# Patient Record
Sex: MALE | Race: WHITE | NOT HISPANIC OR LATINO | Employment: FULL TIME | URBAN - METROPOLITAN AREA
[De-identification: names, ages, dates, MRNs, and addresses within clinical notes are randomized per-mention and may not be internally consistent; named-entity substitution may affect disease eponyms.]

---

## 2018-04-04 ENCOUNTER — TRANSFERRED RECORDS (OUTPATIENT)
Dept: HEALTH INFORMATION MANAGEMENT | Facility: CLINIC | Age: 53
End: 2018-04-04

## 2021-09-17 ENCOUNTER — OFFICE VISIT (OUTPATIENT)
Dept: PHYSICAL MEDICINE AND REHAB | Facility: CLINIC | Age: 56
End: 2021-09-17
Payer: COMMERCIAL

## 2021-09-17 VITALS
OXYGEN SATURATION: 96 % | DIASTOLIC BLOOD PRESSURE: 81 MMHG | HEART RATE: 87 BPM | BODY MASS INDEX: 32.58 KG/M2 | WEIGHT: 220 LBS | HEIGHT: 69 IN | SYSTOLIC BLOOD PRESSURE: 119 MMHG
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
## 2021-09-17 DIAGNOSIS — R41.3 MEMORY LOSS: ICD-10-CM

## 2021-09-17 DIAGNOSIS — G47.30 SLEEP APNEA, UNSPECIFIED TYPE: Primary | ICD-10-CM

## 2021-09-17 DIAGNOSIS — E03.9 HYPOTHYROIDISM, UNSPECIFIED TYPE: ICD-10-CM

## 2021-09-17 DIAGNOSIS — F43.10 PTSD (POST-TRAUMATIC STRESS DISORDER): ICD-10-CM

## 2021-09-17 DIAGNOSIS — S13.4XXS WHIPLASH INJURIES, SEQUELA: ICD-10-CM

## 2021-09-17 PROCEDURE — 99204 OFFICE O/P NEW MOD 45 MIN: CPT | Performed by: PHYSICAL MEDICINE & REHABILITATION

## 2021-09-17 RX ORDER — MIRTAZAPINE 30 MG/1
30 TABLET, FILM COATED ORAL AT BEDTIME
COMMUNITY
Start: 2021-08-16

## 2021-09-17 RX ORDER — ALBUTEROL SULFATE 90 UG/1
AEROSOL, METERED RESPIRATORY (INHALATION)
COMMUNITY
Start: 2021-07-14

## 2021-09-17 RX ORDER — DIVALPROEX SODIUM 500 MG/1
1000 TABLET, DELAYED RELEASE ORAL
COMMUNITY
End: 2022-12-26

## 2021-09-17 RX ORDER — INSULIN LISPRO 100 [IU]/ML
INJECTION, SOLUTION INTRAVENOUS; SUBCUTANEOUS
Status: ON HOLD | COMMUNITY
Start: 2021-08-24 | End: 2024-01-01

## 2021-09-17 RX ORDER — LEVOTHYROXINE SODIUM 150 UG/1
1 TABLET ORAL DAILY
COMMUNITY
Start: 2021-07-22

## 2021-09-17 RX ORDER — LEVETIRACETAM 1000 MG/1
TABLET ORAL
COMMUNITY
Start: 2021-08-18 | End: 2022-10-24

## 2021-09-17 RX ORDER — BLOOD SUGAR DIAGNOSTIC
STRIP MISCELLANEOUS
COMMUNITY
Start: 2020-04-22

## 2021-09-17 RX ORDER — PEN NEEDLE, DIABETIC 30 GX5/16"
NEEDLE, DISPOSABLE MISCELLANEOUS
COMMUNITY
Start: 2020-12-10

## 2021-09-17 RX ORDER — ONDANSETRON 4 MG/1
TABLET, ORALLY DISINTEGRATING ORAL
Status: ON HOLD | COMMUNITY
Start: 2021-02-12 | End: 2024-01-01

## 2021-09-17 RX ORDER — GABAPENTIN 300 MG/1
900 CAPSULE ORAL 3 TIMES DAILY
COMMUNITY
Start: 2021-08-31

## 2021-09-17 RX ORDER — HYDROXYZINE PAMOATE 25 MG/1
CAPSULE ORAL
COMMUNITY
Start: 2021-08-19

## 2021-09-17 RX ORDER — CYCLOBENZAPRINE HCL 10 MG
TABLET ORAL
COMMUNITY
Start: 2020-12-14 | End: 2022-12-26

## 2021-09-17 RX ORDER — OXYMETAZOLINE HCL 0.05% 0.05 MG/ML
SPRAY NASAL
COMMUNITY
Start: 2021-04-24

## 2021-09-17 ASSESSMENT — MIFFLIN-ST. JEOR: SCORE: 1828.29

## 2021-09-17 ASSESSMENT — PAIN SCALES - GENERAL: PAINLEVEL: NO PAIN (0)
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
## 2021-09-24 ENCOUNTER — HOSPITAL ENCOUNTER (OUTPATIENT)
Dept: SPEECH THERAPY | Facility: CLINIC | Age: 56
Setting detail: THERAPIES SERIES
End: 2021-09-24
Attending: PHYSICAL MEDICINE & REHABILITATION
Payer: COMMERCIAL

## 2021-09-24 PROCEDURE — 96125 COGNITIVE TEST BY HC PRO: CPT | Mod: GN | Performed by: SPEECH-LANGUAGE PATHOLOGIST
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
## 2021-09-27 NOTE — PROGRESS NOTES
Speech Language Pathology     Cognitive Linguistic Quick Test (CLQT)    SUMMARY OF TEST:    The CLQT assesses visual attention and perception, working memory and language output skills, as well as auditory memory and comprehension.  Non-linguistic tasks can help assess planning, and self-monitoring, visual discrimination and analysis, as well as creativity and mental flexibility.   Together, these subtests assess the cognitive domains of attention, memory, executive function, language, and visuospatial skills using a severity rating of either WNL (within normal limits), Mild, Moderate or Severe.    RESULTS OF TESTING:   Attention    Score: 121    Severity Rating: Moderate   Memory    Score: 121    Severity Rating: Moderate   Executive Functions    Score: 26    Severity Rating: WNL    Language    Score: 28    Severity Rating: Mild   Visuospatial Skills    Score: 64    Severity Rating: Mild    Composite Severity Rating    Score: 2.8    Severity Rating: Mild    Clock Drawing     Score: 11    Severity Rating: Mild     INTERPRETATION OF TEST RESULTS: Overall pt scored in the mild range for overage cognitive domain score with moderate impairments in memory and attention, mild impairments in language and visuospatial skills and WNL in executive functions. Individual subtests in which pt scored below normal limits included symbol cancellation, clock drawing, story retelling, symbol trails, and design memory. Pt was WFL for the following individual subtests personal facts, confrontation naming, generative naming, mazes, and design generation. Patterns of impaired attention and memory with relatively intact executive function were noted.  TIME ADMINISTERING TEST: 45 min  TIME FOR INTERPRETATION AND PREPARATION OF REPORT: 45 min  TOTAL TIME: 90 min  Reference:  Kimmie Salas, [place], CCC-SLP, (2001) [employer]    Thank you for referring this patient.     Lynn Amado MS, CCC-SLP  Speech-Language  [facility]  [phone]

## 2021-10-01 ENCOUNTER — HOSPITAL ENCOUNTER (OUTPATIENT)
Dept: SPEECH THERAPY | Facility: CLINIC | Age: 56
Setting detail: THERAPIES SERIES
End: 2021-10-01
Payer: COMMERCIAL

## 2021-10-01 PROCEDURE — 97130 THER IVNTJ EA ADDL 15 MIN: CPT | Mod: GN | Performed by: SPEECH-LANGUAGE PATHOLOGIST

## 2021-10-01 PROCEDURE — 97129 THER IVNTJ 1ST 15 MIN: CPT | Mod: GN | Performed by: SPEECH-LANGUAGE PATHOLOGIST

## 2021-10-06 ENCOUNTER — HOSPITAL ENCOUNTER (OUTPATIENT)
Dept: SPEECH THERAPY | Facility: CLINIC | Age: 56
Setting detail: THERAPIES SERIES
End: 2021-10-06
Attending: PHYSICAL MEDICINE & REHABILITATION
Payer: COMMERCIAL

## 2021-10-06 PROCEDURE — 97130 THER IVNTJ EA ADDL 15 MIN: CPT | Mod: GN | Performed by: SPEECH-LANGUAGE PATHOLOGIST

## 2021-10-06 PROCEDURE — 97129 THER IVNTJ 1ST 15 MIN: CPT | Mod: GN | Performed by: SPEECH-LANGUAGE PATHOLOGIST

## 2021-10-14 ENCOUNTER — VIRTUAL VISIT (OUTPATIENT)
Dept: BEHAVIORAL HEALTH | Facility: CLINIC | Age: 56
End: 2021-10-14
Attending: PHYSICAL MEDICINE & REHABILITATION

## 2021-10-14 DIAGNOSIS — Z91.199 NO-SHOW FOR APPOINTMENT: Primary | ICD-10-CM

## 2021-10-14 NOTE — PROGRESS NOTES
No Show:    This patient was a no show for this scheduled appointment. Pt did not connect via text invitation to [employer]. Made two call attempts, both went to . Left a message with instructions of how to reschedule.     Connor Swenson, JENI    October 14, 2021

## 2021-10-15 ENCOUNTER — HOSPITAL ENCOUNTER (OUTPATIENT)
Dept: SPEECH THERAPY | Facility: CLINIC | Age: 56
Setting detail: THERAPIES SERIES
End: 2021-10-15
Attending: PHYSICAL MEDICINE & REHABILITATION
Payer: COMMERCIAL

## 2021-10-15 PROCEDURE — 97129 THER IVNTJ 1ST 15 MIN: CPT | Mod: GN | Performed by: SPEECH-LANGUAGE PATHOLOGIST

## 2021-10-15 PROCEDURE — 97130 THER IVNTJ EA ADDL 15 MIN: CPT | Mod: GN | Performed by: SPEECH-LANGUAGE PATHOLOGIST

## 2021-10-26 ENCOUNTER — HOSPITAL ENCOUNTER (OUTPATIENT)
Dept: SPEECH THERAPY | Facility: CLINIC | Age: 56
Setting detail: THERAPIES SERIES
End: 2021-10-26
Attending: PHYSICAL MEDICINE & REHABILITATION
Payer: COMMERCIAL

## 2021-10-26 PROCEDURE — 97130 THER IVNTJ EA ADDL 15 MIN: CPT | Mod: GN | Performed by: SPEECH-LANGUAGE PATHOLOGIST

## 2021-10-26 PROCEDURE — 97129 THER IVNTJ 1ST 15 MIN: CPT | Mod: GN | Performed by: SPEECH-LANGUAGE PATHOLOGIST

## 2021-11-10 ENCOUNTER — HOSPITAL ENCOUNTER (OUTPATIENT)
Dept: SPEECH THERAPY | Facility: CLINIC | Age: 56
Setting detail: THERAPIES SERIES
End: 2021-11-10
Attending: PHYSICAL MEDICINE & REHABILITATION
Payer: COMMERCIAL

## 2021-11-10 PROCEDURE — 97130 THER IVNTJ EA ADDL 15 MIN: CPT | Mod: GN | Performed by: SPEECH-LANGUAGE PATHOLOGIST

## 2021-11-10 PROCEDURE — 97129 THER IVNTJ 1ST 15 MIN: CPT | Mod: GN | Performed by: SPEECH-LANGUAGE PATHOLOGIST

## 2021-11-17 ENCOUNTER — HOSPITAL ENCOUNTER (OUTPATIENT)
Dept: SPEECH THERAPY | Facility: CLINIC | Age: 56
Setting detail: THERAPIES SERIES
End: 2021-11-17
Attending: PHYSICAL MEDICINE & REHABILITATION

## 2021-11-17 PROCEDURE — 97130 THER IVNTJ EA ADDL 15 MIN: CPT | Mod: GN | Performed by: SPEECH-LANGUAGE PATHOLOGIST

## 2021-11-17 PROCEDURE — 97129 THER IVNTJ 1ST 15 MIN: CPT | Mod: GN | Performed by: SPEECH-LANGUAGE PATHOLOGIST

## 2021-11-18 ENCOUNTER — VIRTUAL VISIT (OUTPATIENT)
Dept: BEHAVIORAL HEALTH | Facility: CLINIC | Age: 56
End: 2021-11-18

## 2021-11-18 DIAGNOSIS — Z91.199 NO-SHOW FOR APPOINTMENT: Primary | ICD-10-CM

## 2021-11-18 NOTE — PROGRESS NOTES
No Show:    This patient was a no show for this scheduled appointment. Sent two invitations to join video visit, by email and text. Called mobile 2x, went to . Left a message with instructions of how to reschedule.     Connor Swenson, JENI    November 18, 2021

## 2021-11-24 ENCOUNTER — HOSPITAL ENCOUNTER (OUTPATIENT)
Dept: SPEECH THERAPY | Facility: CLINIC | Age: 56
Setting detail: THERAPIES SERIES
End: 2021-11-24
Attending: PHYSICAL MEDICINE & REHABILITATION

## 2021-11-24 PROCEDURE — 97130 THER IVNTJ EA ADDL 15 MIN: CPT | Mod: GN | Performed by: SPEECH-LANGUAGE PATHOLOGIST

## 2021-11-24 PROCEDURE — 97129 THER IVNTJ 1ST 15 MIN: CPT | Mod: GN | Performed by: SPEECH-LANGUAGE PATHOLOGIST

## 2021-12-07 ENCOUNTER — THERAPY VISIT (OUTPATIENT)
Dept: PHYSICAL THERAPY | Facility: CLINIC | Age: 56
End: 2021-12-07
Payer: COMMERCIAL

## 2021-12-07 DIAGNOSIS — M54.2 CERVICALGIA: ICD-10-CM

## 2021-12-07 DIAGNOSIS — M54.50 LUMBAGO: ICD-10-CM

## 2021-12-07 DIAGNOSIS — M54.6 MIDLINE THORACIC BACK PAIN: ICD-10-CM

## 2021-12-07 PROCEDURE — 97110 THERAPEUTIC EXERCISES: CPT | Mod: GP
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
## 2022-01-27 ENCOUNTER — MEDICAL CORRESPONDENCE (OUTPATIENT)
Dept: HEALTH INFORMATION MANAGEMENT | Facility: CLINIC | Age: 57
End: 2022-01-27

## 2022-08-23 ENCOUNTER — TRANSFERRED RECORDS (OUTPATIENT)
Dept: HEALTH INFORMATION MANAGEMENT | Facility: CLINIC | Age: 57
End: 2022-08-23

## 2022-08-23 ENCOUNTER — MEDICAL CORRESPONDENCE (OUTPATIENT)
Dept: HEALTH INFORMATION MANAGEMENT | Facility: CLINIC | Age: 57
End: 2022-08-23

## 2022-08-27 ENCOUNTER — TRANSFERRED RECORDS (OUTPATIENT)
Dept: HEALTH INFORMATION MANAGEMENT | Facility: CLINIC | Age: 57
End: 2022-08-27

## 2022-08-30 ENCOUNTER — MEDICAL CORRESPONDENCE (OUTPATIENT)
Dept: HEALTH INFORMATION MANAGEMENT | Facility: CLINIC | Age: 57
End: 2022-08-30

## 2022-09-07 ENCOUNTER — MEDICAL CORRESPONDENCE (OUTPATIENT)
Dept: HEALTH INFORMATION MANAGEMENT | Facility: CLINIC | Age: 57
End: 2022-09-07

## 2022-10-19 ENCOUNTER — TRANSCRIBE ORDERS (OUTPATIENT)
Dept: OTHER | Age: 57
End: 2022-10-19

## 2022-10-19 ENCOUNTER — TELEPHONE (OUTPATIENT)
Dept: NEUROLOGY | Facility: CLINIC | Age: 57
End: 2022-10-19

## 2022-10-19 DIAGNOSIS — R41.89 COGNITIVE IMPAIRMENT: ICD-10-CM

## 2022-10-19 DIAGNOSIS — R73.09 LABILE BLOOD GLUCOSE: ICD-10-CM

## 2022-10-19 DIAGNOSIS — G40.919 INTRACTABLE SEIZURE DISORDER (H): Primary | ICD-10-CM

## 2022-10-19 DIAGNOSIS — G43.719 INTRACTABLE CHRONIC MIGRAINE WITHOUT AURA AND WITHOUT STATUS MIGRAINOSUS: ICD-10-CM

## 2022-10-19 DIAGNOSIS — R29.6 REPEATED FALLS: ICD-10-CM

## 2022-10-19 DIAGNOSIS — R00.0 TACHYCARDIA: ICD-10-CM

## 2022-10-19 DIAGNOSIS — R68.89 FLU-LIKE SYMPTOMS: ICD-10-CM

## 2022-10-24 ENCOUNTER — OFFICE VISIT (OUTPATIENT)
Dept: NEUROLOGY | Facility: CLINIC | Age: 57
End: 2022-10-24
Attending: PSYCHIATRY & NEUROLOGY
Payer: COMMERCIAL

## 2022-10-24 VITALS
BODY MASS INDEX: 31.25 KG/M2 | SYSTOLIC BLOOD PRESSURE: 126 MMHG | HEIGHT: 69 IN | HEART RATE: 76 BPM | TEMPERATURE: 97.8 F | WEIGHT: 211 LBS | DIASTOLIC BLOOD PRESSURE: 82 MMHG

## 2022-10-24 DIAGNOSIS — G40.319: Primary | ICD-10-CM

## 2022-10-24 RX ORDER — LEVETIRACETAM 750 MG/1
1500 TABLET ORAL 2 TIMES DAILY
COMMUNITY
Start: 2022-08-02

## 2022-10-24 RX ORDER — LACOSAMIDE 100 MG/1
100 TABLET ORAL 2 TIMES DAILY
COMMUNITY
Start: 2022-09-06

## 2022-10-24 RX ORDER — POLYETHYLENE GLYCOL 3350 17 G/17G
17 POWDER, FOR SOLUTION ORAL
Status: ON HOLD | COMMUNITY
End: 2024-01-01

## 2022-10-24 RX ORDER — DIHYDROERGOTAMINE MESYLATE 4 MG/ML
1 SPRAY NASAL
Status: ON HOLD | COMMUNITY
Start: 2022-08-11 | End: 2024-01-01

## 2022-10-24 RX ORDER — AMOXICILLIN 250 MG
1 CAPSULE ORAL DAILY
Status: ON HOLD | COMMUNITY
End: 2024-01-01

## 2022-10-24 RX ORDER — CETIRIZINE HYDROCHLORIDE 10 MG/1
10 TABLET ORAL DAILY
COMMUNITY
Start: 2022-07-22 | End: 2022-12-26

## 2022-10-24 RX ORDER — ASPIRIN 81 MG/1
81 TABLET, CHEWABLE ORAL EVERY EVENING
COMMUNITY
Start: 2022-08-03

## 2022-10-24 RX ORDER — LEVOTHYROXINE SODIUM 150 UG/1
150 TABLET ORAL DAILY
Status: ON HOLD | COMMUNITY
Start: 2022-08-02 | End: 2024-01-01

## 2022-10-24 RX ORDER — CALCIUM CARBONATE 500(1250)
500 TABLET,CHEWABLE ORAL
COMMUNITY
Start: 2022-08-02
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
## 2022-10-26 ENCOUNTER — TELEPHONE (OUTPATIENT)
Dept: NEUROLOGY | Facility: CLINIC | Age: 57
End: 2022-10-26

## 2022-10-26 NOTE — TELEPHONE ENCOUNTER
Silvano Motta is calling to inform Dr. Torres that the medication he has been taking is as follows :    Vimpat prescribed by Dr. Maddy Belle at [facility].    Patient states he was advised by Dr. Torres to provide this information.

## 2022-10-28 ENCOUNTER — TRANSFERRED RECORDS (OUTPATIENT)
Dept: HEALTH INFORMATION MANAGEMENT | Facility: CLINIC | Age: 57
End: 2022-10-28

## 2022-10-29 ENCOUNTER — TELEPHONE (OUTPATIENT)
Dept: NEUROLOGY | Facility: CLINIC | Age: 57
End: 2022-10-29

## 2022-10-29 NOTE — TELEPHONE ENCOUNTER
"Patient of Dr. Torres, needs \"full neuropsychological testing\" to qualify for assisted living, wondered how that would be arranged. Answer:  Call (or [facility], but he's been having trouble with MyChart) Dr. Torres's office on Monday.  "

## 2022-10-31 NOTE — TELEPHONE ENCOUNTER
Patient's mother is requesting a call back to discuss neuropsych testing. Caller would like to know if Dr. Torres would be willing to order this.    Please follow up at [phone].

## 2022-10-31 NOTE — TELEPHONE ENCOUNTER
Patient's mother states patient is ready to be admitted to assisted living, but needs to be deemed disabled by neuropsych testing. It looks like the schedulers have placed an appointment on hold for the patient for next week. Will check with Lacy Vizcarra and Brian to see if they both agree to this request.

## 2022-10-31 NOTE — TELEPHONE ENCOUNTER
Per Dr. Vizcarra, a clinical np test is not appropriate when the stated goal is in regards to disability claims. Patient was provided with the following providers to contact as alternatives.  Claudia Boo ([place])   Hafsa Adhikari ([place])   Lj Jin ([place])   Dawood Kuhn ([place])

## 2022-10-31 NOTE — TELEPHONE ENCOUNTER
Spoke with patient and recommended the following providers, per Dr. Vizcarra's recommendation, to do an evaluation for the patient:  Claudia Boo ([place])   Hafsa Adhikari ([place])   Lj Jin ([place])   Dawood Kuhn ([place])     Patient took down the information and will contact alternate providers.

## 2022-11-01 NOTE — TELEPHONE ENCOUNTER
What is the concern that needs to be addressed by a nurse? Patient  calling to let  know that Patient does not need the np testing for disability claim , he needs it to transition out from  and to be out in the community and if he will be able to do this testing for patient ?    May a detailed message be left on voicemail? yes    Date of last office visit: 10/24/2022    Message routed to: [facility] RN POOL

## 2022-11-02 NOTE — TELEPHONE ENCOUNTER
Contacted [facility], informed we can not do the type of neuropsychiatry testing they are looking for. Gave her the Providers we had recommended previously

## 2022-11-02 NOTE — TELEPHONE ENCOUNTER
Patient called back to clinic and is really wanting to have Neuropsych testing done, said he just needs regular Neuropsych testing. Would like to get referral for it. Informed Patient that Dr. Torres does not think it's necessary at this time. He would still abbi to have it done

## 2022-11-03 ENCOUNTER — MEDICAL CORRESPONDENCE (OUTPATIENT)
Dept: HEALTH INFORMATION MANAGEMENT | Facility: CLINIC | Age: 57
End: 2022-11-03

## 2022-11-03 ENCOUNTER — ANCILLARY PROCEDURE (OUTPATIENT)
Dept: NEUROLOGY | Facility: CLINIC | Age: 57
End: 2022-11-03
Attending: PSYCHIATRY & NEUROLOGY
Payer: COMMERCIAL

## 2022-11-03 DIAGNOSIS — G40.319: ICD-10-CM

## 2022-11-03 NOTE — TELEPHONE ENCOUNTER
Call placed to patient.  We discussed 's recommendations that he should discuss neuropsychometric testing with his primary care provider, or his regular neurologist (both part of [facility] system)    notes that he would only order epilepsy specific testing if indicated, and at this time he does not feel the testing is indicated.    Patient expressed understanding of this, and said he would call his  as his doctors at [facility] would not be available

## 2022-11-08 ENCOUNTER — TELEPHONE (OUTPATIENT)
Dept: NEUROLOGY | Facility: CLINIC | Age: 57
End: 2022-11-08

## 2022-11-08 NOTE — TELEPHONE ENCOUNTER
What is the concern that needs to be addressed by a nurse? Patient mom is hoping to get EEG results done on 11/3/22. Offered appointment on 11/23 but they will be out of town and hoping to get results before then.    May a detailed message be left on voicemail? Yes    Date of last office visit: 10/24/22    Message routed to: [facility] RN pool

## 2022-12-16 ENCOUNTER — TELEPHONE (OUTPATIENT)
Dept: NEUROLOGY | Facility: CLINIC | Age: 57
End: 2022-12-16

## 2022-12-16 NOTE — TELEPHONE ENCOUNTER
What is the concern that needs to be addressed by a nurse?Patient sister would like to discuss few thing with nurse. Recent results and next steps. Consent of file    May a detailed message be left on voicemail? Yes    Date of last office visit: 10/24/22    Message routed to: [facility] RN Pool

## 2022-12-19 NOTE — TELEPHONE ENCOUNTER
Wants to make sure Dr. Torres has been able to review records from [facility], available thru [facility] where

## 2022-12-20 NOTE — TELEPHONE ENCOUNTER
Chart reviewed.  Scanned consent to communicate reviewed.   The document signature has not been dated.   Signature does not match signature of photo ID and previous consent to communicate that has not names listed in the person to communicate with section (date 10/5/2021)

## 2022-12-26 ENCOUNTER — OFFICE VISIT (OUTPATIENT)
Dept: NEUROLOGY | Facility: CLINIC | Age: 57
End: 2022-12-26
Payer: COMMERCIAL

## 2022-12-26 VITALS
BODY MASS INDEX: 31.25 KG/M2 | DIASTOLIC BLOOD PRESSURE: 82 MMHG | HEART RATE: 78 BPM | HEIGHT: 69 IN | SYSTOLIC BLOOD PRESSURE: 116 MMHG | WEIGHT: 211 LBS | TEMPERATURE: 97.9 F

## 2022-12-26 DIAGNOSIS — G40.319: Primary | ICD-10-CM

## 2022-12-26 DIAGNOSIS — G35 MULTIPLE SCLEROSIS (H): ICD-10-CM
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
## 2022-12-26 NOTE — LETTER
Date:December 27, 2022      Patient was self referred, no letter generated. Do not send.        [facility] Health Information
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
## 2022-12-31 NOTE — TELEPHONE ENCOUNTER
RECORDS RECEIVED FROM: Internal   REASON FOR VISIT: MS   Date of Appt: 1.25.23   NOTES (FOR ALL VISITS) STATUS DETAILS   OFFICE NOTE from referring provider Internal 12.26.22 Dr. Torres, [facility] Neurology   NEUROPSYCH Care Everywhere [facility]:  7/20/21   MEDICATION LIST Internal    IMAGING  (FOR ALL VISITS)     LUMBAR PUNCTURE Care Everywhere [facility]:  8/26/22   MRI (HEAD, NECK, SPINE) Received   8.26.22 MR Brain  8.26.22 MR Angio Head  12.10.21 MRI Brain   12.9.21 MR Brain  11.26.21 MR cervical spine  11.26.21 MR brain      Action 12.31.22 MJ   Action Taken Requested images from     * states images are in PACS. No * images were requested    1/18/23 MV 2.26pm  2nd request faxed to [facility] for Dec 2021 MRI Brains    1/19/23 MV 7.03am  Images resolved in PACS

## 2023-01-01 ENCOUNTER — TELEPHONE (OUTPATIENT)
Dept: NEUROLOGY | Facility: CLINIC | Age: 58
End: 2023-01-01
Payer: COMMERCIAL

## 2023-01-01 ENCOUNTER — PRE VISIT (OUTPATIENT)
Dept: NEUROLOGY | Facility: CLINIC | Age: 58
End: 2023-01-01

## 2023-01-01 ENCOUNTER — ANCILLARY PROCEDURE (OUTPATIENT)
Dept: MRI IMAGING | Facility: CLINIC | Age: 58
End: 2023-01-01
Attending: COUNSELOR
Payer: COMMERCIAL

## 2023-01-01 ENCOUNTER — TELEPHONE (OUTPATIENT)
Dept: NEUROLOGY | Facility: CLINIC | Age: 58
End: 2023-01-01

## 2023-01-01 DIAGNOSIS — R25.1 TREMOR: ICD-10-CM

## 2023-01-01 DIAGNOSIS — R41.3 MEMORY LOSS: ICD-10-CM

## 2023-01-01 DIAGNOSIS — R90.89 LEPTOMENINGEAL ENHANCEMENT ON MRI OF BRAIN: ICD-10-CM

## 2023-01-01 DIAGNOSIS — R29.2 REFLEX ASYMMETRY: ICD-10-CM

## 2023-01-01 PROCEDURE — 70553 MRI BRAIN STEM W/O & W/DYE: CPT | Mod: GC | Performed by: STUDENT IN AN ORGANIZED HEALTH CARE EDUCATION/TRAINING PROGRAM

## 2023-01-01 PROCEDURE — A9585 GADOBUTROL INJECTION: HCPCS | Performed by: STUDENT IN AN ORGANIZED HEALTH CARE EDUCATION/TRAINING PROGRAM

## 2023-01-01 PROCEDURE — 72156 MRI NECK SPINE W/O & W/DYE: CPT | Mod: GC | Performed by: RADIOLOGY

## 2023-01-01 RX ORDER — GADOBUTROL 604.72 MG/ML
10 INJECTION INTRAVENOUS ONCE
Status: COMPLETED | OUTPATIENT
Start: 2023-01-01 | End: 2023-01-01

## 2023-01-01 RX ADMIN — GADOBUTROL 10 ML: 604.72 INJECTION INTRAVENOUS at 08:51

## 2023-01-25 ENCOUNTER — LAB (OUTPATIENT)
Dept: LAB | Facility: CLINIC | Age: 58
End: 2023-01-25
Payer: COMMERCIAL

## 2023-01-25 ENCOUNTER — OFFICE VISIT (OUTPATIENT)
Dept: NEUROLOGY | Facility: CLINIC | Age: 58
End: 2023-01-25
Attending: PSYCHIATRY & NEUROLOGY
Payer: COMMERCIAL

## 2023-01-25 ENCOUNTER — PRE VISIT (OUTPATIENT)
Dept: NEUROLOGY | Facility: CLINIC | Age: 58
End: 2023-01-25
Payer: COMMERCIAL

## 2023-01-25 VITALS
HEART RATE: 87 BPM | DIASTOLIC BLOOD PRESSURE: 75 MMHG | OXYGEN SATURATION: 96 % | WEIGHT: 211.3 LBS | SYSTOLIC BLOOD PRESSURE: 115 MMHG | BODY MASS INDEX: 31.2 KG/M2

## 2023-01-25 DIAGNOSIS — R25.1 TREMOR: ICD-10-CM

## 2023-01-25 DIAGNOSIS — R90.89 LEPTOMENINGEAL ENHANCEMENT ON MRI OF BRAIN: Primary | ICD-10-CM

## 2023-01-25 DIAGNOSIS — R29.2 REFLEX ASYMMETRY: ICD-10-CM

## 2023-01-25 DIAGNOSIS — G35 MULTIPLE SCLEROSIS (H): ICD-10-CM

## 2023-01-25 DIAGNOSIS — R41.3 MEMORY LOSS: ICD-10-CM

## 2023-01-25 DIAGNOSIS — R90.89 LEPTOMENINGEAL ENHANCEMENT ON MRI OF BRAIN: ICD-10-CM

## 2023-01-25 LAB
Lab: NORMAL
PERFORMING LABORATORY: NORMAL
RHEUMATOID FACT SER NEPH-ACNC: <7 IU/ML
SPECIMEN STATUS: NORMAL
T PALLIDUM AB SER QL: NONREACTIVE
TEST NAME: NORMAL

## 2023-01-25 PROCEDURE — 99205 OFFICE O/P NEW HI 60 MIN: CPT | Mod: GC | Performed by: PSYCHIATRY & NEUROLOGY

## 2023-01-25 PROCEDURE — 86038 ANTINUCLEAR ANTIBODIES: CPT | Mod: 90 | Performed by: PATHOLOGY

## 2023-01-25 PROCEDURE — 36415 COLL VENOUS BLD VENIPUNCTURE: CPT | Performed by: PATHOLOGY

## 2023-01-25 PROCEDURE — 86255 FLUORESCENT ANTIBODY SCREEN: CPT | Performed by: PATHOLOGY

## 2023-01-25 PROCEDURE — 86225 DNA ANTIBODY NATIVE: CPT | Mod: 90 | Performed by: PATHOLOGY

## 2023-01-25 PROCEDURE — 86431 RHEUMATOID FACTOR QUANT: CPT | Mod: 90 | Performed by: PATHOLOGY

## 2023-01-25 PROCEDURE — 83519 RIA NONANTIBODY: CPT | Performed by: PATHOLOGY

## 2023-01-25 PROCEDURE — 86780 TREPONEMA PALLIDUM: CPT | Mod: 90 | Performed by: PATHOLOGY

## 2023-01-25 PROCEDURE — G0463 HOSPITAL OUTPT CLINIC VISIT: HCPCS | Performed by: PSYCHIATRY & NEUROLOGY

## 2023-01-25 PROCEDURE — 86036 ANCA SCREEN EACH ANTIBODY: CPT | Mod: 90 | Performed by: PATHOLOGY

## 2023-01-25 PROCEDURE — G0463 HOSPITAL OUTPT CLINIC VISIT: HCPCS

## 2023-01-25 PROCEDURE — 86235 NUCLEAR ANTIGEN ANTIBODY: CPT | Mod: 90 | Performed by: PATHOLOGY

## 2023-01-25 PROCEDURE — 86596 VOLTAGE-GTD CA CHNL ANTB EA: CPT | Performed by: PATHOLOGY

## 2023-01-25 PROCEDURE — 86341 ISLET CELL ANTIBODY: CPT | Mod: 90 | Performed by: PATHOLOGY

## 2023-01-25 PROCEDURE — 86039 ANTINUCLEAR ANTIBODIES (ANA): CPT | Mod: 90 | Performed by: PATHOLOGY

## 2023-01-25 PROCEDURE — 99000 SPECIMEN HANDLING OFFICE-LAB: CPT | Performed by: PATHOLOGY

## 2023-01-25 PROCEDURE — 86256 FLUORESCENT ANTIBODY TITER: CPT | Mod: 90 | Performed by: PATHOLOGY

## 2023-01-25 RX ORDER — ACETAMINOPHEN 500 MG
1000 TABLET ORAL
Status: ON HOLD | COMMUNITY
Start: 2022-08-02 | End: 2024-01-01

## 2023-01-25 ASSESSMENT — PAIN SCALES - GENERAL: PAINLEVEL: NO PAIN (0)

## 2023-01-25 NOTE — NURSING NOTE
Chief Complaint   Patient presents with     MS     New Patient     Multiple sclerosis/Demeylination      Vitals were taken and medications were reconciled.   Marcio Baptiste, EMT  8:14 AM
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
## 2023-01-25 NOTE — PATIENT INSTRUCTIONS
We don't see findings consistent with MS on your MRI. I don't think this is MS. You did have oligoclonal bands in your spinal fluid which can fit with other immune diseases.     We would like to repeat your MRI to check how things have changed since you were in the hospital. We would also like to check for changes in your neck.     We will send some lab work for autoimmune diseases which could explain your symptoms including FLORENTINO ab which can sometimes cause seizures and be associated with DMI.     It is possible that many of the changes you are experiencing are related to your TBI and seizures. Seizures can cause injury to the brain over time.     Follow up in 2 weeks to discuss results (with Dr. Ibrahim)

## 2023-01-26 LAB
ANA PAT SER IF-IMP: ABNORMAL
ANA SER QL IF: POSITIVE
ANA TITR SER IF: ABNORMAL {TITER}
ANCA AB PATTERN SER IF-IMP: NORMAL
C-ANCA TITR SER IF: NORMAL {TITER}
DSDNA AB SER-ACNC: 2 IU/ML
ENA SS-A AB SER IA-ACNC: <0.5 U/ML
ENA SS-A AB SER IA-ACNC: NEGATIVE
ENA SS-B IGG SER IA-ACNC: <0.6 U/ML
ENA SS-B IGG SER IA-ACNC: NEGATIVE

## 2023-01-28 LAB — GAD65 AB SER IA-ACNC: 42.7 IU/ML

## 2023-02-02 LAB — MAYO MISC RESULT: NORMAL

## 2023-02-13 ENCOUNTER — LAB (OUTPATIENT)
Dept: LAB | Facility: CLINIC | Age: 58
End: 2023-02-13
Payer: COMMERCIAL

## 2023-02-13 ENCOUNTER — OFFICE VISIT (OUTPATIENT)
Dept: NEUROLOGY | Facility: CLINIC | Age: 58
End: 2023-02-13
Attending: COUNSELOR
Payer: COMMERCIAL

## 2023-02-13 VITALS
OXYGEN SATURATION: 94 % | SYSTOLIC BLOOD PRESSURE: 122 MMHG | DIASTOLIC BLOOD PRESSURE: 83 MMHG | BODY MASS INDEX: 31.5 KG/M2 | HEART RATE: 87 BPM | WEIGHT: 213.3 LBS

## 2023-02-13 DIAGNOSIS — R41.3 MEMORY LOSS: ICD-10-CM

## 2023-02-13 DIAGNOSIS — G40.909 SEIZURE DISORDER (H): Primary | ICD-10-CM

## 2023-02-13 DIAGNOSIS — G40.909 SEIZURE DISORDER (H): ICD-10-CM

## 2023-02-13 DIAGNOSIS — G44.219 EPISODIC TENSION-TYPE HEADACHE, NOT INTRACTABLE: ICD-10-CM

## 2023-02-13 LAB
Lab: NORMAL
PERFORMING LABORATORY: NORMAL
TEST NAME: NORMAL

## 2023-02-13 PROCEDURE — 86341 ISLET CELL ANTIBODY: CPT | Performed by: PATHOLOGY

## 2023-02-13 PROCEDURE — G0463 HOSPITAL OUTPT CLINIC VISIT: HCPCS | Performed by: COUNSELOR

## 2023-02-13 PROCEDURE — 99215 OFFICE O/P EST HI 40 MIN: CPT | Performed by: COUNSELOR

## 2023-02-13 PROCEDURE — 36415 COLL VENOUS BLD VENIPUNCTURE: CPT | Performed by: PATHOLOGY

## 2023-02-13 PROCEDURE — G0463 HOSPITAL OUTPT CLINIC VISIT: HCPCS

## 2023-02-13 PROCEDURE — 86255 FLUORESCENT ANTIBODY SCREEN: CPT | Performed by: PATHOLOGY

## 2023-02-13 PROCEDURE — 99417 PROLNG OP E/M EACH 15 MIN: CPT | Performed by: COUNSELOR

## 2023-02-13 ASSESSMENT — PAIN SCALES - GENERAL: PAINLEVEL: NO PAIN (0)

## 2023-02-13 NOTE — NURSING NOTE
Chief Complaint   Patient presents with     MS     New Patient     2 week follow up lab work done prior      Vitals were taken and medications were reconciled.   Marcio Baptiste, EMT  12:35 PM
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
## 2023-02-13 NOTE — PATIENT INSTRUCTIONS
MRI brain (repeat) and cervical spine.  Blood work today: autoimmune epilepsy panel.    Your FLORENTINO antibody was very mildly elevated, this can sometimes be associated with autoimmune epilepsy. Yours is not high enough for this to be of big concern but it should be rechecked if your. seizures were to become refractory to treatment.   We will refer you to a headache specialist.     If we do not see anything on the MRI of concern you can follow up with Dr. Torres as previously planned.
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
## 2023-02-23 LAB — MAYO MISC RESULT: ABNORMAL

## 2023-03-22 NOTE — TELEPHONE ENCOUNTER
What is the concern that needs to be addressed by a nurse?  called in stating that he needs an call back regarding patient medical disability case with jose luis calvo from 11/09/2020 until now .  would like a call back at [phone]        May a detailed message be left on voicemail?yes        Message routed to: enoc lopez

## 2023-03-23 NOTE — TELEPHONE ENCOUNTER
"Per   \"He had a history of seizures that were controlled with medications that were started by other physicians before I first saw him in October 2022.  I am not prescribing the medications.     I referred him to the MS Clinic for evaluation of brain white matter lesions on MRI, which do not appear to represent MS, but rather some other white matter condition (unrelated to epilepsy) that might cause his encephalopathy and focal motor deficits.  The MS specialists have ordered cervical and brain MRI, which have not been completed yet.       I do not have an opinion on any possible cause of disability.  If someone else completed a disability form, Dr. Peña may want to speak with that person.  Thanks.   \"    Call placed to , message left with a call back, and a comment that  does not have an opinion on any possible cause of disability  "

## 2023-03-28 NOTE — DISCHARGE INSTRUCTIONS
MRI Contrast Discharge Instructions    The IV contrast you received today will pass out of your body in your  urine. This will happen in the next 24 hours. You will not feel this process.  Your urine will not change color.    Drink at least 4 extra glasses of water or juice today (unless your doctor  has restricted your fluids). This reduces the stress on your kidneys.  You may take your regular medicines.    If you are on dialysis: It is best to have dialysis today.    If you have a reaction: Most reactions happen right away. If you have  any new symptoms after leaving the hospital (such as hives or swelling),  call your hospital at the correct number below. Or call your family doctor.  If you have breathing distress or wheezing, call 911.    Special instructions: ***    I have read and understand the above information.    Signature:______________________________________ Date:___________    Staff:__________________________________________ Date:___________     Time:__________    [place] [facility] Departments:    ___[place]: [phone]  ___[facility]: [phone]  ___[place]: [phone] ___[facility]: [phone]  ___[facility]: [phone]  ___[facility]: [phone]  ___[phone]  ___[facility]: [phone]  ___[phone]

## 2023-03-28 NOTE — DISCHARGE INSTRUCTIONS
MRI Contrast Discharge Instructions    The IV contrast you received today will pass out of your body in your  urine. This will happen in the next 24 hours. You will not feel this process.  Your urine will not change color.    Drink at least 4 extra glasses of water or juice today (unless your doctor  has restricted your fluids). This reduces the stress on your kidneys.  You may take your regular medicines.    If you are on dialysis: It is best to have dialysis today.    If you have a reaction: Most reactions happen right away. If you have  any new symptoms after leaving the hospital (such as hives or swelling),  call your hospital at the correct number below. Or call your family doctor.  If you have breathing distress or wheezing, call 911.    Special instructions: ***    I have read and understand the above information.    Signature:______________________________________ Date:___________    Staff:__________________________________________ Date:___________     Time:__________    [place] [facility] Departments:    ___[place]: [phone]  ___[facility]: [phone]  ___[place]: [phone] ___[facility]: [phone]  ___[facility]: [phone]  ___[facility]: [phone]  ___[phone]  ___[facility]: [phone]  ___[phone]
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
## 2023-07-07 NOTE — TELEPHONE ENCOUNTER
LVM for pt, Please verify pt's insurance. If he has only [employer] with medicare coverage, both appts will need to be canceled. Dr. Rosenthal and Dr. Torres.    7/7/23 BD

## 2024-01-01 ENCOUNTER — APPOINTMENT (OUTPATIENT)
Dept: ULTRASOUND IMAGING | Facility: CLINIC | Age: 59
DRG: 003 | End: 2024-01-01
Attending: STUDENT IN AN ORGANIZED HEALTH CARE EDUCATION/TRAINING PROGRAM
Payer: COMMERCIAL

## 2024-01-01 ENCOUNTER — APPOINTMENT (OUTPATIENT)
Dept: PHYSICAL THERAPY | Facility: CLINIC | Age: 59
DRG: 003 | End: 2024-01-01
Attending: INTERNAL MEDICINE
Payer: COMMERCIAL

## 2024-01-01 ENCOUNTER — APPOINTMENT (OUTPATIENT)
Dept: CT IMAGING | Facility: CLINIC | Age: 59
DRG: 003 | End: 2024-01-01
Attending: STUDENT IN AN ORGANIZED HEALTH CARE EDUCATION/TRAINING PROGRAM
Payer: COMMERCIAL

## 2024-01-01 ENCOUNTER — APPOINTMENT (OUTPATIENT)
Dept: CT IMAGING | Facility: CLINIC | Age: 59
DRG: 003 | End: 2024-01-01
Payer: COMMERCIAL

## 2024-01-01 ENCOUNTER — APPOINTMENT (OUTPATIENT)
Dept: GENERAL RADIOLOGY | Facility: CLINIC | Age: 59
DRG: 003 | End: 2024-01-01
Attending: STUDENT IN AN ORGANIZED HEALTH CARE EDUCATION/TRAINING PROGRAM
Payer: COMMERCIAL

## 2024-01-01 ENCOUNTER — APPOINTMENT (OUTPATIENT)
Dept: NEUROLOGY | Facility: CLINIC | Age: 59
DRG: 003 | End: 2024-01-01
Attending: STUDENT IN AN ORGANIZED HEALTH CARE EDUCATION/TRAINING PROGRAM
Payer: COMMERCIAL

## 2024-01-01 ENCOUNTER — APPOINTMENT (OUTPATIENT)
Dept: GENERAL RADIOLOGY | Facility: CLINIC | Age: 59
DRG: 003 | End: 2024-01-01
Attending: INTERNAL MEDICINE
Payer: COMMERCIAL

## 2024-01-01 ENCOUNTER — APPOINTMENT (OUTPATIENT)
Dept: ULTRASOUND IMAGING | Facility: CLINIC | Age: 59
DRG: 003 | End: 2024-01-01
Attending: INTERNAL MEDICINE
Payer: COMMERCIAL

## 2024-01-01 ENCOUNTER — REFERRAL (OUTPATIENT)
Dept: TRANSPLANT | Facility: CLINIC | Age: 59
End: 2024-01-01

## 2024-01-01 ENCOUNTER — APPOINTMENT (OUTPATIENT)
Dept: PHYSICAL THERAPY | Facility: CLINIC | Age: 59
DRG: 003 | End: 2024-01-01
Payer: COMMERCIAL

## 2024-01-01 ENCOUNTER — TELEPHONE (OUTPATIENT)
Dept: CARDIOLOGY | Facility: CLINIC | Age: 59
End: 2024-01-01
Payer: COMMERCIAL

## 2024-01-01 ENCOUNTER — TRANSCRIBE ORDERS (OUTPATIENT)
Dept: OTHER | Age: 59
End: 2024-01-01

## 2024-01-01 ENCOUNTER — ANESTHESIA EVENT (OUTPATIENT)
Dept: MEDSURG UNIT | Facility: CLINIC | Age: 59
DRG: 003 | End: 2024-01-01
Payer: COMMERCIAL

## 2024-01-01 ENCOUNTER — APPOINTMENT (OUTPATIENT)
Dept: CARDIOLOGY | Facility: CLINIC | Age: 59
DRG: 003 | End: 2024-01-01
Attending: NURSE PRACTITIONER
Payer: COMMERCIAL

## 2024-01-01 ENCOUNTER — MEDICAL CORRESPONDENCE (OUTPATIENT)
Dept: HEALTH INFORMATION MANAGEMENT | Facility: CLINIC | Age: 59
End: 2024-01-01
Payer: COMMERCIAL

## 2024-01-01 ENCOUNTER — APPOINTMENT (OUTPATIENT)
Dept: CARDIOLOGY | Facility: CLINIC | Age: 59
DRG: 003 | End: 2024-01-01
Attending: STUDENT IN AN ORGANIZED HEALTH CARE EDUCATION/TRAINING PROGRAM
Payer: COMMERCIAL

## 2024-01-01 ENCOUNTER — APPOINTMENT (OUTPATIENT)
Dept: CARDIOLOGY | Facility: CLINIC | Age: 59
DRG: 003 | End: 2024-01-01
Payer: COMMERCIAL

## 2024-01-01 ENCOUNTER — APPOINTMENT (OUTPATIENT)
Dept: CARDIOLOGY | Facility: CLINIC | Age: 59
DRG: 003 | End: 2024-01-01
Attending: INTERNAL MEDICINE
Payer: COMMERCIAL

## 2024-01-01 ENCOUNTER — PRE VISIT (OUTPATIENT)
Dept: CARDIOLOGY | Facility: CLINIC | Age: 59
End: 2024-01-01
Payer: COMMERCIAL

## 2024-01-01 ENCOUNTER — HOSPITAL ENCOUNTER (INPATIENT)
Facility: CLINIC | Age: 59
LOS: 36 days | DRG: 003 | End: 2024-03-11
Attending: INTERNAL MEDICINE | Admitting: INTERNAL MEDICINE
Payer: COMMERCIAL

## 2024-01-01 ENCOUNTER — APPOINTMENT (OUTPATIENT)
Dept: GENERAL RADIOLOGY | Facility: CLINIC | Age: 59
DRG: 003 | End: 2024-01-01
Payer: COMMERCIAL

## 2024-01-01 ENCOUNTER — TELEPHONE (OUTPATIENT)
Dept: NEUROLOGY | Facility: CLINIC | Age: 59
End: 2024-01-01

## 2024-01-01 ENCOUNTER — APPOINTMENT (OUTPATIENT)
Dept: NUCLEAR MEDICINE | Facility: CLINIC | Age: 59
DRG: 003 | End: 2024-01-01
Attending: STUDENT IN AN ORGANIZED HEALTH CARE EDUCATION/TRAINING PROGRAM
Payer: COMMERCIAL

## 2024-01-01 ENCOUNTER — ANESTHESIA (OUTPATIENT)
Dept: MEDSURG UNIT | Facility: CLINIC | Age: 59
DRG: 003 | End: 2024-01-01
Payer: COMMERCIAL

## 2024-01-01 ENCOUNTER — TRANSFERRED RECORDS (OUTPATIENT)
Dept: HEALTH INFORMATION MANAGEMENT | Facility: CLINIC | Age: 59
End: 2024-01-01
Payer: COMMERCIAL

## 2024-01-01 ENCOUNTER — APPOINTMENT (OUTPATIENT)
Dept: CT IMAGING | Facility: CLINIC | Age: 59
DRG: 003 | End: 2024-01-01
Attending: INTERNAL MEDICINE
Payer: COMMERCIAL

## 2024-01-01 ENCOUNTER — APPOINTMENT (OUTPATIENT)
Dept: GENERAL RADIOLOGY | Facility: CLINIC | Age: 59
DRG: 003 | End: 2024-01-01
Attending: PHYSICIAN ASSISTANT
Payer: COMMERCIAL

## 2024-01-01 ENCOUNTER — TELEPHONE (OUTPATIENT)
Dept: INTERNAL MEDICINE | Facility: CLINIC | Age: 59
End: 2024-01-01

## 2024-01-01 VITALS
WEIGHT: 247.8 LBS | TEMPERATURE: 98.4 F | HEIGHT: 70 IN | HEART RATE: 95 BPM | SYSTOLIC BLOOD PRESSURE: 122 MMHG | DIASTOLIC BLOOD PRESSURE: 60 MMHG | OXYGEN SATURATION: 97 % | RESPIRATION RATE: 15 BRPM | BODY MASS INDEX: 35.48 KG/M2

## 2024-01-01 DIAGNOSIS — E10.65 TYPE 1 DIABETES MELLITUS WITH HYPERGLYCEMIA (H): ICD-10-CM

## 2024-01-01 DIAGNOSIS — I27.20 PULMONARY HYPERTENSION (H): ICD-10-CM

## 2024-01-01 DIAGNOSIS — R06.02 SOB (SHORTNESS OF BREATH): Primary | ICD-10-CM

## 2024-01-01 DIAGNOSIS — Z09 ENCOUNTER FOR FOLLOW-UP EXAMINATION AFTER COMPLETED TREATMENT FOR CONDITIONS OTHER THAN MALIGNANT NEOPLASM: ICD-10-CM

## 2024-01-01 DIAGNOSIS — Z11.59 ENCOUNTER FOR SCREENING FOR OTHER VIRAL DISEASES: ICD-10-CM

## 2024-01-01 DIAGNOSIS — I46.9 CARDIAC ARREST (H): ICD-10-CM

## 2024-01-01 DIAGNOSIS — I27.20 PULMONARY HYPERTENSION (H): Primary | ICD-10-CM

## 2024-01-01 DIAGNOSIS — Z91.89 AT HIGH RISK FOR PRESSURE INJURY OF SKIN: ICD-10-CM

## 2024-01-01 DIAGNOSIS — R79.9 ABNORMAL FINDING OF BLOOD CHEMISTRY, UNSPECIFIED: ICD-10-CM

## 2024-01-01 DIAGNOSIS — J96.01 ACUTE HYPOXEMIC RESPIRATORY FAILURE (H): ICD-10-CM

## 2024-01-01 LAB
7AMINOCLONAZEPAM SERPL-MCNC: NEGATIVE NG/ML
A FLAVUS AB SER QL ID: NORMAL
A FUMIGATUS1 AB SER QL ID: NORMAL
A FUMIGATUS2 AB SER QL ID: NORMAL
A FUMIGATUS3 AB SER QL ID: NORMAL
A FUMIGATUS6 AB SER QL ID: NORMAL
A PULLULANS AB SER QL ID: NORMAL
ABO/RH(D): NORMAL
ACANTHOCYTES BLD QL SMEAR: NORMAL
ACT BLD: 108 SECONDS (ref 74–150)
ACT BLD: 112 SECONDS (ref 74–150)
ACT BLD: 116 SECONDS (ref 74–150)
ACT BLD: 120 SECONDS (ref 74–150)
ACT BLD: 124 SECONDS (ref 74–150)
ACT BLD: 135 SECONDS (ref 74–150)
ACT BLD: 139 SECONDS (ref 74–150)
ACT BLD: 143 SECONDS (ref 74–150)
ACT BLD: 143 SECONDS (ref 74–150)
ACT BLD: 147 SECONDS (ref 74–150)
ACT BLD: 151 SECONDS (ref 74–150)
ACT BLD: 154 SECONDS (ref 74–150)
ACT BLD: 155 SECONDS (ref 74–150)
ACT BLD: 155 SECONDS (ref 74–150)
ACT BLD: 158 SECONDS (ref 74–150)
ACT BLD: 162 SECONDS (ref 74–150)
ACT BLD: 166 SECONDS (ref 74–150)
ACT BLD: 170 SECONDS (ref 74–150)
ACT BLD: 174 SECONDS (ref 74–150)
ACT BLD: 178 SECONDS (ref 74–150)
ACT BLD: 182 SECONDS (ref 74–150)
ACT BLD: 182 SECONDS (ref 74–150)
ACT BLD: 185 SECONDS (ref 74–150)
ACT BLD: 185 SECONDS (ref 74–150)
ACT BLD: 186 SECONDS (ref 74–150)
ACT BLD: 186 SECONDS (ref 74–150)
ACT BLD: 189 SECONDS (ref 74–150)
ACT BLD: 193 SECONDS (ref 74–150)
ACT BLD: 197 SECONDS (ref 74–150)
ACT BLD: 201 SECONDS (ref 74–150)
ACT BLD: 205 SECONDS (ref 74–150)
ACT BLD: 205 SECONDS (ref 74–150)
ACT BLD: 213 SECONDS (ref 74–150)
ACT BLD: 228 SECONDS (ref 74–150)
ACT BLD: 302 SECONDS (ref 74–150)
ALBUMIN SERPL BCG-MCNC: 3 G/DL (ref 3.5–5.2)
ALBUMIN SERPL BCG-MCNC: 3.1 G/DL (ref 3.5–5.2)
ALBUMIN SERPL BCG-MCNC: 3.2 G/DL (ref 3.5–5.2)
ALBUMIN SERPL BCG-MCNC: 3.3 G/DL (ref 3.5–5.2)
ALBUMIN SERPL BCG-MCNC: 3.4 G/DL (ref 3.5–5.2)
ALBUMIN SERPL BCG-MCNC: 3.5 G/DL (ref 3.5–5.2)
ALBUMIN SERPL BCG-MCNC: 3.6 G/DL (ref 3.5–5.2)
ALBUMIN SERPL BCG-MCNC: 3.7 G/DL (ref 3.5–5.2)
ALBUMIN SERPL BCG-MCNC: 3.8 G/DL (ref 3.5–5.2)
ALBUMIN SERPL BCG-MCNC: 3.9 G/DL (ref 3.5–5.2)
ALBUMIN SERPL BCG-MCNC: 3.9 G/DL (ref 3.5–5.2)
ALBUMIN SERPL BCG-MCNC: 4 G/DL (ref 3.5–5.2)
ALBUMIN SERPL BCG-MCNC: 4.1 G/DL (ref 3.5–5.2)
ALBUMIN SERPL BCG-MCNC: 4.2 G/DL (ref 3.5–5.2)
ALBUMIN SERPL BCG-MCNC: 4.2 G/DL (ref 3.5–5.2)
ALBUMIN UR-MCNC: 50 MG/DL
ALBUMIN UR-MCNC: NEGATIVE MG/DL
ALBUMIN UR-MCNC: NEGATIVE MG/DL
ALDOLASE SERPL-CCNC: 5.7 U/L
ALLEN'S TEST: ABNORMAL
ALLEN'S TEST: NO
ALLEN'S TEST: NORMAL
ALLEN'S TEST: YES
ALP SERPL-CCNC: 102 U/L (ref 40–150)
ALP SERPL-CCNC: 108 U/L (ref 40–150)
ALP SERPL-CCNC: 109 U/L (ref 40–150)
ALP SERPL-CCNC: 109 U/L (ref 40–150)
ALP SERPL-CCNC: 110 U/L (ref 40–150)
ALP SERPL-CCNC: 110 U/L (ref 40–150)
ALP SERPL-CCNC: 111 U/L (ref 40–150)
ALP SERPL-CCNC: 112 U/L (ref 40–150)
ALP SERPL-CCNC: 115 U/L (ref 40–150)
ALP SERPL-CCNC: 116 U/L (ref 40–150)
ALP SERPL-CCNC: 117 U/L (ref 40–150)
ALP SERPL-CCNC: 118 U/L (ref 40–150)
ALP SERPL-CCNC: 121 U/L (ref 40–150)
ALP SERPL-CCNC: 124 U/L (ref 40–150)
ALP SERPL-CCNC: 127 U/L (ref 40–150)
ALP SERPL-CCNC: 133 U/L (ref 40–150)
ALP SERPL-CCNC: 133 U/L (ref 40–150)
ALP SERPL-CCNC: 137 U/L (ref 40–150)
ALP SERPL-CCNC: 139 U/L (ref 40–150)
ALP SERPL-CCNC: 139 U/L (ref 40–150)
ALP SERPL-CCNC: 142 U/L (ref 40–150)
ALP SERPL-CCNC: 143 U/L (ref 40–150)
ALP SERPL-CCNC: 144 U/L (ref 40–150)
ALP SERPL-CCNC: 146 U/L (ref 40–150)
ALP SERPL-CCNC: 147 U/L (ref 40–150)
ALP SERPL-CCNC: 159 U/L (ref 40–150)
ALP SERPL-CCNC: 166 U/L (ref 40–150)
ALP SERPL-CCNC: 35 U/L (ref 40–150)
ALP SERPL-CCNC: 35 U/L (ref 40–150)
ALP SERPL-CCNC: 36 U/L (ref 40–150)
ALP SERPL-CCNC: 37 U/L (ref 40–150)
ALP SERPL-CCNC: 38 U/L (ref 40–150)
ALP SERPL-CCNC: 38 U/L (ref 40–150)
ALP SERPL-CCNC: 39 U/L (ref 40–150)
ALP SERPL-CCNC: 40 U/L (ref 40–150)
ALP SERPL-CCNC: 41 U/L (ref 40–150)
ALP SERPL-CCNC: 41 U/L (ref 40–150)
ALP SERPL-CCNC: 43 U/L (ref 40–150)
ALP SERPL-CCNC: 43 U/L (ref 40–150)
ALP SERPL-CCNC: 44 U/L (ref 40–150)
ALP SERPL-CCNC: 44 U/L (ref 40–150)
ALP SERPL-CCNC: 45 U/L (ref 40–150)
ALP SERPL-CCNC: 46 U/L (ref 40–150)
ALP SERPL-CCNC: 47 U/L (ref 40–150)
ALP SERPL-CCNC: 51 U/L (ref 40–150)
ALP SERPL-CCNC: 56 U/L (ref 40–150)
ALP SERPL-CCNC: 59 U/L (ref 40–150)
ALP SERPL-CCNC: 59 U/L (ref 40–150)
ALP SERPL-CCNC: 67 U/L (ref 40–150)
ALP SERPL-CCNC: 68 U/L (ref 40–150)
ALP SERPL-CCNC: 69 U/L (ref 40–150)
ALP SERPL-CCNC: 74 U/L (ref 40–150)
ALP SERPL-CCNC: 76 U/L (ref 40–150)
ALP SERPL-CCNC: 79 U/L (ref 40–150)
ALP SERPL-CCNC: 79 U/L (ref 40–150)
ALP SERPL-CCNC: 81 U/L (ref 40–150)
ALP SERPL-CCNC: 82 U/L (ref 40–150)
ALP SERPL-CCNC: 82 U/L (ref 40–150)
ALP SERPL-CCNC: 83 U/L (ref 40–150)
ALP SERPL-CCNC: 84 U/L (ref 40–150)
ALP SERPL-CCNC: 86 U/L (ref 40–150)
ALP SERPL-CCNC: 87 U/L (ref 40–150)
ALP SERPL-CCNC: 90 U/L (ref 40–150)
ALP SERPL-CCNC: 93 U/L (ref 40–150)
ALPRAZ SERPL-MCNC: NEGATIVE NG/ML
ALT SERPL W P-5'-P-CCNC: 108 U/L (ref 0–70)
ALT SERPL W P-5'-P-CCNC: 108 U/L (ref 0–70)
ALT SERPL W P-5'-P-CCNC: 111 U/L (ref 0–70)
ALT SERPL W P-5'-P-CCNC: 112 U/L (ref 0–70)
ALT SERPL W P-5'-P-CCNC: 113 U/L (ref 0–70)
ALT SERPL W P-5'-P-CCNC: 114 U/L (ref 0–70)
ALT SERPL W P-5'-P-CCNC: 124 U/L (ref 0–70)
ALT SERPL W P-5'-P-CCNC: 124 U/L (ref 0–70)
ALT SERPL W P-5'-P-CCNC: 125 U/L (ref 0–70)
ALT SERPL W P-5'-P-CCNC: 125 U/L (ref 0–70)
ALT SERPL W P-5'-P-CCNC: 129 U/L (ref 0–70)
ALT SERPL W P-5'-P-CCNC: 134 U/L (ref 0–70)
ALT SERPL W P-5'-P-CCNC: 141 U/L (ref 0–70)
ALT SERPL W P-5'-P-CCNC: 149 U/L (ref 0–70)
ALT SERPL W P-5'-P-CCNC: 149 U/L (ref 0–70)
ALT SERPL W P-5'-P-CCNC: 154 U/L (ref 0–70)
ALT SERPL W P-5'-P-CCNC: 157 U/L (ref 0–70)
ALT SERPL W P-5'-P-CCNC: 160 U/L (ref 0–70)
ALT SERPL W P-5'-P-CCNC: 164 U/L (ref 0–70)
ALT SERPL W P-5'-P-CCNC: 171 U/L (ref 0–70)
ALT SERPL W P-5'-P-CCNC: 26 U/L (ref 0–70)
ALT SERPL W P-5'-P-CCNC: 27 U/L (ref 0–70)
ALT SERPL W P-5'-P-CCNC: 29 U/L (ref 0–70)
ALT SERPL W P-5'-P-CCNC: 29 U/L (ref 0–70)
ALT SERPL W P-5'-P-CCNC: 30 U/L (ref 0–70)
ALT SERPL W P-5'-P-CCNC: 30 U/L (ref 0–70)
ALT SERPL W P-5'-P-CCNC: 31 U/L (ref 0–70)
ALT SERPL W P-5'-P-CCNC: 33 U/L (ref 0–70)
ALT SERPL W P-5'-P-CCNC: 33 U/L (ref 0–70)
ALT SERPL W P-5'-P-CCNC: 34 U/L (ref 0–70)
ALT SERPL W P-5'-P-CCNC: 34 U/L (ref 0–70)
ALT SERPL W P-5'-P-CCNC: 37 U/L (ref 0–70)
ALT SERPL W P-5'-P-CCNC: 39 U/L (ref 0–70)
ALT SERPL W P-5'-P-CCNC: 40 U/L (ref 0–70)
ALT SERPL W P-5'-P-CCNC: 41 U/L (ref 0–70)
ALT SERPL W P-5'-P-CCNC: 43 U/L (ref 0–70)
ALT SERPL W P-5'-P-CCNC: 43 U/L (ref 0–70)
ALT SERPL W P-5'-P-CCNC: 45 U/L (ref 0–70)
ALT SERPL W P-5'-P-CCNC: 45 U/L (ref 0–70)
ALT SERPL W P-5'-P-CCNC: 46 U/L (ref 0–70)
ALT SERPL W P-5'-P-CCNC: 48 U/L (ref 0–70)
ALT SERPL W P-5'-P-CCNC: 50 U/L (ref 0–70)
ALT SERPL W P-5'-P-CCNC: 50 U/L (ref 0–70)
ALT SERPL W P-5'-P-CCNC: 54 U/L (ref 0–70)
ALT SERPL W P-5'-P-CCNC: 55 U/L (ref 0–70)
ALT SERPL W P-5'-P-CCNC: 56 U/L (ref 0–70)
ALT SERPL W P-5'-P-CCNC: 60 U/L (ref 0–70)
ALT SERPL W P-5'-P-CCNC: 61 U/L (ref 0–70)
ALT SERPL W P-5'-P-CCNC: 62 U/L (ref 0–70)
ALT SERPL W P-5'-P-CCNC: 64 U/L (ref 0–70)
ALT SERPL W P-5'-P-CCNC: 66 U/L (ref 0–70)
ALT SERPL W P-5'-P-CCNC: 68 U/L (ref 0–70)
ALT SERPL W P-5'-P-CCNC: 69 U/L (ref 0–70)
ALT SERPL W P-5'-P-CCNC: 73 U/L (ref 0–70)
ALT SERPL W P-5'-P-CCNC: 81 U/L (ref 0–70)
ALT SERPL W P-5'-P-CCNC: 81 U/L (ref 0–70)
ALT SERPL W P-5'-P-CCNC: 83 U/L (ref 0–70)
ALT SERPL W P-5'-P-CCNC: 86 U/L (ref 0–70)
ALT SERPL W P-5'-P-CCNC: 87 U/L (ref 0–70)
ALT SERPL W P-5'-P-CCNC: 87 U/L (ref 0–70)
ALT SERPL W P-5'-P-CCNC: 88 U/L (ref 0–70)
ALT SERPL W P-5'-P-CCNC: 88 U/L (ref 0–70)
ALT SERPL W P-5'-P-CCNC: 90 U/L (ref 0–70)
ALT SERPL W P-5'-P-CCNC: 91 U/L (ref 0–70)
ALT SERPL W P-5'-P-CCNC: 94 U/L (ref 0–70)
ALT SERPL W P-5'-P-CCNC: 96 U/L (ref 0–70)
ALT SERPL W P-5'-P-CCNC: 96 U/L (ref 0–70)
ALT SERPL W P-5'-P-CCNC: 98 U/L (ref 0–70)
AMMONIA PLAS-SCNC: 34 UMOL/L (ref 16–60)
AMPHET BLD CFM-MCNC: NEGATIVE NG/ML
AMPHETAMINES UR QL SCN: ABNORMAL
ANA PAT SER IF-IMP: ABNORMAL
ANA SER QL IF: POSITIVE
ANA TITR SER IF: ABNORMAL {TITER}
ANCA AB PATTERN SER IF-IMP: NORMAL
ANION GAP SERPL CALCULATED.3IONS-SCNC: 10 MMOL/L (ref 7–15)
ANION GAP SERPL CALCULATED.3IONS-SCNC: 11 MMOL/L (ref 7–15)
ANION GAP SERPL CALCULATED.3IONS-SCNC: 12 MMOL/L (ref 7–15)
ANION GAP SERPL CALCULATED.3IONS-SCNC: 13 MMOL/L (ref 7–15)
ANION GAP SERPL CALCULATED.3IONS-SCNC: 14 MMOL/L (ref 7–15)
ANION GAP SERPL CALCULATED.3IONS-SCNC: 15 MMOL/L (ref 7–15)
ANION GAP SERPL CALCULATED.3IONS-SCNC: 16 MMOL/L (ref 7–15)
ANION GAP SERPL CALCULATED.3IONS-SCNC: 16 MMOL/L (ref 7–15)
ANION GAP SERPL CALCULATED.3IONS-SCNC: 18 MMOL/L (ref 7–15)
ANION GAP SERPL CALCULATED.3IONS-SCNC: 4 MMOL/L (ref 7–15)
ANION GAP SERPL CALCULATED.3IONS-SCNC: 6 MMOL/L (ref 7–15)
ANION GAP SERPL CALCULATED.3IONS-SCNC: 7 MMOL/L (ref 7–15)
ANION GAP SERPL CALCULATED.3IONS-SCNC: 8 MMOL/L (ref 7–15)
ANION GAP SERPL CALCULATED.3IONS-SCNC: 9 MMOL/L (ref 7–15)
ANTIBODY SCREEN: NEGATIVE
APAP BLD-MCNC: NEGATIVE UG/ML
APPEARANCE UR: ABNORMAL
APPEARANCE UR: CLEAR
APPEARANCE UR: CLEAR
APTT PPP: 104 SECONDS (ref 22–38)
APTT PPP: 106 SECONDS (ref 22–38)
APTT PPP: 108 SECONDS (ref 22–38)
APTT PPP: 116 SECONDS (ref 22–38)
APTT PPP: 117 SECONDS (ref 22–38)
APTT PPP: 121 SECONDS (ref 22–38)
APTT PPP: 124 SECONDS (ref 22–38)
APTT PPP: 127 SECONDS (ref 22–38)
APTT PPP: 129 SECONDS (ref 22–38)
APTT PPP: 137 SECONDS (ref 22–38)
APTT PPP: 140 SECONDS (ref 22–38)
APTT PPP: 141 SECONDS (ref 22–38)
APTT PPP: 216 SECONDS (ref 22–38)
APTT PPP: 30 SECONDS (ref 22–38)
APTT PPP: 31 SECONDS (ref 22–38)
APTT PPP: 33 SECONDS (ref 22–38)
APTT PPP: 34 SECONDS (ref 22–38)
APTT PPP: 34 SECONDS (ref 22–38)
APTT PPP: 35 SECONDS (ref 22–38)
APTT PPP: 35 SECONDS (ref 22–38)
APTT PPP: 36 SECONDS (ref 22–38)
APTT PPP: 36 SECONDS (ref 22–38)
APTT PPP: 40 SECONDS (ref 22–38)
APTT PPP: 41 SECONDS (ref 22–38)
APTT PPP: 42 SECONDS (ref 22–38)
APTT PPP: 47 SECONDS (ref 22–38)
APTT PPP: 48 SECONDS (ref 22–38)
APTT PPP: 49 SECONDS (ref 22–38)
APTT PPP: 50 SECONDS (ref 22–38)
APTT PPP: 51 SECONDS (ref 22–38)
APTT PPP: 51 SECONDS (ref 22–38)
APTT PPP: 52 SECONDS (ref 22–38)
APTT PPP: 53 SECONDS (ref 22–38)
APTT PPP: 54 SECONDS (ref 22–38)
APTT PPP: 56 SECONDS (ref 22–38)
APTT PPP: 57 SECONDS (ref 22–38)
APTT PPP: 59 SECONDS (ref 22–38)
APTT PPP: 61 SECONDS (ref 22–38)
APTT PPP: 64 SECONDS (ref 22–38)
APTT PPP: 66 SECONDS (ref 22–38)
APTT PPP: 67 SECONDS (ref 22–38)
APTT PPP: 67 SECONDS (ref 22–38)
APTT PPP: 68 SECONDS (ref 22–38)
APTT PPP: 69 SECONDS (ref 22–38)
APTT PPP: 70 SECONDS (ref 22–38)
APTT PPP: 71 SECONDS (ref 22–38)
APTT PPP: 74 SECONDS (ref 22–38)
APTT PPP: 76 SECONDS (ref 22–38)
APTT PPP: 77 SECONDS (ref 22–38)
APTT PPP: 79 SECONDS (ref 22–38)
APTT PPP: 81 SECONDS (ref 22–38)
APTT PPP: 83 SECONDS (ref 22–38)
APTT PPP: 96 SECONDS (ref 22–38)
APTT PPP: 98 SECONDS (ref 22–38)
APTT PPP: 99 SECONDS (ref 22–38)
APTT PPP: 99 SECONDS (ref 22–38)
APTT PPP: >240 SECONDS (ref 22–38)
AST SERPL W P-5'-P-CCNC: 101 U/L (ref 0–45)
AST SERPL W P-5'-P-CCNC: 103 U/L (ref 0–45)
AST SERPL W P-5'-P-CCNC: 106 U/L (ref 0–45)
AST SERPL W P-5'-P-CCNC: 110 U/L (ref 0–45)
AST SERPL W P-5'-P-CCNC: 118 U/L (ref 0–45)
AST SERPL W P-5'-P-CCNC: 121 U/L (ref 0–45)
AST SERPL W P-5'-P-CCNC: 126 U/L (ref 0–45)
AST SERPL W P-5'-P-CCNC: 129 U/L (ref 0–45)
AST SERPL W P-5'-P-CCNC: 134 U/L (ref 0–45)
AST SERPL W P-5'-P-CCNC: 135 U/L (ref 0–45)
AST SERPL W P-5'-P-CCNC: 142 U/L (ref 0–45)
AST SERPL W P-5'-P-CCNC: 142 U/L (ref 0–45)
AST SERPL W P-5'-P-CCNC: 145 U/L (ref 0–45)
AST SERPL W P-5'-P-CCNC: 145 U/L (ref 0–45)
AST SERPL W P-5'-P-CCNC: 160 U/L (ref 0–45)
AST SERPL W P-5'-P-CCNC: 166 U/L (ref 0–45)
AST SERPL W P-5'-P-CCNC: 36 U/L (ref 0–45)
AST SERPL W P-5'-P-CCNC: 40 U/L (ref 0–45)
AST SERPL W P-5'-P-CCNC: 42 U/L (ref 0–45)
AST SERPL W P-5'-P-CCNC: 44 U/L (ref 0–45)
AST SERPL W P-5'-P-CCNC: 44 U/L (ref 0–45)
AST SERPL W P-5'-P-CCNC: 45 U/L (ref 0–45)
AST SERPL W P-5'-P-CCNC: 46 U/L (ref 0–45)
AST SERPL W P-5'-P-CCNC: 47 U/L (ref 0–45)
AST SERPL W P-5'-P-CCNC: 48 U/L (ref 0–45)
AST SERPL W P-5'-P-CCNC: 51 U/L (ref 0–45)
AST SERPL W P-5'-P-CCNC: 51 U/L (ref 0–45)
AST SERPL W P-5'-P-CCNC: 53 U/L (ref 0–45)
AST SERPL W P-5'-P-CCNC: 57 U/L (ref 0–45)
AST SERPL W P-5'-P-CCNC: 58 U/L (ref 0–45)
AST SERPL W P-5'-P-CCNC: 61 U/L (ref 0–45)
AST SERPL W P-5'-P-CCNC: 61 U/L (ref 0–45)
AST SERPL W P-5'-P-CCNC: 62 U/L (ref 0–45)
AST SERPL W P-5'-P-CCNC: 62 U/L (ref 0–45)
AST SERPL W P-5'-P-CCNC: 63 U/L (ref 0–45)
AST SERPL W P-5'-P-CCNC: 67 U/L (ref 0–45)
AST SERPL W P-5'-P-CCNC: 69 U/L (ref 0–45)
AST SERPL W P-5'-P-CCNC: 71 U/L (ref 0–45)
AST SERPL W P-5'-P-CCNC: 72 U/L (ref 0–45)
AST SERPL W P-5'-P-CCNC: 74 U/L (ref 0–45)
AST SERPL W P-5'-P-CCNC: 75 U/L (ref 0–45)
AST SERPL W P-5'-P-CCNC: 76 U/L (ref 0–45)
AST SERPL W P-5'-P-CCNC: 77 U/L (ref 0–45)
AST SERPL W P-5'-P-CCNC: 78 U/L (ref 0–45)
AST SERPL W P-5'-P-CCNC: 79 U/L (ref 0–45)
AST SERPL W P-5'-P-CCNC: 81 U/L (ref 0–45)
AST SERPL W P-5'-P-CCNC: 82 U/L (ref 0–45)
AST SERPL W P-5'-P-CCNC: 84 U/L (ref 0–45)
AST SERPL W P-5'-P-CCNC: 85 U/L (ref 0–45)
AST SERPL W P-5'-P-CCNC: 86 U/L (ref 0–45)
AST SERPL W P-5'-P-CCNC: 89 U/L (ref 0–45)
AST SERPL W P-5'-P-CCNC: 89 U/L (ref 0–45)
AST SERPL W P-5'-P-CCNC: 91 U/L (ref 0–45)
AST SERPL W P-5'-P-CCNC: 97 U/L (ref 0–45)
AST SERPL W P-5'-P-CCNC: 98 U/L (ref 0–45)
AST SERPL W P-5'-P-CCNC: ABNORMAL U/L
AT III ACT/NOR PPP CHRO: 100 % (ref 85–135)
AT III ACT/NOR PPP CHRO: 102 % (ref 85–135)
AT III ACT/NOR PPP CHRO: 103 % (ref 85–135)
AT III ACT/NOR PPP CHRO: 106 % (ref 85–135)
AT III ACT/NOR PPP CHRO: 108 % (ref 85–135)
AT III ACT/NOR PPP CHRO: 111 % (ref 85–135)
AT III ACT/NOR PPP CHRO: 114 % (ref 85–135)
AT III ACT/NOR PPP CHRO: 45 % (ref 85–135)
AT III ACT/NOR PPP CHRO: 46 % (ref 85–135)
AT III ACT/NOR PPP CHRO: 51 % (ref 85–135)
AT III ACT/NOR PPP CHRO: 82 % (ref 85–135)
AT III ACT/NOR PPP CHRO: 83 % (ref 85–135)
AT III ACT/NOR PPP CHRO: 89 % (ref 85–135)
AT III ACT/NOR PPP CHRO: 89 % (ref 85–135)
AT III ACT/NOR PPP CHRO: 92 % (ref 85–135)
AT III ACT/NOR PPP CHRO: 95 % (ref 85–135)
AT III ACT/NOR PPP CHRO: 99 % (ref 85–135)
AT III ACT/NOR PPP CHRO: 99 % (ref 85–135)
ATRIAL RATE - MUSE: 105 BPM
ATRIAL RATE - MUSE: 108 BPM
ATRIAL RATE - MUSE: 115 BPM
ATRIAL RATE - MUSE: 72 BPM
ATRIAL RATE - MUSE: 76 BPM
ATRIAL RATE - MUSE: 77 BPM
ATRIAL RATE - MUSE: 79 BPM
ATRIAL RATE - MUSE: 85 BPM
ATRIAL RATE - MUSE: 87 BPM
ATRIAL RATE - MUSE: 95 BPM
AUER BODIES BLD QL SMEAR: NORMAL
BACTERIA #/AREA URNS HPF: ABNORMAL /HPF
BACTERIA BLD CULT: ABNORMAL
BACTERIA BLD CULT: ABNORMAL
BACTERIA BLD CULT: NO GROWTH
BACTERIA SPEC CULT: ABNORMAL
BACTERIA SPT CULT: ABNORMAL
BACTERIA UR CULT: NO GROWTH
BARBITURATES SPEC-MCNC: NEGATIVE UG/ML
BARBITURATES UR QL SCN: ABNORMAL
BASE EXCESS BLDA CALC-SCNC: -1.1 MMOL/L (ref -3–3)
BASE EXCESS BLDA CALC-SCNC: -10.5 MMOL/L (ref -3–3)
BASE EXCESS BLDA CALC-SCNC: -11.7 MMOL/L (ref -3–3)
BASE EXCESS BLDA CALC-SCNC: -3.5 MMOL/L (ref -3–3)
BASE EXCESS BLDA CALC-SCNC: -6.1 MMOL/L (ref -3–3)
BASE EXCESS BLDA CALC-SCNC: -7.6 MMOL/L (ref -3–3)
BASE EXCESS BLDA CALC-SCNC: -9 MMOL/L (ref -3–3)
BASE EXCESS BLDA CALC-SCNC: 0.2 MMOL/L (ref -3–3)
BASE EXCESS BLDA CALC-SCNC: 0.4 MMOL/L (ref -3–3)
BASE EXCESS BLDA CALC-SCNC: 0.6 MMOL/L (ref -3–3)
BASE EXCESS BLDA CALC-SCNC: 0.7 MMOL/L (ref -3–3)
BASE EXCESS BLDA CALC-SCNC: 0.9 MMOL/L (ref -3–3)
BASE EXCESS BLDA CALC-SCNC: 1.2 MMOL/L (ref -3–3)
BASE EXCESS BLDA CALC-SCNC: 1.3 MMOL/L (ref -3–3)
BASE EXCESS BLDA CALC-SCNC: 1.4 MMOL/L (ref -3–3)
BASE EXCESS BLDA CALC-SCNC: 1.5 MMOL/L (ref -3–3)
BASE EXCESS BLDA CALC-SCNC: 1.7 MMOL/L (ref -3–3)
BASE EXCESS BLDA CALC-SCNC: 1.7 MMOL/L (ref -3–3)
BASE EXCESS BLDA CALC-SCNC: 1.8 MMOL/L (ref -3–3)
BASE EXCESS BLDA CALC-SCNC: 1.8 MMOL/L (ref -3–3)
BASE EXCESS BLDA CALC-SCNC: 1.9 MMOL/L (ref -3–3)
BASE EXCESS BLDA CALC-SCNC: 1.9 MMOL/L (ref -3–3)
BASE EXCESS BLDA CALC-SCNC: 10 MMOL/L (ref -3–3)
BASE EXCESS BLDA CALC-SCNC: 2 MMOL/L (ref -3–3)
BASE EXCESS BLDA CALC-SCNC: 2.1 MMOL/L (ref -3–3)
BASE EXCESS BLDA CALC-SCNC: 2.2 MMOL/L (ref -3–3)
BASE EXCESS BLDA CALC-SCNC: 2.3 MMOL/L (ref -3–3)
BASE EXCESS BLDA CALC-SCNC: 2.4 MMOL/L (ref -3–3)
BASE EXCESS BLDA CALC-SCNC: 2.5 MMOL/L (ref -3–3)
BASE EXCESS BLDA CALC-SCNC: 2.5 MMOL/L (ref -3–3)
BASE EXCESS BLDA CALC-SCNC: 2.7 MMOL/L (ref -3–3)
BASE EXCESS BLDA CALC-SCNC: 2.8 MMOL/L (ref -3–3)
BASE EXCESS BLDA CALC-SCNC: 2.9 MMOL/L (ref -3–3)
BASE EXCESS BLDA CALC-SCNC: 3.1 MMOL/L (ref -3–3)
BASE EXCESS BLDA CALC-SCNC: 3.2 MMOL/L (ref -3–3)
BASE EXCESS BLDA CALC-SCNC: 3.3 MMOL/L (ref -3–3)
BASE EXCESS BLDA CALC-SCNC: 3.4 MMOL/L (ref -3–3)
BASE EXCESS BLDA CALC-SCNC: 3.5 MMOL/L (ref -3–3)
BASE EXCESS BLDA CALC-SCNC: 3.6 MMOL/L (ref -3–3)
BASE EXCESS BLDA CALC-SCNC: 3.6 MMOL/L (ref -3–3)
BASE EXCESS BLDA CALC-SCNC: 3.7 MMOL/L (ref -3–3)
BASE EXCESS BLDA CALC-SCNC: 3.8 MMOL/L (ref -3–3)
BASE EXCESS BLDA CALC-SCNC: 3.9 MMOL/L (ref -3–3)
BASE EXCESS BLDA CALC-SCNC: 4 MMOL/L (ref -3–3)
BASE EXCESS BLDA CALC-SCNC: 4 MMOL/L (ref -3–3)
BASE EXCESS BLDA CALC-SCNC: 4.1 MMOL/L (ref -3–3)
BASE EXCESS BLDA CALC-SCNC: 4.2 MMOL/L (ref -3–3)
BASE EXCESS BLDA CALC-SCNC: 4.3 MMOL/L (ref -3–3)
BASE EXCESS BLDA CALC-SCNC: 4.4 MMOL/L (ref -3–3)
BASE EXCESS BLDA CALC-SCNC: 4.5 MMOL/L (ref -3–3)
BASE EXCESS BLDA CALC-SCNC: 4.6 MMOL/L (ref -3–3)
BASE EXCESS BLDA CALC-SCNC: 4.7 MMOL/L (ref -3–3)
BASE EXCESS BLDA CALC-SCNC: 4.7 MMOL/L (ref -3–3)
BASE EXCESS BLDA CALC-SCNC: 4.8 MMOL/L (ref -3–3)
BASE EXCESS BLDA CALC-SCNC: 4.9 MMOL/L (ref -3–3)
BASE EXCESS BLDA CALC-SCNC: 5 MMOL/L (ref -3–3)
BASE EXCESS BLDA CALC-SCNC: 5 MMOL/L (ref -3–3)
BASE EXCESS BLDA CALC-SCNC: 5.1 MMOL/L (ref -3–3)
BASE EXCESS BLDA CALC-SCNC: 5.2 MMOL/L (ref -3–3)
BASE EXCESS BLDA CALC-SCNC: 5.3 MMOL/L (ref -3–3)
BASE EXCESS BLDA CALC-SCNC: 5.4 MMOL/L (ref -3–3)
BASE EXCESS BLDA CALC-SCNC: 5.5 MMOL/L (ref -3–3)
BASE EXCESS BLDA CALC-SCNC: 5.6 MMOL/L (ref -3–3)
BASE EXCESS BLDA CALC-SCNC: 5.7 MMOL/L (ref -3–3)
BASE EXCESS BLDA CALC-SCNC: 5.8 MMOL/L (ref -3–3)
BASE EXCESS BLDA CALC-SCNC: 5.9 MMOL/L (ref -3–3)
BASE EXCESS BLDA CALC-SCNC: 5.9 MMOL/L (ref -3–3)
BASE EXCESS BLDA CALC-SCNC: 6 MMOL/L (ref -3–3)
BASE EXCESS BLDA CALC-SCNC: 6.1 MMOL/L (ref -3–3)
BASE EXCESS BLDA CALC-SCNC: 6.2 MMOL/L (ref -3–3)
BASE EXCESS BLDA CALC-SCNC: 6.3 MMOL/L (ref -3–3)
BASE EXCESS BLDA CALC-SCNC: 6.3 MMOL/L (ref -3–3)
BASE EXCESS BLDA CALC-SCNC: 6.4 MMOL/L (ref -3–3)
BASE EXCESS BLDA CALC-SCNC: 6.5 MMOL/L (ref -3–3)
BASE EXCESS BLDA CALC-SCNC: 6.5 MMOL/L (ref -3–3)
BASE EXCESS BLDA CALC-SCNC: 6.6 MMOL/L (ref -3–3)
BASE EXCESS BLDA CALC-SCNC: 6.7 MMOL/L (ref -3–3)
BASE EXCESS BLDA CALC-SCNC: 6.8 MMOL/L (ref -3–3)
BASE EXCESS BLDA CALC-SCNC: 6.8 MMOL/L (ref -3–3)
BASE EXCESS BLDA CALC-SCNC: 6.9 MMOL/L (ref -3–3)
BASE EXCESS BLDA CALC-SCNC: 7 MMOL/L (ref -3–3)
BASE EXCESS BLDA CALC-SCNC: 7.1 MMOL/L (ref -3–3)
BASE EXCESS BLDA CALC-SCNC: 7.2 MMOL/L (ref -3–3)
BASE EXCESS BLDA CALC-SCNC: 7.3 MMOL/L (ref -3–3)
BASE EXCESS BLDA CALC-SCNC: 7.4 MMOL/L (ref -3–3)
BASE EXCESS BLDA CALC-SCNC: 7.5 MMOL/L (ref -3–3)
BASE EXCESS BLDA CALC-SCNC: 7.6 MMOL/L (ref -3–3)
BASE EXCESS BLDA CALC-SCNC: 7.7 MMOL/L (ref -3–3)
BASE EXCESS BLDA CALC-SCNC: 7.8 MMOL/L (ref -3–3)
BASE EXCESS BLDA CALC-SCNC: 7.9 MMOL/L (ref -3–3)
BASE EXCESS BLDA CALC-SCNC: 8 MMOL/L (ref -3–3)
BASE EXCESS BLDA CALC-SCNC: 8 MMOL/L (ref -3–3)
BASE EXCESS BLDA CALC-SCNC: 8.1 MMOL/L (ref -3–3)
BASE EXCESS BLDA CALC-SCNC: 8.2 MMOL/L (ref -3–3)
BASE EXCESS BLDA CALC-SCNC: 8.3 MMOL/L (ref -3–3)
BASE EXCESS BLDA CALC-SCNC: 8.3 MMOL/L (ref -3–3)
BASE EXCESS BLDA CALC-SCNC: 8.4 MMOL/L (ref -3–3)
BASE EXCESS BLDA CALC-SCNC: 8.5 MMOL/L (ref -3–3)
BASE EXCESS BLDA CALC-SCNC: 8.6 MMOL/L (ref -3–3)
BASE EXCESS BLDA CALC-SCNC: 8.8 MMOL/L (ref -3–3)
BASE EXCESS BLDA CALC-SCNC: 8.9 MMOL/L (ref -3–3)
BASE EXCESS BLDA CALC-SCNC: 9.1 MMOL/L (ref -3–3)
BASE EXCESS BLDA CALC-SCNC: 9.2 MMOL/L (ref -3–3)
BASE EXCESS BLDA CALC-SCNC: 9.4 MMOL/L (ref -3–3)
BASE EXCESS BLDA CALC-SCNC: 9.6 MMOL/L (ref -3–3)
BASE EXCESS BLDA CALC-SCNC: 9.8 MMOL/L (ref -3–3)
BASE EXCESS BLDV CALC-SCNC: 0.7 MMOL/L (ref -3–3)
BASE EXCESS BLDV CALC-SCNC: 1.3 MMOL/L (ref -3–3)
BASE EXCESS BLDV CALC-SCNC: 2.6 MMOL/L (ref -3–3)
BASE EXCESS BLDV CALC-SCNC: 2.7 MMOL/L (ref -3–3)
BASE EXCESS BLDV CALC-SCNC: 2.8 MMOL/L (ref -3–3)
BASE EXCESS BLDV CALC-SCNC: 2.8 MMOL/L (ref -3–3)
BASE EXCESS BLDV CALC-SCNC: 3 MMOL/L (ref -3–3)
BASE EXCESS BLDV CALC-SCNC: 3 MMOL/L (ref -3–3)
BASE EXCESS BLDV CALC-SCNC: 3.1 MMOL/L (ref -3–3)
BASE EXCESS BLDV CALC-SCNC: 3.1 MMOL/L (ref -3–3)
BASE EXCESS BLDV CALC-SCNC: 3.4 MMOL/L (ref -3–3)
BASE EXCESS BLDV CALC-SCNC: 3.5 MMOL/L (ref -3–3)
BASE EXCESS BLDV CALC-SCNC: 3.6 MMOL/L (ref -3–3)
BASE EXCESS BLDV CALC-SCNC: 3.6 MMOL/L (ref -3–3)
BASE EXCESS BLDV CALC-SCNC: 3.9 MMOL/L (ref -3–3)
BASE EXCESS BLDV CALC-SCNC: 4.1 MMOL/L (ref -3–3)
BASE EXCESS BLDV CALC-SCNC: 4.1 MMOL/L (ref -3–3)
BASE EXCESS BLDV CALC-SCNC: 4.3 MMOL/L (ref -3–3)
BASE EXCESS BLDV CALC-SCNC: 4.4 MMOL/L (ref -3–3)
BASE EXCESS BLDV CALC-SCNC: 4.7 MMOL/L (ref -3–3)
BASE EXCESS BLDV CALC-SCNC: 4.7 MMOL/L (ref -3–3)
BASE EXCESS BLDV CALC-SCNC: 4.8 MMOL/L (ref -3–3)
BASE EXCESS BLDV CALC-SCNC: 4.8 MMOL/L (ref -3–3)
BASE EXCESS BLDV CALC-SCNC: 5.4 MMOL/L (ref -3–3)
BASE EXCESS BLDV CALC-SCNC: 5.5 MMOL/L (ref -3–3)
BASE EXCESS BLDV CALC-SCNC: 5.6 MMOL/L (ref -3–3)
BASE EXCESS BLDV CALC-SCNC: 5.8 MMOL/L (ref -3–3)
BASE EXCESS BLDV CALC-SCNC: 5.8 MMOL/L (ref -3–3)
BASE EXCESS BLDV CALC-SCNC: 6 MMOL/L (ref -3–3)
BASE EXCESS BLDV CALC-SCNC: 6.1 MMOL/L (ref -3–3)
BASE EXCESS BLDV CALC-SCNC: 6.2 MMOL/L (ref -3–3)
BASE EXCESS BLDV CALC-SCNC: 6.9 MMOL/L (ref -3–3)
BASE EXCESS BLDV CALC-SCNC: 7 MMOL/L (ref -3–3)
BASE EXCESS BLDV CALC-SCNC: 7.1 MMOL/L (ref -3–3)
BASE EXCESS BLDV CALC-SCNC: 7.1 MMOL/L (ref -3–3)
BASE EXCESS BLDV CALC-SCNC: 7.3 MMOL/L (ref -3–3)
BASE EXCESS BLDV CALC-SCNC: 7.4 MMOL/L (ref -3–3)
BASE EXCESS BLDV CALC-SCNC: 7.4 MMOL/L (ref -3–3)
BASE EXCESS BLDV CALC-SCNC: 7.6 MMOL/L (ref -3–3)
BASE EXCESS BLDV CALC-SCNC: 7.9 MMOL/L (ref -3–3)
BASE EXCESS BLDV CALC-SCNC: 8.2 MMOL/L (ref -3–3)
BASE EXCESS BLDV CALC-SCNC: 8.2 MMOL/L (ref -3–3)
BASE EXCESS BLDV CALC-SCNC: 8.6 MMOL/L (ref -3–3)
BASE EXCESS BLDV CALC-SCNC: 8.8 MMOL/L (ref -3–3)
BASE EXCESS BLDV CALC-SCNC: 9.1 MMOL/L (ref -3–3)
BASE EXCESS BLDV CALC-SCNC: 9.1 MMOL/L (ref -3–3)
BASE EXCESS BLDV CALC-SCNC: 9.6 MMOL/L (ref -3–3)
BASO STIPL BLD QL SMEAR: NORMAL
BASOPHILS # BLD AUTO: 0 10E3/UL (ref 0–0.2)
BASOPHILS # BLD AUTO: 0.1 10E3/UL (ref 0–0.2)
BASOPHILS # BLD AUTO: ABNORMAL 10*3/UL
BASOPHILS # BLD MANUAL: 0.2 10E3/UL (ref 0–0.2)
BASOPHILS NFR BLD AUTO: 0 %
BASOPHILS NFR BLD AUTO: 0 %
BASOPHILS NFR BLD AUTO: ABNORMAL %
BASOPHILS NFR BLD MANUAL: 1 %
BENZODIAZ SPEC QL: POSITIVE
BENZODIAZ SPEC-MCNC: ABNORMAL NG/ML
BENZODIAZ UR QL SCN: ABNORMAL
BILIRUB DIRECT SERPL-MCNC: <0.2 MG/DL (ref 0–0.3)
BILIRUB SERPL-MCNC: 0.2 MG/DL
BILIRUB SERPL-MCNC: 0.3 MG/DL
BILIRUB SERPL-MCNC: 0.4 MG/DL
BILIRUB SERPL-MCNC: 0.5 MG/DL
BILIRUB SERPL-MCNC: 0.7 MG/DL
BILIRUB SERPL-MCNC: 0.7 MG/DL
BILIRUB UR QL STRIP: NEGATIVE
BITE CELLS BLD QL SMEAR: NORMAL
BLD PROD TYP BPU: NORMAL
BLISTER CELLS BLD QL SMEAR: NORMAL
BLOOD COMPONENT TYPE: NORMAL
BUN SERPL-MCNC: 104 MG/DL (ref 6–20)
BUN SERPL-MCNC: 105 MG/DL (ref 6–20)
BUN SERPL-MCNC: 106 MG/DL (ref 6–20)
BUN SERPL-MCNC: 11.2 MG/DL (ref 6–20)
BUN SERPL-MCNC: 11.3 MG/DL (ref 6–20)
BUN SERPL-MCNC: 11.5 MG/DL (ref 6–20)
BUN SERPL-MCNC: 11.6 MG/DL (ref 6–20)
BUN SERPL-MCNC: 11.8 MG/DL (ref 6–20)
BUN SERPL-MCNC: 113 MG/DL (ref 6–20)
BUN SERPL-MCNC: 115 MG/DL (ref 6–20)
BUN SERPL-MCNC: 12.1 MG/DL (ref 6–20)
BUN SERPL-MCNC: 12.2 MG/DL (ref 6–20)
BUN SERPL-MCNC: 12.7 MG/DL (ref 6–20)
BUN SERPL-MCNC: 13.6 MG/DL (ref 6–20)
BUN SERPL-MCNC: 14 MG/DL (ref 6–20)
BUN SERPL-MCNC: 14.1 MG/DL (ref 6–20)
BUN SERPL-MCNC: 14.3 MG/DL (ref 6–20)
BUN SERPL-MCNC: 14.4 MG/DL (ref 6–20)
BUN SERPL-MCNC: 14.5 MG/DL (ref 6–20)
BUN SERPL-MCNC: 14.6 MG/DL (ref 6–20)
BUN SERPL-MCNC: 14.7 MG/DL (ref 6–20)
BUN SERPL-MCNC: 14.8 MG/DL (ref 6–20)
BUN SERPL-MCNC: 14.8 MG/DL (ref 6–20)
BUN SERPL-MCNC: 15 MG/DL (ref 6–20)
BUN SERPL-MCNC: 15.1 MG/DL (ref 6–20)
BUN SERPL-MCNC: 15.2 MG/DL (ref 6–20)
BUN SERPL-MCNC: 15.3 MG/DL (ref 6–20)
BUN SERPL-MCNC: 15.6 MG/DL (ref 6–20)
BUN SERPL-MCNC: 15.6 MG/DL (ref 6–20)
BUN SERPL-MCNC: 15.7 MG/DL (ref 6–20)
BUN SERPL-MCNC: 15.8 MG/DL (ref 6–20)
BUN SERPL-MCNC: 15.9 MG/DL (ref 6–20)
BUN SERPL-MCNC: 16 MG/DL (ref 6–20)
BUN SERPL-MCNC: 16.3 MG/DL (ref 6–20)
BUN SERPL-MCNC: 16.8 MG/DL (ref 6–20)
BUN SERPL-MCNC: 17.2 MG/DL (ref 6–20)
BUN SERPL-MCNC: 17.5 MG/DL (ref 6–20)
BUN SERPL-MCNC: 17.5 MG/DL (ref 6–20)
BUN SERPL-MCNC: 18 MG/DL (ref 6–20)
BUN SERPL-MCNC: 20.3 MG/DL (ref 6–20)
BUN SERPL-MCNC: 21 MG/DL (ref 6–20)
BUN SERPL-MCNC: 22.5 MG/DL (ref 6–20)
BUN SERPL-MCNC: 22.6 MG/DL (ref 6–20)
BUN SERPL-MCNC: 22.9 MG/DL (ref 6–20)
BUN SERPL-MCNC: 23.3 MG/DL (ref 6–20)
BUN SERPL-MCNC: 23.3 MG/DL (ref 6–20)
BUN SERPL-MCNC: 23.5 MG/DL (ref 6–20)
BUN SERPL-MCNC: 23.7 MG/DL (ref 6–20)
BUN SERPL-MCNC: 24.1 MG/DL (ref 6–20)
BUN SERPL-MCNC: 24.4 MG/DL (ref 6–20)
BUN SERPL-MCNC: 25.6 MG/DL (ref 6–20)
BUN SERPL-MCNC: 26.3 MG/DL (ref 6–20)
BUN SERPL-MCNC: 26.5 MG/DL (ref 6–20)
BUN SERPL-MCNC: 27.4 MG/DL (ref 6–20)
BUN SERPL-MCNC: 27.6 MG/DL (ref 6–20)
BUN SERPL-MCNC: 27.9 MG/DL (ref 6–20)
BUN SERPL-MCNC: 28.2 MG/DL (ref 6–20)
BUN SERPL-MCNC: 29.3 MG/DL (ref 6–20)
BUN SERPL-MCNC: 29.3 MG/DL (ref 6–20)
BUN SERPL-MCNC: 30.9 MG/DL (ref 6–20)
BUN SERPL-MCNC: 32.1 MG/DL (ref 6–20)
BUN SERPL-MCNC: 34.6 MG/DL (ref 6–20)
BUN SERPL-MCNC: 35.1 MG/DL (ref 6–20)
BUN SERPL-MCNC: 37.1 MG/DL (ref 6–20)
BUN SERPL-MCNC: 39.9 MG/DL (ref 6–20)
BUN SERPL-MCNC: 42.1 MG/DL (ref 6–20)
BUN SERPL-MCNC: 47.9 MG/DL (ref 6–20)
BUN SERPL-MCNC: 48.1 MG/DL (ref 6–20)
BUN SERPL-MCNC: 50.9 MG/DL (ref 6–20)
BUN SERPL-MCNC: 53.8 MG/DL (ref 6–20)
BUN SERPL-MCNC: 56.3 MG/DL (ref 6–20)
BUN SERPL-MCNC: 59.3 MG/DL (ref 6–20)
BUN SERPL-MCNC: 61.7 MG/DL (ref 6–20)
BUN SERPL-MCNC: 62 MG/DL (ref 6–20)
BUN SERPL-MCNC: 62.9 MG/DL (ref 6–20)
BUN SERPL-MCNC: 67 MG/DL (ref 6–20)
BUN SERPL-MCNC: 70.3 MG/DL (ref 6–20)
BUN SERPL-MCNC: 70.8 MG/DL (ref 6–20)
BUN SERPL-MCNC: 73.8 MG/DL (ref 6–20)
BUN SERPL-MCNC: 75 MG/DL (ref 6–20)
BUN SERPL-MCNC: 79.4 MG/DL (ref 6–20)
BUN SERPL-MCNC: 81.4 MG/DL (ref 6–20)
BUN SERPL-MCNC: 84 MG/DL (ref 6–20)
BUN SERPL-MCNC: 87 MG/DL (ref 6–20)
BUN SERPL-MCNC: 87 MG/DL (ref 6–20)
BUN SERPL-MCNC: 92.1 MG/DL (ref 6–20)
BUN SERPL-MCNC: 95.9 MG/DL (ref 6–20)
BUN SERPL-MCNC: 97.6 MG/DL (ref 6–20)
BUN SERPL-MCNC: 98.3 MG/DL (ref 6–20)
BUPRENORPHINE SERPL-MCNC: NEGATIVE NG/ML
BURR CELLS BLD QL SMEAR: NORMAL
BZE BLD CFM-MCNC: NEGATIVE NG/ML
BZE UR QL SCN: ABNORMAL
C PNEUM DNA SPEC QL NAA+PROBE: NOT DETECTED
C-ANCA TITR SER IF: NORMAL {TITER}
CA-I BLD-MCNC: 3.8 MG/DL (ref 4.4–5.2)
CA-I BLD-MCNC: 4.3 MG/DL (ref 4.4–5.2)
CA-I BLD-MCNC: 4.4 MG/DL (ref 4.4–5.2)
CA-I BLD-MCNC: 4.4 MG/DL (ref 4.4–5.2)
CA-I BLD-MCNC: 4.5 MG/DL (ref 4.4–5.2)
CA-I BLD-MCNC: 4.6 MG/DL (ref 4.4–5.2)
CA-I BLD-MCNC: 4.7 MG/DL (ref 4.4–5.2)
CA-I BLD-MCNC: 4.8 MG/DL (ref 4.4–5.2)
CA-I BLD-MCNC: 4.9 MG/DL (ref 4.4–5.2)
CA-I BLD-MCNC: 5 MG/DL (ref 4.4–5.2)
CA-I BLD-MCNC: 5.1 MG/DL (ref 4.4–5.2)
CA-I BLD-MCNC: 6.1 MG/DL (ref 4.4–5.2)
CA-I BLD-MCNC: 6.3 MG/DL (ref 4.4–5.2)
CALCIUM SERPL-MCNC: 11.1 MG/DL (ref 8.6–10)
CALCIUM SERPL-MCNC: 7.7 MG/DL (ref 8.6–10)
CALCIUM SERPL-MCNC: 7.8 MG/DL (ref 8.6–10)
CALCIUM SERPL-MCNC: 7.9 MG/DL (ref 8.6–10)
CALCIUM SERPL-MCNC: 8 MG/DL (ref 8.6–10)
CALCIUM SERPL-MCNC: 8 MG/DL (ref 8.6–10)
CALCIUM SERPL-MCNC: 8.1 MG/DL (ref 8.6–10)
CALCIUM SERPL-MCNC: 8.1 MG/DL (ref 8.6–10)
CALCIUM SERPL-MCNC: 8.2 MG/DL (ref 8.6–10)
CALCIUM SERPL-MCNC: 8.3 MG/DL (ref 8.6–10)
CALCIUM SERPL-MCNC: 8.4 MG/DL (ref 8.6–10)
CALCIUM SERPL-MCNC: 8.5 MG/DL (ref 8.6–10)
CALCIUM SERPL-MCNC: 8.6 MG/DL (ref 8.6–10)
CALCIUM SERPL-MCNC: 8.7 MG/DL (ref 8.6–10)
CALCIUM SERPL-MCNC: 8.8 MG/DL (ref 8.6–10)
CALCIUM SERPL-MCNC: 8.9 MG/DL (ref 8.6–10)
CALCIUM SERPL-MCNC: 9 MG/DL (ref 8.6–10)
CALCIUM SERPL-MCNC: 9.1 MG/DL (ref 8.6–10)
CALCIUM SERPL-MCNC: 9.2 MG/DL (ref 8.6–10)
CALCIUM SERPL-MCNC: 9.3 MG/DL (ref 8.6–10)
CALCIUM SERPL-MCNC: 9.3 MG/DL (ref 8.6–10)
CALCIUM SERPL-MCNC: 9.4 MG/DL (ref 8.6–10)
CALCIUM SERPL-MCNC: 9.5 MG/DL (ref 8.6–10)
CALCIUM SERPL-MCNC: 9.7 MG/DL (ref 8.6–10)
CANNABINOIDS UR QL SCN: ABNORMAL
CARBOXYTHC BLD-MCNC: NEGATIVE NG/ML
CARISOPRODOL IA: NEGATIVE UG/ML
CCP AB SER IA-ACNC: 1.5 U/ML
CF REDUC 30M P MA P HEP LENFR BLD TEG: 1 % (ref 0–8)
CF REDUC 30M P MA P HEP LENFR BLD TEG: 2.1 % (ref 0–8)
CF REDUC 30M P MA P HEP LENFR BLD TEG: 2.5 % (ref 0–8)
CF REDUC 60M P MA P HEPASE LENFR BLD TEG: 4.3 % (ref 0–15)
CF REDUC 60M P MA P HEPASE LENFR BLD TEG: 6.1 % (ref 0–15)
CF REDUC 60M P MA P HEPASE LENFR BLD TEG: 7.6 % (ref 0–15)
CFT P HPASE BLD TEG: 1.5 MINUTE (ref 1–3)
CFT P HPASE BLD TEG: 1.7 MINUTE (ref 1–3)
CFT P HPASE BLD TEG: 2.1 MINUTE (ref 1–3)
CHLORDIAZEP SERPL-MCNC: NEGATIVE NG/ML
CHLORIDE SERPL-SCNC: 100 MMOL/L (ref 98–107)
CHLORIDE SERPL-SCNC: 101 MMOL/L (ref 98–107)
CHLORIDE SERPL-SCNC: 102 MMOL/L (ref 98–107)
CHLORIDE SERPL-SCNC: 103 MMOL/L (ref 98–107)
CHLORIDE SERPL-SCNC: 104 MMOL/L (ref 98–107)
CHLORIDE SERPL-SCNC: 105 MMOL/L (ref 98–107)
CHLORIDE SERPL-SCNC: 106 MMOL/L (ref 98–107)
CHLORIDE SERPL-SCNC: 107 MMOL/L (ref 98–107)
CHLORIDE SERPL-SCNC: 108 MMOL/L (ref 98–107)
CHLORIDE SERPL-SCNC: 109 MMOL/L (ref 98–107)
CHLORIDE SERPL-SCNC: 110 MMOL/L (ref 98–107)
CHLORIDE SERPL-SCNC: 111 MMOL/L (ref 98–107)
CHLORIDE SERPL-SCNC: 112 MMOL/L (ref 98–107)
CHLORIDE SERPL-SCNC: 113 MMOL/L (ref 98–107)
CHLORIDE SERPL-SCNC: 94 MMOL/L (ref 98–107)
CHLORIDE SERPL-SCNC: 97 MMOL/L (ref 98–107)
CHLORIDE SERPL-SCNC: 99 MMOL/L (ref 98–107)
CI (COAGULATION INDEX)(Z): -0.7 (ref -3–3)
CI (COAGULATION INDEX)(Z): -1 (ref -3–3)
CI (COAGULATION INDEX)(Z): 1.2 (ref -3–3)
CK SERPL-CCNC: 108 U/L (ref 39–308)
CLONAZEPAM SERPL-MCNC: NEGATIVE NG/ML
CLOT ANGLE P HPASE BLD TEG: 62.5 DEGREES (ref 53–72)
CLOT ANGLE P HPASE BLD TEG: 65.6 DEGREES (ref 53–72)
CLOT ANGLE P HPASE BLD TEG: 70.9 DEGREES (ref 53–72)
CLOT INIT KAOL IND TO POST HEP NEUT TRTO: 1.5 {RATIO}
CLOT INIT KAOLIN IND BLD US: 205 SEC (ref 113–166)
CLOT INIT KAOLIN IND P HEP NEUT BLD US: 134 SEC (ref 103–153)
CLOT INIT P HPASE BLD TEG: 4.5 MINUTE (ref 5–10)
CLOT INIT P HPASE BLD TEG: 5.7 MINUTE (ref 5–10)
CLOT INIT P HPASE BLD TEG: 6.5 MINUTE (ref 5–10)
CLOT STIFF PLT CONT BLD CALC: ABNORMAL HPA
CLOT STIFF TF IND P HEP NEUT BLD US: ABNORMAL HPA
CLOT STIFF TF IND+IIB-IIIA INH P HEP NEU: ABNORMAL HPA
CLOT STRENGTH P HPASE BLD TEG: 6.1 KD/SC (ref 4.5–11)
CLOT STRENGTH P HPASE BLD TEG: 6.2 KD/SC (ref 4.5–11)
CLOT STRENGTH P HPASE BLD TEG: 6.9 KD/SC (ref 4.5–11)
CODING SYSTEM: NORMAL
COHGB MFR BLD: 100 % (ref 96–97)
COHGB MFR BLD: 100 % (ref 96–97)
COHGB MFR BLD: 23.8 % (ref 96–97)
COHGB MFR BLD: 68 % (ref 96–97)
COHGB MFR BLD: 84.1 % (ref 96–97)
COHGB MFR BLD: 88.1 % (ref 96–97)
COHGB MFR BLD: 89 % (ref 96–97)
COHGB MFR BLD: 89.6 % (ref 96–97)
COHGB MFR BLD: 90.6 % (ref 96–97)
COHGB MFR BLD: 91.1 % (ref 96–97)
COHGB MFR BLD: 91.2 % (ref 96–97)
COHGB MFR BLD: 91.7 % (ref 96–97)
COHGB MFR BLD: 91.9 % (ref 96–97)
COHGB MFR BLD: 92 % (ref 96–97)
COHGB MFR BLD: 92.3 % (ref 96–97)
COHGB MFR BLD: 92.4 % (ref 96–97)
COHGB MFR BLD: 92.9 % (ref 96–97)
COHGB MFR BLD: 92.9 % (ref 96–97)
COHGB MFR BLD: 93.1 % (ref 96–97)
COHGB MFR BLD: 93.1 % (ref 96–97)
COHGB MFR BLD: 93.2 % (ref 96–97)
COHGB MFR BLD: 93.2 % (ref 96–97)
COHGB MFR BLD: 93.3 % (ref 96–97)
COHGB MFR BLD: 93.4 % (ref 96–97)
COHGB MFR BLD: 93.6 % (ref 96–97)
COHGB MFR BLD: 93.6 % (ref 96–97)
COHGB MFR BLD: 93.7 % (ref 96–97)
COHGB MFR BLD: 93.8 % (ref 96–97)
COHGB MFR BLD: 94 % (ref 96–97)
COHGB MFR BLD: 94.1 % (ref 96–97)
COHGB MFR BLD: 94.1 % (ref 96–97)
COHGB MFR BLD: 94.2 % (ref 96–97)
COHGB MFR BLD: 94.2 % (ref 96–97)
COHGB MFR BLD: 94.5 % (ref 96–97)
COHGB MFR BLD: 94.6 % (ref 96–97)
COHGB MFR BLD: 94.7 % (ref 96–97)
COHGB MFR BLD: 94.8 % (ref 96–97)
COHGB MFR BLD: 94.9 % (ref 96–97)
COHGB MFR BLD: 95 % (ref 96–97)
COHGB MFR BLD: 95 % (ref 96–97)
COHGB MFR BLD: 95.1 % (ref 96–97)
COHGB MFR BLD: 95.1 % (ref 96–97)
COHGB MFR BLD: 95.3 % (ref 96–97)
COHGB MFR BLD: 95.3 % (ref 96–97)
COHGB MFR BLD: 95.4 % (ref 96–97)
COHGB MFR BLD: 95.4 % (ref 96–97)
COHGB MFR BLD: 95.5 % (ref 96–97)
COHGB MFR BLD: 95.6 % (ref 96–97)
COHGB MFR BLD: 95.8 % (ref 96–97)
COHGB MFR BLD: 95.9 % (ref 96–97)
COHGB MFR BLD: 96 % (ref 96–97)
COHGB MFR BLD: 96.1 % (ref 96–97)
COHGB MFR BLD: 96.2 % (ref 96–97)
COHGB MFR BLD: 96.3 % (ref 96–97)
COHGB MFR BLD: 96.3 % (ref 96–97)
COHGB MFR BLD: 96.4 % (ref 96–97)
COHGB MFR BLD: 96.5 % (ref 96–97)
COHGB MFR BLD: 96.6 % (ref 96–97)
COHGB MFR BLD: 96.7 % (ref 96–97)
COHGB MFR BLD: 96.8 % (ref 96–97)
COHGB MFR BLD: 96.9 % (ref 96–97)
COHGB MFR BLD: 97 % (ref 96–97)
COHGB MFR BLD: 97.1 % (ref 96–97)
COHGB MFR BLD: 97.2 % (ref 96–97)
COHGB MFR BLD: 97.3 % (ref 96–97)
COHGB MFR BLD: 97.4 % (ref 96–97)
COHGB MFR BLD: 97.5 % (ref 96–97)
COHGB MFR BLD: 97.6 % (ref 96–97)
COHGB MFR BLD: 97.6 % (ref 96–97)
COHGB MFR BLD: 97.7 % (ref 96–97)
COHGB MFR BLD: 97.8 % (ref 96–97)
COHGB MFR BLD: 97.8 % (ref 96–97)
COHGB MFR BLD: 97.9 % (ref 96–97)
COHGB MFR BLD: 97.9 % (ref 96–97)
COHGB MFR BLD: 98 % (ref 96–97)
COHGB MFR BLD: 98 % (ref 96–97)
COHGB MFR BLD: 98.1 % (ref 96–97)
COHGB MFR BLD: 98.2 % (ref 96–97)
COHGB MFR BLD: 98.3 % (ref 96–97)
COHGB MFR BLD: 98.4 % (ref 96–97)
COHGB MFR BLD: 98.5 % (ref 96–97)
COHGB MFR BLD: 98.6 % (ref 96–97)
COHGB MFR BLD: 98.7 % (ref 96–97)
COHGB MFR BLD: 98.8 % (ref 96–97)
COHGB MFR BLD: 98.8 % (ref 96–97)
COHGB MFR BLD: 98.9 % (ref 96–97)
COHGB MFR BLD: 98.9 % (ref 96–97)
COHGB MFR BLD: 99 % (ref 96–97)
COHGB MFR BLD: 99.1 % (ref 96–97)
COHGB MFR BLD: 99.2 % (ref 96–97)
COHGB MFR BLD: 99.3 % (ref 96–97)
COHGB MFR BLD: 99.4 % (ref 96–97)
COHGB MFR BLD: 99.5 % (ref 96–97)
COHGB MFR BLD: 99.6 % (ref 96–97)
COHGB MFR BLD: 99.7 % (ref 96–97)
COHGB MFR BLD: 99.8 % (ref 96–97)
COHGB MFR BLD: 99.9 % (ref 96–97)
COHGB MFR BLD: >100 % (ref 96–97)
COLOR UR AUTO: ABNORMAL
COLOR UR AUTO: ABNORMAL
COLOR UR AUTO: NORMAL
CORTIS SERPL-MCNC: 21.8 UG/DL
CPB POCT: NO
CPB POCT: NO
CREAT SERPL-MCNC: 0.83 MG/DL (ref 0.67–1.17)
CREAT SERPL-MCNC: 0.84 MG/DL (ref 0.67–1.17)
CREAT SERPL-MCNC: 0.85 MG/DL (ref 0.67–1.17)
CREAT SERPL-MCNC: 0.85 MG/DL (ref 0.67–1.17)
CREAT SERPL-MCNC: 0.86 MG/DL (ref 0.67–1.17)
CREAT SERPL-MCNC: 0.86 MG/DL (ref 0.67–1.17)
CREAT SERPL-MCNC: 0.87 MG/DL (ref 0.67–1.17)
CREAT SERPL-MCNC: 0.87 MG/DL (ref 0.67–1.17)
CREAT SERPL-MCNC: 0.88 MG/DL (ref 0.67–1.17)
CREAT SERPL-MCNC: 0.89 MG/DL (ref 0.67–1.17)
CREAT SERPL-MCNC: 0.9 MG/DL (ref 0.67–1.17)
CREAT SERPL-MCNC: 0.9 MG/DL (ref 0.67–1.17)
CREAT SERPL-MCNC: 0.91 MG/DL (ref 0.67–1.17)
CREAT SERPL-MCNC: 0.92 MG/DL (ref 0.67–1.17)
CREAT SERPL-MCNC: 0.93 MG/DL (ref 0.67–1.17)
CREAT SERPL-MCNC: 0.94 MG/DL (ref 0.67–1.17)
CREAT SERPL-MCNC: 0.95 MG/DL (ref 0.67–1.17)
CREAT SERPL-MCNC: 0.96 MG/DL (ref 0.67–1.17)
CREAT SERPL-MCNC: 0.97 MG/DL (ref 0.67–1.17)
CREAT SERPL-MCNC: 0.98 MG/DL (ref 0.67–1.17)
CREAT SERPL-MCNC: 0.98 MG/DL (ref 0.67–1.17)
CREAT SERPL-MCNC: 1 MG/DL (ref 0.67–1.17)
CREAT SERPL-MCNC: 1.01 MG/DL (ref 0.67–1.17)
CREAT SERPL-MCNC: 1.02 MG/DL (ref 0.67–1.17)
CREAT SERPL-MCNC: 1.03 MG/DL (ref 0.67–1.17)
CREAT SERPL-MCNC: 1.05 MG/DL (ref 0.67–1.17)
CREAT SERPL-MCNC: 1.07 MG/DL (ref 0.67–1.17)
CREAT SERPL-MCNC: 1.07 MG/DL (ref 0.67–1.17)
CREAT SERPL-MCNC: 1.08 MG/DL (ref 0.67–1.17)
CREAT SERPL-MCNC: 1.11 MG/DL (ref 0.67–1.17)
CREAT SERPL-MCNC: 1.11 MG/DL (ref 0.67–1.17)
CREAT SERPL-MCNC: 1.13 MG/DL (ref 0.67–1.17)
CREAT SERPL-MCNC: 1.13 MG/DL (ref 0.67–1.17)
CREAT SERPL-MCNC: 1.17 MG/DL (ref 0.67–1.17)
CREAT SERPL-MCNC: 1.21 MG/DL (ref 0.67–1.17)
CREAT SERPL-MCNC: 1.21 MG/DL (ref 0.67–1.17)
CREAT SERPL-MCNC: 1.22 MG/DL (ref 0.67–1.17)
CREAT SERPL-MCNC: 1.26 MG/DL (ref 0.67–1.17)
CREAT SERPL-MCNC: 1.27 MG/DL (ref 0.67–1.17)
CREAT SERPL-MCNC: 1.27 MG/DL (ref 0.67–1.17)
CREAT SERPL-MCNC: 1.29 MG/DL (ref 0.67–1.17)
CREAT SERPL-MCNC: 1.32 MG/DL (ref 0.67–1.17)
CREAT SERPL-MCNC: 1.34 MG/DL (ref 0.67–1.17)
CREAT SERPL-MCNC: 1.36 MG/DL (ref 0.67–1.17)
CREAT SERPL-MCNC: 1.41 MG/DL (ref 0.67–1.17)
CREAT SERPL-MCNC: 1.44 MG/DL (ref 0.67–1.17)
CREAT SERPL-MCNC: 1.46 MG/DL (ref 0.67–1.17)
CREAT SERPL-MCNC: 1.52 MG/DL (ref 0.67–1.17)
CREAT SERPL-MCNC: 1.6 MG/DL (ref 0.67–1.17)
CREAT SERPL-MCNC: 1.6 MG/DL (ref 0.67–1.17)
CREAT SERPL-MCNC: 1.62 MG/DL (ref 0.67–1.17)
CREAT SERPL-MCNC: 1.63 MG/DL (ref 0.67–1.17)
CREAT SERPL-MCNC: 1.73 MG/DL (ref 0.67–1.17)
CREAT SERPL-MCNC: 1.77 MG/DL (ref 0.67–1.17)
CREAT SERPL-MCNC: 1.82 MG/DL (ref 0.67–1.17)
CREAT SERPL-MCNC: 1.83 MG/DL (ref 0.67–1.17)
CREAT SERPL-MCNC: 1.85 MG/DL (ref 0.67–1.17)
CROSSMATCH: NORMAL
CRP SERPL-MCNC: 113 MG/L
CRP SERPL-MCNC: 131 MG/L
CRP SERPL-MCNC: 15.8 MG/L
CRP SERPL-MCNC: 15.8 MG/L
CRP SERPL-MCNC: 18 MG/L
CRP SERPL-MCNC: 25.4 MG/L
CRP SERPL-MCNC: 25.6 MG/L
CRP SERPL-MCNC: 34.8 MG/L
CRP SERPL-MCNC: 37.3 MG/L
CRP SERPL-MCNC: 43 MG/L
CRP SERPL-MCNC: 43.1 MG/L
CRP SERPL-MCNC: 45.9 MG/L
CRP SERPL-MCNC: 46 MG/L
CRP SERPL-MCNC: 65.8 MG/L
CRP SERPL-MCNC: 86 MG/L
CRP SERPL-MCNC: 89.9 MG/L
CRP SERPL-MCNC: 9.08 MG/L
CRP SERPL-MCNC: <3 MG/L
CRP SERPL-MCNC: <3 MG/L
D DIMER PPP FEU-MCNC: 0.79 UG/ML FEU (ref 0–0.5)
D DIMER PPP FEU-MCNC: 0.89 UG/ML FEU (ref 0–0.5)
D DIMER PPP FEU-MCNC: 1.12 UG/ML FEU (ref 0–0.5)
D DIMER PPP FEU-MCNC: 1.18 UG/ML FEU (ref 0–0.5)
D DIMER PPP FEU-MCNC: 1.21 UG/ML FEU (ref 0–0.5)
D DIMER PPP FEU-MCNC: 1.58 UG/ML FEU (ref 0–0.5)
D DIMER PPP FEU-MCNC: 1.61 UG/ML FEU (ref 0–0.5)
D DIMER PPP FEU-MCNC: 1.63 UG/ML FEU (ref 0–0.5)
D DIMER PPP FEU-MCNC: 1.75 UG/ML FEU (ref 0–0.5)
D DIMER PPP FEU-MCNC: 2.13 UG/ML FEU (ref 0–0.5)
D DIMER PPP FEU-MCNC: 2.22 UG/ML FEU (ref 0–0.5)
D DIMER PPP FEU-MCNC: 2.25 UG/ML FEU (ref 0–0.5)
D DIMER PPP FEU-MCNC: 2.38 UG/ML FEU (ref 0–0.5)
D DIMER PPP FEU-MCNC: 2.45 UG/ML FEU (ref 0–0.5)
D DIMER PPP FEU-MCNC: 2.71 UG/ML FEU (ref 0–0.5)
D DIMER PPP FEU-MCNC: 2.76 UG/ML FEU (ref 0–0.5)
D DIMER PPP FEU-MCNC: 5.85 UG/ML FEU (ref 0–0.5)
D DIMER PPP FEU-MCNC: 6.67 UG/ML FEU (ref 0–0.5)
D DIMER PPP FEU-MCNC: 6.89 UG/ML FEU (ref 0–0.5)
D DIMER PPP FEU-MCNC: 8.85 UG/ML FEU (ref 0–0.5)
D DIMER PPP FEU-MCNC: 8.97 UG/ML FEU (ref 0–0.5)
D DIMER PPP FEU-MCNC: 9.12 UG/ML FEU (ref 0–0.5)
DACRYOCYTES BLD QL SMEAR: NORMAL
DECLARED MEDICATIONS: ABNORMAL
DEPRECATED HCO3 PLAS-SCNC: 17 MMOL/L (ref 22–29)
DEPRECATED HCO3 PLAS-SCNC: 20 MMOL/L (ref 22–29)
DEPRECATED HCO3 PLAS-SCNC: 21 MMOL/L (ref 22–29)
DEPRECATED HCO3 PLAS-SCNC: 22 MMOL/L (ref 22–29)
DEPRECATED HCO3 PLAS-SCNC: 23 MMOL/L (ref 22–29)
DEPRECATED HCO3 PLAS-SCNC: 24 MMOL/L (ref 22–29)
DEPRECATED HCO3 PLAS-SCNC: 25 MMOL/L (ref 22–29)
DEPRECATED HCO3 PLAS-SCNC: 26 MMOL/L (ref 22–29)
DEPRECATED HCO3 PLAS-SCNC: 27 MMOL/L (ref 22–29)
DEPRECATED HCO3 PLAS-SCNC: 28 MMOL/L (ref 22–29)
DEPRECATED HCO3 PLAS-SCNC: 29 MMOL/L (ref 22–29)
DEPRECATED HCO3 PLAS-SCNC: 30 MMOL/L (ref 22–29)
DEPRECATED HCO3 PLAS-SCNC: 31 MMOL/L (ref 22–29)
DEPRECATED HCO3 PLAS-SCNC: 32 MMOL/L (ref 22–29)
DEPRECATED HCO3 PLAS-SCNC: 32 MMOL/L (ref 22–29)
DESALKYLFLURAZ SERPL CFM-MCNC: NEGATIVE NG/ML
DIASTOLIC BLOOD PRESSURE - MUSE: NORMAL MMHG
DIAZEPAM SERPL-MCNC: NEGATIVE NG/ML
DRUGS BLD SCN NOM: ABNORMAL
DRUGS FLD: ABNORMAL
EGFRCR SERPLBLD CKD-EPI 2021: 42 ML/MIN/1.73M2
EGFRCR SERPLBLD CKD-EPI 2021: 43 ML/MIN/1.73M2
EGFRCR SERPLBLD CKD-EPI 2021: 43 ML/MIN/1.73M2
EGFRCR SERPLBLD CKD-EPI 2021: 45 ML/MIN/1.73M2
EGFRCR SERPLBLD CKD-EPI 2021: 46 ML/MIN/1.73M2
EGFRCR SERPLBLD CKD-EPI 2021: 49 ML/MIN/1.73M2
EGFRCR SERPLBLD CKD-EPI 2021: 50 ML/MIN/1.73M2
EGFRCR SERPLBLD CKD-EPI 2021: 53 ML/MIN/1.73M2
EGFRCR SERPLBLD CKD-EPI 2021: 56 ML/MIN/1.73M2
EGFRCR SERPLBLD CKD-EPI 2021: 57 ML/MIN/1.73M2
EGFRCR SERPLBLD CKD-EPI 2021: 58 ML/MIN/1.73M2
EGFRCR SERPLBLD CKD-EPI 2021: 61 ML/MIN/1.73M2
EGFRCR SERPLBLD CKD-EPI 2021: 62 ML/MIN/1.73M2
EGFRCR SERPLBLD CKD-EPI 2021: 63 ML/MIN/1.73M2
EGFRCR SERPLBLD CKD-EPI 2021: 65 ML/MIN/1.73M2
EGFRCR SERPLBLD CKD-EPI 2021: 66 ML/MIN/1.73M2
EGFRCR SERPLBLD CKD-EPI 2021: 66 ML/MIN/1.73M2
EGFRCR SERPLBLD CKD-EPI 2021: 67 ML/MIN/1.73M2
EGFRCR SERPLBLD CKD-EPI 2021: 70 ML/MIN/1.73M2
EGFRCR SERPLBLD CKD-EPI 2021: 73 ML/MIN/1.73M2
EGFRCR SERPLBLD CKD-EPI 2021: 76 ML/MIN/1.73M2
EGFRCR SERPLBLD CKD-EPI 2021: 76 ML/MIN/1.73M2
EGFRCR SERPLBLD CKD-EPI 2021: 78 ML/MIN/1.73M2
EGFRCR SERPLBLD CKD-EPI 2021: 78 ML/MIN/1.73M2
EGFRCR SERPLBLD CKD-EPI 2021: 81 ML/MIN/1.73M2
EGFRCR SERPLBLD CKD-EPI 2021: 83 ML/MIN/1.73M2
EGFRCR SERPLBLD CKD-EPI 2021: 85 ML/MIN/1.73M2
EGFRCR SERPLBLD CKD-EPI 2021: 86 ML/MIN/1.73M2
EGFRCR SERPLBLD CKD-EPI 2021: 87 ML/MIN/1.73M2
EGFRCR SERPLBLD CKD-EPI 2021: 88 ML/MIN/1.73M2
EGFRCR SERPLBLD CKD-EPI 2021: >90 ML/MIN/1.73M2
ELLIPTOCYTES BLD QL SMEAR: NORMAL
ENA JO1 AB SER IA-ACNC: 0.5 U/ML
ENA JO1 IGG SER-ACNC: NEGATIVE
ENA SCL70 IGG SER IA-ACNC: 0.8 U/ML
ENA SCL70 IGG SER IA-ACNC: NEGATIVE
ENA SS-A AB SER IA-ACNC: 0.5 U/ML
ENA SS-A AB SER IA-ACNC: NEGATIVE
ENA SS-B IGG SER IA-ACNC: <0.6 U/ML
ENA SS-B IGG SER IA-ACNC: NEGATIVE
ENTEROCOCCUS FAECALIS: NOT DETECTED
ENTEROCOCCUS FAECIUM: NOT DETECTED
EOSINOPHIL # BLD AUTO: 0.1 10E3/UL (ref 0–0.7)
EOSINOPHIL # BLD AUTO: 0.8 10E3/UL (ref 0–0.7)
EOSINOPHIL # BLD AUTO: ABNORMAL 10*3/UL
EOSINOPHIL # BLD MANUAL: 0 10E3/UL (ref 0–0.7)
EOSINOPHIL NFR BLD AUTO: 1 %
EOSINOPHIL NFR BLD AUTO: 9 %
EOSINOPHIL NFR BLD AUTO: ABNORMAL %
EOSINOPHIL NFR BLD MANUAL: 0 %
ERYTHROCYTE [DISTWIDTH] IN BLOOD BY AUTOMATED COUNT: 11.9 % (ref 10–15)
ERYTHROCYTE [DISTWIDTH] IN BLOOD BY AUTOMATED COUNT: 11.9 % (ref 10–15)
ERYTHROCYTE [DISTWIDTH] IN BLOOD BY AUTOMATED COUNT: 12 % (ref 10–15)
ERYTHROCYTE [DISTWIDTH] IN BLOOD BY AUTOMATED COUNT: 12.1 % (ref 10–15)
ERYTHROCYTE [DISTWIDTH] IN BLOOD BY AUTOMATED COUNT: 12.2 % (ref 10–15)
ERYTHROCYTE [DISTWIDTH] IN BLOOD BY AUTOMATED COUNT: 12.3 % (ref 10–15)
ERYTHROCYTE [DISTWIDTH] IN BLOOD BY AUTOMATED COUNT: 12.4 % (ref 10–15)
ERYTHROCYTE [DISTWIDTH] IN BLOOD BY AUTOMATED COUNT: 12.5 % (ref 10–15)
ERYTHROCYTE [DISTWIDTH] IN BLOOD BY AUTOMATED COUNT: 12.6 % (ref 10–15)
ERYTHROCYTE [DISTWIDTH] IN BLOOD BY AUTOMATED COUNT: 12.7 % (ref 10–15)
ERYTHROCYTE [DISTWIDTH] IN BLOOD BY AUTOMATED COUNT: 12.9 % (ref 10–15)
ERYTHROCYTE [DISTWIDTH] IN BLOOD BY AUTOMATED COUNT: 13.4 % (ref 10–15)
ERYTHROCYTE [DISTWIDTH] IN BLOOD BY AUTOMATED COUNT: 13.4 % (ref 10–15)
ERYTHROCYTE [DISTWIDTH] IN BLOOD BY AUTOMATED COUNT: 13.5 % (ref 10–15)
ERYTHROCYTE [DISTWIDTH] IN BLOOD BY AUTOMATED COUNT: 13.5 % (ref 10–15)
ERYTHROCYTE [DISTWIDTH] IN BLOOD BY AUTOMATED COUNT: 13.8 % (ref 10–15)
ERYTHROCYTE [DISTWIDTH] IN BLOOD BY AUTOMATED COUNT: 14.2 % (ref 10–15)
ERYTHROCYTE [DISTWIDTH] IN BLOOD BY AUTOMATED COUNT: 14.6 % (ref 10–15)
ERYTHROCYTE [DISTWIDTH] IN BLOOD BY AUTOMATED COUNT: 14.7 % (ref 10–15)
ERYTHROCYTE [DISTWIDTH] IN BLOOD BY AUTOMATED COUNT: 14.8 % (ref 10–15)
ERYTHROCYTE [DISTWIDTH] IN BLOOD BY AUTOMATED COUNT: 14.8 % (ref 10–15)
ERYTHROCYTE [DISTWIDTH] IN BLOOD BY AUTOMATED COUNT: 14.9 % (ref 10–15)
ERYTHROCYTE [DISTWIDTH] IN BLOOD BY AUTOMATED COUNT: 15 % (ref 10–15)
ERYTHROCYTE [DISTWIDTH] IN BLOOD BY AUTOMATED COUNT: 15.1 % (ref 10–15)
ERYTHROCYTE [DISTWIDTH] IN BLOOD BY AUTOMATED COUNT: 15.1 % (ref 10–15)
ERYTHROCYTE [DISTWIDTH] IN BLOOD BY AUTOMATED COUNT: 15.2 % (ref 10–15)
ERYTHROCYTE [DISTWIDTH] IN BLOOD BY AUTOMATED COUNT: 15.2 % (ref 10–15)
ERYTHROCYTE [DISTWIDTH] IN BLOOD BY AUTOMATED COUNT: 15.5 % (ref 10–15)
ERYTHROCYTE [DISTWIDTH] IN BLOOD BY AUTOMATED COUNT: 15.6 % (ref 10–15)
ERYTHROCYTE [DISTWIDTH] IN BLOOD BY AUTOMATED COUNT: 15.6 % (ref 10–15)
ERYTHROCYTE [DISTWIDTH] IN BLOOD BY AUTOMATED COUNT: 15.8 % (ref 10–15)
ERYTHROCYTE [DISTWIDTH] IN BLOOD BY AUTOMATED COUNT: 15.8 % (ref 10–15)
ERYTHROCYTE [DISTWIDTH] IN BLOOD BY AUTOMATED COUNT: 15.9 % (ref 10–15)
ERYTHROCYTE [DISTWIDTH] IN BLOOD BY AUTOMATED COUNT: 16.6 % (ref 10–15)
ERYTHROCYTE [DISTWIDTH] IN BLOOD BY AUTOMATED COUNT: 16.6 % (ref 10–15)
ERYTHROCYTE [DISTWIDTH] IN BLOOD BY AUTOMATED COUNT: 16.8 % (ref 10–15)
ERYTHROCYTE [DISTWIDTH] IN BLOOD BY AUTOMATED COUNT: 16.9 % (ref 10–15)
ERYTHROCYTE [DISTWIDTH] IN BLOOD BY AUTOMATED COUNT: 17 % (ref 10–15)
ERYTHROCYTE [DISTWIDTH] IN BLOOD BY AUTOMATED COUNT: 17.2 % (ref 10–15)
ERYTHROCYTE [DISTWIDTH] IN BLOOD BY AUTOMATED COUNT: 17.3 % (ref 10–15)
ERYTHROCYTE [DISTWIDTH] IN BLOOD BY AUTOMATED COUNT: 17.4 % (ref 10–15)
ERYTHROCYTE [DISTWIDTH] IN BLOOD BY AUTOMATED COUNT: 17.5 % (ref 10–15)
ERYTHROCYTE [DISTWIDTH] IN BLOOD BY AUTOMATED COUNT: 17.5 % (ref 10–15)
ERYTHROCYTE [DISTWIDTH] IN BLOOD BY AUTOMATED COUNT: 17.7 % (ref 10–15)
ERYTHROCYTE [DISTWIDTH] IN BLOOD BY AUTOMATED COUNT: 17.8 % (ref 10–15)
ERYTHROCYTE [DISTWIDTH] IN BLOOD BY AUTOMATED COUNT: 17.9 % (ref 10–15)
ERYTHROCYTE [DISTWIDTH] IN BLOOD BY AUTOMATED COUNT: 18.4 % (ref 10–15)
ERYTHROCYTE [DISTWIDTH] IN BLOOD BY AUTOMATED COUNT: 18.5 % (ref 10–15)
ERYTHROCYTE [DISTWIDTH] IN BLOOD BY AUTOMATED COUNT: 18.6 % (ref 10–15)
ERYTHROCYTE [DISTWIDTH] IN BLOOD BY AUTOMATED COUNT: 19.1 % (ref 10–15)
ERYTHROCYTE [DISTWIDTH] IN BLOOD BY AUTOMATED COUNT: 19.2 % (ref 10–15)
ERYTHROCYTE [DISTWIDTH] IN BLOOD BY AUTOMATED COUNT: 19.3 % (ref 10–15)
ERYTHROCYTE [DISTWIDTH] IN BLOOD BY AUTOMATED COUNT: 19.3 % (ref 10–15)
ERYTHROCYTE [DISTWIDTH] IN BLOOD BY AUTOMATED COUNT: 19.4 % (ref 10–15)
ERYTHROCYTE [DISTWIDTH] IN BLOOD BY AUTOMATED COUNT: 19.5 % (ref 10–15)
ERYTHROCYTE [DISTWIDTH] IN BLOOD BY AUTOMATED COUNT: 19.9 % (ref 10–15)
ERYTHROCYTE [DISTWIDTH] IN BLOOD BY AUTOMATED COUNT: 19.9 % (ref 10–15)
ERYTHROCYTE [SEDIMENTATION RATE] IN BLOOD BY WESTERGREN METHOD: 103 MM/HR (ref 0–20)
ERYTHROCYTE [SEDIMENTATION RATE] IN BLOOD BY WESTERGREN METHOD: 14 MM/HR (ref 0–20)
ERYTHROCYTE [SEDIMENTATION RATE] IN BLOOD BY WESTERGREN METHOD: 140 MM/HR (ref 0–20)
ERYTHROCYTE [SEDIMENTATION RATE] IN BLOOD BY WESTERGREN METHOD: 15 MM/HR (ref 0–20)
ERYTHROCYTE [SEDIMENTATION RATE] IN BLOOD BY WESTERGREN METHOD: 19 MM/HR (ref 0–20)
ERYTHROCYTE [SEDIMENTATION RATE] IN BLOOD BY WESTERGREN METHOD: 20 MM/HR (ref 0–20)
ERYTHROCYTE [SEDIMENTATION RATE] IN BLOOD BY WESTERGREN METHOD: 3 MM/HR (ref 0–20)
ERYTHROCYTE [SEDIMENTATION RATE] IN BLOOD BY WESTERGREN METHOD: 33 MM/HR (ref 0–20)
ERYTHROCYTE [SEDIMENTATION RATE] IN BLOOD BY WESTERGREN METHOD: 36 MM/HR (ref 0–20)
ERYTHROCYTE [SEDIMENTATION RATE] IN BLOOD BY WESTERGREN METHOD: 37 MM/HR (ref 0–20)
ERYTHROCYTE [SEDIMENTATION RATE] IN BLOOD BY WESTERGREN METHOD: 4 MM/HR (ref 0–20)
ERYTHROCYTE [SEDIMENTATION RATE] IN BLOOD BY WESTERGREN METHOD: 40 MM/HR (ref 0–20)
ERYTHROCYTE [SEDIMENTATION RATE] IN BLOOD BY WESTERGREN METHOD: 42 MM/HR (ref 0–20)
ERYTHROCYTE [SEDIMENTATION RATE] IN BLOOD BY WESTERGREN METHOD: 52 MM/HR (ref 0–20)
ERYTHROCYTE [SEDIMENTATION RATE] IN BLOOD BY WESTERGREN METHOD: 56 MM/HR (ref 0–20)
ERYTHROCYTE [SEDIMENTATION RATE] IN BLOOD BY WESTERGREN METHOD: 57 MM/HR (ref 0–20)
ERYTHROCYTE [SEDIMENTATION RATE] IN BLOOD BY WESTERGREN METHOD: 6 MM/HR (ref 0–20)
ERYTHROCYTE [SEDIMENTATION RATE] IN BLOOD BY WESTERGREN METHOD: 68 MM/HR (ref 0–20)
ERYTHROCYTE [SEDIMENTATION RATE] IN BLOOD BY WESTERGREN METHOD: 7 MM/HR (ref 0–20)
ETHANOL BLD-MCNC: NEGATIVE GM/DL
FENTANYL BLD CFM-MCNC: 0.8 NG/ML
FENTANYL IA: ABNORMAL NG/ML
FENTANYL SPEC QL: POSITIVE
FENTANYL UR QL: ABNORMAL
FIBRINOGEN PPP-MCNC: 102 MG/DL (ref 170–490)
FIBRINOGEN PPP-MCNC: 128 MG/DL (ref 170–490)
FIBRINOGEN PPP-MCNC: 134 MG/DL (ref 170–490)
FIBRINOGEN PPP-MCNC: 140 MG/DL (ref 170–490)
FIBRINOGEN PPP-MCNC: 153 MG/DL (ref 170–490)
FIBRINOGEN PPP-MCNC: 170 MG/DL (ref 170–490)
FIBRINOGEN PPP-MCNC: 187 MG/DL (ref 170–490)
FIBRINOGEN PPP-MCNC: 220 MG/DL (ref 170–490)
FIBRINOGEN PPP-MCNC: 223 MG/DL (ref 170–490)
FIBRINOGEN PPP-MCNC: 227 MG/DL (ref 170–490)
FIBRINOGEN PPP-MCNC: 246 MG/DL (ref 170–490)
FIBRINOGEN PPP-MCNC: 264 MG/DL (ref 170–490)
FIBRINOGEN PPP-MCNC: 299 MG/DL (ref 170–490)
FIBRINOGEN PPP-MCNC: 327 MG/DL (ref 170–490)
FIBRINOGEN PPP-MCNC: 360 MG/DL (ref 170–490)
FIBRINOGEN PPP-MCNC: 369 MG/DL (ref 170–490)
FIBRINOGEN PPP-MCNC: 376 MG/DL (ref 170–490)
FIBRINOGEN PPP-MCNC: 384 MG/DL (ref 170–490)
FIBRINOGEN PPP-MCNC: 405 MG/DL (ref 170–490)
FIBRINOGEN PPP-MCNC: 406 MG/DL (ref 170–490)
FIBRINOGEN PPP-MCNC: 422 MG/DL (ref 170–490)
FIBRINOGEN PPP-MCNC: 432 MG/DL (ref 170–490)
FIBRINOGEN PPP-MCNC: 460 MG/DL (ref 170–490)
FLUAV H1 2009 PAND RNA SPEC QL NAA+PROBE: NOT DETECTED
FLUAV H1 RNA SPEC QL NAA+PROBE: NOT DETECTED
FLUAV H3 RNA SPEC QL NAA+PROBE: NOT DETECTED
FLUAV RNA SPEC QL NAA+PROBE: NEGATIVE
FLUAV RNA SPEC QL NAA+PROBE: NEGATIVE
FLUAV RNA SPEC QL NAA+PROBE: NOT DETECTED
FLUBV RNA RESP QL NAA+PROBE: NEGATIVE
FLUBV RNA RESP QL NAA+PROBE: NEGATIVE
FLUBV RNA SPEC QL NAA+PROBE: NOT DETECTED
FLURAZEPAM SPEC-MCNC: NEGATIVE NG/ML
FRAGMENTS BLD QL SMEAR: NORMAL
GABAPENTIN IA: ABNORMAL UG/ML
GABAPENTIN SERPLBLD QL SCN: POSITIVE
GABAPENTIN SERPLBLD-MCNC: 15.7 UG/ML
GGT SERPL-CCNC: 130 U/L (ref 8–61)
GGT SERPL-CCNC: 131 U/L (ref 8–61)
GGT SERPL-CCNC: 133 U/L (ref 8–61)
GGT SERPL-CCNC: 138 U/L (ref 8–61)
GLUCOSE BLD-MCNC: 106 MG/DL (ref 70–99)
GLUCOSE BLD-MCNC: 112 MG/DL (ref 70–99)
GLUCOSE BLD-MCNC: 123 MG/DL (ref 70–99)
GLUCOSE BLD-MCNC: 124 MG/DL (ref 70–99)
GLUCOSE BLD-MCNC: 130 MG/DL (ref 70–99)
GLUCOSE BLD-MCNC: 147 MG/DL (ref 70–99)
GLUCOSE BLD-MCNC: 148 MG/DL (ref 70–99)
GLUCOSE BLD-MCNC: 148 MG/DL (ref 70–99)
GLUCOSE BLD-MCNC: 152 MG/DL (ref 70–99)
GLUCOSE BLD-MCNC: 171 MG/DL (ref 70–99)
GLUCOSE BLD-MCNC: 178 MG/DL (ref 70–99)
GLUCOSE BLD-MCNC: 180 MG/DL (ref 70–99)
GLUCOSE BLD-MCNC: 219 MG/DL (ref 70–99)
GLUCOSE BLD-MCNC: 245 MG/DL (ref 70–99)
GLUCOSE BLD-MCNC: 72 MG/DL (ref 70–99)
GLUCOSE BLD-MCNC: 74 MG/DL (ref 70–99)
GLUCOSE BLD-MCNC: 79 MG/DL (ref 70–99)
GLUCOSE BLD-MCNC: 83 MG/DL (ref 70–99)
GLUCOSE BLD-MCNC: 89 MG/DL (ref 70–99)
GLUCOSE BLD-MCNC: 97 MG/DL (ref 70–99)
GLUCOSE BLD-MCNC: 99 MG/DL (ref 70–99)
GLUCOSE BLDC GLUCOMTR-MCNC: 100 MG/DL (ref 70–99)
GLUCOSE BLDC GLUCOMTR-MCNC: 102 MG/DL (ref 70–99)
GLUCOSE BLDC GLUCOMTR-MCNC: 103 MG/DL (ref 70–99)
GLUCOSE BLDC GLUCOMTR-MCNC: 104 MG/DL (ref 70–99)
GLUCOSE BLDC GLUCOMTR-MCNC: 105 MG/DL (ref 70–99)
GLUCOSE BLDC GLUCOMTR-MCNC: 108 MG/DL (ref 70–99)
GLUCOSE BLDC GLUCOMTR-MCNC: 109 MG/DL (ref 70–99)
GLUCOSE BLDC GLUCOMTR-MCNC: 109 MG/DL (ref 70–99)
GLUCOSE BLDC GLUCOMTR-MCNC: 110 MG/DL (ref 70–99)
GLUCOSE BLDC GLUCOMTR-MCNC: 110 MG/DL (ref 70–99)
GLUCOSE BLDC GLUCOMTR-MCNC: 111 MG/DL (ref 70–99)
GLUCOSE BLDC GLUCOMTR-MCNC: 111 MG/DL (ref 70–99)
GLUCOSE BLDC GLUCOMTR-MCNC: 113 MG/DL (ref 70–99)
GLUCOSE BLDC GLUCOMTR-MCNC: 113 MG/DL (ref 70–99)
GLUCOSE BLDC GLUCOMTR-MCNC: 114 MG/DL (ref 70–99)
GLUCOSE BLDC GLUCOMTR-MCNC: 114 MG/DL (ref 70–99)
GLUCOSE BLDC GLUCOMTR-MCNC: 115 MG/DL (ref 70–99)
GLUCOSE BLDC GLUCOMTR-MCNC: 116 MG/DL (ref 70–99)
GLUCOSE BLDC GLUCOMTR-MCNC: 116 MG/DL (ref 70–99)
GLUCOSE BLDC GLUCOMTR-MCNC: 117 MG/DL (ref 70–99)
GLUCOSE BLDC GLUCOMTR-MCNC: 117 MG/DL (ref 70–99)
GLUCOSE BLDC GLUCOMTR-MCNC: 118 MG/DL (ref 70–99)
GLUCOSE BLDC GLUCOMTR-MCNC: 119 MG/DL (ref 70–99)
GLUCOSE BLDC GLUCOMTR-MCNC: 120 MG/DL (ref 70–99)
GLUCOSE BLDC GLUCOMTR-MCNC: 121 MG/DL (ref 70–99)
GLUCOSE BLDC GLUCOMTR-MCNC: 122 MG/DL (ref 70–99)
GLUCOSE BLDC GLUCOMTR-MCNC: 123 MG/DL (ref 70–99)
GLUCOSE BLDC GLUCOMTR-MCNC: 124 MG/DL (ref 70–99)
GLUCOSE BLDC GLUCOMTR-MCNC: 126 MG/DL (ref 70–99)
GLUCOSE BLDC GLUCOMTR-MCNC: 127 MG/DL (ref 70–99)
GLUCOSE BLDC GLUCOMTR-MCNC: 128 MG/DL (ref 70–99)
GLUCOSE BLDC GLUCOMTR-MCNC: 128 MG/DL (ref 70–99)
GLUCOSE BLDC GLUCOMTR-MCNC: 129 MG/DL (ref 70–99)
GLUCOSE BLDC GLUCOMTR-MCNC: 130 MG/DL (ref 70–99)
GLUCOSE BLDC GLUCOMTR-MCNC: 131 MG/DL (ref 70–99)
GLUCOSE BLDC GLUCOMTR-MCNC: 132 MG/DL (ref 70–99)
GLUCOSE BLDC GLUCOMTR-MCNC: 132 MG/DL (ref 70–99)
GLUCOSE BLDC GLUCOMTR-MCNC: 133 MG/DL (ref 70–99)
GLUCOSE BLDC GLUCOMTR-MCNC: 133 MG/DL (ref 70–99)
GLUCOSE BLDC GLUCOMTR-MCNC: 134 MG/DL (ref 70–99)
GLUCOSE BLDC GLUCOMTR-MCNC: 134 MG/DL (ref 70–99)
GLUCOSE BLDC GLUCOMTR-MCNC: 135 MG/DL (ref 70–99)
GLUCOSE BLDC GLUCOMTR-MCNC: 136 MG/DL (ref 70–99)
GLUCOSE BLDC GLUCOMTR-MCNC: 137 MG/DL (ref 70–99)
GLUCOSE BLDC GLUCOMTR-MCNC: 138 MG/DL (ref 70–99)
GLUCOSE BLDC GLUCOMTR-MCNC: 139 MG/DL (ref 70–99)
GLUCOSE BLDC GLUCOMTR-MCNC: 140 MG/DL (ref 70–99)
GLUCOSE BLDC GLUCOMTR-MCNC: 141 MG/DL (ref 70–99)
GLUCOSE BLDC GLUCOMTR-MCNC: 142 MG/DL (ref 70–99)
GLUCOSE BLDC GLUCOMTR-MCNC: 143 MG/DL (ref 70–99)
GLUCOSE BLDC GLUCOMTR-MCNC: 144 MG/DL (ref 70–99)
GLUCOSE BLDC GLUCOMTR-MCNC: 145 MG/DL (ref 70–99)
GLUCOSE BLDC GLUCOMTR-MCNC: 146 MG/DL (ref 70–99)
GLUCOSE BLDC GLUCOMTR-MCNC: 147 MG/DL (ref 70–99)
GLUCOSE BLDC GLUCOMTR-MCNC: 148 MG/DL (ref 70–99)
GLUCOSE BLDC GLUCOMTR-MCNC: 149 MG/DL (ref 70–99)
GLUCOSE BLDC GLUCOMTR-MCNC: 149 MG/DL (ref 70–99)
GLUCOSE BLDC GLUCOMTR-MCNC: 150 MG/DL (ref 70–99)
GLUCOSE BLDC GLUCOMTR-MCNC: 151 MG/DL (ref 70–99)
GLUCOSE BLDC GLUCOMTR-MCNC: 152 MG/DL (ref 70–99)
GLUCOSE BLDC GLUCOMTR-MCNC: 153 MG/DL (ref 70–99)
GLUCOSE BLDC GLUCOMTR-MCNC: 154 MG/DL (ref 70–99)
GLUCOSE BLDC GLUCOMTR-MCNC: 155 MG/DL (ref 70–99)
GLUCOSE BLDC GLUCOMTR-MCNC: 156 MG/DL (ref 70–99)
GLUCOSE BLDC GLUCOMTR-MCNC: 157 MG/DL (ref 70–99)
GLUCOSE BLDC GLUCOMTR-MCNC: 157 MG/DL (ref 70–99)
GLUCOSE BLDC GLUCOMTR-MCNC: 158 MG/DL (ref 70–99)
GLUCOSE BLDC GLUCOMTR-MCNC: 159 MG/DL (ref 70–99)
GLUCOSE BLDC GLUCOMTR-MCNC: 159 MG/DL (ref 70–99)
GLUCOSE BLDC GLUCOMTR-MCNC: 160 MG/DL (ref 70–99)
GLUCOSE BLDC GLUCOMTR-MCNC: 160 MG/DL (ref 70–99)
GLUCOSE BLDC GLUCOMTR-MCNC: 161 MG/DL (ref 70–99)
GLUCOSE BLDC GLUCOMTR-MCNC: 162 MG/DL (ref 70–99)
GLUCOSE BLDC GLUCOMTR-MCNC: 162 MG/DL (ref 70–99)
GLUCOSE BLDC GLUCOMTR-MCNC: 163 MG/DL (ref 70–99)
GLUCOSE BLDC GLUCOMTR-MCNC: 164 MG/DL (ref 70–99)
GLUCOSE BLDC GLUCOMTR-MCNC: 165 MG/DL (ref 70–99)
GLUCOSE BLDC GLUCOMTR-MCNC: 166 MG/DL (ref 70–99)
GLUCOSE BLDC GLUCOMTR-MCNC: 167 MG/DL (ref 70–99)
GLUCOSE BLDC GLUCOMTR-MCNC: 167 MG/DL (ref 70–99)
GLUCOSE BLDC GLUCOMTR-MCNC: 168 MG/DL (ref 70–99)
GLUCOSE BLDC GLUCOMTR-MCNC: 169 MG/DL (ref 70–99)
GLUCOSE BLDC GLUCOMTR-MCNC: 170 MG/DL (ref 70–99)
GLUCOSE BLDC GLUCOMTR-MCNC: 171 MG/DL (ref 70–99)
GLUCOSE BLDC GLUCOMTR-MCNC: 172 MG/DL (ref 70–99)
GLUCOSE BLDC GLUCOMTR-MCNC: 173 MG/DL (ref 70–99)
GLUCOSE BLDC GLUCOMTR-MCNC: 174 MG/DL (ref 70–99)
GLUCOSE BLDC GLUCOMTR-MCNC: 174 MG/DL (ref 70–99)
GLUCOSE BLDC GLUCOMTR-MCNC: 175 MG/DL (ref 70–99)
GLUCOSE BLDC GLUCOMTR-MCNC: 176 MG/DL (ref 70–99)
GLUCOSE BLDC GLUCOMTR-MCNC: 177 MG/DL (ref 70–99)
GLUCOSE BLDC GLUCOMTR-MCNC: 177 MG/DL (ref 70–99)
GLUCOSE BLDC GLUCOMTR-MCNC: 178 MG/DL (ref 70–99)
GLUCOSE BLDC GLUCOMTR-MCNC: 179 MG/DL (ref 70–99)
GLUCOSE BLDC GLUCOMTR-MCNC: 180 MG/DL (ref 70–99)
GLUCOSE BLDC GLUCOMTR-MCNC: 180 MG/DL (ref 70–99)
GLUCOSE BLDC GLUCOMTR-MCNC: 181 MG/DL (ref 70–99)
GLUCOSE BLDC GLUCOMTR-MCNC: 182 MG/DL (ref 70–99)
GLUCOSE BLDC GLUCOMTR-MCNC: 183 MG/DL (ref 70–99)
GLUCOSE BLDC GLUCOMTR-MCNC: 184 MG/DL (ref 70–99)
GLUCOSE BLDC GLUCOMTR-MCNC: 185 MG/DL (ref 70–99)
GLUCOSE BLDC GLUCOMTR-MCNC: 186 MG/DL (ref 70–99)
GLUCOSE BLDC GLUCOMTR-MCNC: 186 MG/DL (ref 70–99)
GLUCOSE BLDC GLUCOMTR-MCNC: 187 MG/DL (ref 70–99)
GLUCOSE BLDC GLUCOMTR-MCNC: 188 MG/DL (ref 70–99)
GLUCOSE BLDC GLUCOMTR-MCNC: 189 MG/DL (ref 70–99)
GLUCOSE BLDC GLUCOMTR-MCNC: 190 MG/DL (ref 70–99)
GLUCOSE BLDC GLUCOMTR-MCNC: 191 MG/DL (ref 70–99)
GLUCOSE BLDC GLUCOMTR-MCNC: 192 MG/DL (ref 70–99)
GLUCOSE BLDC GLUCOMTR-MCNC: 194 MG/DL (ref 70–99)
GLUCOSE BLDC GLUCOMTR-MCNC: 194 MG/DL (ref 70–99)
GLUCOSE BLDC GLUCOMTR-MCNC: 195 MG/DL (ref 70–99)
GLUCOSE BLDC GLUCOMTR-MCNC: 195 MG/DL (ref 70–99)
GLUCOSE BLDC GLUCOMTR-MCNC: 196 MG/DL (ref 70–99)
GLUCOSE BLDC GLUCOMTR-MCNC: 197 MG/DL (ref 70–99)
GLUCOSE BLDC GLUCOMTR-MCNC: 198 MG/DL (ref 70–99)
GLUCOSE BLDC GLUCOMTR-MCNC: 199 MG/DL (ref 70–99)
GLUCOSE BLDC GLUCOMTR-MCNC: 200 MG/DL (ref 70–99)
GLUCOSE BLDC GLUCOMTR-MCNC: 201 MG/DL (ref 70–99)
GLUCOSE BLDC GLUCOMTR-MCNC: 208 MG/DL (ref 70–99)
GLUCOSE BLDC GLUCOMTR-MCNC: 209 MG/DL (ref 70–99)
GLUCOSE BLDC GLUCOMTR-MCNC: 209 MG/DL (ref 70–99)
GLUCOSE BLDC GLUCOMTR-MCNC: 210 MG/DL (ref 70–99)
GLUCOSE BLDC GLUCOMTR-MCNC: 212 MG/DL (ref 70–99)
GLUCOSE BLDC GLUCOMTR-MCNC: 213 MG/DL (ref 70–99)
GLUCOSE BLDC GLUCOMTR-MCNC: 214 MG/DL (ref 70–99)
GLUCOSE BLDC GLUCOMTR-MCNC: 214 MG/DL (ref 70–99)
GLUCOSE BLDC GLUCOMTR-MCNC: 216 MG/DL (ref 70–99)
GLUCOSE BLDC GLUCOMTR-MCNC: 218 MG/DL (ref 70–99)
GLUCOSE BLDC GLUCOMTR-MCNC: 219 MG/DL (ref 70–99)
GLUCOSE BLDC GLUCOMTR-MCNC: 219 MG/DL (ref 70–99)
GLUCOSE BLDC GLUCOMTR-MCNC: 221 MG/DL (ref 70–99)
GLUCOSE BLDC GLUCOMTR-MCNC: 224 MG/DL (ref 70–99)
GLUCOSE BLDC GLUCOMTR-MCNC: 227 MG/DL (ref 70–99)
GLUCOSE BLDC GLUCOMTR-MCNC: 231 MG/DL (ref 70–99)
GLUCOSE BLDC GLUCOMTR-MCNC: 232 MG/DL (ref 70–99)
GLUCOSE BLDC GLUCOMTR-MCNC: 235 MG/DL (ref 70–99)
GLUCOSE BLDC GLUCOMTR-MCNC: 236 MG/DL (ref 70–99)
GLUCOSE BLDC GLUCOMTR-MCNC: 237 MG/DL (ref 70–99)
GLUCOSE BLDC GLUCOMTR-MCNC: 241 MG/DL (ref 70–99)
GLUCOSE BLDC GLUCOMTR-MCNC: 242 MG/DL (ref 70–99)
GLUCOSE BLDC GLUCOMTR-MCNC: 243 MG/DL (ref 70–99)
GLUCOSE BLDC GLUCOMTR-MCNC: 244 MG/DL (ref 70–99)
GLUCOSE BLDC GLUCOMTR-MCNC: 245 MG/DL (ref 70–99)
GLUCOSE BLDC GLUCOMTR-MCNC: 246 MG/DL (ref 70–99)
GLUCOSE BLDC GLUCOMTR-MCNC: 247 MG/DL (ref 70–99)
GLUCOSE BLDC GLUCOMTR-MCNC: 248 MG/DL (ref 70–99)
GLUCOSE BLDC GLUCOMTR-MCNC: 264 MG/DL (ref 70–99)
GLUCOSE BLDC GLUCOMTR-MCNC: 266 MG/DL (ref 70–99)
GLUCOSE BLDC GLUCOMTR-MCNC: 268 MG/DL (ref 70–99)
GLUCOSE BLDC GLUCOMTR-MCNC: 269 MG/DL (ref 70–99)
GLUCOSE BLDC GLUCOMTR-MCNC: 269 MG/DL (ref 70–99)
GLUCOSE BLDC GLUCOMTR-MCNC: 271 MG/DL (ref 70–99)
GLUCOSE BLDC GLUCOMTR-MCNC: 272 MG/DL (ref 70–99)
GLUCOSE BLDC GLUCOMTR-MCNC: 277 MG/DL (ref 70–99)
GLUCOSE BLDC GLUCOMTR-MCNC: 279 MG/DL (ref 70–99)
GLUCOSE BLDC GLUCOMTR-MCNC: 282 MG/DL (ref 70–99)
GLUCOSE BLDC GLUCOMTR-MCNC: 284 MG/DL (ref 70–99)
GLUCOSE BLDC GLUCOMTR-MCNC: 285 MG/DL (ref 70–99)
GLUCOSE BLDC GLUCOMTR-MCNC: 286 MG/DL (ref 70–99)
GLUCOSE BLDC GLUCOMTR-MCNC: 286 MG/DL (ref 70–99)
GLUCOSE BLDC GLUCOMTR-MCNC: 287 MG/DL (ref 70–99)
GLUCOSE BLDC GLUCOMTR-MCNC: 288 MG/DL (ref 70–99)
GLUCOSE BLDC GLUCOMTR-MCNC: 289 MG/DL (ref 70–99)
GLUCOSE BLDC GLUCOMTR-MCNC: 291 MG/DL (ref 70–99)
GLUCOSE BLDC GLUCOMTR-MCNC: 292 MG/DL (ref 70–99)
GLUCOSE BLDC GLUCOMTR-MCNC: 293 MG/DL (ref 70–99)
GLUCOSE BLDC GLUCOMTR-MCNC: 297 MG/DL (ref 70–99)
GLUCOSE BLDC GLUCOMTR-MCNC: 298 MG/DL (ref 70–99)
GLUCOSE BLDC GLUCOMTR-MCNC: 298 MG/DL (ref 70–99)
GLUCOSE BLDC GLUCOMTR-MCNC: 299 MG/DL (ref 70–99)
GLUCOSE BLDC GLUCOMTR-MCNC: 300 MG/DL (ref 70–99)
GLUCOSE BLDC GLUCOMTR-MCNC: 302 MG/DL (ref 70–99)
GLUCOSE BLDC GLUCOMTR-MCNC: 303 MG/DL (ref 70–99)
GLUCOSE BLDC GLUCOMTR-MCNC: 304 MG/DL (ref 70–99)
GLUCOSE BLDC GLUCOMTR-MCNC: 305 MG/DL (ref 70–99)
GLUCOSE BLDC GLUCOMTR-MCNC: 305 MG/DL (ref 70–99)
GLUCOSE BLDC GLUCOMTR-MCNC: 315 MG/DL (ref 70–99)
GLUCOSE BLDC GLUCOMTR-MCNC: 320 MG/DL (ref 70–99)
GLUCOSE BLDC GLUCOMTR-MCNC: 321 MG/DL (ref 70–99)
GLUCOSE BLDC GLUCOMTR-MCNC: 321 MG/DL (ref 70–99)
GLUCOSE BLDC GLUCOMTR-MCNC: 322 MG/DL (ref 70–99)
GLUCOSE BLDC GLUCOMTR-MCNC: 328 MG/DL (ref 70–99)
GLUCOSE BLDC GLUCOMTR-MCNC: 329 MG/DL (ref 70–99)
GLUCOSE BLDC GLUCOMTR-MCNC: 329 MG/DL (ref 70–99)
GLUCOSE BLDC GLUCOMTR-MCNC: 335 MG/DL (ref 70–99)
GLUCOSE BLDC GLUCOMTR-MCNC: 338 MG/DL (ref 70–99)
GLUCOSE BLDC GLUCOMTR-MCNC: 338 MG/DL (ref 70–99)
GLUCOSE BLDC GLUCOMTR-MCNC: 340 MG/DL (ref 70–99)
GLUCOSE BLDC GLUCOMTR-MCNC: 344 MG/DL (ref 70–99)
GLUCOSE BLDC GLUCOMTR-MCNC: 349 MG/DL (ref 70–99)
GLUCOSE BLDC GLUCOMTR-MCNC: 37 MG/DL (ref 70–99)
GLUCOSE BLDC GLUCOMTR-MCNC: 370 MG/DL (ref 70–99)
GLUCOSE BLDC GLUCOMTR-MCNC: 380 MG/DL (ref 70–99)
GLUCOSE BLDC GLUCOMTR-MCNC: 382 MG/DL (ref 70–99)
GLUCOSE BLDC GLUCOMTR-MCNC: 386 MG/DL (ref 70–99)
GLUCOSE BLDC GLUCOMTR-MCNC: 387 MG/DL (ref 70–99)
GLUCOSE BLDC GLUCOMTR-MCNC: 388 MG/DL (ref 70–99)
GLUCOSE BLDC GLUCOMTR-MCNC: 401 MG/DL (ref 70–99)
GLUCOSE BLDC GLUCOMTR-MCNC: 412 MG/DL (ref 70–99)
GLUCOSE BLDC GLUCOMTR-MCNC: 419 MG/DL (ref 70–99)
GLUCOSE BLDC GLUCOMTR-MCNC: 57 MG/DL (ref 70–99)
GLUCOSE BLDC GLUCOMTR-MCNC: 61 MG/DL (ref 70–99)
GLUCOSE BLDC GLUCOMTR-MCNC: 67 MG/DL (ref 70–99)
GLUCOSE BLDC GLUCOMTR-MCNC: 68 MG/DL (ref 70–99)
GLUCOSE BLDC GLUCOMTR-MCNC: 70 MG/DL (ref 70–99)
GLUCOSE BLDC GLUCOMTR-MCNC: 70 MG/DL (ref 70–99)
GLUCOSE BLDC GLUCOMTR-MCNC: 75 MG/DL (ref 70–99)
GLUCOSE BLDC GLUCOMTR-MCNC: 76 MG/DL (ref 70–99)
GLUCOSE BLDC GLUCOMTR-MCNC: 78 MG/DL (ref 70–99)
GLUCOSE BLDC GLUCOMTR-MCNC: 79 MG/DL (ref 70–99)
GLUCOSE BLDC GLUCOMTR-MCNC: 79 MG/DL (ref 70–99)
GLUCOSE BLDC GLUCOMTR-MCNC: 82 MG/DL (ref 70–99)
GLUCOSE BLDC GLUCOMTR-MCNC: 86 MG/DL (ref 70–99)
GLUCOSE BLDC GLUCOMTR-MCNC: 87 MG/DL (ref 70–99)
GLUCOSE BLDC GLUCOMTR-MCNC: 89 MG/DL (ref 70–99)
GLUCOSE BLDC GLUCOMTR-MCNC: 90 MG/DL (ref 70–99)
GLUCOSE BLDC GLUCOMTR-MCNC: 90 MG/DL (ref 70–99)
GLUCOSE BLDC GLUCOMTR-MCNC: 91 MG/DL (ref 70–99)
GLUCOSE BLDC GLUCOMTR-MCNC: 93 MG/DL (ref 70–99)
GLUCOSE BLDC GLUCOMTR-MCNC: 94 MG/DL (ref 70–99)
GLUCOSE BLDC GLUCOMTR-MCNC: 94 MG/DL (ref 70–99)
GLUCOSE BLDC GLUCOMTR-MCNC: 95 MG/DL (ref 70–99)
GLUCOSE BLDC GLUCOMTR-MCNC: 96 MG/DL (ref 70–99)
GLUCOSE BLDC GLUCOMTR-MCNC: 96 MG/DL (ref 70–99)
GLUCOSE BLDC GLUCOMTR-MCNC: 97 MG/DL (ref 70–99)
GLUCOSE BLDC GLUCOMTR-MCNC: 97 MG/DL (ref 70–99)
GLUCOSE BLDC GLUCOMTR-MCNC: 98 MG/DL (ref 70–99)
GLUCOSE BLDC GLUCOMTR-MCNC: 99 MG/DL (ref 70–99)
GLUCOSE SERPL-MCNC: 100 MG/DL (ref 70–99)
GLUCOSE SERPL-MCNC: 103 MG/DL (ref 70–99)
GLUCOSE SERPL-MCNC: 109 MG/DL (ref 70–99)
GLUCOSE SERPL-MCNC: 110 MG/DL (ref 70–99)
GLUCOSE SERPL-MCNC: 117 MG/DL (ref 70–99)
GLUCOSE SERPL-MCNC: 120 MG/DL (ref 70–99)
GLUCOSE SERPL-MCNC: 122 MG/DL (ref 70–99)
GLUCOSE SERPL-MCNC: 126 MG/DL (ref 70–99)
GLUCOSE SERPL-MCNC: 126 MG/DL (ref 70–99)
GLUCOSE SERPL-MCNC: 129 MG/DL (ref 70–99)
GLUCOSE SERPL-MCNC: 129 MG/DL (ref 70–99)
GLUCOSE SERPL-MCNC: 130 MG/DL (ref 70–99)
GLUCOSE SERPL-MCNC: 133 MG/DL (ref 70–99)
GLUCOSE SERPL-MCNC: 144 MG/DL (ref 70–99)
GLUCOSE SERPL-MCNC: 147 MG/DL (ref 70–99)
GLUCOSE SERPL-MCNC: 150 MG/DL (ref 70–99)
GLUCOSE SERPL-MCNC: 150 MG/DL (ref 70–99)
GLUCOSE SERPL-MCNC: 151 MG/DL (ref 70–99)
GLUCOSE SERPL-MCNC: 152 MG/DL (ref 70–99)
GLUCOSE SERPL-MCNC: 153 MG/DL (ref 70–99)
GLUCOSE SERPL-MCNC: 154 MG/DL (ref 70–99)
GLUCOSE SERPL-MCNC: 154 MG/DL (ref 70–99)
GLUCOSE SERPL-MCNC: 156 MG/DL (ref 70–99)
GLUCOSE SERPL-MCNC: 159 MG/DL (ref 70–99)
GLUCOSE SERPL-MCNC: 159 MG/DL (ref 70–99)
GLUCOSE SERPL-MCNC: 160 MG/DL (ref 70–99)
GLUCOSE SERPL-MCNC: 162 MG/DL (ref 70–99)
GLUCOSE SERPL-MCNC: 162 MG/DL (ref 70–99)
GLUCOSE SERPL-MCNC: 164 MG/DL (ref 70–99)
GLUCOSE SERPL-MCNC: 166 MG/DL (ref 70–99)
GLUCOSE SERPL-MCNC: 167 MG/DL (ref 70–99)
GLUCOSE SERPL-MCNC: 168 MG/DL (ref 70–99)
GLUCOSE SERPL-MCNC: 168 MG/DL (ref 70–99)
GLUCOSE SERPL-MCNC: 169 MG/DL (ref 70–99)
GLUCOSE SERPL-MCNC: 172 MG/DL (ref 70–99)
GLUCOSE SERPL-MCNC: 174 MG/DL (ref 70–99)
GLUCOSE SERPL-MCNC: 174 MG/DL (ref 70–99)
GLUCOSE SERPL-MCNC: 176 MG/DL (ref 70–99)
GLUCOSE SERPL-MCNC: 178 MG/DL (ref 70–99)
GLUCOSE SERPL-MCNC: 180 MG/DL (ref 70–99)
GLUCOSE SERPL-MCNC: 181 MG/DL (ref 70–99)
GLUCOSE SERPL-MCNC: 182 MG/DL (ref 70–99)
GLUCOSE SERPL-MCNC: 184 MG/DL (ref 70–99)
GLUCOSE SERPL-MCNC: 185 MG/DL (ref 70–99)
GLUCOSE SERPL-MCNC: 187 MG/DL (ref 70–99)
GLUCOSE SERPL-MCNC: 188 MG/DL (ref 70–99)
GLUCOSE SERPL-MCNC: 188 MG/DL (ref 70–99)
GLUCOSE SERPL-MCNC: 191 MG/DL (ref 70–99)
GLUCOSE SERPL-MCNC: 192 MG/DL (ref 70–99)
GLUCOSE SERPL-MCNC: 194 MG/DL (ref 70–99)
GLUCOSE SERPL-MCNC: 198 MG/DL (ref 70–99)
GLUCOSE SERPL-MCNC: 199 MG/DL (ref 70–99)
GLUCOSE SERPL-MCNC: 200 MG/DL (ref 70–99)
GLUCOSE SERPL-MCNC: 201 MG/DL (ref 70–99)
GLUCOSE SERPL-MCNC: 204 MG/DL (ref 70–99)
GLUCOSE SERPL-MCNC: 208 MG/DL (ref 70–99)
GLUCOSE SERPL-MCNC: 209 MG/DL (ref 70–99)
GLUCOSE SERPL-MCNC: 222 MG/DL (ref 70–99)
GLUCOSE SERPL-MCNC: 223 MG/DL (ref 70–99)
GLUCOSE SERPL-MCNC: 227 MG/DL (ref 70–99)
GLUCOSE SERPL-MCNC: 230 MG/DL (ref 70–99)
GLUCOSE SERPL-MCNC: 230 MG/DL (ref 70–99)
GLUCOSE SERPL-MCNC: 235 MG/DL (ref 70–99)
GLUCOSE SERPL-MCNC: 235 MG/DL (ref 70–99)
GLUCOSE SERPL-MCNC: 247 MG/DL (ref 70–99)
GLUCOSE SERPL-MCNC: 247 MG/DL (ref 70–99)
GLUCOSE SERPL-MCNC: 248 MG/DL (ref 70–99)
GLUCOSE SERPL-MCNC: 255 MG/DL (ref 70–99)
GLUCOSE SERPL-MCNC: 271 MG/DL (ref 70–99)
GLUCOSE SERPL-MCNC: 286 MG/DL (ref 70–99)
GLUCOSE SERPL-MCNC: 306 MG/DL (ref 70–99)
GLUCOSE SERPL-MCNC: 317 MG/DL (ref 70–99)
GLUCOSE SERPL-MCNC: 330 MG/DL (ref 70–99)
GLUCOSE SERPL-MCNC: 62 MG/DL (ref 70–99)
GLUCOSE SERPL-MCNC: 75 MG/DL (ref 70–99)
GLUCOSE SERPL-MCNC: 91 MG/DL (ref 70–99)
GLUCOSE SERPL-MCNC: 97 MG/DL (ref 70–99)
GLUCOSE UR STRIP-MCNC: NEGATIVE MG/DL
GRAM STAIN RESULT: ABNORMAL
GRANULAR CAST: 2 /LPF
HADV DNA SPEC QL NAA+PROBE: NOT DETECTED
HBA1C MFR BLD: 10 %
HBA1C MFR BLD: 10 %
HCO3 BLD-SCNC: 17 MMOL/L (ref 21–28)
HCO3 BLD-SCNC: 19 MMOL/L (ref 21–28)
HCO3 BLD-SCNC: 22 MMOL/L (ref 21–28)
HCO3 BLD-SCNC: 24 MMOL/L (ref 21–28)
HCO3 BLD-SCNC: 25 MMOL/L (ref 21–28)
HCO3 BLD-SCNC: 26 MMOL/L (ref 21–28)
HCO3 BLD-SCNC: 27 MMOL/L (ref 21–28)
HCO3 BLD-SCNC: 28 MMOL/L (ref 21–28)
HCO3 BLD-SCNC: 29 MMOL/L (ref 21–28)
HCO3 BLD-SCNC: 30 MMOL/L (ref 21–28)
HCO3 BLD-SCNC: 31 MMOL/L (ref 21–28)
HCO3 BLD-SCNC: 32 MMOL/L (ref 21–28)
HCO3 BLD-SCNC: 33 MMOL/L (ref 21–28)
HCO3 BLD-SCNC: 34 MMOL/L (ref 21–28)
HCO3 BLDA-SCNC: 16 MMOL/L (ref 21–28)
HCO3 BLDA-SCNC: 20 MMOL/L (ref 21–28)
HCO3 BLDA-SCNC: 23 MMOL/L (ref 21–28)
HCO3 BLDA-SCNC: 25 MMOL/L (ref 21–28)
HCO3 BLDA-SCNC: 27 MMOL/L (ref 21–28)
HCO3 BLDA-SCNC: 27 MMOL/L (ref 21–28)
HCO3 BLDA-SCNC: 28 MMOL/L (ref 21–28)
HCO3 BLDA-SCNC: 29 MMOL/L (ref 21–28)
HCO3 BLDA-SCNC: 30 MMOL/L (ref 21–28)
HCO3 BLDA-SCNC: 31 MMOL/L (ref 21–28)
HCO3 BLDA-SCNC: 32 MMOL/L (ref 21–28)
HCO3 BLDA-SCNC: 33 MMOL/L (ref 21–28)
HCO3 BLDA-SCNC: 34 MMOL/L (ref 21–28)
HCO3 BLDA-SCNC: 35 MMOL/L (ref 21–28)
HCO3 BLDV-SCNC: 27 MMOL/L (ref 21–28)
HCO3 BLDV-SCNC: 27 MMOL/L (ref 21–28)
HCO3 BLDV-SCNC: 28 MMOL/L (ref 21–28)
HCO3 BLDV-SCNC: 29 MMOL/L (ref 21–28)
HCO3 BLDV-SCNC: 30 MMOL/L (ref 21–28)
HCO3 BLDV-SCNC: 31 MMOL/L (ref 21–28)
HCO3 BLDV-SCNC: 32 MMOL/L (ref 21–28)
HCO3 BLDV-SCNC: 33 MMOL/L (ref 21–28)
HCO3 BLDV-SCNC: 34 MMOL/L (ref 21–28)
HCO3 BLDV-SCNC: 35 MMOL/L (ref 21–28)
HCOV PNL SPEC NAA+PROBE: NOT DETECTED
HCT VFR BLD AUTO: 20.5 % (ref 40–53)
HCT VFR BLD AUTO: 20.5 % (ref 40–53)
HCT VFR BLD AUTO: 20.6 % (ref 40–53)
HCT VFR BLD AUTO: 20.9 % (ref 40–53)
HCT VFR BLD AUTO: 21 % (ref 40–53)
HCT VFR BLD AUTO: 21.1 % (ref 40–53)
HCT VFR BLD AUTO: 21.1 % (ref 40–53)
HCT VFR BLD AUTO: 21.2 % (ref 40–53)
HCT VFR BLD AUTO: 21.3 % (ref 40–53)
HCT VFR BLD AUTO: 21.4 % (ref 40–53)
HCT VFR BLD AUTO: 21.4 % (ref 40–53)
HCT VFR BLD AUTO: 21.5 % (ref 40–53)
HCT VFR BLD AUTO: 21.6 % (ref 40–53)
HCT VFR BLD AUTO: 21.7 % (ref 40–53)
HCT VFR BLD AUTO: 21.8 % (ref 40–53)
HCT VFR BLD AUTO: 21.8 % (ref 40–53)
HCT VFR BLD AUTO: 21.9 % (ref 40–53)
HCT VFR BLD AUTO: 21.9 % (ref 40–53)
HCT VFR BLD AUTO: 22 % (ref 40–53)
HCT VFR BLD AUTO: 22.1 % (ref 40–53)
HCT VFR BLD AUTO: 22.1 % (ref 40–53)
HCT VFR BLD AUTO: 22.2 % (ref 40–53)
HCT VFR BLD AUTO: 22.4 % (ref 40–53)
HCT VFR BLD AUTO: 22.5 % (ref 40–53)
HCT VFR BLD AUTO: 22.6 % (ref 40–53)
HCT VFR BLD AUTO: 22.6 % (ref 40–53)
HCT VFR BLD AUTO: 22.8 % (ref 40–53)
HCT VFR BLD AUTO: 22.8 % (ref 40–53)
HCT VFR BLD AUTO: 22.9 % (ref 40–53)
HCT VFR BLD AUTO: 23 % (ref 40–53)
HCT VFR BLD AUTO: 23.1 % (ref 40–53)
HCT VFR BLD AUTO: 23.2 % (ref 40–53)
HCT VFR BLD AUTO: 23.3 % (ref 40–53)
HCT VFR BLD AUTO: 23.4 % (ref 40–53)
HCT VFR BLD AUTO: 23.5 % (ref 40–53)
HCT VFR BLD AUTO: 23.8 % (ref 40–53)
HCT VFR BLD AUTO: 23.9 % (ref 40–53)
HCT VFR BLD AUTO: 23.9 % (ref 40–53)
HCT VFR BLD AUTO: 24 % (ref 40–53)
HCT VFR BLD AUTO: 24.2 % (ref 40–53)
HCT VFR BLD AUTO: 24.9 % (ref 40–53)
HCT VFR BLD AUTO: 25 % (ref 40–53)
HCT VFR BLD AUTO: 25.1 % (ref 40–53)
HCT VFR BLD AUTO: 25.2 % (ref 40–53)
HCT VFR BLD AUTO: 25.4 % (ref 40–53)
HCT VFR BLD AUTO: 26.4 % (ref 40–53)
HCT VFR BLD AUTO: 29.2 % (ref 40–53)
HCT VFR BLD AUTO: 30.7 % (ref 40–53)
HCT VFR BLD AUTO: 39.7 % (ref 40–53)
HCT VFR BLD AUTO: 40.9 % (ref 40–53)
HCT VFR BLD AUTO: 42.2 % (ref 40–53)
HCT VFR BLD AUTO: 42.8 % (ref 40–53)
HCT VFR BLD AUTO: 42.9 % (ref 40–53)
HCT VFR BLD AUTO: 43 % (ref 40–53)
HCT VFR BLD AUTO: 43.6 % (ref 40–53)
HCT VFR BLD AUTO: 43.7 % (ref 40–53)
HCT VFR BLD AUTO: 44 % (ref 40–53)
HCT VFR BLD AUTO: 44.1 % (ref 40–53)
HCT VFR BLD AUTO: 45.1 % (ref 40–53)
HCT VFR BLD AUTO: 45.4 % (ref 40–53)
HCT VFR BLD AUTO: 45.8 % (ref 40–53)
HCT VFR BLD AUTO: 55.9 % (ref 40–53)
HCT VFR BLD AUTO: 57.6 % (ref 40–53)
HCT VFR BLD AUTO: 59.6 % (ref 40–53)
HCT VFR BLD CALC: 55 % (ref 40–53)
HCT VFR BLD CALC: 60 % (ref 40–53)
HGB BLD-MCNC: 10.2 G/DL (ref 13.3–17.7)
HGB BLD-MCNC: 10.8 G/DL (ref 13.3–17.7)
HGB BLD-MCNC: 13.5 G/DL (ref 13.3–17.7)
HGB BLD-MCNC: 13.9 G/DL (ref 13.3–17.7)
HGB BLD-MCNC: 14.4 G/DL (ref 13.3–17.7)
HGB BLD-MCNC: 14.5 G/DL (ref 13.3–17.7)
HGB BLD-MCNC: 14.6 G/DL (ref 13.3–17.7)
HGB BLD-MCNC: 14.7 G/DL (ref 13.3–17.7)
HGB BLD-MCNC: 14.8 G/DL (ref 13.3–17.7)
HGB BLD-MCNC: 14.9 G/DL (ref 13.3–17.7)
HGB BLD-MCNC: 15.1 G/DL (ref 13.3–17.7)
HGB BLD-MCNC: 15.1 G/DL (ref 13.3–17.7)
HGB BLD-MCNC: 15.3 G/DL (ref 13.3–17.7)
HGB BLD-MCNC: 15.4 G/DL (ref 13.3–17.7)
HGB BLD-MCNC: 15.4 G/DL (ref 13.3–17.7)
HGB BLD-MCNC: 15.7 G/DL (ref 13.3–17.7)
HGB BLD-MCNC: 16 G/DL (ref 13.3–17.7)
HGB BLD-MCNC: 16.6 G/DL (ref 13.3–17.7)
HGB BLD-MCNC: 18.7 G/DL (ref 13.3–17.7)
HGB BLD-MCNC: 18.7 G/DL (ref 13.3–17.7)
HGB BLD-MCNC: 19.6 G/DL (ref 13.3–17.7)
HGB BLD-MCNC: 20.1 G/DL (ref 13.3–17.7)
HGB BLD-MCNC: 20.4 G/DL (ref 13.3–17.7)
HGB BLD-MCNC: 21.1 G/DL (ref 13.3–17.7)
HGB BLD-MCNC: 6.3 G/DL (ref 13.3–17.7)
HGB BLD-MCNC: 6.8 G/DL (ref 13.3–17.7)
HGB BLD-MCNC: 6.9 G/DL (ref 13.3–17.7)
HGB BLD-MCNC: 6.9 G/DL (ref 13.3–17.7)
HGB BLD-MCNC: 7 G/DL (ref 13.3–17.7)
HGB BLD-MCNC: 7 G/DL (ref 13.3–17.7)
HGB BLD-MCNC: 7.1 G/DL (ref 13.3–17.7)
HGB BLD-MCNC: 7.2 G/DL (ref 13.3–17.7)
HGB BLD-MCNC: 7.3 G/DL (ref 13.3–17.7)
HGB BLD-MCNC: 7.4 G/DL (ref 13.3–17.7)
HGB BLD-MCNC: 7.5 G/DL (ref 13.3–17.7)
HGB BLD-MCNC: 7.6 G/DL (ref 13.3–17.7)
HGB BLD-MCNC: 7.7 G/DL (ref 13.3–17.7)
HGB BLD-MCNC: 7.8 G/DL (ref 13.3–17.7)
HGB BLD-MCNC: 7.9 G/DL (ref 13.3–17.7)
HGB BLD-MCNC: 8 G/DL (ref 13.3–17.7)
HGB BLD-MCNC: 8.1 G/DL (ref 13.3–17.7)
HGB BLD-MCNC: 8.2 G/DL (ref 13.3–17.7)
HGB BLD-MCNC: 8.3 G/DL (ref 13.3–17.7)
HGB BLD-MCNC: 8.4 G/DL (ref 13.3–17.7)
HGB BLD-MCNC: 8.5 G/DL (ref 13.3–17.7)
HGB BLD-MCNC: 8.6 G/DL (ref 13.3–17.7)
HGB BLD-MCNC: 8.6 G/DL (ref 13.3–17.7)
HGB BLD-MCNC: 8.9 G/DL (ref 13.3–17.7)
HGB BLD-MCNC: 9.3 G/DL (ref 13.3–17.7)
HGB C CRYSTALS: NORMAL
HGB FREE PLAS-MCNC: 100 MG/DL
HGB FREE PLAS-MCNC: 110 MG/DL
HGB FREE PLAS-MCNC: 110 MG/DL
HGB FREE PLAS-MCNC: 30 MG/DL
HGB FREE PLAS-MCNC: 40 MG/DL
HGB FREE PLAS-MCNC: 50 MG/DL
HGB FREE PLAS-MCNC: 70 MG/DL
HGB FREE PLAS-MCNC: 80 MG/DL
HGB FREE PLAS-MCNC: 80 MG/DL
HGB FREE PLAS-MCNC: 90 MG/DL
HGB FREE PLAS-MCNC: 90 MG/DL
HGB UR QL STRIP: ABNORMAL
HGB UR QL STRIP: NEGATIVE
HGB UR QL STRIP: NEGATIVE
HIV 1+2 AB+HIV1 P24 AG SERPL QL IA: NONREACTIVE
HMPV RNA SPEC QL NAA+PROBE: NOT DETECTED
HOLD SPECIMEN HIT: NORMAL
HOLD SPECIMEN: NORMAL
HOWELL-JOLLY BOD BLD QL SMEAR: NORMAL
HPIV1 RNA SPEC QL NAA+PROBE: NOT DETECTED
HPIV2 RNA SPEC QL NAA+PROBE: NOT DETECTED
HPIV3 RNA SPEC QL NAA+PROBE: NOT DETECTED
HPIV4 RNA SPEC QL NAA+PROBE: NOT DETECTED
HYALINE CASTS: 4 /LPF
IGG SERPL-MCNC: 379 MG/DL (ref 610–1616)
IGG SERPL-MCNC: 791 MG/DL (ref 610–1616)
IGG SERPL-MCNC: 798 MG/DL (ref 610–1616)
IGG1 SER-MCNC: 240 MG/DL (ref 382–929)
IGG1 SER-MCNC: 510 MG/DL (ref 382–929)
IGG2 SER-MCNC: 228 MG/DL (ref 242–700)
IGG2 SER-MCNC: 98 MG/DL (ref 242–700)
IGG3 SER-MCNC: 18 MG/DL (ref 22–176)
IGG3 SER-MCNC: 39 MG/DL (ref 22–176)
IGG4 SER-MCNC: 20 MG/DL (ref 4–86)
IGG4 SER-MCNC: 34 MG/DL (ref 4–86)
IMM GRANULOCYTES # BLD: 0.2 10E3/UL
IMM GRANULOCYTES # BLD: 0.7 10E3/UL
IMM GRANULOCYTES # BLD: ABNORMAL 10*3/UL
IMM GRANULOCYTES NFR BLD: 2 %
IMM GRANULOCYTES NFR BLD: 3 %
IMM GRANULOCYTES NFR BLD: ABNORMAL %
INR PPP: 0.98 (ref 0.85–1.15)
INR PPP: 1.02 (ref 0.85–1.15)
INR PPP: 1.02 (ref 0.85–1.15)
INR PPP: 1.03 (ref 0.85–1.15)
INR PPP: 1.03 (ref 0.85–1.15)
INR PPP: 1.04 (ref 0.85–1.15)
INR PPP: 1.05 (ref 0.85–1.15)
INR PPP: 1.06 (ref 0.85–1.15)
INR PPP: 1.07 (ref 0.85–1.15)
INR PPP: 1.08 (ref 0.85–1.15)
INR PPP: 1.09 (ref 0.85–1.15)
INR PPP: 1.1 (ref 0.85–1.15)
INR PPP: 1.11 (ref 0.85–1.15)
INR PPP: 1.12 (ref 0.85–1.15)
INR PPP: 1.12 (ref 0.85–1.15)
INR PPP: 1.13 (ref 0.85–1.15)
INR PPP: 1.14 (ref 0.85–1.15)
INR PPP: 1.17 (ref 0.85–1.15)
INR PPP: 1.17 (ref 0.85–1.15)
INR PPP: 1.19 (ref 0.85–1.15)
INR PPP: 1.2 (ref 0.85–1.15)
INR PPP: 1.31 (ref 0.85–1.15)
INR PPP: 1.31 (ref 0.85–1.15)
INR PPP: 1.33 (ref 0.85–1.15)
INR PPP: 1.39 (ref 0.85–1.15)
INR PPP: 1.4 (ref 0.85–1.15)
INR PPP: 1.41 (ref 0.85–1.15)
INR PPP: 1.42 (ref 0.85–1.15)
INR PPP: 1.46 (ref 0.85–1.15)
INR PPP: 1.47 (ref 0.85–1.15)
INR PPP: 1.59 (ref 0.85–1.15)
INR PPP: 1.74 (ref 0.85–1.15)
INR PPP: 1.86 (ref 0.85–1.15)
INR PPP: 2.4 (ref 0.85–1.15)
INR PPP: 2.62 (ref 0.85–1.15)
INTERPRETATION ECG - MUSE: NORMAL
INTERPRETATION TEGPIA: NORMAL
INTERPRETATION TEGPIA: NORMAL
INTERPRETATION: NORMAL
ISSUE DATE AND TIME: NORMAL
KETONES UR STRIP-MCNC: NEGATIVE MG/DL
LAB PDF RESULT: NORMAL
LABORATORY REPORT: NORMAL
LACTATE BLD-SCNC: 0.6 MMOL/L (ref 0.7–2)
LACTATE BLD-SCNC: 0.8 MMOL/L (ref 0.7–2)
LACTATE BLD-SCNC: 0.8 MMOL/L (ref 0.7–2)
LACTATE BLD-SCNC: 0.9 MMOL/L (ref 0.7–2)
LACTATE BLD-SCNC: 1 MMOL/L (ref 0.7–2)
LACTATE BLD-SCNC: 1 MMOL/L (ref 0.7–2)
LACTATE BLD-SCNC: 1.1 MMOL/L (ref 0.7–2)
LACTATE BLD-SCNC: 1.2 MMOL/L (ref 0.7–2)
LACTATE BLD-SCNC: 1.4 MMOL/L (ref 0.7–2)
LACTATE BLD-SCNC: 7.3 MMOL/L (ref 0.7–2)
LACTATE BLD-SCNC: 7.7 MMOL/L (ref 0.7–2)
LACTATE SERPL-SCNC: 0.6 MMOL/L (ref 0.7–2)
LACTATE SERPL-SCNC: 0.7 MMOL/L (ref 0.7–2)
LACTATE SERPL-SCNC: 0.8 MMOL/L (ref 0.7–2)
LACTATE SERPL-SCNC: 0.9 MMOL/L (ref 0.7–2)
LACTATE SERPL-SCNC: 1 MMOL/L (ref 0.7–2)
LACTATE SERPL-SCNC: 1.1 MMOL/L (ref 0.7–2)
LACTATE SERPL-SCNC: 1.2 MMOL/L (ref 0.7–2)
LACTATE SERPL-SCNC: 1.3 MMOL/L (ref 0.7–2)
LACTATE SERPL-SCNC: 1.4 MMOL/L (ref 0.7–2)
LACTATE SERPL-SCNC: 1.5 MMOL/L (ref 0.7–2)
LACTATE SERPL-SCNC: 1.6 MMOL/L (ref 0.7–2)
LACTATE SERPL-SCNC: 1.7 MMOL/L (ref 0.7–2)
LACTATE SERPL-SCNC: 1.8 MMOL/L (ref 0.7–2)
LACTATE SERPL-SCNC: 1.9 MMOL/L (ref 0.7–2)
LACTATE SERPL-SCNC: 2 MMOL/L (ref 0.7–2)
LACTATE SERPL-SCNC: 2 MMOL/L (ref 0.7–2)
LACTATE SERPL-SCNC: 2.1 MMOL/L (ref 0.7–2)
LACTATE SERPL-SCNC: 2.2 MMOL/L (ref 0.7–2)
LACTATE SERPL-SCNC: 2.3 MMOL/L (ref 0.7–2)
LACTATE SERPL-SCNC: 2.5 MMOL/L (ref 0.7–2)
LACTATE SERPL-SCNC: 2.8 MMOL/L (ref 0.7–2)
LACTATE SERPL-SCNC: 3.2 MMOL/L (ref 0.7–2)
LACTATE SERPL-SCNC: 3.9 MMOL/L (ref 0.7–2)
LACTATE SERPL-SCNC: 4.7 MMOL/L (ref 0.7–2)
LACTATE SERPL-SCNC: 5.1 MMOL/L (ref 0.7–2)
LACTATE SERPL-SCNC: 5.2 MMOL/L (ref 0.7–2)
LACTATE SERPL-SCNC: 6.6 MMOL/L (ref 0.7–2)
LDH SERPL L TO P-CCNC: 372 U/L (ref 0–250)
LDH SERPL L TO P-CCNC: 387 U/L (ref 0–250)
LDH SERPL L TO P-CCNC: 393 U/L (ref 0–250)
LDH SERPL L TO P-CCNC: 398 U/L (ref 0–250)
LDH SERPL L TO P-CCNC: 404 U/L (ref 0–250)
LDH SERPL L TO P-CCNC: 422 U/L (ref 0–250)
LDH SERPL L TO P-CCNC: 441 U/L (ref 0–250)
LDH SERPL L TO P-CCNC: 461 U/L (ref 0–250)
LDH SERPL L TO P-CCNC: 462 U/L (ref 0–250)
LDH SERPL L TO P-CCNC: 502 U/L (ref 0–250)
LDH SERPL L TO P-CCNC: 528 U/L (ref 0–250)
LDH SERPL L TO P-CCNC: 549 U/L (ref 0–250)
LDH SERPL L TO P-CCNC: 598 U/L (ref 0–250)
LDH SERPL L TO P-CCNC: 639 U/L (ref 0–250)
LDH SERPL L TO P-CCNC: 662 U/L (ref 0–250)
LDH SERPL L TO P-CCNC: 665 U/L (ref 0–250)
LDH SERPL L TO P-CCNC: 672 U/L (ref 0–250)
LDH SERPL L TO P-CCNC: 696 U/L (ref 0–250)
LEUKOCYTE ESTERASE UR QL STRIP: NEGATIVE
LISTERIA SPECIES (DETECTED/NOT DETECTED): NOT DETECTED
LOCATION OF TASK: NORMAL
LORAZEPAM SERPL-MCNC: NEGATIVE NG/ML
LVEF ECHO: NORMAL
LYMPHOCYTES # BLD AUTO: 1 10E3/UL (ref 0.8–5.3)
LYMPHOCYTES # BLD AUTO: 3.7 10E3/UL (ref 0.8–5.3)
LYMPHOCYTES # BLD AUTO: ABNORMAL 10*3/UL
LYMPHOCYTES # BLD MANUAL: 2.6 10E3/UL (ref 0.8–5.3)
LYMPHOCYTES NFR BLD AUTO: 11 %
LYMPHOCYTES NFR BLD AUTO: 17 %
LYMPHOCYTES NFR BLD AUTO: ABNORMAL %
LYMPHOCYTES NFR BLD MANUAL: 13 %
M PNEUMO DNA SPEC QL NAA+PROBE: NOT DETECTED
MAGNESIUM SERPL-MCNC: 1.5 MG/DL (ref 1.7–2.3)
MAGNESIUM SERPL-MCNC: 1.9 MG/DL (ref 1.7–2.3)
MAGNESIUM SERPL-MCNC: 2 MG/DL (ref 1.7–2.3)
MAGNESIUM SERPL-MCNC: 2.1 MG/DL (ref 1.7–2.3)
MAGNESIUM SERPL-MCNC: 2.2 MG/DL (ref 1.7–2.3)
MAGNESIUM SERPL-MCNC: 2.3 MG/DL (ref 1.7–2.3)
MAGNESIUM SERPL-MCNC: 2.4 MG/DL (ref 1.7–2.3)
MAGNESIUM SERPL-MCNC: 2.5 MG/DL (ref 1.7–2.3)
MAGNESIUM SERPL-MCNC: 2.6 MG/DL (ref 1.7–2.3)
MAGNESIUM SERPL-MCNC: 2.7 MG/DL (ref 1.7–2.3)
MAGNESIUM SERPL-MCNC: 2.7 MG/DL (ref 1.7–2.3)
MAGNESIUM SERPL-MCNC: 2.8 MG/DL (ref 1.7–2.3)
MAGNESIUM SERPL-MCNC: 2.9 MG/DL (ref 1.7–2.3)
MAGNESIUM SERPL-MCNC: 3 MG/DL (ref 1.7–2.3)
MAGNESIUM SERPL-MCNC: 3.1 MG/DL (ref 1.7–2.3)
MAGNESIUM SERPL-MCNC: 3.2 MG/DL (ref 1.7–2.3)
MCF P HPASE BLD TEG: 54.9 MM (ref 50–70)
MCF P HPASE BLD TEG: 55.5 MM (ref 50–70)
MCF P HPASE BLD TEG: 58 MM (ref 50–70)
MCH RBC QN AUTO: 29.2 PG (ref 26.5–33)
MCH RBC QN AUTO: 29.3 PG (ref 26.5–33)
MCH RBC QN AUTO: 29.4 PG (ref 26.5–33)
MCH RBC QN AUTO: 29.5 PG (ref 26.5–33)
MCH RBC QN AUTO: 29.6 PG (ref 26.5–33)
MCH RBC QN AUTO: 29.6 PG (ref 26.5–33)
MCH RBC QN AUTO: 29.7 PG (ref 26.5–33)
MCH RBC QN AUTO: 29.8 PG (ref 26.5–33)
MCH RBC QN AUTO: 29.9 PG (ref 26.5–33)
MCH RBC QN AUTO: 30 PG (ref 26.5–33)
MCH RBC QN AUTO: 30 PG (ref 26.5–33)
MCH RBC QN AUTO: 30.1 PG (ref 26.5–33)
MCH RBC QN AUTO: 30.2 PG (ref 26.5–33)
MCH RBC QN AUTO: 30.3 PG (ref 26.5–33)
MCH RBC QN AUTO: 30.6 PG (ref 26.5–33)
MCH RBC QN AUTO: 30.7 PG (ref 26.5–33)
MCH RBC QN AUTO: 30.8 PG (ref 26.5–33)
MCH RBC QN AUTO: 30.9 PG (ref 26.5–33)
MCH RBC QN AUTO: 31 PG (ref 26.5–33)
MCH RBC QN AUTO: 31.1 PG (ref 26.5–33)
MCH RBC QN AUTO: 31.4 PG (ref 26.5–33)
MCH RBC QN AUTO: 31.5 PG (ref 26.5–33)
MCH RBC QN AUTO: 31.6 PG (ref 26.5–33)
MCH RBC QN AUTO: 31.7 PG (ref 26.5–33)
MCH RBC QN AUTO: 31.8 PG (ref 26.5–33)
MCH RBC QN AUTO: 32 PG (ref 26.5–33)
MCH RBC QN AUTO: 32.1 PG (ref 26.5–33)
MCH RBC QN AUTO: 32.3 PG (ref 26.5–33)
MCH RBC QN AUTO: 32.4 PG (ref 26.5–33)
MCH RBC QN AUTO: 32.5 PG (ref 26.5–33)
MCH RBC QN AUTO: 32.6 PG (ref 26.5–33)
MCH RBC QN AUTO: 32.7 PG (ref 26.5–33)
MCH RBC QN AUTO: 32.8 PG (ref 26.5–33)
MCH RBC QN AUTO: 32.9 PG (ref 26.5–33)
MCH RBC QN AUTO: 33 PG (ref 26.5–33)
MCH RBC QN AUTO: 33.1 PG (ref 26.5–33)
MCH RBC QN AUTO: 33.2 PG (ref 26.5–33)
MCH RBC QN AUTO: 33.3 PG (ref 26.5–33)
MCH RBC QN AUTO: 33.5 PG (ref 26.5–33)
MCH RBC QN AUTO: 33.5 PG (ref 26.5–33)
MCH RBC QN AUTO: 33.6 PG (ref 26.5–33)
MCH RBC QN AUTO: 33.6 PG (ref 26.5–33)
MCH RBC QN AUTO: 33.7 PG (ref 26.5–33)
MCH RBC QN AUTO: 33.8 PG (ref 26.5–33)
MCH RBC QN AUTO: 34.3 PG (ref 26.5–33)
MCHC RBC AUTO-ENTMCNC: 32.7 G/DL (ref 31.5–36.5)
MCHC RBC AUTO-ENTMCNC: 32.9 G/DL (ref 31.5–36.5)
MCHC RBC AUTO-ENTMCNC: 32.9 G/DL (ref 31.5–36.5)
MCHC RBC AUTO-ENTMCNC: 33 G/DL (ref 31.5–36.5)
MCHC RBC AUTO-ENTMCNC: 33.1 G/DL (ref 31.5–36.5)
MCHC RBC AUTO-ENTMCNC: 33.2 G/DL (ref 31.5–36.5)
MCHC RBC AUTO-ENTMCNC: 33.3 G/DL (ref 31.5–36.5)
MCHC RBC AUTO-ENTMCNC: 33.5 G/DL (ref 31.5–36.5)
MCHC RBC AUTO-ENTMCNC: 33.6 G/DL (ref 31.5–36.5)
MCHC RBC AUTO-ENTMCNC: 33.7 G/DL (ref 31.5–36.5)
MCHC RBC AUTO-ENTMCNC: 33.8 G/DL (ref 31.5–36.5)
MCHC RBC AUTO-ENTMCNC: 33.9 G/DL (ref 31.5–36.5)
MCHC RBC AUTO-ENTMCNC: 34 G/DL (ref 31.5–36.5)
MCHC RBC AUTO-ENTMCNC: 34.1 G/DL (ref 31.5–36.5)
MCHC RBC AUTO-ENTMCNC: 34.2 G/DL (ref 31.5–36.5)
MCHC RBC AUTO-ENTMCNC: 34.2 G/DL (ref 31.5–36.5)
MCHC RBC AUTO-ENTMCNC: 34.3 G/DL (ref 31.5–36.5)
MCHC RBC AUTO-ENTMCNC: 34.3 G/DL (ref 31.5–36.5)
MCHC RBC AUTO-ENTMCNC: 34.4 G/DL (ref 31.5–36.5)
MCHC RBC AUTO-ENTMCNC: 34.5 G/DL (ref 31.5–36.5)
MCHC RBC AUTO-ENTMCNC: 34.6 G/DL (ref 31.5–36.5)
MCHC RBC AUTO-ENTMCNC: 34.7 G/DL (ref 31.5–36.5)
MCHC RBC AUTO-ENTMCNC: 34.8 G/DL (ref 31.5–36.5)
MCHC RBC AUTO-ENTMCNC: 34.8 G/DL (ref 31.5–36.5)
MCHC RBC AUTO-ENTMCNC: 34.9 G/DL (ref 31.5–36.5)
MCHC RBC AUTO-ENTMCNC: 35 G/DL (ref 31.5–36.5)
MCHC RBC AUTO-ENTMCNC: 35 G/DL (ref 31.5–36.5)
MCHC RBC AUTO-ENTMCNC: 35.1 G/DL (ref 31.5–36.5)
MCHC RBC AUTO-ENTMCNC: 35.1 G/DL (ref 31.5–36.5)
MCHC RBC AUTO-ENTMCNC: 35.2 G/DL (ref 31.5–36.5)
MCHC RBC AUTO-ENTMCNC: 35.4 G/DL (ref 31.5–36.5)
MCHC RBC AUTO-ENTMCNC: 35.7 G/DL (ref 31.5–36.5)
MCHC RBC AUTO-ENTMCNC: 36.2 G/DL (ref 31.5–36.5)
MCV RBC AUTO: 100 FL (ref 78–100)
MCV RBC AUTO: 100 FL (ref 78–100)
MCV RBC AUTO: 87 FL (ref 78–100)
MCV RBC AUTO: 88 FL (ref 78–100)
MCV RBC AUTO: 88 FL (ref 78–100)
MCV RBC AUTO: 89 FL (ref 78–100)
MCV RBC AUTO: 90 FL (ref 78–100)
MCV RBC AUTO: 91 FL (ref 78–100)
MCV RBC AUTO: 92 FL (ref 78–100)
MCV RBC AUTO: 93 FL (ref 78–100)
MCV RBC AUTO: 94 FL (ref 78–100)
MCV RBC AUTO: 95 FL (ref 78–100)
MCV RBC AUTO: 96 FL (ref 78–100)
MCV RBC AUTO: 97 FL (ref 78–100)
MCV RBC AUTO: 98 FL (ref 78–100)
MCV RBC AUTO: 99 FL (ref 78–100)
MCV RBC AUTO: 99 FL (ref 78–100)
MEPERIDINE SERPLBLD-MCNC: NEGATIVE NG/ML
METHADONE SAL CFM-MCNC: NEGATIVE NG/ML
MIDAZOLAM SERPL-MCNC: 10.4 NG/ML
MONOCYTES # BLD AUTO: 0.6 10E3/UL (ref 0–1.3)
MONOCYTES # BLD AUTO: 0.7 10E3/UL (ref 0–1.3)
MONOCYTES # BLD AUTO: ABNORMAL 10*3/UL
MONOCYTES # BLD MANUAL: 1.6 10E3/UL (ref 0–1.3)
MONOCYTES NFR BLD AUTO: 3 %
MONOCYTES NFR BLD AUTO: 7 %
MONOCYTES NFR BLD AUTO: ABNORMAL %
MONOCYTES NFR BLD MANUAL: 8 %
MRSA DNA SPEC QL NAA+PROBE: POSITIVE
MUCOUS THREADS #/AREA URNS LPF: PRESENT /LPF
MUCOUS THREADS #/AREA URNS LPF: PRESENT /LPF
MYELOCYTES # BLD MANUAL: 0.8 10E3/UL
MYELOCYTES NFR BLD MANUAL: 4 %
NEUTROPHILS # BLD AUTO: 16.2 10E3/UL (ref 1.6–8.3)
NEUTROPHILS # BLD AUTO: 6.2 10E3/UL (ref 1.6–8.3)
NEUTROPHILS # BLD AUTO: ABNORMAL 10*3/UL
NEUTROPHILS # BLD MANUAL: 14.8 10E3/UL (ref 1.6–8.3)
NEUTROPHILS NFR BLD AUTO: 71 %
NEUTROPHILS NFR BLD AUTO: 76 %
NEUTROPHILS NFR BLD AUTO: ABNORMAL %
NEUTROPHILS NFR BLD MANUAL: 74 %
NEUTS HYPERSEG BLD QL SMEAR: NORMAL
NITRATE UR QL: NEGATIVE
NORCHLORDIAZEP SERPL-MCNC: NEGATIVE UG/ML
NORDIAZEPAM SPEC-MCNC: NEGATIVE NG/ML
NORFENTANYL BLD CFM-MCNC: NEGATIVE NG/ML
NRBC # BLD AUTO: 0 10E3/UL
NRBC # BLD AUTO: 0.1 10E3/UL
NRBC # BLD AUTO: 0.4 10E3/UL
NRBC # BLD AUTO: 0.8 10E3/UL
NRBC BLD AUTO-RTO: 0 /100
NRBC BLD AUTO-RTO: 0 /100
NRBC BLD AUTO-RTO: 2 /100
NRBC BLD MANUAL-RTO: 4 %
NSE SERPL IA-MCNC: 19.9 NG/ML
NSE SERPL IA-MCNC: 24.1 NG/ML
NSE SERPL IA-MCNC: 34.4 NG/ML
NT-PROBNP SERPL-MCNC: <36 PG/ML (ref 0–900)
O2/TOTAL GAS SETTING VFR VENT: 100 %
O2/TOTAL GAS SETTING VFR VENT: 40 %
O2/TOTAL GAS SETTING VFR VENT: 50 %
O2/TOTAL GAS SETTING VFR VENT: 55 %
O2/TOTAL GAS SETTING VFR VENT: 6 %
O2/TOTAL GAS SETTING VFR VENT: 60 %
O2/TOTAL GAS SETTING VFR VENT: 65 %
O2/TOTAL GAS SETTING VFR VENT: 70 %
O2/TOTAL GAS SETTING VFR VENT: 80 %
O2/TOTAL GAS SETTING VFR VENT: 85 %
O2/TOTAL GAS SETTING VFR VENT: 90 %
O2/TOTAL GAS SETTING VFR VENT: 91 %
OPIATES SPEC-MCNC: NEGATIVE NG/ML
OPIATES UR QL SCN: ABNORMAL
OXAZEPAM SERPL CFM-MCNC: NEGATIVE NG/ML
OXYCODONE SERPLBLD SCN-MCNC: NEGATIVE NG/ML
OXYHGB MFR BLDA: 100 % (ref 75–100)
OXYHGB MFR BLDA: 70 % (ref 75–100)
OXYHGB MFR BLDA: 70 % (ref 92–100)
OXYHGB MFR BLDA: 71 % (ref 92–100)
OXYHGB MFR BLDA: 72 % (ref 92–100)
OXYHGB MFR BLDA: 73 % (ref 92–100)
OXYHGB MFR BLDA: 82 % (ref 92–100)
OXYHGB MFR BLDA: 84 % (ref 75–100)
OXYHGB MFR BLDA: 84 % (ref 92–100)
OXYHGB MFR BLDA: 87 % (ref 92–100)
OXYHGB MFR BLDA: 87 % (ref 92–100)
OXYHGB MFR BLDA: 89 % (ref 92–100)
OXYHGB MFR BLDA: 90 % (ref 92–100)
OXYHGB MFR BLDA: 93 % (ref 92–100)
OXYHGB MFR BLDA: 95 % (ref 75–100)
OXYHGB MFR BLDA: 97 % (ref 75–100)
OXYHGB MFR BLDA: 98 % (ref 75–100)
OXYHGB MFR BLDA: 99 % (ref 75–100)
OXYHGB MFR BLDV: 24 % (ref 70–75)
OXYHGB MFR BLDV: 32 % (ref 70–75)
OXYHGB MFR BLDV: 34 % (ref 70–75)
OXYHGB MFR BLDV: 37 %
OXYHGB MFR BLDV: 41 %
OXYHGB MFR BLDV: 52 %
OXYHGB MFR BLDV: 56 %
OXYHGB MFR BLDV: 61 % (ref 70–75)
OXYHGB MFR BLDV: 62 %
OXYHGB MFR BLDV: 63 %
OXYHGB MFR BLDV: 63 % (ref 70–75)
OXYHGB MFR BLDV: 65 %
OXYHGB MFR BLDV: 65 %
OXYHGB MFR BLDV: 65 % (ref 70–75)
OXYHGB MFR BLDV: 68 %
OXYHGB MFR BLDV: 70 %
OXYHGB MFR BLDV: 71 %
OXYHGB MFR BLDV: 71 %
OXYHGB MFR BLDV: 72 %
OXYHGB MFR BLDV: 72 %
OXYHGB MFR BLDV: 73 %
OXYHGB MFR BLDV: 74 % (ref 70–75)
OXYHGB MFR BLDV: 75 %
OXYHGB MFR BLDV: 75 % (ref 70–75)
OXYHGB MFR BLDV: 76 %
OXYHGB MFR BLDV: 76 %
OXYHGB MFR BLDV: 78 %
OXYHGB MFR BLDV: 78 %
OXYHGB MFR BLDV: 79 %
OXYHGB MFR BLDV: 79 % (ref 70–75)
OXYHGB MFR BLDV: 80 %
OXYHGB MFR BLDV: 81 %
OXYHGB MFR BLDV: 81 %
OXYHGB MFR BLDV: 81 % (ref 70–75)
OXYHGB MFR BLDV: 82 %
OXYHGB MFR BLDV: 82 %
OXYHGB MFR BLDV: 82 % (ref 70–75)
OXYHGB MFR BLDV: 83 %
OXYHGB MFR BLDV: 84 %
OXYHGB MFR BLDV: 85 %
OXYHGB MFR BLDV: 85 %
OXYHGB MFR BLDV: 85 % (ref 70–75)
OXYHGB MFR BLDV: 87 %
OXYHGB MFR BLDV: 90 % (ref 70–75)
OXYHGB MFR BLDV: 97 % (ref 70–75)
P AXIS - MUSE: -4 DEGREES
P AXIS - MUSE: -6 DEGREES
P AXIS - MUSE: 17 DEGREES
P AXIS - MUSE: 29 DEGREES
P AXIS - MUSE: 32 DEGREES
P AXIS - MUSE: 50 DEGREES
P AXIS - MUSE: 59 DEGREES
P AXIS - MUSE: 64 DEGREES
P AXIS - MUSE: 64 DEGREES
P AXIS - MUSE: 78 DEGREES
PA AA BLD-ACNC: 100 %
PA AA BLD-ACNC: 99 %
PA ADP BLD-ACNC: 50 %
PA ADP BLD-ACNC: 84 %
PCO2 BLD: 30 MM HG (ref 35–45)
PCO2 BLD: 31 MM HG (ref 35–45)
PCO2 BLD: 31 MM HG (ref 35–45)
PCO2 BLD: 32 MM HG (ref 35–45)
PCO2 BLD: 33 MM HG (ref 35–45)
PCO2 BLD: 34 MM HG (ref 35–45)
PCO2 BLD: 35 MM HG (ref 35–45)
PCO2 BLD: 36 MM HG (ref 35–45)
PCO2 BLD: 37 MM HG (ref 35–45)
PCO2 BLD: 38 MM HG (ref 35–45)
PCO2 BLD: 39 MM HG (ref 35–45)
PCO2 BLD: 40 MM HG (ref 35–45)
PCO2 BLD: 41 MM HG (ref 35–45)
PCO2 BLD: 42 MM HG (ref 35–45)
PCO2 BLD: 43 MM HG (ref 35–45)
PCO2 BLD: 44 MM HG (ref 35–45)
PCO2 BLD: 45 MM HG (ref 35–45)
PCO2 BLD: 46 MM HG (ref 35–45)
PCO2 BLD: 47 MM HG (ref 35–45)
PCO2 BLD: 48 MM HG (ref 35–45)
PCO2 BLD: 49 MM HG (ref 35–45)
PCO2 BLD: 51 MM HG (ref 35–45)
PCO2 BLD: 90 MM HG (ref 35–45)
PCO2 BLDA: 36 MM HG (ref 35–45)
PCO2 BLDA: 37 MM HG (ref 35–45)
PCO2 BLDA: 40 MM HG (ref 35–45)
PCO2 BLDA: 41 MM HG (ref 35–45)
PCO2 BLDA: 42 MM HG (ref 35–45)
PCO2 BLDA: 45 MM HG (ref 35–45)
PCO2 BLDA: 45 MM HG (ref 35–45)
PCO2 BLDA: 46 MM HG (ref 35–45)
PCO2 BLDA: 47 MM HG (ref 35–45)
PCO2 BLDA: 47 MM HG (ref 35–45)
PCO2 BLDA: 48 MM HG (ref 35–45)
PCO2 BLDA: 49 MM HG (ref 35–45)
PCO2 BLDA: 50 MM HG (ref 35–45)
PCO2 BLDA: 51 MM HG (ref 35–45)
PCO2 BLDA: 51 MM HG (ref 35–45)
PCO2 BLDA: 52 MM HG (ref 35–45)
PCO2 BLDA: 53 MM HG (ref 35–45)
PCO2 BLDA: 54 MM HG (ref 35–45)
PCO2 BLDA: 56 MM HG (ref 35–45)
PCO2 BLDA: 56 MM HG (ref 35–45)
PCO2 BLDA: 60 MM HG (ref 35–45)
PCO2 BLDA: 60 MM HG (ref 35–45)
PCO2 BLDA: 61 MM HG (ref 35–45)
PCO2 BLDA: 64 MM HG (ref 35–45)
PCO2 BLDV: 40 MM HG (ref 40–50)
PCO2 BLDV: 43 MM HG (ref 40–50)
PCO2 BLDV: 45 MM HG (ref 40–50)
PCO2 BLDV: 46 MM HG (ref 40–50)
PCO2 BLDV: 46 MM HG (ref 40–50)
PCO2 BLDV: 47 MM HG (ref 40–50)
PCO2 BLDV: 48 MM HG (ref 40–50)
PCO2 BLDV: 49 MM HG (ref 40–50)
PCO2 BLDV: 50 MM HG (ref 40–50)
PCO2 BLDV: 51 MM HG (ref 40–50)
PCO2 BLDV: 52 MM HG (ref 40–50)
PCO2 BLDV: 53 MM HG (ref 40–50)
PCO2 BLDV: 53 MM HG (ref 40–50)
PCO2 BLDV: 54 MM HG (ref 40–50)
PCO2 BLDV: 55 MM HG (ref 40–50)
PCO2 BLDV: 56 MM HG (ref 40–50)
PCO2 BLDV: 57 MM HG (ref 40–50)
PCO2 BLDV: 58 MM HG (ref 40–50)
PCO2 BLDV: 59 MM HG (ref 40–50)
PCO2 BLDV: 59 MM HG (ref 40–50)
PCO2 BLDV: 60 MM HG (ref 40–50)
PCO2 BLDV: 60 MM HG (ref 40–50)
PCO2 BLDV: 61 MM HG (ref 40–50)
PCO2 BLDV: 61 MM HG (ref 40–50)
PCO2 BLDV: 64 MM HG (ref 40–50)
PCO2 BLDV: 64 MM HG (ref 40–50)
PCO2 BLDV: 65 MM HG (ref 40–50)
PCO2 BLDV: 66 MM HG (ref 40–50)
PCO2 BLDV: 68 MM HG (ref 40–50)
PCO2 BLDV: 91 MM HG (ref 40–50)
PCP QUAL URINE (ROCHE): ABNORMAL
PCP SPEC-MCNC: NEGATIVE NG/ML
PEEP: 10 CM H2O
PEEP: 12 CM H2O
PEEP: 14 CM H2O
PEEP: 5 CM H2O
PEEP: 5 CM H2O
PEEP: 8 CM H2O
PETH INTERPRETATION: NORMAL
PF4 HEPARIN CMPLX AB SER QL: NEGATIVE
PH BLD: 7.09 [PH] (ref 7.35–7.45)
PH BLD: 7.21 [PH] (ref 7.35–7.45)
PH BLD: 7.35 [PH] (ref 7.35–7.45)
PH BLD: 7.35 [PH] (ref 7.35–7.45)
PH BLD: 7.36 [PH] (ref 7.35–7.45)
PH BLD: 7.4 [PH] (ref 7.35–7.45)
PH BLD: 7.4 [PH] (ref 7.35–7.45)
PH BLD: 7.41 [PH] (ref 7.35–7.45)
PH BLD: 7.42 [PH] (ref 7.35–7.45)
PH BLD: 7.43 [PH] (ref 7.35–7.45)
PH BLD: 7.44 [PH] (ref 7.35–7.45)
PH BLD: 7.45 [PH] (ref 7.35–7.45)
PH BLD: 7.46 [PH] (ref 7.35–7.45)
PH BLD: 7.47 [PH] (ref 7.35–7.45)
PH BLD: 7.48 [PH] (ref 7.35–7.45)
PH BLD: 7.49 [PH] (ref 7.35–7.45)
PH BLD: 7.5 [PH] (ref 7.35–7.45)
PH BLD: 7.51 [PH] (ref 7.35–7.45)
PH BLD: 7.52 [PH] (ref 7.35–7.45)
PH BLD: 7.52 [PH] (ref 7.35–7.45)
PH BLD: 7.53 [PH] (ref 7.35–7.45)
PH BLD: 7.54 [PH] (ref 7.35–7.45)
PH BLD: 7.55 [PH] (ref 7.35–7.45)
PH BLD: 7.55 [PH] (ref 7.35–7.45)
PH BLD: 7.56 [PH] (ref 7.35–7.45)
PH BLDA: 7.09 [PH] (ref 7.35–7.45)
PH BLDA: 7.25 [PH] (ref 7.35–7.45)
PH BLDA: 7.32 [PH] (ref 7.35–7.45)
PH BLDA: 7.33 [PH] (ref 7.35–7.45)
PH BLDA: 7.34 [PH] (ref 7.35–7.45)
PH BLDA: 7.34 [PH] (ref 7.35–7.45)
PH BLDA: 7.36 [PH] (ref 7.35–7.45)
PH BLDA: 7.36 [PH] (ref 7.35–7.45)
PH BLDA: 7.37 [PH] (ref 7.35–7.45)
PH BLDA: 7.38 [PH] (ref 7.35–7.45)
PH BLDA: 7.39 [PH] (ref 7.35–7.45)
PH BLDA: 7.4 [PH] (ref 7.35–7.45)
PH BLDA: 7.41 [PH] (ref 7.35–7.45)
PH BLDA: 7.41 [PH] (ref 7.35–7.45)
PH BLDA: 7.42 [PH] (ref 7.35–7.45)
PH BLDA: 7.43 [PH] (ref 7.35–7.45)
PH BLDA: 7.44 [PH] (ref 7.35–7.45)
PH BLDA: 7.45 [PH] (ref 7.35–7.45)
PH BLDA: 7.45 [PH] (ref 7.35–7.45)
PH BLDA: 7.46 [PH] (ref 7.35–7.45)
PH BLDA: 7.52 [PH] (ref 7.35–7.45)
PH BLDV: 7.09 [PH] (ref 7.32–7.43)
PH BLDV: 7.3 [PH] (ref 7.32–7.43)
PH BLDV: 7.32 [PH] (ref 7.32–7.43)
PH BLDV: 7.32 [PH] (ref 7.32–7.43)
PH BLDV: 7.33 [PH] (ref 7.32–7.43)
PH BLDV: 7.33 [PH] (ref 7.32–7.43)
PH BLDV: 7.34 [PH] (ref 7.32–7.43)
PH BLDV: 7.35 [PH] (ref 7.32–7.43)
PH BLDV: 7.36 [PH] (ref 7.32–7.43)
PH BLDV: 7.37 [PH] (ref 7.32–7.43)
PH BLDV: 7.38 [PH] (ref 7.32–7.43)
PH BLDV: 7.39 [PH] (ref 7.32–7.43)
PH BLDV: 7.4 [PH] (ref 7.32–7.43)
PH BLDV: 7.41 [PH] (ref 7.32–7.43)
PH BLDV: 7.43 [PH] (ref 7.32–7.43)
PH BLDV: 7.44 [PH] (ref 7.32–7.43)
PH BLDV: 7.45 [PH] (ref 7.32–7.43)
PH BLDV: 7.47 [PH] (ref 7.32–7.43)
PH UR STRIP: 5 [PH] (ref 5–7)
PH UR STRIP: 5.5 [PH] (ref 5–7)
PH UR STRIP: 5.5 [PH] (ref 5–7)
PHOSPHATE SERPL-MCNC: 2 MG/DL (ref 2.5–4.5)
PHOSPHATE SERPL-MCNC: 2.4 MG/DL (ref 2.5–4.5)
PHOSPHATE SERPL-MCNC: 2.7 MG/DL (ref 2.5–4.5)
PHOSPHATE SERPL-MCNC: 2.7 MG/DL (ref 2.5–4.5)
PHOSPHATE SERPL-MCNC: 2.8 MG/DL (ref 2.5–4.5)
PHOSPHATE SERPL-MCNC: 2.8 MG/DL (ref 2.5–4.5)
PHOSPHATE SERPL-MCNC: 2.9 MG/DL (ref 2.5–4.5)
PHOSPHATE SERPL-MCNC: 2.9 MG/DL (ref 2.5–4.5)
PHOSPHATE SERPL-MCNC: 3 MG/DL (ref 2.5–4.5)
PHOSPHATE SERPL-MCNC: 3.1 MG/DL (ref 2.5–4.5)
PHOSPHATE SERPL-MCNC: 3.1 MG/DL (ref 2.5–4.5)
PHOSPHATE SERPL-MCNC: 3.6 MG/DL (ref 2.5–4.5)
PHOSPHATE SERPL-MCNC: 3.8 MG/DL (ref 2.5–4.5)
PHOSPHATE SERPL-MCNC: 3.8 MG/DL (ref 2.5–4.5)
PHOSPHATE SERPL-MCNC: 4 MG/DL (ref 2.5–4.5)
PHOSPHATE SERPL-MCNC: 4.3 MG/DL (ref 2.5–4.5)
PHOSPHATE SERPL-MCNC: 4.4 MG/DL (ref 2.5–4.5)
PHOSPHATE SERPL-MCNC: 4.6 MG/DL (ref 2.5–4.5)
PHOSPHATE SERPL-MCNC: 6.2 MG/DL (ref 2.5–4.5)
PHOSPHATE SERPL-MCNC: 6.6 MG/DL (ref 2.5–4.5)
PIGEON SERUM AB QL ID: NORMAL
PLAT MORPH BLD: ABNORMAL
PLAT MORPH BLD: NORMAL
PLATELET # BLD AUTO: 101 10E3/UL (ref 150–450)
PLATELET # BLD AUTO: 103 10E3/UL (ref 150–450)
PLATELET # BLD AUTO: 103 10E3/UL (ref 150–450)
PLATELET # BLD AUTO: 107 10E3/UL (ref 150–450)
PLATELET # BLD AUTO: 111 10E3/UL (ref 150–450)
PLATELET # BLD AUTO: 113 10E3/UL (ref 150–450)
PLATELET # BLD AUTO: 114 10E3/UL (ref 150–450)
PLATELET # BLD AUTO: 123 10E3/UL (ref 150–450)
PLATELET # BLD AUTO: 123 10E3/UL (ref 150–450)
PLATELET # BLD AUTO: 124 10E3/UL (ref 150–450)
PLATELET # BLD AUTO: 125 10E3/UL (ref 150–450)
PLATELET # BLD AUTO: 127 10E3/UL (ref 150–450)
PLATELET # BLD AUTO: 129 10E3/UL (ref 150–450)
PLATELET # BLD AUTO: 131 10E3/UL (ref 150–450)
PLATELET # BLD AUTO: 134 10E3/UL (ref 150–450)
PLATELET # BLD AUTO: 137 10E3/UL (ref 150–450)
PLATELET # BLD AUTO: 140 10E3/UL (ref 150–450)
PLATELET # BLD AUTO: 143 10E3/UL (ref 150–450)
PLATELET # BLD AUTO: 147 10E3/UL (ref 150–450)
PLATELET # BLD AUTO: 148 10E3/UL (ref 150–450)
PLATELET # BLD AUTO: 149 10E3/UL (ref 150–450)
PLATELET # BLD AUTO: 149 10E3/UL (ref 150–450)
PLATELET # BLD AUTO: 150 10E3/UL (ref 150–450)
PLATELET # BLD AUTO: 152 10E3/UL (ref 150–450)
PLATELET # BLD AUTO: 154 10E3/UL (ref 150–450)
PLATELET # BLD AUTO: 155 10E3/UL (ref 150–450)
PLATELET # BLD AUTO: 158 10E3/UL (ref 150–450)
PLATELET # BLD AUTO: 159 10E3/UL (ref 150–450)
PLATELET # BLD AUTO: 161 10E3/UL (ref 150–450)
PLATELET # BLD AUTO: 163 10E3/UL (ref 150–450)
PLATELET # BLD AUTO: 166 10E3/UL (ref 150–450)
PLATELET # BLD AUTO: 167 10E3/UL (ref 150–450)
PLATELET # BLD AUTO: 168 10E3/UL (ref 150–450)
PLATELET # BLD AUTO: 184 10E3/UL (ref 150–450)
PLATELET # BLD AUTO: 187 10E3/UL (ref 150–450)
PLATELET # BLD AUTO: 205 10E3/UL (ref 150–450)
PLATELET # BLD AUTO: 209 10E3/UL (ref 150–450)
PLATELET # BLD AUTO: 220 10E3/UL (ref 150–450)
PLATELET # BLD AUTO: 50 10E3/UL (ref 150–450)
PLATELET # BLD AUTO: 54 10E3/UL (ref 150–450)
PLATELET # BLD AUTO: 54 10E3/UL (ref 150–450)
PLATELET # BLD AUTO: 55 10E3/UL (ref 150–450)
PLATELET # BLD AUTO: 56 10E3/UL (ref 150–450)
PLATELET # BLD AUTO: 57 10E3/UL (ref 150–450)
PLATELET # BLD AUTO: 59 10E3/UL (ref 150–450)
PLATELET # BLD AUTO: 59 10E3/UL (ref 150–450)
PLATELET # BLD AUTO: 60 10E3/UL (ref 150–450)
PLATELET # BLD AUTO: 60 10E3/UL (ref 150–450)
PLATELET # BLD AUTO: 65 10E3/UL (ref 150–450)
PLATELET # BLD AUTO: 65 10E3/UL (ref 150–450)
PLATELET # BLD AUTO: 67 10E3/UL (ref 150–450)
PLATELET # BLD AUTO: 67 10E3/UL (ref 150–450)
PLATELET # BLD AUTO: 68 10E3/UL (ref 150–450)
PLATELET # BLD AUTO: 68 10E3/UL (ref 150–450)
PLATELET # BLD AUTO: 69 10E3/UL (ref 150–450)
PLATELET # BLD AUTO: 70 10E3/UL (ref 150–450)
PLATELET # BLD AUTO: 72 10E3/UL (ref 150–450)
PLATELET # BLD AUTO: 73 10E3/UL (ref 150–450)
PLATELET # BLD AUTO: 75 10E3/UL (ref 150–450)
PLATELET # BLD AUTO: 77 10E3/UL (ref 150–450)
PLATELET # BLD AUTO: 77 10E3/UL (ref 150–450)
PLATELET # BLD AUTO: 78 10E3/UL (ref 150–450)
PLATELET # BLD AUTO: 78 10E3/UL (ref 150–450)
PLATELET # BLD AUTO: 79 10E3/UL (ref 150–450)
PLATELET # BLD AUTO: 80 10E3/UL (ref 150–450)
PLATELET # BLD AUTO: 81 10E3/UL (ref 150–450)
PLATELET # BLD AUTO: 84 10E3/UL (ref 150–450)
PLATELET # BLD AUTO: 84 10E3/UL (ref 150–450)
PLATELET # BLD AUTO: 86 10E3/UL (ref 150–450)
PLATELET # BLD AUTO: 87 10E3/UL (ref 150–450)
PLATELET # BLD AUTO: 90 10E3/UL (ref 150–450)
PLATELET # BLD AUTO: 92 10E3/UL (ref 150–450)
PLATELET # BLD AUTO: 95 10E3/UL (ref 150–450)
PLATELET # BLD AUTO: 95 10E3/UL (ref 150–450)
PLATELET # BLD AUTO: 97 10E3/UL (ref 150–450)
PLPETH BLD-MCNC: 17 NG/ML
PO2 BLD: 100 MM HG (ref 80–105)
PO2 BLD: 100 MM HG (ref 80–105)
PO2 BLD: 102 MM HG (ref 80–105)
PO2 BLD: 103 MM HG (ref 80–105)
PO2 BLD: 104 MM HG (ref 80–105)
PO2 BLD: 105 MM HG (ref 80–105)
PO2 BLD: 107 MM HG (ref 80–105)
PO2 BLD: 107 MM HG (ref 80–105)
PO2 BLD: 109 MM HG (ref 80–105)
PO2 BLD: 109 MM HG (ref 80–105)
PO2 BLD: 110 MM HG (ref 80–105)
PO2 BLD: 112 MM HG (ref 80–105)
PO2 BLD: 113 MM HG (ref 80–105)
PO2 BLD: 114 MM HG (ref 80–105)
PO2 BLD: 116 MM HG (ref 80–105)
PO2 BLD: 117 MM HG (ref 80–105)
PO2 BLD: 119 MM HG (ref 80–105)
PO2 BLD: 121 MM HG (ref 80–105)
PO2 BLD: 121 MM HG (ref 80–105)
PO2 BLD: 122 MM HG (ref 80–105)
PO2 BLD: 123 MM HG (ref 80–105)
PO2 BLD: 127 MM HG (ref 80–105)
PO2 BLD: 129 MM HG (ref 80–105)
PO2 BLD: 130 MM HG (ref 80–105)
PO2 BLD: 132 MM HG (ref 80–105)
PO2 BLD: 134 MM HG (ref 80–105)
PO2 BLD: 137 MM HG (ref 80–105)
PO2 BLD: 140 MM HG (ref 80–105)
PO2 BLD: 144 MM HG (ref 80–105)
PO2 BLD: 160 MM HG (ref 80–105)
PO2 BLD: 190 MM HG (ref 80–105)
PO2 BLD: 204 MM HG (ref 80–105)
PO2 BLD: 237 MM HG (ref 80–105)
PO2 BLD: 25 MM HG (ref 80–105)
PO2 BLD: 272 MM HG (ref 80–105)
PO2 BLD: 38 MM HG (ref 80–105)
PO2 BLD: 47 MM HG (ref 80–105)
PO2 BLD: 48 MM HG (ref 80–105)
PO2 BLD: 52 MM HG (ref 80–105)
PO2 BLD: 54 MM HG (ref 80–105)
PO2 BLD: 54 MM HG (ref 80–105)
PO2 BLD: 55 MM HG (ref 80–105)
PO2 BLD: 55 MM HG (ref 80–105)
PO2 BLD: 57 MM HG (ref 80–105)
PO2 BLD: 58 MM HG (ref 80–105)
PO2 BLD: 59 MM HG (ref 80–105)
PO2 BLD: 60 MM HG (ref 80–105)
PO2 BLD: 61 MM HG (ref 80–105)
PO2 BLD: 61 MM HG (ref 80–105)
PO2 BLD: 63 MM HG (ref 80–105)
PO2 BLD: 64 MM HG (ref 80–105)
PO2 BLD: 65 MM HG (ref 80–105)
PO2 BLD: 66 MM HG (ref 80–105)
PO2 BLD: 67 MM HG (ref 80–105)
PO2 BLD: 68 MM HG (ref 80–105)
PO2 BLD: 69 MM HG (ref 80–105)
PO2 BLD: 70 MM HG (ref 80–105)
PO2 BLD: 71 MM HG (ref 80–105)
PO2 BLD: 72 MM HG (ref 80–105)
PO2 BLD: 73 MM HG (ref 80–105)
PO2 BLD: 74 MM HG (ref 80–105)
PO2 BLD: 75 MM HG (ref 80–105)
PO2 BLD: 76 MM HG (ref 80–105)
PO2 BLD: 77 MM HG (ref 80–105)
PO2 BLD: 78 MM HG (ref 80–105)
PO2 BLD: 79 MM HG (ref 80–105)
PO2 BLD: 80 MM HG (ref 80–105)
PO2 BLD: 81 MM HG (ref 80–105)
PO2 BLD: 82 MM HG (ref 80–105)
PO2 BLD: 83 MM HG (ref 80–105)
PO2 BLD: 83 MM HG (ref 80–105)
PO2 BLD: 84 MM HG (ref 80–105)
PO2 BLD: 85 MM HG (ref 80–105)
PO2 BLD: 86 MM HG (ref 80–105)
PO2 BLD: 88 MM HG (ref 80–105)
PO2 BLD: 89 MM HG (ref 80–105)
PO2 BLD: 90 MM HG (ref 80–105)
PO2 BLD: 91 MM HG (ref 80–105)
PO2 BLD: 91 MM HG (ref 80–105)
PO2 BLD: 92 MM HG (ref 80–105)
PO2 BLD: 93 MM HG (ref 80–105)
PO2 BLD: 93 MM HG (ref 80–105)
PO2 BLD: 94 MM HG (ref 80–105)
PO2 BLD: 94 MM HG (ref 80–105)
PO2 BLD: 95 MM HG (ref 80–105)
PO2 BLD: 95 MM HG (ref 80–105)
PO2 BLD: 96 MM HG (ref 80–105)
PO2 BLD: 97 MM HG (ref 80–105)
PO2 BLD: 97 MM HG (ref 80–105)
PO2 BLD: 98 MM HG (ref 80–105)
PO2 BLD: 99 MM HG (ref 80–105)
PO2 BLDA: 123 MM HG (ref 80–105)
PO2 BLDA: 127 MM HG (ref 80–105)
PO2 BLDA: 160 MM HG (ref 80–105)
PO2 BLDA: 207 MM HG (ref 80–105)
PO2 BLDA: 264 MM HG (ref 80–105)
PO2 BLDA: 266 MM HG (ref 80–105)
PO2 BLDA: 270 MM HG (ref 80–105)
PO2 BLDA: 271 MM HG (ref 80–105)
PO2 BLDA: 275 MM HG (ref 80–105)
PO2 BLDA: 286 MM HG (ref 80–105)
PO2 BLDA: 332 MM HG (ref 80–105)
PO2 BLDA: 334 MM HG (ref 80–105)
PO2 BLDA: 336 MM HG (ref 80–105)
PO2 BLDA: 342 MM HG (ref 80–105)
PO2 BLDA: 349 MM HG (ref 80–105)
PO2 BLDA: 351 MM HG (ref 80–105)
PO2 BLDA: 36 MM HG (ref 80–105)
PO2 BLDA: 361 MM HG (ref 80–105)
PO2 BLDA: 362 MM HG (ref 80–105)
PO2 BLDA: 366 MM HG (ref 80–105)
PO2 BLDA: 37 MM HG (ref 80–105)
PO2 BLDA: 375 MM HG (ref 80–105)
PO2 BLDA: 38 MM HG (ref 80–105)
PO2 BLDA: 38 MM HG (ref 80–105)
PO2 BLDA: 383 MM HG (ref 80–105)
PO2 BLDA: 384 MM HG (ref 80–105)
PO2 BLDA: 390 MM HG (ref 80–105)
PO2 BLDA: 396 MM HG (ref 80–105)
PO2 BLDA: 398 MM HG (ref 80–105)
PO2 BLDA: 399 MM HG (ref 80–105)
PO2 BLDA: 410 MM HG (ref 80–105)
PO2 BLDA: 42 MM HG (ref 80–105)
PO2 BLDA: 421 MM HG (ref 80–105)
PO2 BLDA: 439 MM HG (ref 80–105)
PO2 BLDA: 452 MM HG (ref 80–105)
PO2 BLDA: 456 MM HG (ref 80–105)
PO2 BLDA: 46 MM HG (ref 80–105)
PO2 BLDA: 461 MM HG (ref 80–105)
PO2 BLDA: 54 MM HG (ref 80–105)
PO2 BLDA: 54 MM HG (ref 80–105)
PO2 BLDA: 56 MM HG (ref 80–105)
PO2 BLDA: 56 MM HG (ref 80–105)
PO2 BLDA: 63 MM HG (ref 80–105)
PO2 BLDA: 65 MM HG (ref 80–105)
PO2 BLDA: 70 MM HG (ref 80–105)
PO2 BLDA: 72 MM HG (ref 80–105)
PO2 BLDA: 74 MM HG (ref 80–105)
PO2 BLDA: 91 MM HG (ref 80–105)
PO2 BLDV: 19 MM HG (ref 25–47)
PO2 BLDV: 23 MM HG (ref 25–47)
PO2 BLDV: 23 MM HG (ref 25–47)
PO2 BLDV: 24 MM HG (ref 25–47)
PO2 BLDV: 25 MM HG (ref 25–47)
PO2 BLDV: 26 MM HG (ref 25–47)
PO2 BLDV: 30 MM HG (ref 25–47)
PO2 BLDV: 33 MM HG (ref 25–47)
PO2 BLDV: 34 MM HG (ref 25–47)
PO2 BLDV: 34 MM HG (ref 25–47)
PO2 BLDV: 35 MM HG (ref 25–47)
PO2 BLDV: 35 MM HG (ref 25–47)
PO2 BLDV: 36 MM HG (ref 25–47)
PO2 BLDV: 36 MM HG (ref 25–47)
PO2 BLDV: 37 MM HG (ref 25–47)
PO2 BLDV: 37 MM HG (ref 25–47)
PO2 BLDV: 38 MM HG (ref 25–47)
PO2 BLDV: 40 MM HG (ref 25–47)
PO2 BLDV: 40 MM HG (ref 25–47)
PO2 BLDV: 41 MM HG (ref 25–47)
PO2 BLDV: 41 MM HG (ref 25–47)
PO2 BLDV: 42 MM HG (ref 25–47)
PO2 BLDV: 43 MM HG (ref 25–47)
PO2 BLDV: 44 MM HG (ref 25–47)
PO2 BLDV: 45 MM HG (ref 25–47)
PO2 BLDV: 46 MM HG (ref 25–47)
PO2 BLDV: 46 MM HG (ref 25–47)
PO2 BLDV: 47 MM HG (ref 25–47)
PO2 BLDV: 48 MM HG (ref 25–47)
PO2 BLDV: 48 MM HG (ref 25–47)
PO2 BLDV: 49 MM HG (ref 25–47)
PO2 BLDV: 50 MM HG (ref 25–47)
PO2 BLDV: 50 MM HG (ref 25–47)
PO2 BLDV: 51 MM HG (ref 25–47)
PO2 BLDV: 52 MM HG (ref 25–47)
PO2 BLDV: 53 MM HG (ref 25–47)
PO2 BLDV: 54 MM HG (ref 25–47)
PO2 BLDV: 55 MM HG (ref 25–47)
PO2 BLDV: 70 MM HG (ref 25–47)
PO2 BLDV: 85 MM HG (ref 25–47)
POLYCHROMASIA BLD QL SMEAR: NORMAL
POLYCHROMASIA BLD QL SMEAR: SLIGHT
POPETH BLD-MCNC: 32 NG/ML
POTASSIUM BLD-SCNC: 2.7 MMOL/L (ref 3.4–5.3)
POTASSIUM BLD-SCNC: 3.1 MMOL/L (ref 3.4–5.3)
POTASSIUM BLD-SCNC: 3.3 MMOL/L (ref 3.4–5.3)
POTASSIUM BLD-SCNC: 3.4 MMOL/L (ref 3.4–5.3)
POTASSIUM BLD-SCNC: 3.5 MMOL/L (ref 3.4–5.3)
POTASSIUM BLD-SCNC: 3.7 MMOL/L (ref 3.4–5.3)
POTASSIUM BLD-SCNC: 3.7 MMOL/L (ref 3.4–5.3)
POTASSIUM BLD-SCNC: 3.8 MMOL/L (ref 3.4–5.3)
POTASSIUM BLD-SCNC: 3.9 MMOL/L (ref 3.4–5.3)
POTASSIUM BLD-SCNC: 4 MMOL/L (ref 3.4–5.3)
POTASSIUM BLD-SCNC: 4.8 MMOL/L (ref 3.4–5.3)
POTASSIUM SERPL-SCNC: 3.1 MMOL/L (ref 3.4–5.3)
POTASSIUM SERPL-SCNC: 3.2 MMOL/L (ref 3.4–5.3)
POTASSIUM SERPL-SCNC: 3.3 MMOL/L (ref 3.4–5.3)
POTASSIUM SERPL-SCNC: 3.3 MMOL/L (ref 3.4–5.3)
POTASSIUM SERPL-SCNC: 3.4 MMOL/L (ref 3.4–5.3)
POTASSIUM SERPL-SCNC: 3.5 MMOL/L (ref 3.4–5.3)
POTASSIUM SERPL-SCNC: 3.6 MMOL/L (ref 3.4–5.3)
POTASSIUM SERPL-SCNC: 3.7 MMOL/L (ref 3.4–5.3)
POTASSIUM SERPL-SCNC: 3.8 MMOL/L (ref 3.4–5.3)
POTASSIUM SERPL-SCNC: 3.9 MMOL/L (ref 3.4–5.3)
POTASSIUM SERPL-SCNC: 4 MMOL/L (ref 3.4–5.3)
POTASSIUM SERPL-SCNC: 4.1 MMOL/L (ref 3.4–5.3)
POTASSIUM SERPL-SCNC: 4.2 MMOL/L (ref 3.4–5.3)
POTASSIUM SERPL-SCNC: 4.3 MMOL/L (ref 3.4–5.3)
POTASSIUM SERPL-SCNC: 4.4 MMOL/L (ref 3.4–5.3)
POTASSIUM SERPL-SCNC: 4.5 MMOL/L (ref 3.4–5.3)
POTASSIUM SERPL-SCNC: 4.6 MMOL/L (ref 3.4–5.3)
POTASSIUM SERPL-SCNC: 5 MMOL/L (ref 3.4–5.3)
PR INTERVAL - MUSE: 134 MS
PR INTERVAL - MUSE: 136 MS
PR INTERVAL - MUSE: 136 MS
PR INTERVAL - MUSE: 146 MS
PR INTERVAL - MUSE: 152 MS
PR INTERVAL - MUSE: 160 MS
PR INTERVAL - MUSE: 168 MS
PR INTERVAL - MUSE: 174 MS
PR INTERVAL - MUSE: 176 MS
PR INTERVAL - MUSE: 180 MS
PROCALCITONIN SERPL IA-MCNC: 0.18 NG/ML
PROCALCITONIN SERPL IA-MCNC: 0.81 NG/ML
PROCALCITONIN SERPL IA-MCNC: 1.37 NG/ML
PROPOXYPH SPEC-MCNC: NEGATIVE NG/ML
PROT SERPL-MCNC: 4.4 G/DL (ref 6.4–8.3)
PROT SERPL-MCNC: 4.6 G/DL (ref 6.4–8.3)
PROT SERPL-MCNC: 4.7 G/DL (ref 6.4–8.3)
PROT SERPL-MCNC: 4.8 G/DL (ref 6.4–8.3)
PROT SERPL-MCNC: 4.9 G/DL (ref 6.4–8.3)
PROT SERPL-MCNC: 5 G/DL (ref 6.4–8.3)
PROT SERPL-MCNC: 5.1 G/DL (ref 6.4–8.3)
PROT SERPL-MCNC: 5.2 G/DL (ref 6.4–8.3)
PROT SERPL-MCNC: 5.3 G/DL (ref 6.4–8.3)
PROT SERPL-MCNC: 5.4 G/DL (ref 6.4–8.3)
PROT SERPL-MCNC: 5.5 G/DL (ref 6.4–8.3)
PROT SERPL-MCNC: 5.6 G/DL (ref 6.4–8.3)
PROT SERPL-MCNC: 5.7 G/DL (ref 6.4–8.3)
PROT SERPL-MCNC: 6.1 G/DL (ref 6.4–8.3)
PROT SERPL-MCNC: 6.5 G/DL (ref 6.4–8.3)
PROT SERPL-MCNC: 6.6 G/DL (ref 6.4–8.3)
PROT SERPL-MCNC: 6.8 G/DL (ref 6.4–8.3)
QRS DURATION - MUSE: 70 MS
QRS DURATION - MUSE: 70 MS
QRS DURATION - MUSE: 72 MS
QRS DURATION - MUSE: 74 MS
QRS DURATION - MUSE: 76 MS
QRS DURATION - MUSE: 84 MS
QT - MUSE: 298 MS
QT - MUSE: 302 MS
QT - MUSE: 332 MS
QT - MUSE: 384 MS
QT - MUSE: 388 MS
QT - MUSE: 396 MS
QT - MUSE: 396 MS
QT - MUSE: 408 MS
QT - MUSE: 434 MS
QT - MUSE: 450 MS
QTC - MUSE: 399 MS
QTC - MUSE: 399 MS
QTC - MUSE: 444 MS
QTC - MUSE: 448 MS
QTC - MUSE: 459 MS
QTC - MUSE: 471 MS
QTC - MUSE: 483 MS
QTC - MUSE: 488 MS
QTC - MUSE: 490 MS
QTC - MUSE: 492 MS
R AXIS - MUSE: -31 DEGREES
R AXIS - MUSE: -44 DEGREES
R AXIS - MUSE: -48 DEGREES
R AXIS - MUSE: -49 DEGREES
R AXIS - MUSE: -55 DEGREES
R AXIS - MUSE: -55 DEGREES
R AXIS - MUSE: -58 DEGREES
R AXIS - MUSE: -66 DEGREES
R AXIS - MUSE: -72 DEGREES
R AXIS - MUSE: 265 DEGREES
RBC # BLD AUTO: 2.14 10E6/UL (ref 4.4–5.9)
RBC # BLD AUTO: 2.15 10E6/UL (ref 4.4–5.9)
RBC # BLD AUTO: 2.19 10E6/UL (ref 4.4–5.9)
RBC # BLD AUTO: 2.2 10E6/UL (ref 4.4–5.9)
RBC # BLD AUTO: 2.2 10E6/UL (ref 4.4–5.9)
RBC # BLD AUTO: 2.21 10E6/UL (ref 4.4–5.9)
RBC # BLD AUTO: 2.22 10E6/UL (ref 4.4–5.9)
RBC # BLD AUTO: 2.24 10E6/UL (ref 4.4–5.9)
RBC # BLD AUTO: 2.24 10E6/UL (ref 4.4–5.9)
RBC # BLD AUTO: 2.25 10E6/UL (ref 4.4–5.9)
RBC # BLD AUTO: 2.26 10E6/UL (ref 4.4–5.9)
RBC # BLD AUTO: 2.28 10E6/UL (ref 4.4–5.9)
RBC # BLD AUTO: 2.3 10E6/UL (ref 4.4–5.9)
RBC # BLD AUTO: 2.3 10E6/UL (ref 4.4–5.9)
RBC # BLD AUTO: 2.31 10E6/UL (ref 4.4–5.9)
RBC # BLD AUTO: 2.33 10E6/UL (ref 4.4–5.9)
RBC # BLD AUTO: 2.33 10E6/UL (ref 4.4–5.9)
RBC # BLD AUTO: 2.34 10E6/UL (ref 4.4–5.9)
RBC # BLD AUTO: 2.34 10E6/UL (ref 4.4–5.9)
RBC # BLD AUTO: 2.36 10E6/UL (ref 4.4–5.9)
RBC # BLD AUTO: 2.37 10E6/UL (ref 4.4–5.9)
RBC # BLD AUTO: 2.37 10E6/UL (ref 4.4–5.9)
RBC # BLD AUTO: 2.38 10E6/UL (ref 4.4–5.9)
RBC # BLD AUTO: 2.4 10E6/UL (ref 4.4–5.9)
RBC # BLD AUTO: 2.4 10E6/UL (ref 4.4–5.9)
RBC # BLD AUTO: 2.41 10E6/UL (ref 4.4–5.9)
RBC # BLD AUTO: 2.42 10E6/UL (ref 4.4–5.9)
RBC # BLD AUTO: 2.43 10E6/UL (ref 4.4–5.9)
RBC # BLD AUTO: 2.43 10E6/UL (ref 4.4–5.9)
RBC # BLD AUTO: 2.44 10E6/UL (ref 4.4–5.9)
RBC # BLD AUTO: 2.45 10E6/UL (ref 4.4–5.9)
RBC # BLD AUTO: 2.46 10E6/UL (ref 4.4–5.9)
RBC # BLD AUTO: 2.47 10E6/UL (ref 4.4–5.9)
RBC # BLD AUTO: 2.5 10E6/UL (ref 4.4–5.9)
RBC # BLD AUTO: 2.51 10E6/UL (ref 4.4–5.9)
RBC # BLD AUTO: 2.51 10E6/UL (ref 4.4–5.9)
RBC # BLD AUTO: 2.52 10E6/UL (ref 4.4–5.9)
RBC # BLD AUTO: 2.54 10E6/UL (ref 4.4–5.9)
RBC # BLD AUTO: 2.54 10E6/UL (ref 4.4–5.9)
RBC # BLD AUTO: 2.55 10E6/UL (ref 4.4–5.9)
RBC # BLD AUTO: 2.55 10E6/UL (ref 4.4–5.9)
RBC # BLD AUTO: 2.56 10E6/UL (ref 4.4–5.9)
RBC # BLD AUTO: 2.56 10E6/UL (ref 4.4–5.9)
RBC # BLD AUTO: 2.58 10E6/UL (ref 4.4–5.9)
RBC # BLD AUTO: 2.58 10E6/UL (ref 4.4–5.9)
RBC # BLD AUTO: 2.59 10E6/UL (ref 4.4–5.9)
RBC # BLD AUTO: 2.6 10E6/UL (ref 4.4–5.9)
RBC # BLD AUTO: 2.62 10E6/UL (ref 4.4–5.9)
RBC # BLD AUTO: 2.64 10E6/UL (ref 4.4–5.9)
RBC # BLD AUTO: 2.65 10E6/UL (ref 4.4–5.9)
RBC # BLD AUTO: 2.7 10E6/UL (ref 4.4–5.9)
RBC # BLD AUTO: 2.7 10E6/UL (ref 4.4–5.9)
RBC # BLD AUTO: 2.79 10E6/UL (ref 4.4–5.9)
RBC # BLD AUTO: 2.8 10E6/UL (ref 4.4–5.9)
RBC # BLD AUTO: 3.08 10E6/UL (ref 4.4–5.9)
RBC # BLD AUTO: 3.25 10E6/UL (ref 4.4–5.9)
RBC # BLD AUTO: 4.1 10E6/UL (ref 4.4–5.9)
RBC # BLD AUTO: 4.24 10E6/UL (ref 4.4–5.9)
RBC # BLD AUTO: 4.32 10E6/UL (ref 4.4–5.9)
RBC # BLD AUTO: 4.44 10E6/UL (ref 4.4–5.9)
RBC # BLD AUTO: 4.45 10E6/UL (ref 4.4–5.9)
RBC # BLD AUTO: 4.47 10E6/UL (ref 4.4–5.9)
RBC # BLD AUTO: 4.49 10E6/UL (ref 4.4–5.9)
RBC # BLD AUTO: 4.51 10E6/UL (ref 4.4–5.9)
RBC # BLD AUTO: 4.54 10E6/UL (ref 4.4–5.9)
RBC # BLD AUTO: 4.58 10E6/UL (ref 4.4–5.9)
RBC # BLD AUTO: 4.58 10E6/UL (ref 4.4–5.9)
RBC # BLD AUTO: 4.62 10E6/UL (ref 4.4–5.9)
RBC # BLD AUTO: 4.67 10E6/UL (ref 4.4–5.9)
RBC # BLD AUTO: 4.89 10E6/UL (ref 4.4–5.9)
RBC # BLD AUTO: 4.96 10E6/UL (ref 4.4–5.9)
RBC # BLD AUTO: 5.63 10E6/UL (ref 4.4–5.9)
RBC # BLD AUTO: 6.05 10E6/UL (ref 4.4–5.9)
RBC # BLD AUTO: 6.38 10E6/UL (ref 4.4–5.9)
RBC AGGLUT BLD QL: NORMAL
RBC MORPH BLD: ABNORMAL
RBC MORPH BLD: NORMAL
RBC URINE: 0 /HPF
RBC URINE: 4 /HPF
RBC URINE: 6 /HPF
RHEUMATOID FACT SERPL-ACNC: <10 IU/ML
ROULEAUX BLD QL SMEAR: NORMAL
RSV RNA SPEC NAA+PROBE: NEGATIVE
RSV RNA SPEC NAA+PROBE: NEGATIVE
RSV RNA SPEC QL NAA+PROBE: NOT DETECTED
RSV RNA SPEC QL NAA+PROBE: NOT DETECTED
RV+EV RNA SPEC QL NAA+PROBE: NOT DETECTED
S RECTIVIRGULA AB SER QL ID: NORMAL
S VIRIDIS AB SER QL ID: NORMAL
S100 CA BINDING PROTEIN B SER-MCNC: 119 NG/L
S100 CA BINDING PROTEIN B SER-MCNC: 246 NG/L
S100 CA BINDING PROTEIN B SER-MCNC: 860 NG/L
SA TARGET DNA: POSITIVE
SAO2 % BLDA: 100 % (ref 96–97)
SAO2 % BLDA: 24 % (ref 92–100)
SAO2 % BLDA: 67 % (ref 92–100)
SAO2 % BLDA: 71.9 % (ref 96–97)
SAO2 % BLDA: 72 % (ref 96–97)
SAO2 % BLDA: 73 % (ref 96–97)
SAO2 % BLDA: 74 % (ref 96–97)
SAO2 % BLDA: 75 % (ref 96–97)
SAO2 % BLDA: 83 % (ref 92–100)
SAO2 % BLDA: 84 % (ref 96–97)
SAO2 % BLDA: 86 % (ref 96–97)
SAO2 % BLDA: 86.6 % (ref 96–97)
SAO2 % BLDA: 87 % (ref 92–100)
SAO2 % BLDA: 88 % (ref 92–100)
SAO2 % BLDA: 89 % (ref 92–100)
SAO2 % BLDA: 89 % (ref 96–97)
SAO2 % BLDA: 90 % (ref 92–100)
SAO2 % BLDA: 91 % (ref 92–100)
SAO2 % BLDA: 91 % (ref 96–97)
SAO2 % BLDA: 92 % (ref 92–100)
SAO2 % BLDA: 93 % (ref 92–100)
SAO2 % BLDA: 93 % (ref 96–97)
SAO2 % BLDA: 93 % (ref 96–97)
SAO2 % BLDA: 94 % (ref 92–100)
SAO2 % BLDA: 95 % (ref 92–100)
SAO2 % BLDA: 95 % (ref 96–97)
SAO2 % BLDA: 96 % (ref 92–100)
SAO2 % BLDA: 97 % (ref 92–100)
SAO2 % BLDA: 98 % (ref 92–100)
SAO2 % BLDA: 98.2 % (ref 96–97)
SAO2 % BLDA: 99 % (ref 92–100)
SAO2 % BLDA: 99.9 % (ref 96–97)
SAO2 % BLDA: >100 % (ref 96–97)
SAO2 % BLDV: 22 % (ref 70–75)
SAO2 % BLDV: 24.5 % (ref 70–75)
SAO2 % BLDV: 32.1 % (ref 70–75)
SAO2 % BLDV: 34.5 % (ref 70–75)
SAO2 % BLDV: 38.5 % (ref 70–75)
SAO2 % BLDV: 41.7 % (ref 70–75)
SAO2 % BLDV: 53.9 % (ref 70–75)
SAO2 % BLDV: 57.7 % (ref 70–75)
SAO2 % BLDV: 61 % (ref 70–75)
SAO2 % BLDV: 62.9 % (ref 70–75)
SAO2 % BLDV: 63.9 % (ref 70–75)
SAO2 % BLDV: 63.9 % (ref 70–75)
SAO2 % BLDV: 64.8 % (ref 70–75)
SAO2 % BLDV: 66.2 % (ref 70–75)
SAO2 % BLDV: 66.3 % (ref 70–75)
SAO2 % BLDV: 66.9 % (ref 70–75)
SAO2 % BLDV: 69.3 % (ref 70–75)
SAO2 % BLDV: 72.1 % (ref 70–75)
SAO2 % BLDV: 72.8 % (ref 70–75)
SAO2 % BLDV: 73.2 % (ref 70–75)
SAO2 % BLDV: 74.1 % (ref 70–75)
SAO2 % BLDV: 74.5 % (ref 70–75)
SAO2 % BLDV: 75.3 % (ref 70–75)
SAO2 % BLDV: 75.7 % (ref 70–75)
SAO2 % BLDV: 76.3 % (ref 70–75)
SAO2 % BLDV: 77.5 % (ref 70–75)
SAO2 % BLDV: 78.6 % (ref 70–75)
SAO2 % BLDV: 78.8 % (ref 70–75)
SAO2 % BLDV: 79.7 % (ref 70–75)
SAO2 % BLDV: 80.1 % (ref 70–75)
SAO2 % BLDV: 80.2 % (ref 70–75)
SAO2 % BLDV: 80.9 % (ref 70–75)
SAO2 % BLDV: 81 % (ref 70–75)
SAO2 % BLDV: 81 % (ref 70–75)
SAO2 % BLDV: 81.1 % (ref 70–75)
SAO2 % BLDV: 82.1 % (ref 70–75)
SAO2 % BLDV: 82.7 % (ref 70–75)
SAO2 % BLDV: 82.8 % (ref 70–75)
SAO2 % BLDV: 83 % (ref 70–75)
SAO2 % BLDV: 84.3 % (ref 70–75)
SAO2 % BLDV: 84.5 % (ref 70–75)
SAO2 % BLDV: 84.9 % (ref 70–75)
SAO2 % BLDV: 86.4 % (ref 70–75)
SAO2 % BLDV: 86.6 % (ref 70–75)
SAO2 % BLDV: 87.1 % (ref 70–75)
SAO2 % BLDV: 87.1 % (ref 70–75)
SAO2 % BLDV: 88.7 % (ref 70–75)
SAO2 % BLDV: 92.2 % (ref 70–75)
SAO2 % BLDV: 98 % (ref 70–75)
SARS-COV-2 RNA RESP QL NAA+PROBE: NEGATIVE
SICKLE CELLS BLD QL SMEAR: NORMAL
SIGNIFICANT RESULTS: NORMAL
SMUDGE CELLS BLD QL SMEAR: NORMAL
SODIUM BLD-SCNC: 138 MMOL/L (ref 135–145)
SODIUM BLD-SCNC: 139 MMOL/L (ref 135–145)
SODIUM BLD-SCNC: 139 MMOL/L (ref 135–145)
SODIUM BLD-SCNC: 141 MMOL/L (ref 135–145)
SODIUM BLD-SCNC: 146 MMOL/L (ref 135–145)
SODIUM BLD-SCNC: 147 MMOL/L (ref 135–145)
SODIUM SERPL-SCNC: 134 MMOL/L (ref 135–145)
SODIUM SERPL-SCNC: 134 MMOL/L (ref 135–145)
SODIUM SERPL-SCNC: 135 MMOL/L (ref 135–145)
SODIUM SERPL-SCNC: 136 MMOL/L (ref 135–145)
SODIUM SERPL-SCNC: 136 MMOL/L (ref 135–145)
SODIUM SERPL-SCNC: 137 MMOL/L (ref 135–145)
SODIUM SERPL-SCNC: 138 MMOL/L (ref 135–145)
SODIUM SERPL-SCNC: 138 MMOL/L (ref 135–145)
SODIUM SERPL-SCNC: 139 MMOL/L (ref 135–145)
SODIUM SERPL-SCNC: 140 MMOL/L (ref 135–145)
SODIUM SERPL-SCNC: 141 MMOL/L (ref 135–145)
SODIUM SERPL-SCNC: 142 MMOL/L (ref 135–145)
SODIUM SERPL-SCNC: 143 MMOL/L (ref 135–145)
SODIUM SERPL-SCNC: 144 MMOL/L (ref 135–145)
SODIUM SERPL-SCNC: 145 MMOL/L (ref 135–145)
SODIUM SERPL-SCNC: 146 MMOL/L (ref 135–145)
SODIUM SERPL-SCNC: 147 MMOL/L (ref 135–145)
SODIUM SERPL-SCNC: 147 MMOL/L (ref 135–145)
SODIUM SERPL-SCNC: 148 MMOL/L (ref 135–145)
SODIUM SERPL-SCNC: 149 MMOL/L (ref 135–145)
SODIUM SERPL-SCNC: 150 MMOL/L (ref 135–145)
SODIUM SERPL-SCNC: 151 MMOL/L (ref 135–145)
SP GR UR STRIP: 1.01 (ref 1–1.03)
SP GR UR STRIP: 1.01 (ref 1–1.03)
SP GR UR STRIP: 1.02 (ref 1–1.03)
SPECIMEN DESCRIPTION: NORMAL
SPECIMEN EXPIRATION DATE: NORMAL
SPHEROCYTES BLD QL SMEAR: NORMAL
STAPHYLOCOCCUS AUREUS: NOT DETECTED
STAPHYLOCOCCUS EPIDERMIDIS: DETECTED
STAPHYLOCOCCUS LUGDUNENSIS: NOT DETECTED
STOMATOCYTES BLD QL SMEAR: NORMAL
STREPTOCOCCUS AGALACTIAE: NOT DETECTED
STREPTOCOCCUS ANGINOSUS GROUP: NOT DETECTED
STREPTOCOCCUS PNEUMONIAE: NOT DETECTED
STREPTOCOCCUS PYOGENES: NOT DETECTED
STREPTOCOCCUS SPECIES: NOT DETECTED
SUBCLASSES, PERCENT: 102 %
SUBCLASSES, PERCENT: 99 %
SYSTOLIC BLOOD PRESSURE - MUSE: NORMAL MMHG
T AXIS - MUSE: -19 DEGREES
T AXIS - MUSE: 100 DEGREES
T AXIS - MUSE: 19 DEGREES
T AXIS - MUSE: 44 DEGREES
T AXIS - MUSE: 57 DEGREES
T AXIS - MUSE: 64 DEGREES
T AXIS - MUSE: 69 DEGREES
T AXIS - MUSE: 71 DEGREES
T AXIS - MUSE: 77 DEGREES
T AXIS - MUSE: 86 DEGREES
T CANDIDUS AB SER QL: NORMAL
T4 FREE SERPL-MCNC: 1.21 NG/DL (ref 0.9–1.7)
TARGETS BLD QL SMEAR: NORMAL
TEMAZEPAM SERPL-MCNC: NEGATIVE NG/ML
TEST DETAILS, MDL: NORMAL
TOXIC GRANULES BLD QL SMEAR: NORMAL
TRAMADOL BLD-MCNC: NEGATIVE NG/ML
TRIAZOLAM SPEC-MCNC: NEGATIVE NG/ML
TRIGL SERPL-MCNC: 164 MG/DL
TRIGL SERPL-MCNC: 194 MG/DL
TRIGL SERPL-MCNC: 233 MG/DL
TRIGL SERPL-MCNC: 236 MG/DL
TRIGL SERPL-MCNC: 281 MG/DL
TRIGL SERPL-MCNC: 288 MG/DL
TROPONIN T SERPL HS-MCNC: 105 NG/L
TROPONIN T SERPL HS-MCNC: 128 NG/L
TROPONIN T SERPL HS-MCNC: 160 NG/L
TROPONIN T SERPL HS-MCNC: 174 NG/L
TROPONIN T SERPL HS-MCNC: 237 NG/L
TROPONIN T SERPL HS-MCNC: 27 NG/L
TROPONIN T SERPL HS-MCNC: 29 NG/L
TROPONIN T SERPL HS-MCNC: 30 NG/L
TROPONIN T SERPL HS-MCNC: 30 NG/L
TROPONIN T SERPL HS-MCNC: 32 NG/L
TROPONIN T SERPL HS-MCNC: 33 NG/L
TROPONIN T SERPL HS-MCNC: 34 NG/L
TROPONIN T SERPL HS-MCNC: 34 NG/L
TROPONIN T SERPL HS-MCNC: 35 NG/L
TROPONIN T SERPL HS-MCNC: 35 NG/L
TROPONIN T SERPL HS-MCNC: 36 NG/L
TROPONIN T SERPL HS-MCNC: 36 NG/L
TROPONIN T SERPL HS-MCNC: 39 NG/L
TROPONIN T SERPL HS-MCNC: 41 NG/L
TROPONIN T SERPL HS-MCNC: 41 NG/L
TROPONIN T SERPL HS-MCNC: 42 NG/L
TROPONIN T SERPL HS-MCNC: 42 NG/L
TROPONIN T SERPL HS-MCNC: 43 NG/L
TROPONIN T SERPL HS-MCNC: 44 NG/L
TROPONIN T SERPL HS-MCNC: 44 NG/L
TROPONIN T SERPL HS-MCNC: 45 NG/L
TROPONIN T SERPL HS-MCNC: 46 NG/L
TROPONIN T SERPL HS-MCNC: 47 NG/L
TROPONIN T SERPL HS-MCNC: 48 NG/L
TROPONIN T SERPL HS-MCNC: 50 NG/L
TROPONIN T SERPL HS-MCNC: 51 NG/L
TROPONIN T SERPL HS-MCNC: 52 NG/L
TROPONIN T SERPL HS-MCNC: 52 NG/L
TROPONIN T SERPL HS-MCNC: 53 NG/L
TROPONIN T SERPL HS-MCNC: 53 NG/L
TROPONIN T SERPL HS-MCNC: 55 NG/L
TROPONIN T SERPL HS-MCNC: 57 NG/L
TROPONIN T SERPL HS-MCNC: 57 NG/L
TROPONIN T SERPL HS-MCNC: 58 NG/L
TROPONIN T SERPL HS-MCNC: 59 NG/L
TROPONIN T SERPL HS-MCNC: 63 NG/L
TROPONIN T SERPL HS-MCNC: 64 NG/L
TROPONIN T SERPL HS-MCNC: 66 NG/L
TROPONIN T SERPL HS-MCNC: 67 NG/L
TROPONIN T SERPL HS-MCNC: 69 NG/L
TROPONIN T SERPL HS-MCNC: 70 NG/L
TROPONIN T SERPL HS-MCNC: 73 NG/L
TROPONIN T SERPL HS-MCNC: 75 NG/L
TROPONIN T SERPL HS-MCNC: 89 NG/L
TSH SERPL DL<=0.005 MIU/L-ACNC: 5.64 UIU/ML (ref 0.3–4.2)
UFH PPP CHRO-ACNC: 0.1 IU/ML
UFH PPP CHRO-ACNC: 0.11 IU/ML
UFH PPP CHRO-ACNC: 0.12 IU/ML
UFH PPP CHRO-ACNC: 0.14 IU/ML
UFH PPP CHRO-ACNC: 0.18 IU/ML
UFH PPP CHRO-ACNC: 0.19 IU/ML
UFH PPP CHRO-ACNC: 0.2 IU/ML
UFH PPP CHRO-ACNC: 0.22 IU/ML
UFH PPP CHRO-ACNC: 0.25 IU/ML
UFH PPP CHRO-ACNC: 0.28 IU/ML
UFH PPP CHRO-ACNC: 0.28 IU/ML
UFH PPP CHRO-ACNC: 0.32 IU/ML
UFH PPP CHRO-ACNC: 0.34 IU/ML
UFH PPP CHRO-ACNC: 0.35 IU/ML
UFH PPP CHRO-ACNC: 0.35 IU/ML
UFH PPP CHRO-ACNC: 0.39 IU/ML
UFH PPP CHRO-ACNC: 0.41 IU/ML
UFH PPP CHRO-ACNC: 0.41 IU/ML
UFH PPP CHRO-ACNC: 0.42 IU/ML
UFH PPP CHRO-ACNC: 0.42 IU/ML
UFH PPP CHRO-ACNC: 0.43 IU/ML
UFH PPP CHRO-ACNC: 0.43 IU/ML
UFH PPP CHRO-ACNC: 0.46 IU/ML
UFH PPP CHRO-ACNC: 0.49 IU/ML
UFH PPP CHRO-ACNC: 0.49 IU/ML
UFH PPP CHRO-ACNC: 0.51 IU/ML
UFH PPP CHRO-ACNC: 0.52 IU/ML
UFH PPP CHRO-ACNC: 0.53 IU/ML
UFH PPP CHRO-ACNC: 0.55 IU/ML
UFH PPP CHRO-ACNC: 0.56 IU/ML
UFH PPP CHRO-ACNC: 0.58 IU/ML
UFH PPP CHRO-ACNC: 0.59 IU/ML
UFH PPP CHRO-ACNC: 0.6 IU/ML
UFH PPP CHRO-ACNC: 0.62 IU/ML
UFH PPP CHRO-ACNC: 0.63 IU/ML
UFH PPP CHRO-ACNC: 0.64 IU/ML
UFH PPP CHRO-ACNC: 0.67 IU/ML
UFH PPP CHRO-ACNC: 0.68 IU/ML
UFH PPP CHRO-ACNC: 0.71 IU/ML
UFH PPP CHRO-ACNC: 0.72 IU/ML
UFH PPP CHRO-ACNC: 0.73 IU/ML
UFH PPP CHRO-ACNC: 0.74 IU/ML
UFH PPP CHRO-ACNC: 0.81 IU/ML
UFH PPP CHRO-ACNC: 0.82 IU/ML
UFH PPP CHRO-ACNC: 0.83 IU/ML
UFH PPP CHRO-ACNC: 0.99 IU/ML
UFH PPP CHRO-ACNC: 1 IU/ML
UFH PPP CHRO-ACNC: 1.02 IU/ML
UFH PPP CHRO-ACNC: <0.1 IU/ML
UFH PPP CHRO-ACNC: >1.1 IU/ML
UNIT ABO/RH: NORMAL
UNIT NUMBER: NORMAL
UNIT STATUS: NORMAL
UNIT TYPE ISBT: 5100
UROBILINOGEN UR STRIP-MCNC: NORMAL MG/DL
VANCOMYCIN SERPL-MCNC: 15.7 UG/ML
VANCOMYCIN SERPL-MCNC: 16.2 UG/ML
VANCOMYCIN SERPL-MCNC: 19.6 UG/ML
VANCOMYCIN SERPL-MCNC: 21.2 UG/ML
VANCOMYCIN SERPL-MCNC: 32.5 UG/ML
VARIANT LYMPHS BLD QL SMEAR: NORMAL
VENTRICULAR RATE- MUSE: 105 BPM
VENTRICULAR RATE- MUSE: 108 BPM
VENTRICULAR RATE- MUSE: 115 BPM
VENTRICULAR RATE- MUSE: 72 BPM
VENTRICULAR RATE- MUSE: 76 BPM
VENTRICULAR RATE- MUSE: 77 BPM
VENTRICULAR RATE- MUSE: 79 BPM
VENTRICULAR RATE- MUSE: 85 BPM
VENTRICULAR RATE- MUSE: 87 BPM
VENTRICULAR RATE- MUSE: 95 BPM
VIT D+METAB SERPL-MCNC: 13 NG/ML (ref 20–50)
WBC # BLD AUTO: 10.3 10E3/UL (ref 4–11)
WBC # BLD AUTO: 10.3 10E3/UL (ref 4–11)
WBC # BLD AUTO: 11.2 10E3/UL (ref 4–11)
WBC # BLD AUTO: 11.4 10E3/UL (ref 4–11)
WBC # BLD AUTO: 11.4 10E3/UL (ref 4–11)
WBC # BLD AUTO: 11.6 10E3/UL (ref 4–11)
WBC # BLD AUTO: 11.7 10E3/UL (ref 4–11)
WBC # BLD AUTO: 12.1 10E3/UL (ref 4–11)
WBC # BLD AUTO: 12.2 10E3/UL (ref 4–11)
WBC # BLD AUTO: 12.4 10E3/UL (ref 4–11)
WBC # BLD AUTO: 12.4 10E3/UL (ref 4–11)
WBC # BLD AUTO: 12.6 10E3/UL (ref 4–11)
WBC # BLD AUTO: 12.7 10E3/UL (ref 4–11)
WBC # BLD AUTO: 12.7 10E3/UL (ref 4–11)
WBC # BLD AUTO: 12.8 10E3/UL (ref 4–11)
WBC # BLD AUTO: 13 10E3/UL (ref 4–11)
WBC # BLD AUTO: 13.1 10E3/UL (ref 4–11)
WBC # BLD AUTO: 13.2 10E3/UL (ref 4–11)
WBC # BLD AUTO: 13.3 10E3/UL (ref 4–11)
WBC # BLD AUTO: 13.4 10E3/UL (ref 4–11)
WBC # BLD AUTO: 13.6 10E3/UL (ref 4–11)
WBC # BLD AUTO: 13.7 10E3/UL (ref 4–11)
WBC # BLD AUTO: 14 10E3/UL (ref 4–11)
WBC # BLD AUTO: 14.6 10E3/UL (ref 4–11)
WBC # BLD AUTO: 14.8 10E3/UL (ref 4–11)
WBC # BLD AUTO: 14.9 10E3/UL (ref 4–11)
WBC # BLD AUTO: 15 10E3/UL (ref 4–11)
WBC # BLD AUTO: 15.9 10E3/UL (ref 4–11)
WBC # BLD AUTO: 16 10E3/UL (ref 4–11)
WBC # BLD AUTO: 17.2 10E3/UL (ref 4–11)
WBC # BLD AUTO: 18.2 10E3/UL (ref 4–11)
WBC # BLD AUTO: 18.4 10E3/UL (ref 4–11)
WBC # BLD AUTO: 18.8 10E3/UL (ref 4–11)
WBC # BLD AUTO: 18.8 10E3/UL (ref 4–11)
WBC # BLD AUTO: 19.4 10E3/UL (ref 4–11)
WBC # BLD AUTO: 19.4 10E3/UL (ref 4–11)
WBC # BLD AUTO: 20 10E3/UL (ref 4–11)
WBC # BLD AUTO: 20.5 10E3/UL (ref 4–11)
WBC # BLD AUTO: 20.5 10E3/UL (ref 4–11)
WBC # BLD AUTO: 21.6 10E3/UL (ref 4–11)
WBC # BLD AUTO: 22.1 10E3/UL (ref 4–11)
WBC # BLD AUTO: 27.4 10E3/UL (ref 4–11)
WBC # BLD AUTO: 5.1 10E3/UL (ref 4–11)
WBC # BLD AUTO: 5.2 10E3/UL (ref 4–11)
WBC # BLD AUTO: 5.2 10E3/UL (ref 4–11)
WBC # BLD AUTO: 5.3 10E3/UL (ref 4–11)
WBC # BLD AUTO: 5.6 10E3/UL (ref 4–11)
WBC # BLD AUTO: 6.1 10E3/UL (ref 4–11)
WBC # BLD AUTO: 6.2 10E3/UL (ref 4–11)
WBC # BLD AUTO: 6.4 10E3/UL (ref 4–11)
WBC # BLD AUTO: 6.7 10E3/UL (ref 4–11)
WBC # BLD AUTO: 6.8 10E3/UL (ref 4–11)
WBC # BLD AUTO: 6.9 10E3/UL (ref 4–11)
WBC # BLD AUTO: 7 10E3/UL (ref 4–11)
WBC # BLD AUTO: 7.2 10E3/UL (ref 4–11)
WBC # BLD AUTO: 7.3 10E3/UL (ref 4–11)
WBC # BLD AUTO: 7.3 10E3/UL (ref 4–11)
WBC # BLD AUTO: 7.5 10E3/UL (ref 4–11)
WBC # BLD AUTO: 7.7 10E3/UL (ref 4–11)
WBC # BLD AUTO: 7.7 10E3/UL (ref 4–11)
WBC # BLD AUTO: 7.9 10E3/UL (ref 4–11)
WBC # BLD AUTO: 7.9 10E3/UL (ref 4–11)
WBC # BLD AUTO: 8.1 10E3/UL (ref 4–11)
WBC # BLD AUTO: 8.3 10E3/UL (ref 4–11)
WBC # BLD AUTO: 8.4 10E3/UL (ref 4–11)
WBC # BLD AUTO: 8.7 10E3/UL (ref 4–11)
WBC # BLD AUTO: 8.7 10E3/UL (ref 4–11)
WBC # BLD AUTO: 9 10E3/UL (ref 4–11)
WBC # BLD AUTO: 9.1 10E3/UL (ref 4–11)
WBC # BLD AUTO: 9.2 10E3/UL (ref 4–11)
WBC # BLD AUTO: 9.6 10E3/UL (ref 4–11)
WBC URINE: 1 /HPF
WBC URINE: 2 /HPF
WBC URINE: 6 /HPF

## 2024-01-01 PROCEDURE — 80048 BASIC METABOLIC PNL TOTAL CA: CPT

## 2024-01-01 PROCEDURE — G0452 MOLECULAR PATHOLOGY INTERPR: HCPCS | Mod: 26 | Performed by: PATHOLOGY

## 2024-01-01 PROCEDURE — 200N000002 HC R&B ICU UMMC

## 2024-01-01 PROCEDURE — 84100 ASSAY OF PHOSPHORUS: CPT | Performed by: STUDENT IN AN ORGANIZED HEALTH CARE EDUCATION/TRAINING PROGRAM

## 2024-01-01 PROCEDURE — 94660 CPAP INITIATION&MGMT: CPT

## 2024-01-01 PROCEDURE — 71045 X-RAY EXAM CHEST 1 VIEW: CPT

## 2024-01-01 PROCEDURE — 82805 BLOOD GASES W/O2 SATURATION: CPT | Performed by: STUDENT IN AN ORGANIZED HEALTH CARE EDUCATION/TRAINING PROGRAM

## 2024-01-01 PROCEDURE — 250N000011 HC RX IP 250 OP 636: Mod: JZ | Performed by: INTERNAL MEDICINE

## 2024-01-01 PROCEDURE — C9113 INJ PANTOPRAZOLE SODIUM, VIA: HCPCS | Performed by: NURSE PRACTITIONER

## 2024-01-01 PROCEDURE — 71045 X-RAY EXAM CHEST 1 VIEW: CPT | Mod: 26 | Performed by: RADIOLOGY

## 2024-01-01 PROCEDURE — 250N000013 HC RX MED GY IP 250 OP 250 PS 637: Performed by: STUDENT IN AN ORGANIZED HEALTH CARE EDUCATION/TRAINING PROGRAM

## 2024-01-01 PROCEDURE — 85379 FIBRIN DEGRADATION QUANT: CPT | Performed by: STUDENT IN AN ORGANIZED HEALTH CARE EDUCATION/TRAINING PROGRAM

## 2024-01-01 PROCEDURE — 85520 HEPARIN ASSAY: CPT | Performed by: STUDENT IN AN ORGANIZED HEALTH CARE EDUCATION/TRAINING PROGRAM

## 2024-01-01 PROCEDURE — 36415 COLL VENOUS BLD VENIPUNCTURE: CPT

## 2024-01-01 PROCEDURE — 85300 ANTITHROMBIN III ACTIVITY: CPT | Performed by: STUDENT IN AN ORGANIZED HEALTH CARE EDUCATION/TRAINING PROGRAM

## 2024-01-01 PROCEDURE — 999N000157 HC STATISTIC RCP TIME EA 10 MIN

## 2024-01-01 PROCEDURE — 250N000013 HC RX MED GY IP 250 OP 250 PS 637

## 2024-01-01 PROCEDURE — 80053 COMPREHEN METABOLIC PANEL: CPT

## 2024-01-01 PROCEDURE — 99232 SBSQ HOSP IP/OBS MODERATE 35: CPT | Mod: GC | Performed by: INTERNAL MEDICINE

## 2024-01-01 PROCEDURE — 83735 ASSAY OF MAGNESIUM: CPT | Performed by: STUDENT IN AN ORGANIZED HEALTH CARE EDUCATION/TRAINING PROGRAM

## 2024-01-01 PROCEDURE — 94640 AIRWAY INHALATION TREATMENT: CPT | Mod: 76

## 2024-01-01 PROCEDURE — 80347 BENZODIAZEPINES 13 OR MORE: CPT | Performed by: STUDENT IN AN ORGANIZED HEALTH CARE EDUCATION/TRAINING PROGRAM

## 2024-01-01 PROCEDURE — 85730 THROMBOPLASTIN TIME PARTIAL: CPT | Performed by: STUDENT IN AN ORGANIZED HEALTH CARE EDUCATION/TRAINING PROGRAM

## 2024-01-01 PROCEDURE — 250N000009 HC RX 250: Performed by: STUDENT IN AN ORGANIZED HEALTH CARE EDUCATION/TRAINING PROGRAM

## 2024-01-01 PROCEDURE — 4A09X5Z MEASUREMENT OF RESPIRATORY FLOW, EXTERNAL APPROACH: ICD-10-PCS | Performed by: INTERNAL MEDICINE

## 2024-01-01 PROCEDURE — 86316 IMMUNOASSAY TUMOR OTHER: CPT | Performed by: STUDENT IN AN ORGANIZED HEALTH CARE EDUCATION/TRAINING PROGRAM

## 2024-01-01 PROCEDURE — 258N000003 HC RX IP 258 OP 636: Performed by: INTERNAL MEDICINE

## 2024-01-01 PROCEDURE — 87637 SARSCOV2&INF A&B&RSV AMP PRB: CPT

## 2024-01-01 PROCEDURE — 94640 AIRWAY INHALATION TREATMENT: CPT

## 2024-01-01 PROCEDURE — 71275 CT ANGIOGRAPHY CHEST: CPT

## 2024-01-01 PROCEDURE — 82805 BLOOD GASES W/O2 SATURATION: CPT | Performed by: INTERNAL MEDICINE

## 2024-01-01 PROCEDURE — 370N000003 HC ANESTHESIA WARD SERVICE: Performed by: ANESTHESIOLOGY

## 2024-01-01 PROCEDURE — 99291 CRITICAL CARE FIRST HOUR: CPT | Performed by: PHYSICIAN ASSISTANT

## 2024-01-01 PROCEDURE — 250N000013 HC RX MED GY IP 250 OP 250 PS 637: Performed by: NURSE PRACTITIONER

## 2024-01-01 PROCEDURE — 80053 COMPREHEN METABOLIC PANEL: CPT | Performed by: STUDENT IN AN ORGANIZED HEALTH CARE EDUCATION/TRAINING PROGRAM

## 2024-01-01 PROCEDURE — 93325 DOPPLER ECHO COLOR FLOW MAPG: CPT

## 2024-01-01 PROCEDURE — 99233 SBSQ HOSP IP/OBS HIGH 50: CPT | Mod: 25 | Performed by: NURSE PRACTITIONER

## 2024-01-01 PROCEDURE — 250N000011 HC RX IP 250 OP 636: Performed by: STUDENT IN AN ORGANIZED HEALTH CARE EDUCATION/TRAINING PROGRAM

## 2024-01-01 PROCEDURE — 94645 CONT INHLJ TX EACH ADDL HOUR: CPT

## 2024-01-01 PROCEDURE — 82550 ASSAY OF CK (CPK): CPT

## 2024-01-01 PROCEDURE — 83051 HEMOGLOBIN PLASMA: CPT | Performed by: STUDENT IN AN ORGANIZED HEALTH CARE EDUCATION/TRAINING PROGRAM

## 2024-01-01 PROCEDURE — 82330 ASSAY OF CALCIUM: CPT | Performed by: STUDENT IN AN ORGANIZED HEALTH CARE EDUCATION/TRAINING PROGRAM

## 2024-01-01 PROCEDURE — 71250 CT THORAX DX C-: CPT | Mod: 26 | Performed by: RADIOLOGY

## 2024-01-01 PROCEDURE — 86235 NUCLEAR ANTIGEN ANTIBODY: CPT

## 2024-01-01 PROCEDURE — 82805 BLOOD GASES W/O2 SATURATION: CPT

## 2024-01-01 PROCEDURE — 85007 BL SMEAR W/DIFF WBC COUNT: CPT | Performed by: INTERNAL MEDICINE

## 2024-01-01 PROCEDURE — 99233 SBSQ HOSP IP/OBS HIGH 50: CPT | Mod: GC | Performed by: STUDENT IN AN ORGANIZED HEALTH CARE EDUCATION/TRAINING PROGRAM

## 2024-01-01 PROCEDURE — 999N000215 HC STATISTIC HFNC ADULT NON-CPAP

## 2024-01-01 PROCEDURE — 85610 PROTHROMBIN TIME: CPT | Performed by: STUDENT IN AN ORGANIZED HEALTH CARE EDUCATION/TRAINING PROGRAM

## 2024-01-01 PROCEDURE — 83605 ASSAY OF LACTIC ACID: CPT | Performed by: STUDENT IN AN ORGANIZED HEALTH CARE EDUCATION/TRAINING PROGRAM

## 2024-01-01 PROCEDURE — 85384 FIBRINOGEN ACTIVITY: CPT | Performed by: STUDENT IN AN ORGANIZED HEALTH CARE EDUCATION/TRAINING PROGRAM

## 2024-01-01 PROCEDURE — 85027 COMPLETE CBC AUTOMATED: CPT

## 2024-01-01 PROCEDURE — 86431 RHEUMATOID FACTOR QUANT: CPT

## 2024-01-01 PROCEDURE — 99223 1ST HOSP IP/OBS HIGH 75: CPT | Mod: GC | Performed by: STUDENT IN AN ORGANIZED HEALTH CARE EDUCATION/TRAINING PROGRAM

## 2024-01-01 PROCEDURE — 86923 COMPATIBILITY TEST ELECTRIC: CPT | Performed by: NURSE PRACTITIONER

## 2024-01-01 PROCEDURE — 87637 SARSCOV2&INF A&B&RSV AMP PRB: CPT | Performed by: STUDENT IN AN ORGANIZED HEALTH CARE EDUCATION/TRAINING PROGRAM

## 2024-01-01 PROCEDURE — 84478 ASSAY OF TRIGLYCERIDES: CPT | Performed by: INTERNAL MEDICINE

## 2024-01-01 PROCEDURE — 82040 ASSAY OF SERUM ALBUMIN: CPT | Performed by: STUDENT IN AN ORGANIZED HEALTH CARE EDUCATION/TRAINING PROGRAM

## 2024-01-01 PROCEDURE — C9113 INJ PANTOPRAZOLE SODIUM, VIA: HCPCS | Performed by: STUDENT IN AN ORGANIZED HEALTH CARE EDUCATION/TRAINING PROGRAM

## 2024-01-01 PROCEDURE — 93308 TTE F-UP OR LMTD: CPT | Mod: 26 | Performed by: INTERNAL MEDICINE

## 2024-01-01 PROCEDURE — 99207 EEG VIDEO 2-12 HRS UNMONITORED: CPT | Performed by: PSYCHIATRY & NEUROLOGY

## 2024-01-01 PROCEDURE — 99232 SBSQ HOSP IP/OBS MODERATE 35: CPT | Mod: GC | Performed by: STUDENT IN AN ORGANIZED HEALTH CARE EDUCATION/TRAINING PROGRAM

## 2024-01-01 PROCEDURE — 85018 HEMOGLOBIN: CPT

## 2024-01-01 PROCEDURE — P9016 RBC LEUKOCYTES REDUCED: HCPCS | Performed by: STUDENT IN AN ORGANIZED HEALTH CARE EDUCATION/TRAINING PROGRAM

## 2024-01-01 PROCEDURE — 36415 COLL VENOUS BLD VENIPUNCTURE: CPT | Performed by: STUDENT IN AN ORGANIZED HEALTH CARE EDUCATION/TRAINING PROGRAM

## 2024-01-01 PROCEDURE — 84155 ASSAY OF PROTEIN SERUM: CPT | Performed by: STUDENT IN AN ORGANIZED HEALTH CARE EDUCATION/TRAINING PROGRAM

## 2024-01-01 PROCEDURE — 33949 ECMO/ECLS DAILY MGMT ARTERY: CPT

## 2024-01-01 PROCEDURE — 272N000555 HC SENSOR NIRS OXIMETER, ADULT

## 2024-01-01 PROCEDURE — 83615 LACTATE (LD) (LDH) ENZYME: CPT | Performed by: STUDENT IN AN ORGANIZED HEALTH CARE EDUCATION/TRAINING PROGRAM

## 2024-01-01 PROCEDURE — 250N000011 HC RX IP 250 OP 636: Performed by: NURSE PRACTITIONER

## 2024-01-01 PROCEDURE — 250N000011 HC RX IP 250 OP 636

## 2024-01-01 PROCEDURE — 250N000009 HC RX 250: Performed by: INTERNAL MEDICINE

## 2024-01-01 PROCEDURE — 71045 X-RAY EXAM CHEST 1 VIEW: CPT | Mod: 26 | Performed by: STUDENT IN AN ORGANIZED HEALTH CARE EDUCATION/TRAINING PROGRAM

## 2024-01-01 PROCEDURE — P9016 RBC LEUKOCYTES REDUCED: HCPCS | Performed by: NURSE PRACTITIONER

## 2024-01-01 PROCEDURE — 258N000003 HC RX IP 258 OP 636: Performed by: STUDENT IN AN ORGANIZED HEALTH CARE EDUCATION/TRAINING PROGRAM

## 2024-01-01 PROCEDURE — 84484 ASSAY OF TROPONIN QUANT: CPT | Performed by: STUDENT IN AN ORGANIZED HEALTH CARE EDUCATION/TRAINING PROGRAM

## 2024-01-01 PROCEDURE — 87635 SARS-COV-2 COVID-19 AMP PRB: CPT

## 2024-01-01 PROCEDURE — 85347 COAGULATION TIME ACTIVATED: CPT

## 2024-01-01 PROCEDURE — 85027 COMPLETE CBC AUTOMATED: CPT | Performed by: STUDENT IN AN ORGANIZED HEALTH CARE EDUCATION/TRAINING PROGRAM

## 2024-01-01 PROCEDURE — 85652 RBC SED RATE AUTOMATED: CPT | Performed by: STUDENT IN AN ORGANIZED HEALTH CARE EDUCATION/TRAINING PROGRAM

## 2024-01-01 PROCEDURE — 85396 CLOTTING ASSAY WHOLE BLOOD: CPT | Performed by: STUDENT IN AN ORGANIZED HEALTH CARE EDUCATION/TRAINING PROGRAM

## 2024-01-01 PROCEDURE — 33949 ECMO/ECLS DAILY MGMT ARTERY: CPT | Performed by: STUDENT IN AN ORGANIZED HEALTH CARE EDUCATION/TRAINING PROGRAM

## 2024-01-01 PROCEDURE — 70450 CT HEAD/BRAIN W/O DYE: CPT

## 2024-01-01 PROCEDURE — 86923 COMPATIBILITY TEST ELECTRIC: CPT | Performed by: STUDENT IN AN ORGANIZED HEALTH CARE EDUCATION/TRAINING PROGRAM

## 2024-01-01 PROCEDURE — 93925 LOWER EXTREMITY STUDY: CPT | Mod: 26 | Performed by: RADIOLOGY

## 2024-01-01 PROCEDURE — C1751 CATH, INF, PER/CENT/MIDLINE: HCPCS | Performed by: INTERNAL MEDICINE

## 2024-01-01 PROCEDURE — 86140 C-REACTIVE PROTEIN: CPT | Performed by: STUDENT IN AN ORGANIZED HEALTH CARE EDUCATION/TRAINING PROGRAM

## 2024-01-01 PROCEDURE — 250N000011 HC RX IP 250 OP 636: Performed by: INTERNAL MEDICINE

## 2024-01-01 PROCEDURE — 82805 BLOOD GASES W/O2 SATURATION: CPT | Performed by: NURSE PRACTITIONER

## 2024-01-01 PROCEDURE — 120N000003 HC R&B IMCU UMMC

## 2024-01-01 PROCEDURE — 94003 VENT MGMT INPAT SUBQ DAY: CPT

## 2024-01-01 PROCEDURE — 258N000001 HC RX 258: Performed by: STUDENT IN AN ORGANIZED HEALTH CARE EDUCATION/TRAINING PROGRAM

## 2024-01-01 PROCEDURE — 84100 ASSAY OF PHOSPHORUS: CPT | Performed by: INTERNAL MEDICINE

## 2024-01-01 PROCEDURE — 86038 ANTINUCLEAR ANTIBODIES: CPT | Performed by: STUDENT IN AN ORGANIZED HEALTH CARE EDUCATION/TRAINING PROGRAM

## 2024-01-01 PROCEDURE — 78580 LUNG PERFUSION IMAGING: CPT

## 2024-01-01 PROCEDURE — 93325 DOPPLER ECHO COLOR FLOW MAPG: CPT | Mod: 26 | Performed by: INTERNAL MEDICINE

## 2024-01-01 PROCEDURE — 85027 COMPLETE CBC AUTOMATED: CPT | Performed by: INTERNAL MEDICINE

## 2024-01-01 PROCEDURE — 999N000185 HC STATISTIC TRANSPORT TIME EA 15 MIN

## 2024-01-01 PROCEDURE — 93325 DOPPLER ECHO COLOR FLOW MAPG: CPT | Mod: 26 | Performed by: STUDENT IN AN ORGANIZED HEALTH CARE EDUCATION/TRAINING PROGRAM

## 2024-01-01 PROCEDURE — 93010 ELECTROCARDIOGRAM REPORT: CPT | Performed by: INTERNAL MEDICINE

## 2024-01-01 PROCEDURE — 99291 CRITICAL CARE FIRST HOUR: CPT | Mod: 25 | Performed by: STUDENT IN AN ORGANIZED HEALTH CARE EDUCATION/TRAINING PROGRAM

## 2024-01-01 PROCEDURE — 33949 ECMO/ECLS DAILY MGMT ARTERY: CPT | Performed by: INTERNAL MEDICINE

## 2024-01-01 PROCEDURE — 81408 MOPATH PROCEDURE LEVEL 9: CPT | Performed by: INTERNAL MEDICINE

## 2024-01-01 PROCEDURE — 84478 ASSAY OF TRIGLYCERIDES: CPT | Performed by: STUDENT IN AN ORGANIZED HEALTH CARE EDUCATION/TRAINING PROGRAM

## 2024-01-01 PROCEDURE — 250N000013 HC RX MED GY IP 250 OP 250 PS 637: Performed by: INTERNAL MEDICINE

## 2024-01-01 PROCEDURE — 99232 SBSQ HOSP IP/OBS MODERATE 35: CPT | Performed by: INTERNAL MEDICINE

## 2024-01-01 PROCEDURE — 36415 COLL VENOUS BLD VENIPUNCTURE: CPT | Performed by: PHYSICIAN ASSISTANT

## 2024-01-01 PROCEDURE — 87389 HIV-1 AG W/HIV-1&-2 AB AG IA: CPT | Performed by: STUDENT IN AN ORGANIZED HEALTH CARE EDUCATION/TRAINING PROGRAM

## 2024-01-01 PROCEDURE — 999N000111 HC STATISTIC OT IP EVAL DEFER

## 2024-01-01 PROCEDURE — 99207 PR NO BILLABLE SERVICE THIS VISIT: CPT | Performed by: INTERNAL MEDICINE

## 2024-01-01 PROCEDURE — 83605 ASSAY OF LACTIC ACID: CPT | Performed by: INTERNAL MEDICINE

## 2024-01-01 PROCEDURE — 85730 THROMBOPLASTIN TIME PARTIAL: CPT | Performed by: NURSE PRACTITIONER

## 2024-01-01 PROCEDURE — 86606 ASPERGILLUS ANTIBODY: CPT | Performed by: STUDENT IN AN ORGANIZED HEALTH CARE EDUCATION/TRAINING PROGRAM

## 2024-01-01 PROCEDURE — 71250 CT THORAX DX C-: CPT

## 2024-01-01 PROCEDURE — 93451 RIGHT HEART CATH: CPT | Performed by: INTERNAL MEDICINE

## 2024-01-01 PROCEDURE — 95714 VEEG EA 12-26 HR UNMNTR: CPT

## 2024-01-01 PROCEDURE — 82247 BILIRUBIN TOTAL: CPT | Performed by: STUDENT IN AN ORGANIZED HEALTH CARE EDUCATION/TRAINING PROGRAM

## 2024-01-01 PROCEDURE — 99207 PR NO BILLABLE SERVICE THIS VISIT: CPT | Performed by: NURSE PRACTITIONER

## 2024-01-01 PROCEDURE — 93308 TTE F-UP OR LMTD: CPT | Mod: 26 | Performed by: STUDENT IN AN ORGANIZED HEALTH CARE EDUCATION/TRAINING PROGRAM

## 2024-01-01 PROCEDURE — 74177 CT ABD & PELVIS W/CONTRAST: CPT | Mod: 26 | Performed by: RADIOLOGY

## 2024-01-01 PROCEDURE — 87040 BLOOD CULTURE FOR BACTERIA: CPT | Performed by: STUDENT IN AN ORGANIZED HEALTH CARE EDUCATION/TRAINING PROGRAM

## 2024-01-01 PROCEDURE — 84075 ASSAY ALKALINE PHOSPHATASE: CPT | Performed by: STUDENT IN AN ORGANIZED HEALTH CARE EDUCATION/TRAINING PROGRAM

## 2024-01-01 PROCEDURE — 99232 SBSQ HOSP IP/OBS MODERATE 35: CPT | Performed by: NURSE PRACTITIONER

## 2024-01-01 PROCEDURE — 84450 TRANSFERASE (AST) (SGOT): CPT | Performed by: STUDENT IN AN ORGANIZED HEALTH CARE EDUCATION/TRAINING PROGRAM

## 2024-01-01 PROCEDURE — 87149 DNA/RNA DIRECT PROBE: CPT | Performed by: PHYSICIAN ASSISTANT

## 2024-01-01 PROCEDURE — 258N000001 HC RX 258

## 2024-01-01 PROCEDURE — 82947 ASSAY GLUCOSE BLOOD QUANT: CPT | Performed by: STUDENT IN AN ORGANIZED HEALTH CARE EDUCATION/TRAINING PROGRAM

## 2024-01-01 PROCEDURE — 93451 RIGHT HEART CATH: CPT | Mod: 26 | Performed by: INTERNAL MEDICINE

## 2024-01-01 PROCEDURE — 71275 CT ANGIOGRAPHY CHEST: CPT | Mod: 26 | Performed by: RADIOLOGY

## 2024-01-01 PROCEDURE — 85049 AUTOMATED PLATELET COUNT: CPT

## 2024-01-01 PROCEDURE — 80048 BASIC METABOLIC PNL TOTAL CA: CPT | Performed by: PHYSICIAN ASSISTANT

## 2024-01-01 PROCEDURE — 258N000001 HC RX 258: Performed by: INTERNAL MEDICINE

## 2024-01-01 PROCEDURE — 343N000001 HC RX 343: Performed by: STUDENT IN AN ORGANIZED HEALTH CARE EDUCATION/TRAINING PROGRAM

## 2024-01-01 PROCEDURE — 99233 SBSQ HOSP IP/OBS HIGH 50: CPT | Mod: GC | Performed by: INTERNAL MEDICINE

## 2024-01-01 PROCEDURE — 99152 MOD SED SAME PHYS/QHP 5/>YRS: CPT | Performed by: INTERNAL MEDICINE

## 2024-01-01 PROCEDURE — 82565 ASSAY OF CREATININE: CPT

## 2024-01-01 PROCEDURE — 93005 ELECTROCARDIOGRAM TRACING: CPT

## 2024-01-01 PROCEDURE — 87040 BLOOD CULTURE FOR BACTERIA: CPT | Performed by: INTERNAL MEDICINE

## 2024-01-01 PROCEDURE — 3E083PZ INTRODUCTION OF PLATELET INHIBITOR INTO HEART, PERCUTANEOUS APPROACH: ICD-10-PCS | Performed by: INTERNAL MEDICINE

## 2024-01-01 PROCEDURE — 36620 INSERTION CATHETER ARTERY: CPT | Mod: GC | Performed by: STUDENT IN AN ORGANIZED HEALTH CARE EDUCATION/TRAINING PROGRAM

## 2024-01-01 PROCEDURE — 93880 EXTRACRANIAL BILAT STUDY: CPT | Mod: 26 | Performed by: RADIOLOGY

## 2024-01-01 PROCEDURE — 99233 SBSQ HOSP IP/OBS HIGH 50: CPT | Performed by: STUDENT IN AN ORGANIZED HEALTH CARE EDUCATION/TRAINING PROGRAM

## 2024-01-01 PROCEDURE — 80202 ASSAY OF VANCOMYCIN: CPT | Performed by: STUDENT IN AN ORGANIZED HEALTH CARE EDUCATION/TRAINING PROGRAM

## 2024-01-01 PROCEDURE — 82787 IGG 1 2 3 OR 4 EACH: CPT | Performed by: STUDENT IN AN ORGANIZED HEALTH CARE EDUCATION/TRAINING PROGRAM

## 2024-01-01 PROCEDURE — 36556 INSERT NON-TUNNEL CV CATH: CPT | Mod: GC | Performed by: STUDENT IN AN ORGANIZED HEALTH CARE EDUCATION/TRAINING PROGRAM

## 2024-01-01 PROCEDURE — 120N000002 HC R&B MED SURG/OB UMMC

## 2024-01-01 PROCEDURE — 83605 ASSAY OF LACTIC ACID: CPT | Performed by: PHYSICIAN ASSISTANT

## 2024-01-01 PROCEDURE — 258N000003 HC RX IP 258 OP 636: Performed by: NURSE PRACTITIONER

## 2024-01-01 PROCEDURE — 250N000012 HC RX MED GY IP 250 OP 636 PS 637: Performed by: STUDENT IN AN ORGANIZED HEALTH CARE EDUCATION/TRAINING PROGRAM

## 2024-01-01 PROCEDURE — 250N000009 HC RX 250: Performed by: NURSE PRACTITIONER

## 2024-01-01 PROCEDURE — 99152 MOD SED SAME PHYS/QHP 5/>YRS: CPT | Mod: GC | Performed by: INTERNAL MEDICINE

## 2024-01-01 PROCEDURE — 84443 ASSAY THYROID STIM HORMONE: CPT

## 2024-01-01 PROCEDURE — 97110 THERAPEUTIC EXERCISES: CPT | Mod: GP

## 2024-01-01 PROCEDURE — 95718 EEG PHYS/QHP 2-12 HR W/VEEG: CPT | Performed by: PSYCHIATRY & NEUROLOGY

## 2024-01-01 PROCEDURE — 3E043XZ INTRODUCTION OF VASOPRESSOR INTO CENTRAL VEIN, PERCUTANEOUS APPROACH: ICD-10-PCS | Performed by: ANESTHESIOLOGY

## 2024-01-01 PROCEDURE — 250N000012 HC RX MED GY IP 250 OP 636 PS 637: Performed by: NURSE PRACTITIONER

## 2024-01-01 PROCEDURE — 999N000065 XR CHEST PORT 1 VIEW

## 2024-01-01 PROCEDURE — 87640 STAPH A DNA AMP PROBE: CPT | Performed by: STUDENT IN AN ORGANIZED HEALTH CARE EDUCATION/TRAINING PROGRAM

## 2024-01-01 PROCEDURE — 95711 VEEG 2-12 HR UNMONITORED: CPT

## 2024-01-01 PROCEDURE — 86900 BLOOD TYPING SEROLOGIC ABO: CPT | Performed by: STUDENT IN AN ORGANIZED HEALTH CARE EDUCATION/TRAINING PROGRAM

## 2024-01-01 PROCEDURE — 80202 ASSAY OF VANCOMYCIN: CPT | Performed by: INTERNAL MEDICINE

## 2024-01-01 PROCEDURE — 99222 1ST HOSP IP/OBS MODERATE 55: CPT | Mod: GC | Performed by: INTERNAL MEDICINE

## 2024-01-01 PROCEDURE — 250N000011 HC RX IP 250 OP 636: Performed by: PHYSICIAN ASSISTANT

## 2024-01-01 PROCEDURE — 93321 DOPPLER ECHO F-UP/LMTD STD: CPT | Mod: 26 | Performed by: STUDENT IN AN ORGANIZED HEALTH CARE EDUCATION/TRAINING PROGRAM

## 2024-01-01 PROCEDURE — 999N000065 XR ABDOMEN 1 VIEW

## 2024-01-01 PROCEDURE — 999N000128 HC STATISTIC PERIPHERAL IV START W/O US GUIDANCE

## 2024-01-01 PROCEDURE — 86200 CCP ANTIBODY: CPT

## 2024-01-01 PROCEDURE — 81406 MOPATH PROCEDURE LEVEL 7: CPT | Performed by: INTERNAL MEDICINE

## 2024-01-01 PROCEDURE — 250N000009 HC RX 250

## 2024-01-01 PROCEDURE — G0278 ILIAC ART ANGIO,CARDIAC CATH: HCPCS | Performed by: INTERNAL MEDICINE

## 2024-01-01 PROCEDURE — 99291 CRITICAL CARE FIRST HOUR: CPT | Mod: 25 | Performed by: INTERNAL MEDICINE

## 2024-01-01 PROCEDURE — 84145 PROCALCITONIN (PCT): CPT | Performed by: STUDENT IN AN ORGANIZED HEALTH CARE EDUCATION/TRAINING PROGRAM

## 2024-01-01 PROCEDURE — 999N000208 ECHOCARDIOGRAM LIMITED

## 2024-01-01 PROCEDURE — 87070 CULTURE OTHR SPECIMN AEROBIC: CPT

## 2024-01-01 PROCEDURE — 82805 BLOOD GASES W/O2 SATURATION: CPT | Performed by: PHYSICIAN ASSISTANT

## 2024-01-01 PROCEDURE — 99233 SBSQ HOSP IP/OBS HIGH 50: CPT | Performed by: INTERNAL MEDICINE

## 2024-01-01 PROCEDURE — 86923 COMPATIBILITY TEST ELECTRIC: CPT

## 2024-01-01 PROCEDURE — 71045 X-RAY EXAM CHEST 1 VIEW: CPT | Mod: 77

## 2024-01-01 PROCEDURE — 85027 COMPLETE CBC AUTOMATED: CPT | Performed by: PHYSICIAN ASSISTANT

## 2024-01-01 PROCEDURE — 74018 RADEX ABDOMEN 1 VIEW: CPT | Mod: 26 | Performed by: RADIOLOGY

## 2024-01-01 PROCEDURE — 97110 THERAPEUTIC EXERCISES: CPT | Mod: GP | Performed by: REHABILITATION PRACTITIONER

## 2024-01-01 PROCEDURE — 272N000272 HC CONTINUOUS NEBULIZER MICRO PUMP

## 2024-01-01 PROCEDURE — 250N000011 HC RX IP 250 OP 636: Mod: JZ | Performed by: NURSE PRACTITIONER

## 2024-01-01 PROCEDURE — 84484 ASSAY OF TROPONIN QUANT: CPT | Performed by: PHYSICIAN ASSISTANT

## 2024-01-01 PROCEDURE — 87070 CULTURE OTHR SPECIMN AEROBIC: CPT | Performed by: NURSE PRACTITIONER

## 2024-01-01 PROCEDURE — 5A09557 ASSISTANCE WITH RESPIRATORY VENTILATION, GREATER THAN 96 CONSECUTIVE HOURS, CONTINUOUS POSITIVE AIRWAY PRESSURE: ICD-10-PCS | Performed by: INTERNAL MEDICINE

## 2024-01-01 PROCEDURE — 84460 ALANINE AMINO (ALT) (SGPT): CPT | Performed by: STUDENT IN AN ORGANIZED HEALTH CARE EDUCATION/TRAINING PROGRAM

## 2024-01-01 PROCEDURE — 87205 SMEAR GRAM STAIN: CPT

## 2024-01-01 PROCEDURE — C1769 GUIDE WIRE: HCPCS | Performed by: INTERNAL MEDICINE

## 2024-01-01 PROCEDURE — 4A023N6 MEASUREMENT OF CARDIAC SAMPLING AND PRESSURE, RIGHT HEART, PERCUTANEOUS APPROACH: ICD-10-PCS | Performed by: INTERNAL MEDICINE

## 2024-01-01 PROCEDURE — 99232 SBSQ HOSP IP/OBS MODERATE 35: CPT | Performed by: PHYSICIAN ASSISTANT

## 2024-01-01 PROCEDURE — 87205 SMEAR GRAM STAIN: CPT | Performed by: STUDENT IN AN ORGANIZED HEALTH CARE EDUCATION/TRAINING PROGRAM

## 2024-01-01 PROCEDURE — 97530 THERAPEUTIC ACTIVITIES: CPT | Mod: GP

## 2024-01-01 PROCEDURE — C8929 TTE W OR WO FOL WCON,DOPPLER: HCPCS

## 2024-01-01 PROCEDURE — 86900 BLOOD TYPING SEROLOGIC ABO: CPT | Performed by: INTERNAL MEDICINE

## 2024-01-01 PROCEDURE — 81001 URINALYSIS AUTO W/SCOPE: CPT | Performed by: STUDENT IN AN ORGANIZED HEALTH CARE EDUCATION/TRAINING PROGRAM

## 2024-01-01 PROCEDURE — 87635 SARS-COV-2 COVID-19 AMP PRB: CPT | Performed by: STUDENT IN AN ORGANIZED HEALTH CARE EDUCATION/TRAINING PROGRAM

## 2024-01-01 PROCEDURE — 82977 ASSAY OF GGT: CPT | Performed by: STUDENT IN AN ORGANIZED HEALTH CARE EDUCATION/TRAINING PROGRAM

## 2024-01-01 PROCEDURE — 85730 THROMBOPLASTIN TIME PARTIAL: CPT | Performed by: INTERNAL MEDICINE

## 2024-01-01 PROCEDURE — 94644 CONT INHLJ TX 1ST HOUR: CPT

## 2024-01-01 PROCEDURE — 99418 PROLNG IP/OBS E/M EA 15 MIN: CPT | Performed by: STUDENT IN AN ORGANIZED HEALTH CARE EDUCATION/TRAINING PROGRAM

## 2024-01-01 PROCEDURE — 93306 TTE W/DOPPLER COMPLETE: CPT | Mod: 26 | Performed by: INTERNAL MEDICINE

## 2024-01-01 PROCEDURE — 272N000001 HC OR GENERAL SUPPLY STERILE: Performed by: INTERNAL MEDICINE

## 2024-01-01 PROCEDURE — 83880 ASSAY OF NATRIURETIC PEPTIDE: CPT

## 2024-01-01 PROCEDURE — 999N000208 ECHOCARDIOGRAM COMPLETE

## 2024-01-01 PROCEDURE — 85610 PROTHROMBIN TIME: CPT

## 2024-01-01 PROCEDURE — 74176 CT ABD & PELVIS W/O CONTRAST: CPT | Mod: 26 | Performed by: RADIOLOGY

## 2024-01-01 PROCEDURE — 83036 HEMOGLOBIN GLYCOSYLATED A1C: CPT | Performed by: STUDENT IN AN ORGANIZED HEALTH CARE EDUCATION/TRAINING PROGRAM

## 2024-01-01 PROCEDURE — 99292 CRITICAL CARE ADDL 30 MIN: CPT | Mod: 25 | Performed by: INTERNAL MEDICINE

## 2024-01-01 PROCEDURE — 84439 ASSAY OF FREE THYROXINE: CPT

## 2024-01-01 PROCEDURE — 99153 MOD SED SAME PHYS/QHP EA: CPT | Performed by: INTERNAL MEDICINE

## 2024-01-01 PROCEDURE — 5A1955Z RESPIRATORY VENTILATION, GREATER THAN 96 CONSECUTIVE HOURS: ICD-10-PCS | Performed by: ANESTHESIOLOGY

## 2024-01-01 PROCEDURE — 99233 SBSQ HOSP IP/OBS HIGH 50: CPT | Mod: 25 | Performed by: INTERNAL MEDICINE

## 2024-01-01 PROCEDURE — 93925 LOWER EXTREMITY STUDY: CPT

## 2024-01-01 PROCEDURE — P9016 RBC LEUKOCYTES REDUCED: HCPCS

## 2024-01-01 PROCEDURE — 95718 EEG PHYS/QHP 2-12 HR W/VEEG: CPT | Mod: GC | Performed by: PSYCHIATRY & NEUROLOGY

## 2024-01-01 PROCEDURE — 255N000002 HC RX 255 OP 636: Performed by: INTERNAL MEDICINE

## 2024-01-01 PROCEDURE — 82248 BILIRUBIN DIRECT: CPT | Performed by: STUDENT IN AN ORGANIZED HEALTH CARE EDUCATION/TRAINING PROGRAM

## 2024-01-01 PROCEDURE — 80321 ALCOHOLS BIOMARKERS 1OR 2: CPT | Performed by: INTERNAL MEDICINE

## 2024-01-01 PROCEDURE — 82140 ASSAY OF AMMONIA: CPT | Performed by: NURSE PRACTITIONER

## 2024-01-01 PROCEDURE — 81479 UNLISTED MOLECULAR PATHOLOGY: CPT | Performed by: INTERNAL MEDICINE

## 2024-01-01 PROCEDURE — 95720 EEG PHY/QHP EA INCR W/VEEG: CPT | Mod: GC | Performed by: PSYCHIATRY & NEUROLOGY

## 2024-01-01 PROCEDURE — 85730 THROMBOPLASTIN TIME PARTIAL: CPT

## 2024-01-01 PROCEDURE — 93321 DOPPLER ECHO F-UP/LMTD STD: CPT | Mod: 26 | Performed by: INTERNAL MEDICINE

## 2024-01-01 PROCEDURE — 99233 SBSQ HOSP IP/OBS HIGH 50: CPT | Performed by: NURSE PRACTITIONER

## 2024-01-01 PROCEDURE — 85379 FIBRIN DEGRADATION QUANT: CPT | Performed by: INTERNAL MEDICINE

## 2024-01-01 PROCEDURE — 250N000012 HC RX MED GY IP 250 OP 636 PS 637

## 2024-01-01 PROCEDURE — 85396 CLOTTING ASSAY WHOLE BLOOD: CPT

## 2024-01-01 PROCEDURE — C9254 INJECTION, LACOSAMIDE: HCPCS | Performed by: STUDENT IN AN ORGANIZED HEALTH CARE EDUCATION/TRAINING PROGRAM

## 2024-01-01 PROCEDURE — 36014 PLACE CATHETER IN ARTERY: CPT | Mod: LT | Performed by: INTERNAL MEDICINE

## 2024-01-01 PROCEDURE — 86900 BLOOD TYPING SEROLOGIC ABO: CPT | Performed by: NURSE PRACTITIONER

## 2024-01-01 PROCEDURE — 258N000003 HC RX IP 258 OP 636: Performed by: PHYSICIAN ASSISTANT

## 2024-01-01 PROCEDURE — 80069 RENAL FUNCTION PANEL: CPT

## 2024-01-01 PROCEDURE — 81001 URINALYSIS AUTO W/SCOPE: CPT | Performed by: NURSE PRACTITIONER

## 2024-01-01 PROCEDURE — 999N000026 HC STATISTIC CARDIOPULM RESUSCITATION

## 2024-01-01 PROCEDURE — C1894 INTRO/SHEATH, NON-LASER: HCPCS | Performed by: INTERNAL MEDICINE

## 2024-01-01 PROCEDURE — 33952 ECMO/ECLS INSJ PRPH CANNULA: CPT | Performed by: INTERNAL MEDICINE

## 2024-01-01 PROCEDURE — 80053 COMPREHEN METABOLIC PANEL: CPT | Performed by: INTERNAL MEDICINE

## 2024-01-01 PROCEDURE — 250N000012 HC RX MED GY IP 250 OP 636 PS 637: Performed by: INTERNAL MEDICINE

## 2024-01-01 PROCEDURE — 75825 VEIN X-RAY TRUNK: CPT | Performed by: INTERNAL MEDICINE

## 2024-01-01 PROCEDURE — 99231 SBSQ HOSP IP/OBS SF/LOW 25: CPT | Mod: GC | Performed by: INTERNAL MEDICINE

## 2024-01-01 PROCEDURE — 84132 ASSAY OF SERUM POTASSIUM: CPT | Performed by: INTERNAL MEDICINE

## 2024-01-01 PROCEDURE — 75741 ARTERY X-RAYS LUNG: CPT

## 2024-01-01 PROCEDURE — 80307 DRUG TEST PRSMV CHEM ANLYZR: CPT | Performed by: STUDENT IN AN ORGANIZED HEALTH CARE EDUCATION/TRAINING PROGRAM

## 2024-01-01 PROCEDURE — 255N000002 HC RX 255 OP 636: Performed by: STUDENT IN AN ORGANIZED HEALTH CARE EDUCATION/TRAINING PROGRAM

## 2024-01-01 PROCEDURE — 02HV33Z INSERTION OF INFUSION DEVICE INTO SUPERIOR VENA CAVA, PERCUTANEOUS APPROACH: ICD-10-PCS | Performed by: STUDENT IN AN ORGANIZED HEALTH CARE EDUCATION/TRAINING PROGRAM

## 2024-01-01 PROCEDURE — 36620 INSERTION CATHETER ARTERY: CPT | Mod: 59 | Performed by: INTERNAL MEDICINE

## 2024-01-01 PROCEDURE — 33947 ECMO/ECLS INITIATION ARTERY: CPT | Performed by: INTERNAL MEDICINE

## 2024-01-01 PROCEDURE — 84100 ASSAY OF PHOSPHORUS: CPT | Performed by: NURSE PRACTITIONER

## 2024-01-01 PROCEDURE — 80354 DRUG SCREENING FENTANYL: CPT | Performed by: STUDENT IN AN ORGANIZED HEALTH CARE EDUCATION/TRAINING PROGRAM

## 2024-01-01 PROCEDURE — 272N000001 HC OR GENERAL SUPPLY STERILE

## 2024-01-01 PROCEDURE — 85025 COMPLETE CBC W/AUTO DIFF WBC: CPT | Performed by: STUDENT IN AN ORGANIZED HEALTH CARE EDUCATION/TRAINING PROGRAM

## 2024-01-01 PROCEDURE — 33947 ECMO/ECLS INITIATION ARTERY: CPT

## 2024-01-01 PROCEDURE — 82085 ASSAY OF ALDOLASE: CPT | Performed by: STUDENT IN AN ORGANIZED HEALTH CARE EDUCATION/TRAINING PROGRAM

## 2024-01-01 PROCEDURE — 85025 COMPLETE CBC W/AUTO DIFF WBC: CPT

## 2024-01-01 PROCEDURE — G0463 HOSPITAL OUTPT CLINIC VISIT: HCPCS | Mod: 25

## 2024-01-01 PROCEDURE — A7035 POS AIRWAY PRESS HEADGEAR: HCPCS

## 2024-01-01 PROCEDURE — 93306 TTE W/DOPPLER COMPLETE: CPT

## 2024-01-01 PROCEDURE — 76000 FLUOROSCOPY <1 HR PHYS/QHP: CPT | Mod: 26 | Performed by: STUDENT IN AN ORGANIZED HEALTH CARE EDUCATION/TRAINING PROGRAM

## 2024-01-01 PROCEDURE — A9540 TC99M MAA: HCPCS | Performed by: STUDENT IN AN ORGANIZED HEALTH CARE EDUCATION/TRAINING PROGRAM

## 2024-01-01 PROCEDURE — 99223 1ST HOSP IP/OBS HIGH 75: CPT | Mod: GC | Performed by: SURGERY

## 2024-01-01 PROCEDURE — 87633 RESP VIRUS 12-25 TARGETS: CPT

## 2024-01-01 PROCEDURE — C1887 CATHETER, GUIDING: HCPCS | Performed by: INTERNAL MEDICINE

## 2024-01-01 PROCEDURE — 87186 SC STD MICRODIL/AGAR DIL: CPT | Performed by: STUDENT IN AN ORGANIZED HEALTH CARE EDUCATION/TRAINING PROGRAM

## 2024-01-01 PROCEDURE — G0463 HOSPITAL OUTPT CLINIC VISIT: HCPCS

## 2024-01-01 PROCEDURE — C1725 CATH, TRANSLUMIN NON-LASER: HCPCS | Performed by: INTERNAL MEDICINE

## 2024-01-01 PROCEDURE — 99232 SBSQ HOSP IP/OBS MODERATE 35: CPT | Mod: 25 | Performed by: INTERNAL MEDICINE

## 2024-01-01 PROCEDURE — 76705 ECHO EXAM OF ABDOMEN: CPT

## 2024-01-01 PROCEDURE — 36415 COLL VENOUS BLD VENIPUNCTURE: CPT | Performed by: INTERNAL MEDICINE

## 2024-01-01 PROCEDURE — 70450 CT HEAD/BRAIN W/O DYE: CPT | Mod: 26 | Performed by: RADIOLOGY

## 2024-01-01 PROCEDURE — 86022 PLATELET ANTIBODIES: CPT | Performed by: NURSE PRACTITIONER

## 2024-01-01 PROCEDURE — 85384 FIBRINOGEN ACTIVITY: CPT

## 2024-01-01 PROCEDURE — 86140 C-REACTIVE PROTEIN: CPT

## 2024-01-01 PROCEDURE — 36014 PLACE CATHETER IN ARTERY: CPT | Performed by: INTERNAL MEDICINE

## 2024-01-01 PROCEDURE — 83036 HEMOGLOBIN GLYCOSYLATED A1C: CPT

## 2024-01-01 PROCEDURE — 95720 EEG PHY/QHP EA INCR W/VEEG: CPT | Performed by: PSYCHIATRY & NEUROLOGY

## 2024-01-01 PROCEDURE — 81407 MOPATH PROCEDURE LEVEL 8: CPT | Performed by: INTERNAL MEDICINE

## 2024-01-01 PROCEDURE — 96040 HC GENETIC COUNSELING, EACH 30 MINUTES: CPT | Performed by: GENETIC COUNSELOR, MS

## 2024-01-01 PROCEDURE — 87186 SC STD MICRODIL/AGAR DIL: CPT | Performed by: PHYSICIAN ASSISTANT

## 2024-01-01 PROCEDURE — 99418 PROLNG IP/OBS E/M EA 15 MIN: CPT | Performed by: INTERNAL MEDICINE

## 2024-01-01 PROCEDURE — 272N000237 HC CARDIOHELP CIRCUIT

## 2024-01-01 PROCEDURE — P9045 ALBUMIN (HUMAN), 5%, 250 ML: HCPCS | Mod: JZ | Performed by: STUDENT IN AN ORGANIZED HEALTH CARE EDUCATION/TRAINING PROGRAM

## 2024-01-01 PROCEDURE — 82330 ASSAY OF CALCIUM: CPT

## 2024-01-01 PROCEDURE — 410N000003 HC PER-PERFUSION 1ST 30 MIN: Performed by: INTERNAL MEDICINE

## 2024-01-01 PROCEDURE — 94002 VENT MGMT INPAT INIT DAY: CPT

## 2024-01-01 PROCEDURE — 36600 WITHDRAWAL OF ARTERIAL BLOOD: CPT

## 2024-01-01 PROCEDURE — 83605 ASSAY OF LACTIC ACID: CPT | Performed by: NURSE PRACTITIONER

## 2024-01-01 PROCEDURE — 87641 MR-STAPH DNA AMP PROBE: CPT | Performed by: STUDENT IN AN ORGANIZED HEALTH CARE EDUCATION/TRAINING PROGRAM

## 2024-01-01 PROCEDURE — P9045 ALBUMIN (HUMAN), 5%, 250 ML: HCPCS | Mod: JZ | Performed by: INTERNAL MEDICINE

## 2024-01-01 PROCEDURE — 76705 ECHO EXAM OF ABDOMEN: CPT | Mod: 26 | Performed by: RADIOLOGY

## 2024-01-01 PROCEDURE — 99223 1ST HOSP IP/OBS HIGH 75: CPT | Performed by: NURSE PRACTITIONER

## 2024-01-01 PROCEDURE — 2Y40X5Z PACKING OF MOUTH AND PHARYNX USING PACKING MATERIAL: ICD-10-PCS | Performed by: STUDENT IN AN ORGANIZED HEALTH CARE EDUCATION/TRAINING PROGRAM

## 2024-01-01 PROCEDURE — 5A1522G EXTRACORPOREAL OXYGENATION, MEMBRANE, PERIPHERAL VENO-ARTERIAL: ICD-10-PCS | Performed by: INTERNAL MEDICINE

## 2024-01-01 PROCEDURE — 87070 CULTURE OTHR SPECIMN AEROBIC: CPT | Performed by: STUDENT IN AN ORGANIZED HEALTH CARE EDUCATION/TRAINING PROGRAM

## 2024-01-01 PROCEDURE — 74177 CT ABD & PELVIS W/CONTRAST: CPT

## 2024-01-01 PROCEDURE — 82784 ASSAY IGA/IGD/IGG/IGM EACH: CPT | Performed by: STUDENT IN AN ORGANIZED HEALTH CARE EDUCATION/TRAINING PROGRAM

## 2024-01-01 PROCEDURE — 99233 SBSQ HOSP IP/OBS HIGH 50: CPT | Performed by: CLINICAL NURSE SPECIALIST

## 2024-01-01 PROCEDURE — 99232 SBSQ HOSP IP/OBS MODERATE 35: CPT | Performed by: STUDENT IN AN ORGANIZED HEALTH CARE EDUCATION/TRAINING PROGRAM

## 2024-01-01 PROCEDURE — 87086 URINE CULTURE/COLONY COUNT: CPT | Performed by: STUDENT IN AN ORGANIZED HEALTH CARE EDUCATION/TRAINING PROGRAM

## 2024-01-01 PROCEDURE — 999N000105 HC STATISTIC NO DOCUMENTATION TO SUPPORT CHARGE

## 2024-01-01 PROCEDURE — 93568 NJX CAR CTH NSLC P-ART ANGRP: CPT | Performed by: INTERNAL MEDICINE

## 2024-01-01 PROCEDURE — 99497 ADVNCD CARE PLAN 30 MIN: CPT | Mod: 25 | Performed by: INTERNAL MEDICINE

## 2024-01-01 PROCEDURE — 0WC3XZZ EXTIRPATION OF MATTER FROM ORAL CAVITY AND THROAT, EXTERNAL APPROACH: ICD-10-PCS | Performed by: STUDENT IN AN ORGANIZED HEALTH CARE EDUCATION/TRAINING PROGRAM

## 2024-01-01 PROCEDURE — 360N000079 HC SURGERY LEVEL 6, PER MIN

## 2024-01-01 PROCEDURE — 93880 EXTRACRANIAL BILAT STUDY: CPT

## 2024-01-01 PROCEDURE — 82306 VITAMIN D 25 HYDROXY: CPT | Performed by: STUDENT IN AN ORGANIZED HEALTH CARE EDUCATION/TRAINING PROGRAM

## 2024-01-01 PROCEDURE — 86037 ANCA TITER EACH ANTIBODY: CPT | Performed by: STUDENT IN AN ORGANIZED HEALTH CARE EDUCATION/TRAINING PROGRAM

## 2024-01-01 PROCEDURE — 80048 BASIC METABOLIC PNL TOTAL CA: CPT | Performed by: STUDENT IN AN ORGANIZED HEALTH CARE EDUCATION/TRAINING PROGRAM

## 2024-01-01 PROCEDURE — 93976 VASCULAR STUDY: CPT | Mod: 26 | Performed by: RADIOLOGY

## 2024-01-01 PROCEDURE — 76000 FLUOROSCOPY <1 HR PHYS/QHP: CPT

## 2024-01-01 PROCEDURE — 82374 ASSAY BLOOD CARBON DIOXIDE: CPT

## 2024-01-01 PROCEDURE — 75741 ARTERY X-RAYS LUNG: CPT | Mod: 26 | Performed by: INTERNAL MEDICINE

## 2024-01-01 PROCEDURE — 85027 COMPLETE CBC AUTOMATED: CPT | Performed by: NURSE PRACTITIONER

## 2024-01-01 PROCEDURE — 85610 PROTHROMBIN TIME: CPT | Performed by: INTERNAL MEDICINE

## 2024-01-01 PROCEDURE — 83615 LACTATE (LD) (LDH) ENZYME: CPT | Performed by: NURSE PRACTITIONER

## 2024-01-01 PROCEDURE — 97161 PT EVAL LOW COMPLEX 20 MIN: CPT | Mod: GP

## 2024-01-01 PROCEDURE — 82247 BILIRUBIN TOTAL: CPT | Performed by: NURSE PRACTITIONER

## 2024-01-01 PROCEDURE — 93306 TTE W/DOPPLER COMPLETE: CPT | Mod: 26 | Performed by: STUDENT IN AN ORGANIZED HEALTH CARE EDUCATION/TRAINING PROGRAM

## 2024-01-01 PROCEDURE — 82533 TOTAL CORTISOL: CPT | Performed by: STUDENT IN AN ORGANIZED HEALTH CARE EDUCATION/TRAINING PROGRAM

## 2024-01-01 PROCEDURE — 78580 LUNG PERFUSION IMAGING: CPT | Mod: 26 | Performed by: RADIOLOGY

## 2024-01-01 PROCEDURE — 80171 DRUG SCREEN QUANT GABAPENTIN: CPT | Performed by: STUDENT IN AN ORGANIZED HEALTH CARE EDUCATION/TRAINING PROGRAM

## 2024-01-01 PROCEDURE — 87205 SMEAR GRAM STAIN: CPT | Performed by: NURSE PRACTITIONER

## 2024-01-01 PROCEDURE — 84145 PROCALCITONIN (PCT): CPT | Performed by: NURSE PRACTITIONER

## 2024-01-01 PROCEDURE — 02HP32Z INSERTION OF MONITORING DEVICE INTO PULMONARY TRUNK, PERCUTANEOUS APPROACH: ICD-10-PCS | Performed by: INTERNAL MEDICINE

## 2024-01-01 PROCEDURE — 93308 TTE F-UP OR LMTD: CPT

## 2024-01-01 PROCEDURE — 82803 BLOOD GASES ANY COMBINATION: CPT

## 2024-01-01 PROCEDURE — 93976 VASCULAR STUDY: CPT

## 2024-01-01 PROCEDURE — 99291 CRITICAL CARE FIRST HOUR: CPT | Performed by: PSYCHIATRY & NEUROLOGY

## 2024-01-01 PROCEDURE — 999N000254 HC STATISTIC VENTILATOR TRANSFER

## 2024-01-01 PROCEDURE — 94150 VITAL CAPACITY TEST: CPT

## 2024-01-01 DEVICE — CATH CENTRAL LINE MULTI LUMEN 7FRX20CM CDC-45703-XP1A: Type: IMPLANTABLE DEVICE | Status: FUNCTIONAL

## 2024-01-01 RX ORDER — NOREPINEPHRINE BITARTRATE 0.06 MG/ML
.01-.6 INJECTION, SOLUTION INTRAVENOUS CONTINUOUS
Status: DISCONTINUED | OUTPATIENT
Start: 2024-01-01 | End: 2024-01-01

## 2024-01-01 RX ORDER — NOREPINEPHRINE BITARTRATE 0.06 MG/ML
INJECTION, SOLUTION INTRAVENOUS CONTINUOUS PRN
Status: COMPLETED | OUTPATIENT
Start: 2024-01-01 | End: 2024-01-01

## 2024-01-01 RX ORDER — POTASSIUM CHLORIDE 1.5 G/1.58G
40 POWDER, FOR SOLUTION ORAL 2 TIMES DAILY
Status: COMPLETED | OUTPATIENT
Start: 2024-01-01 | End: 2024-01-01

## 2024-01-01 RX ORDER — LIDOCAINE 40 MG/G
CREAM TOPICAL
Status: DISCONTINUED | OUTPATIENT
Start: 2024-01-01 | End: 2024-01-01

## 2024-01-01 RX ORDER — DULOXETIN HYDROCHLORIDE 30 MG/1
30 CAPSULE, DELAYED RELEASE ORAL DAILY
COMMUNITY

## 2024-01-01 RX ORDER — ACETAZOLAMIDE 500 MG/5ML
500 INJECTION, POWDER, LYOPHILIZED, FOR SOLUTION INTRAVENOUS ONCE
Status: COMPLETED | OUTPATIENT
Start: 2024-01-01 | End: 2024-01-01

## 2024-01-01 RX ORDER — OLANZAPINE 2.5 MG/1
10 TABLET, FILM COATED ORAL 3 TIMES DAILY
Status: DISCONTINUED | OUTPATIENT
Start: 2024-01-01 | End: 2024-01-01

## 2024-01-01 RX ORDER — DEXTROSE MONOHYDRATE 100 MG/ML
INJECTION, SOLUTION INTRAVENOUS CONTINUOUS
Status: DISCONTINUED | OUTPATIENT
Start: 2024-01-01 | End: 2024-01-01

## 2024-01-01 RX ORDER — FENTANYL CITRATE 50 UG/ML
INJECTION, SOLUTION INTRAMUSCULAR; INTRAVENOUS
Status: DISCONTINUED | OUTPATIENT
Start: 2024-01-01 | End: 2024-01-01 | Stop reason: HOSPADM

## 2024-01-01 RX ORDER — DEXTROSE MONOHYDRATE 25 G/50ML
25-50 INJECTION, SOLUTION INTRAVENOUS
Status: DISCONTINUED | OUTPATIENT
Start: 2024-01-01 | End: 2024-01-01

## 2024-01-01 RX ORDER — IPRATROPIUM BROMIDE AND ALBUTEROL SULFATE 2.5; .5 MG/3ML; MG/3ML
3 SOLUTION RESPIRATORY (INHALATION) EVERY 4 HOURS PRN
Status: DISCONTINUED | OUTPATIENT
Start: 2024-01-01 | End: 2024-01-01

## 2024-01-01 RX ORDER — LEVOTHYROXINE SODIUM 75 UG/1
150 TABLET ORAL
Status: DISCONTINUED | OUTPATIENT
Start: 2024-01-01 | End: 2024-04-10 | Stop reason: HOSPADM

## 2024-01-01 RX ORDER — DEXTROSE, SODIUM CHLORIDE, SODIUM LACTATE, POTASSIUM CHLORIDE, AND CALCIUM CHLORIDE 5; .6; .31; .03; .02 G/100ML; G/100ML; G/100ML; G/100ML; G/100ML
INJECTION, SOLUTION INTRAVENOUS CONTINUOUS
Status: DISCONTINUED | OUTPATIENT
Start: 2024-01-01 | End: 2024-01-01

## 2024-01-01 RX ORDER — LACOSAMIDE 100 MG/1
100 TABLET ORAL 2 TIMES DAILY
Status: CANCELLED | OUTPATIENT
Start: 2024-01-01

## 2024-01-01 RX ORDER — GABAPENTIN 250 MG/5ML
300 SOLUTION ORAL EVERY 8 HOURS SCHEDULED
Status: DISCONTINUED | OUTPATIENT
Start: 2024-01-01 | End: 2024-04-10 | Stop reason: HOSPADM

## 2024-01-01 RX ORDER — POLYETHYLENE GLYCOL 3350 17 G/17G
17 POWDER, FOR SOLUTION ORAL DAILY PRN
Status: DISCONTINUED | OUTPATIENT
Start: 2024-01-01 | End: 2024-04-10 | Stop reason: HOSPADM

## 2024-01-01 RX ORDER — POTASSIUM CHLORIDE 29.8 MG/ML
20 INJECTION INTRAVENOUS ONCE
Status: COMPLETED | OUTPATIENT
Start: 2024-01-01 | End: 2024-01-01

## 2024-01-01 RX ORDER — POTASSIUM CHLORIDE 29.8 MG/ML
20 INJECTION INTRAVENOUS ONCE
Qty: 50 ML | Refills: 0 | Status: COMPLETED | OUTPATIENT
Start: 2024-01-01 | End: 2024-01-01

## 2024-01-01 RX ORDER — HEPARIN SODIUM 10000 [USP'U]/ML
300 INJECTION, SOLUTION INTRAVENOUS; SUBCUTANEOUS ONCE
Qty: 3.37 ML | Refills: 0 | Status: COMPLETED | OUTPATIENT
Start: 2024-01-01 | End: 2024-01-01

## 2024-01-01 RX ORDER — MIRTAZAPINE 15 MG/1
30 TABLET, FILM COATED ORAL AT BEDTIME
Status: DISCONTINUED | OUTPATIENT
Start: 2024-01-01 | End: 2024-01-01

## 2024-01-01 RX ORDER — LEVOTHYROXINE SODIUM ANHYDROUS 100 UG/5ML
112.5 INJECTION, POWDER, LYOPHILIZED, FOR SOLUTION INTRAVENOUS DAILY
Status: DISCONTINUED | OUTPATIENT
Start: 2024-01-01 | End: 2024-01-01

## 2024-01-01 RX ORDER — FLUTICASONE FUROATE AND VILANTEROL 200; 25 UG/1; UG/1
1 POWDER RESPIRATORY (INHALATION)
Status: ON HOLD | COMMUNITY
Start: 2023-01-01 | End: 2024-01-01

## 2024-01-01 RX ORDER — POTASSIUM CHLORIDE 1.5 G/1.58G
20 POWDER, FOR SOLUTION ORAL ONCE
Qty: 1 PACKET | Refills: 0 | Status: COMPLETED | OUTPATIENT
Start: 2024-01-01 | End: 2024-01-01

## 2024-01-01 RX ORDER — NALOXONE HYDROCHLORIDE 0.4 MG/ML
0.4 INJECTION, SOLUTION INTRAMUSCULAR; INTRAVENOUS; SUBCUTANEOUS
Status: DISCONTINUED | OUTPATIENT
Start: 2024-01-01 | End: 2024-04-10 | Stop reason: HOSPADM

## 2024-01-01 RX ORDER — NICOTINE POLACRILEX 4 MG
15-30 LOZENGE BUCCAL
Status: DISCONTINUED | OUTPATIENT
Start: 2024-01-01 | End: 2024-01-01

## 2024-01-01 RX ORDER — CEFTRIAXONE 2 G/1
2 INJECTION, POWDER, FOR SOLUTION INTRAMUSCULAR; INTRAVENOUS EVERY 24 HOURS
Qty: 80 ML | Refills: 0 | Status: COMPLETED | OUTPATIENT
Start: 2024-01-01 | End: 2024-01-01

## 2024-01-01 RX ORDER — POTASSIUM CHLORIDE 29.8 MG/ML
20 INJECTION INTRAVENOUS
Status: COMPLETED | OUTPATIENT
Start: 2024-01-01 | End: 2024-01-01

## 2024-01-01 RX ORDER — AMINO ACIDS/PROTEIN HYDROLYS 11G-40/45
1 LIQUID IN PACKET (ML) ORAL
Status: DISCONTINUED | OUTPATIENT
Start: 2024-01-01 | End: 2024-01-01

## 2024-01-01 RX ORDER — GLYCOPYRROLATE 0.2 MG/ML
0.1 INJECTION, SOLUTION INTRAMUSCULAR; INTRAVENOUS EVERY 4 HOURS PRN
Status: DISCONTINUED | OUTPATIENT
Start: 2024-01-01 | End: 2024-04-10 | Stop reason: HOSPADM

## 2024-01-01 RX ORDER — ATORVASTATIN CALCIUM 40 MG/1
40 TABLET, FILM COATED ORAL DAILY
Status: DISCONTINUED | OUTPATIENT
Start: 2024-01-01 | End: 2024-01-01

## 2024-01-01 RX ORDER — POTASSIUM CHLORIDE 1.5 G/1.58G
40 POWDER, FOR SOLUTION ORAL ONCE
Qty: 2 PACKET | Refills: 0 | Status: COMPLETED | OUTPATIENT
Start: 2024-01-01 | End: 2024-01-01

## 2024-01-01 RX ORDER — MIDAZOLAM HCL IN 0.9 % NACL/PF 1 MG/ML
1-4 PLASTIC BAG, INJECTION (ML) INTRAVENOUS CONTINUOUS
Status: DISCONTINUED | OUTPATIENT
Start: 2024-01-01 | End: 2024-01-01

## 2024-01-01 RX ORDER — FUROSEMIDE 10 MG/ML
40 INJECTION INTRAMUSCULAR; INTRAVENOUS ONCE
Qty: 4 ML | Refills: 0 | Status: COMPLETED | OUTPATIENT
Start: 2024-01-01 | End: 2024-01-01

## 2024-01-01 RX ORDER — POTASSIUM CHLORIDE 1.5 G/1.58G
40 POWDER, FOR SOLUTION ORAL ONCE
Status: COMPLETED | OUTPATIENT
Start: 2024-01-01 | End: 2024-01-01

## 2024-01-01 RX ORDER — OXYCODONE HYDROCHLORIDE 5 MG/1
5 TABLET ORAL EVERY 4 HOURS PRN
Status: DISCONTINUED | OUTPATIENT
Start: 2024-01-01 | End: 2024-04-10 | Stop reason: HOSPADM

## 2024-01-01 RX ORDER — DULOXETIN HYDROCHLORIDE 60 MG/1
CAPSULE, DELAYED RELEASE ORAL
Status: ON HOLD | COMMUNITY
Start: 2023-01-01 | End: 2024-01-01

## 2024-01-01 RX ORDER — AMOXICILLIN 250 MG
1 CAPSULE ORAL
Status: DISCONTINUED | OUTPATIENT
Start: 2024-01-01 | End: 2024-01-01

## 2024-01-01 RX ORDER — DEXAMETHASONE SODIUM PHOSPHATE 4 MG/ML
20 INJECTION, SOLUTION INTRA-ARTICULAR; INTRALESIONAL; INTRAMUSCULAR; INTRAVENOUS; SOFT TISSUE DAILY
Qty: 25 ML | Refills: 0 | Status: DISCONTINUED | OUTPATIENT
Start: 2024-01-01 | End: 2024-01-01

## 2024-01-01 RX ORDER — DEXAMETHASONE SODIUM PHOSPHATE 10 MG/ML
20 INJECTION, SOLUTION INTRAMUSCULAR; INTRAVENOUS DAILY
Status: COMPLETED | OUTPATIENT
Start: 2024-01-01 | End: 2024-01-01

## 2024-01-01 RX ORDER — HEPARIN SODIUM 10000 [USP'U]/100ML
10-50 INJECTION, SOLUTION INTRAVENOUS CONTINUOUS
Status: DISCONTINUED | OUTPATIENT
Start: 2024-01-01 | End: 2024-01-01

## 2024-01-01 RX ORDER — GABAPENTIN 250 MG/5ML
900 SOLUTION ORAL 3 TIMES DAILY
Status: DISCONTINUED | OUTPATIENT
Start: 2024-01-01 | End: 2024-01-01

## 2024-01-01 RX ORDER — GUAIFENESIN 600 MG/1
15 TABLET, EXTENDED RELEASE ORAL DAILY
Status: DISCONTINUED | OUTPATIENT
Start: 2024-01-01 | End: 2024-01-01

## 2024-01-01 RX ORDER — DULOXETIN HYDROCHLORIDE 30 MG/1
30 CAPSULE, DELAYED RELEASE ORAL DAILY
Status: DISCONTINUED | OUTPATIENT
Start: 2024-01-01 | End: 2024-01-01

## 2024-01-01 RX ORDER — IOPAMIDOL 755 MG/ML
INJECTION, SOLUTION INTRAVASCULAR
Status: DISCONTINUED | OUTPATIENT
Start: 2024-01-01 | End: 2024-01-01 | Stop reason: HOSPADM

## 2024-01-01 RX ORDER — HYDROXYZINE HYDROCHLORIDE 25 MG/1
25 TABLET, FILM COATED ORAL EVERY 6 HOURS PRN
Status: DISCONTINUED | OUTPATIENT
Start: 2024-01-01 | End: 2024-01-01

## 2024-01-01 RX ORDER — MAGNESIUM SULFATE HEPTAHYDRATE 40 MG/ML
2 INJECTION, SOLUTION INTRAVENOUS ONCE
Status: COMPLETED | OUTPATIENT
Start: 2024-01-01 | End: 2024-01-01

## 2024-01-01 RX ORDER — METOLAZONE 5 MG/1
5 TABLET ORAL ONCE
Status: COMPLETED | OUTPATIENT
Start: 2024-01-01 | End: 2024-01-01

## 2024-01-01 RX ORDER — GLYCOPYRROLATE 0.2 MG/ML
0.2 INJECTION, SOLUTION INTRAMUSCULAR; INTRAVENOUS ONCE
Status: COMPLETED | OUTPATIENT
Start: 2024-01-01 | End: 2024-01-01

## 2024-01-01 RX ORDER — FUROSEMIDE 10 MG/ML
40 INJECTION INTRAMUSCULAR; INTRAVENOUS ONCE
Status: COMPLETED | OUTPATIENT
Start: 2024-01-01 | End: 2024-01-01

## 2024-01-01 RX ORDER — LIDOCAINE HYDROCHLORIDE ANHYDROUS AND DEXTROSE MONOHYDRATE .8; 5 G/100ML; G/100ML
1-4 INJECTION, SOLUTION INTRAVENOUS CONTINUOUS
Status: CANCELLED | OUTPATIENT
Start: 2024-01-01

## 2024-01-01 RX ORDER — METOLAZONE 2.5 MG/1
2.5 TABLET ORAL ONCE
Status: COMPLETED | OUTPATIENT
Start: 2024-01-01 | End: 2024-01-01

## 2024-01-01 RX ORDER — ACETAMINOPHEN 325 MG/1
650 TABLET ORAL EVERY 4 HOURS PRN
Status: DISCONTINUED | OUTPATIENT
Start: 2024-01-01 | End: 2024-04-10 | Stop reason: HOSPADM

## 2024-01-01 RX ORDER — LEVALBUTEROL INHALATION SOLUTION 0.31 MG/3ML
0.31 SOLUTION RESPIRATORY (INHALATION)
Status: DISCONTINUED | OUTPATIENT
Start: 2024-01-01 | End: 2024-01-01

## 2024-01-01 RX ORDER — FLUOCINONIDE 0.5 MG/G
OINTMENT TOPICAL 2 TIMES DAILY PRN
Status: DISCONTINUED | OUTPATIENT
Start: 2024-01-01 | End: 2024-04-10 | Stop reason: HOSPADM

## 2024-01-01 RX ORDER — HYDROXYZINE HYDROCHLORIDE 50 MG/1
50 TABLET, FILM COATED ORAL EVERY 6 HOURS PRN
Status: COMPLETED | OUTPATIENT
Start: 2024-01-01 | End: 2024-01-01

## 2024-01-01 RX ORDER — FUROSEMIDE 10 MG/ML
40 INJECTION INTRAMUSCULAR; INTRAVENOUS
Status: DISCONTINUED | OUTPATIENT
Start: 2024-01-01 | End: 2024-01-01

## 2024-01-01 RX ORDER — PROPOFOL 10 MG/ML
INJECTION, EMULSION INTRAVENOUS
Status: COMPLETED
Start: 2024-01-01 | End: 2024-01-01

## 2024-01-01 RX ORDER — DEXTROSE MONOHYDRATE 100 MG/ML
INJECTION, SOLUTION INTRAVENOUS CONTINUOUS PRN
Status: DISCONTINUED | OUTPATIENT
Start: 2024-01-01 | End: 2024-01-01

## 2024-01-01 RX ORDER — OXYCODONE HYDROCHLORIDE 5 MG/1
5 TABLET ORAL EVERY 4 HOURS
Status: DISCONTINUED | OUTPATIENT
Start: 2024-01-01 | End: 2024-01-01

## 2024-01-01 RX ORDER — MAGNESIUM HYDROXIDE/ALUMINUM HYDROXICE/SIMETHICONE 120; 1200; 1200 MG/30ML; MG/30ML; MG/30ML
30 SUSPENSION ORAL EVERY 4 HOURS PRN
Status: DISCONTINUED | OUTPATIENT
Start: 2024-01-01 | End: 2024-04-10 | Stop reason: HOSPADM

## 2024-01-01 RX ORDER — FUROSEMIDE 10 MG/ML
80 INJECTION INTRAMUSCULAR; INTRAVENOUS ONCE
Status: COMPLETED | OUTPATIENT
Start: 2024-01-01 | End: 2024-01-01

## 2024-01-01 RX ORDER — FENTANYL CITRATE 50 UG/ML
INJECTION, SOLUTION INTRAMUSCULAR; INTRAVENOUS
Status: DISCONTINUED
Start: 2024-01-01 | End: 2024-01-01 | Stop reason: HOSPADM

## 2024-01-01 RX ORDER — POLYETHYLENE GLYCOL 3350 17 G/17G
17 POWDER, FOR SOLUTION ORAL DAILY
Status: DISCONTINUED | OUTPATIENT
Start: 2024-01-01 | End: 2024-04-10 | Stop reason: HOSPADM

## 2024-01-01 RX ORDER — NOREPINEPHRINE BITARTRATE 0.06 MG/ML
INJECTION, SOLUTION INTRAVENOUS
Status: DISCONTINUED
Start: 2024-01-01 | End: 2024-01-01 | Stop reason: HOSPADM

## 2024-01-01 RX ORDER — FENTANYL CITRATE 50 UG/ML
100 INJECTION, SOLUTION INTRAMUSCULAR; INTRAVENOUS ONCE
Status: COMPLETED | OUTPATIENT
Start: 2024-01-01 | End: 2024-01-01

## 2024-01-01 RX ORDER — FUROSEMIDE 10 MG/ML
80 INJECTION INTRAMUSCULAR; INTRAVENOUS ONCE
Qty: 8 ML | Refills: 0 | Status: COMPLETED | OUTPATIENT
Start: 2024-01-01 | End: 2024-01-01

## 2024-01-01 RX ORDER — POTASSIUM CHLORIDE 1.5 G/1.58G
20 POWDER, FOR SOLUTION ORAL ONCE
Status: COMPLETED | OUTPATIENT
Start: 2024-01-01 | End: 2024-01-01

## 2024-01-01 RX ORDER — OLANZAPINE 2.5 MG/1
5 TABLET, FILM COATED ORAL AT BEDTIME
Status: DISCONTINUED | OUTPATIENT
Start: 2024-01-01 | End: 2024-01-01

## 2024-01-01 RX ORDER — PIPERACILLIN SODIUM, TAZOBACTAM SODIUM 4; .5 G/20ML; G/20ML
4.5 INJECTION, POWDER, LYOPHILIZED, FOR SOLUTION INTRAVENOUS EVERY 6 HOURS
Status: DISCONTINUED | OUTPATIENT
Start: 2024-01-01 | End: 2024-01-01

## 2024-01-01 RX ORDER — HYDROXYZINE HYDROCHLORIDE 50 MG/1
50 TABLET, FILM COATED ORAL EVERY 6 HOURS PRN
Status: DISCONTINUED | OUTPATIENT
Start: 2024-01-01 | End: 2024-01-01

## 2024-01-01 RX ORDER — DEXAMETHASONE SODIUM PHOSPHATE 4 MG/ML
10 INJECTION, SOLUTION INTRA-ARTICULAR; INTRALESIONAL; INTRAMUSCULAR; INTRAVENOUS; SOFT TISSUE EVERY 24 HOURS
Qty: 15 ML | Refills: 0 | Status: DISCONTINUED | OUTPATIENT
Start: 2024-01-01 | End: 2024-01-01

## 2024-01-01 RX ORDER — ONDANSETRON 2 MG/ML
4 INJECTION INTRAMUSCULAR; INTRAVENOUS ONCE
Status: COMPLETED | OUTPATIENT
Start: 2024-01-01 | End: 2024-01-01

## 2024-01-01 RX ORDER — AMINO ACIDS/PROTEIN HYDROLYS 11G-40/45
1 LIQUID IN PACKET (ML) ORAL DAILY
Status: DISCONTINUED | OUTPATIENT
Start: 2024-01-01 | End: 2024-01-01

## 2024-01-01 RX ORDER — IOPAMIDOL 755 MG/ML
135 INJECTION, SOLUTION INTRAVASCULAR ONCE
Status: COMPLETED | OUTPATIENT
Start: 2024-01-01 | End: 2024-01-01

## 2024-01-01 RX ORDER — POTASSIUM CHLORIDE 1.5 G/1.58G
40 POWDER, FOR SOLUTION ORAL 2 TIMES DAILY
Status: DISCONTINUED | OUTPATIENT
Start: 2024-01-01 | End: 2024-04-10 | Stop reason: HOSPADM

## 2024-01-01 RX ORDER — DULOXETIN HYDROCHLORIDE 30 MG/1
30 CAPSULE, DELAYED RELEASE ORAL DAILY
Status: CANCELLED | OUTPATIENT
Start: 2024-01-01

## 2024-01-01 RX ORDER — FLUTICASONE PROPIONATE 220 UG/1
2 AEROSOL, METERED RESPIRATORY (INHALATION) DAILY
COMMUNITY
Start: 2023-01-01 | End: 2024-09-24

## 2024-01-01 RX ORDER — AMOXICILLIN 250 MG
1 CAPSULE ORAL
Status: DISCONTINUED | OUTPATIENT
Start: 2024-01-01 | End: 2024-04-10 | Stop reason: HOSPADM

## 2024-01-01 RX ORDER — HEPARIN SODIUM 200 [USP'U]/100ML
3 INJECTION, SOLUTION INTRAVENOUS CONTINUOUS
Status: DISCONTINUED | OUTPATIENT
Start: 2024-01-01 | End: 2024-04-10 | Stop reason: HOSPADM

## 2024-01-01 RX ORDER — CHLORHEXIDINE GLUCONATE ORAL RINSE 1.2 MG/ML
15 SOLUTION DENTAL 2 TIMES DAILY
Status: DISCONTINUED | OUTPATIENT
Start: 2024-01-01 | End: 2024-04-10 | Stop reason: HOSPADM

## 2024-01-01 RX ORDER — AMOXICILLIN 250 MG
2 CAPSULE ORAL EVERY EVENING
Status: DISCONTINUED | OUTPATIENT
Start: 2024-01-01 | End: 2024-04-10 | Stop reason: HOSPADM

## 2024-01-01 RX ORDER — CEFTRIAXONE 1 G/1
1 INJECTION, POWDER, FOR SOLUTION INTRAMUSCULAR; INTRAVENOUS EVERY 24 HOURS
Status: DISCONTINUED | OUTPATIENT
Start: 2024-01-01 | End: 2024-01-01

## 2024-01-01 RX ORDER — POLYETHYLENE GLYCOL 3350 17 G/17G
17 POWDER, FOR SOLUTION ORAL DAILY PRN
Status: DISCONTINUED | OUTPATIENT
Start: 2024-01-01 | End: 2024-01-01

## 2024-01-01 RX ORDER — DEXMEDETOMIDINE HYDROCHLORIDE 4 UG/ML
.1-1.2 INJECTION, SOLUTION INTRAVENOUS CONTINUOUS
Status: DISCONTINUED | OUTPATIENT
Start: 2024-01-01 | End: 2024-01-01

## 2024-01-01 RX ORDER — ASPIRIN 81 MG/1
81 TABLET, CHEWABLE ORAL DAILY
Status: DISCONTINUED | OUTPATIENT
Start: 2024-01-01 | End: 2024-01-01

## 2024-01-01 RX ORDER — HYDRALAZINE HYDROCHLORIDE 10 MG/1
10 TABLET, FILM COATED ORAL ONCE
Status: COMPLETED | OUTPATIENT
Start: 2024-01-01 | End: 2024-01-01

## 2024-01-01 RX ORDER — HEPARIN SODIUM (PORCINE) LOCK FLUSH IV SOLN 100 UNIT/ML 100 UNIT/ML
5-10 SOLUTION INTRAVENOUS EVERY 30 MIN PRN
Status: DISCONTINUED | OUTPATIENT
Start: 2024-01-01 | End: 2024-01-01

## 2024-01-01 RX ORDER — ALBUTEROL SULFATE 0.83 MG/ML
2.5 SOLUTION RESPIRATORY (INHALATION)
Status: DISCONTINUED | OUTPATIENT
Start: 2024-01-01 | End: 2024-04-10 | Stop reason: HOSPADM

## 2024-01-01 RX ORDER — IOPAMIDOL 755 MG/ML
75 INJECTION, SOLUTION INTRAVASCULAR ONCE
Status: COMPLETED | OUTPATIENT
Start: 2024-01-01 | End: 2024-01-01

## 2024-01-01 RX ORDER — PIPERACILLIN SODIUM, TAZOBACTAM SODIUM 3; .375 G/15ML; G/15ML
3.38 INJECTION, POWDER, LYOPHILIZED, FOR SOLUTION INTRAVENOUS EVERY 6 HOURS
Status: DISCONTINUED | OUTPATIENT
Start: 2024-01-01 | End: 2024-01-01

## 2024-01-01 RX ORDER — NALOXONE HYDROCHLORIDE 0.4 MG/ML
0.2 INJECTION, SOLUTION INTRAMUSCULAR; INTRAVENOUS; SUBCUTANEOUS
Status: DISCONTINUED | OUTPATIENT
Start: 2024-01-01 | End: 2024-04-10 | Stop reason: HOSPADM

## 2024-01-01 RX ORDER — HEPARIN SODIUM 10000 [USP'U]/100ML
10-50 INJECTION, SOLUTION INTRAVENOUS CONTINUOUS
Status: DISCONTINUED | OUTPATIENT
Start: 2024-01-01 | End: 2024-01-01 | Stop reason: DRUGHIGH

## 2024-01-01 RX ORDER — MIDAZOLAM HCL IN 0.9 % NACL/PF 1 MG/ML
1-8 PLASTIC BAG, INJECTION (ML) INTRAVENOUS CONTINUOUS
Status: DISCONTINUED | OUTPATIENT
Start: 2024-01-01 | End: 2024-01-01

## 2024-01-01 RX ORDER — NICOTINE POLACRILEX 4 MG
15-30 LOZENGE BUCCAL
Status: DISCONTINUED | OUTPATIENT
Start: 2024-01-01 | End: 2024-04-10 | Stop reason: HOSPADM

## 2024-01-01 RX ORDER — HEPARIN SODIUM 10000 [USP'U]/100ML
10-50 INJECTION, SOLUTION INTRAVENOUS CONTINUOUS
Status: DISCONTINUED | OUTPATIENT
Start: 2024-01-01 | End: 2024-04-10 | Stop reason: HOSPADM

## 2024-01-01 RX ORDER — METHOCARBAMOL 500 MG/1
1000 TABLET, FILM COATED ORAL EVERY 8 HOURS SCHEDULED
Status: DISCONTINUED | OUTPATIENT
Start: 2024-01-01 | End: 2024-04-10 | Stop reason: HOSPADM

## 2024-01-01 RX ORDER — GABAPENTIN 300 MG/1
900 CAPSULE ORAL 3 TIMES DAILY
Status: DISCONTINUED | OUTPATIENT
Start: 2024-01-01 | End: 2024-01-01

## 2024-01-01 RX ORDER — LEVALBUTEROL INHALATION SOLUTION 1.25 MG/3ML
1.25 SOLUTION RESPIRATORY (INHALATION)
Status: DISCONTINUED | OUTPATIENT
Start: 2024-01-01 | End: 2024-01-01

## 2024-01-01 RX ORDER — POTASSIUM CHLORIDE 29.8 MG/ML
20 INJECTION INTRAVENOUS
Status: DISCONTINUED | OUTPATIENT
Start: 2024-01-01 | End: 2024-01-01

## 2024-01-01 RX ORDER — DEXTROSE MONOHYDRATE 25 G/50ML
25-50 INJECTION, SOLUTION INTRAVENOUS
Status: DISCONTINUED | OUTPATIENT
Start: 2024-01-01 | End: 2024-04-10 | Stop reason: HOSPADM

## 2024-01-01 RX ORDER — OXYMETAZOLINE HYDROCHLORIDE 0.05 G/100ML
2 SPRAY NASAL 3 TIMES DAILY
Status: DISCONTINUED | OUTPATIENT
Start: 2024-01-01 | End: 2024-01-01

## 2024-01-01 RX ORDER — ASPIRIN 81 MG/1
81 TABLET, CHEWABLE ORAL DAILY
Status: DISCONTINUED | OUTPATIENT
Start: 2024-01-01 | End: 2024-04-10 | Stop reason: HOSPADM

## 2024-01-01 RX ORDER — FUROSEMIDE 10 MG/ML
40 INJECTION INTRAMUSCULAR; INTRAVENOUS ONCE
Status: DISCONTINUED | OUTPATIENT
Start: 2024-01-01 | End: 2024-01-01

## 2024-01-01 RX ORDER — OLANZAPINE 10 MG/1
2.5 INJECTION, POWDER, LYOPHILIZED, FOR SOLUTION INTRAMUSCULAR ONCE
Status: COMPLETED | OUTPATIENT
Start: 2024-01-01 | End: 2024-01-01

## 2024-01-01 RX ORDER — FUROSEMIDE 10 MG/ML
40 INJECTION INTRAMUSCULAR; INTRAVENOUS 2 TIMES DAILY
Status: DISCONTINUED | OUTPATIENT
Start: 2024-01-01 | End: 2024-01-01

## 2024-01-01 RX ORDER — METOLAZONE 5 MG/1
5 TABLET ORAL ONCE
Status: DISCONTINUED | OUTPATIENT
Start: 2024-01-01 | End: 2024-01-01

## 2024-01-01 RX ORDER — FLUTICASONE PROPIONATE 50 MCG
1 SPRAY, SUSPENSION (ML) NASAL DAILY PRN
Status: DISCONTINUED | OUTPATIENT
Start: 2024-01-01 | End: 2024-04-10 | Stop reason: HOSPADM

## 2024-01-01 RX ORDER — AMINO ACIDS/PROTEIN HYDROLYS 11G-40/45
1 LIQUID IN PACKET (ML) ORAL 3 TIMES DAILY
Status: DISCONTINUED | OUTPATIENT
Start: 2024-01-01 | End: 2024-01-01

## 2024-01-01 RX ORDER — BUDESONIDE 1 MG/2ML
1 INHALANT ORAL DAILY
Status: DISCONTINUED | OUTPATIENT
Start: 2024-01-01 | End: 2024-01-01

## 2024-01-01 RX ORDER — MULTIVITAMIN,THERAPEUTIC
1 TABLET ORAL DAILY
Status: DISCONTINUED | OUTPATIENT
Start: 2024-01-01 | End: 2024-04-10 | Stop reason: HOSPADM

## 2024-01-01 RX ORDER — PIPERACILLIN SODIUM, TAZOBACTAM SODIUM 4; .5 G/20ML; G/20ML
4.5 INJECTION, POWDER, LYOPHILIZED, FOR SOLUTION INTRAVENOUS EVERY 6 HOURS
Status: DISCONTINUED | OUTPATIENT
Start: 2024-01-01 | End: 2024-04-03

## 2024-01-01 RX ORDER — DEXTROSE MONOHYDRATE 100 MG/ML
INJECTION, SOLUTION INTRAVENOUS CONTINUOUS PRN
Status: DISCONTINUED | OUTPATIENT
Start: 2024-01-01 | End: 2024-04-10 | Stop reason: HOSPADM

## 2024-01-01 RX ORDER — LACOSAMIDE 10 MG/ML
100 SOLUTION ORAL 2 TIMES DAILY
Status: DISCONTINUED | OUTPATIENT
Start: 2024-01-01 | End: 2024-04-10 | Stop reason: HOSPADM

## 2024-01-01 RX ORDER — LEVETIRACETAM 750 MG/1
1500 TABLET ORAL 2 TIMES DAILY
Status: DISCONTINUED | OUTPATIENT
Start: 2024-01-01 | End: 2024-01-01

## 2024-01-01 RX ORDER — ACETAMINOPHEN 650 MG/1
650 SUPPOSITORY RECTAL EVERY 4 HOURS PRN
Status: DISCONTINUED | OUTPATIENT
Start: 2024-01-01 | End: 2024-04-10 | Stop reason: HOSPADM

## 2024-01-01 RX ORDER — LIDOCAINE 40 MG/G
CREAM TOPICAL
Status: DISCONTINUED | OUTPATIENT
Start: 2024-01-01 | End: 2024-04-10 | Stop reason: HOSPADM

## 2024-01-01 RX ORDER — FLUOCINONIDE GEL 0.5 MG/G
GEL TOPICAL 2 TIMES DAILY PRN
Status: DISCONTINUED | OUTPATIENT
Start: 2024-01-01 | End: 2024-01-01

## 2024-01-01 RX ORDER — OLANZAPINE 2.5 MG/1
10 TABLET, FILM COATED ORAL 2 TIMES DAILY
Status: DISCONTINUED | OUTPATIENT
Start: 2024-01-01 | End: 2024-01-01

## 2024-01-01 RX ORDER — SODIUM CHLORIDE FOR INHALATION 3 %
3 VIAL, NEBULIZER (ML) INHALATION EVERY 4 HOURS
Status: DISCONTINUED | OUTPATIENT
Start: 2024-01-01 | End: 2024-04-10 | Stop reason: HOSPADM

## 2024-01-01 RX ORDER — PROPOFOL 10 MG/ML
5-75 INJECTION, EMULSION INTRAVENOUS CONTINUOUS
Status: DISCONTINUED | OUTPATIENT
Start: 2024-01-01 | End: 2024-01-01

## 2024-01-01 RX ORDER — ACYCLOVIR 200 MG/1
3 CAPSULE ORAL ONCE
Status: COMPLETED | OUTPATIENT
Start: 2024-01-01 | End: 2024-01-01

## 2024-01-01 RX ORDER — MAGNESIUM HYDROXIDE/ALUMINUM HYDROXICE/SIMETHICONE 120; 1200; 1200 MG/30ML; MG/30ML; MG/30ML
30 SUSPENSION ORAL EVERY 4 HOURS PRN
Status: DISCONTINUED | OUTPATIENT
Start: 2024-01-01 | End: 2024-01-01

## 2024-01-01 RX ORDER — HYDROXYZINE HYDROCHLORIDE 25 MG/1
25 TABLET, FILM COATED ORAL EVERY 6 HOURS PRN
Status: DISCONTINUED | OUTPATIENT
Start: 2024-01-01 | End: 2024-04-10 | Stop reason: HOSPADM

## 2024-01-01 RX ORDER — ALBUTEROL SULFATE 0.83 MG/ML
2.5 SOLUTION RESPIRATORY (INHALATION)
Status: DISCONTINUED | OUTPATIENT
Start: 2024-01-01 | End: 2024-01-01

## 2024-01-01 RX ORDER — FLUOCINONIDE GEL 0.5 MG/G
GEL TOPICAL 2 TIMES DAILY PRN
COMMUNITY

## 2024-01-01 RX ORDER — LEVETIRACETAM 15 MG/ML
1500 INJECTION INTRAVASCULAR EVERY 12 HOURS
Status: DISCONTINUED | OUTPATIENT
Start: 2024-01-01 | End: 2024-01-01

## 2024-01-01 RX ORDER — HEPARIN SODIUM 200 [USP'U]/100ML
3 INJECTION, SOLUTION INTRAVENOUS CONTINUOUS
Status: DISCONTINUED | OUTPATIENT
Start: 2024-01-01 | End: 2024-01-01

## 2024-01-01 RX ORDER — ACETAZOLAMIDE 250 MG/1
500 TABLET ORAL ONCE
Status: COMPLETED | OUTPATIENT
Start: 2024-01-01 | End: 2024-01-01

## 2024-01-01 RX ORDER — KETAMINE HCL IN 0.9 % NACL 20 MG/2 ML
10 SYRINGE (ML) INTRAVENOUS
Status: DISCONTINUED | OUTPATIENT
Start: 2024-01-01 | End: 2024-01-01

## 2024-01-01 RX ORDER — LORAZEPAM 0.5 MG/1
1 TABLET ORAL 2 TIMES DAILY PRN
COMMUNITY
Start: 2023-01-01

## 2024-01-01 RX ORDER — LACOSAMIDE 100 MG/1
100 TABLET ORAL 2 TIMES DAILY
Status: DISCONTINUED | OUTPATIENT
Start: 2024-01-01 | End: 2024-01-01

## 2024-01-01 RX ORDER — MAGNESIUM SULFATE HEPTAHYDRATE 40 MG/ML
4 INJECTION, SOLUTION INTRAVENOUS EVERY 4 HOURS PRN
Status: DISCONTINUED | OUTPATIENT
Start: 2024-01-01 | End: 2024-01-01

## 2024-01-01 RX ORDER — ENOXAPARIN SODIUM 100 MG/ML
40 INJECTION SUBCUTANEOUS EVERY 24 HOURS
Status: DISCONTINUED | OUTPATIENT
Start: 2024-01-01 | End: 2024-01-01

## 2024-01-01 RX ORDER — ACETAMINOPHEN 325 MG/1
650 TABLET ORAL EVERY 4 HOURS PRN
Status: DISCONTINUED | OUTPATIENT
Start: 2024-01-01 | End: 2024-01-01

## 2024-01-01 RX ORDER — QUETIAPINE FUMARATE 100 MG/1
100 TABLET, FILM COATED ORAL 3 TIMES DAILY
Status: DISCONTINUED | OUTPATIENT
Start: 2024-01-01 | End: 2024-01-01

## 2024-01-01 RX ORDER — LEVETIRACETAM 100 MG/ML
1500 SOLUTION ORAL 2 TIMES DAILY
Status: DISCONTINUED | OUTPATIENT
Start: 2024-01-01 | End: 2024-04-10 | Stop reason: HOSPADM

## 2024-01-01 RX ORDER — PROPOFOL 10 MG/ML
5-60 INJECTION, EMULSION INTRAVENOUS CONTINUOUS
Status: DISCONTINUED | OUTPATIENT
Start: 2024-01-01 | End: 2024-04-10 | Stop reason: HOSPADM

## 2024-01-01 RX ORDER — MIDAZOLAM HCL IN 0.9 % NACL/PF 1 MG/ML
1-8 PLASTIC BAG, INJECTION (ML) INTRAVENOUS CONTINUOUS
Status: DISCONTINUED | OUTPATIENT
Start: 2024-01-01 | End: 2024-04-10 | Stop reason: HOSPADM

## 2024-01-01 RX ORDER — METHOCARBAMOL 500 MG/1
1000 TABLET, FILM COATED ORAL 4 TIMES DAILY
Status: DISCONTINUED | OUTPATIENT
Start: 2024-01-01 | End: 2024-01-01

## 2024-01-01 RX ORDER — BLOOD-GLUCOSE SENSOR
1 EACH MISCELLANEOUS
Qty: 6 EACH | Refills: 0 | Status: SHIPPED | OUTPATIENT
Start: 2024-01-01

## 2024-01-01 RX ORDER — HEPARIN SODIUM 10000 [USP'U]/ML
300 INJECTION, SOLUTION INTRAVENOUS; SUBCUTANEOUS ONCE
Status: DISCONTINUED | OUTPATIENT
Start: 2024-01-01 | End: 2024-01-01

## 2024-01-01 RX ORDER — LACOSAMIDE 10 MG/ML
100 SOLUTION ORAL 2 TIMES DAILY
Status: DISCONTINUED | OUTPATIENT
Start: 2024-01-01 | End: 2024-01-01

## 2024-01-01 RX ORDER — HYDROXYZINE HYDROCHLORIDE 25 MG/1
25 TABLET, FILM COATED ORAL EVERY 6 HOURS PRN
Status: COMPLETED | OUTPATIENT
Start: 2024-01-01 | End: 2024-01-01

## 2024-01-01 RX ORDER — PROPOFOL 10 MG/ML
INJECTION, EMULSION INTRAVENOUS
Status: DISCONTINUED
Start: 2024-01-01 | End: 2024-01-01 | Stop reason: HOSPADM

## 2024-01-01 RX ORDER — ASPIRIN 81 MG/1
324 TABLET, CHEWABLE ORAL ONCE
Status: COMPLETED | OUTPATIENT
Start: 2024-01-01 | End: 2024-01-01

## 2024-01-01 RX ORDER — PHENYLEPHRINE HCL IN 0.9% NACL 50MG/250ML
.5-6 PLASTIC BAG, INJECTION (ML) INTRAVENOUS CONTINUOUS
Status: DISCONTINUED | OUTPATIENT
Start: 2024-01-01 | End: 2024-01-01

## 2024-01-01 RX ORDER — FUROSEMIDE 10 MG/ML
80 INJECTION INTRAMUSCULAR; INTRAVENOUS
Status: DISCONTINUED | OUTPATIENT
Start: 2024-01-01 | End: 2024-01-01

## 2024-01-01 RX ORDER — OXYCODONE HYDROCHLORIDE 10 MG/1
10 TABLET ORAL EVERY 4 HOURS
Status: DISCONTINUED | OUTPATIENT
Start: 2024-01-01 | End: 2024-04-10 | Stop reason: HOSPADM

## 2024-01-01 RX ORDER — MAGNESIUM SULFATE HEPTAHYDRATE 40 MG/ML
2 INJECTION, SOLUTION INTRAVENOUS DAILY PRN
Status: DISCONTINUED | OUTPATIENT
Start: 2024-01-01 | End: 2024-01-01

## 2024-01-01 RX ORDER — MEPERIDINE HYDROCHLORIDE 25 MG/ML
25-50 INJECTION INTRAMUSCULAR; INTRAVENOUS; SUBCUTANEOUS
Status: DISCONTINUED | OUTPATIENT
Start: 2024-01-01 | End: 2024-01-01

## 2024-01-01 RX ORDER — POTASSIUM PHOS IN 0.9 % NACL 15MMOL/250
15 PLASTIC BAG, INJECTION (ML) INTRAVENOUS ONCE
Status: COMPLETED | OUTPATIENT
Start: 2024-01-01 | End: 2024-01-01

## 2024-01-01 RX ORDER — LEVOTHYROXINE SODIUM 75 UG/1
150 TABLET ORAL
Status: DISCONTINUED | OUTPATIENT
Start: 2024-01-01 | End: 2024-01-01

## 2024-01-01 RX ORDER — PROPOFOL 10 MG/ML
INJECTION, EMULSION INTRAVENOUS CONTINUOUS PRN
Status: COMPLETED | OUTPATIENT
Start: 2024-01-01 | End: 2024-01-01

## 2024-01-01 RX ORDER — QUETIAPINE FUMARATE 100 MG/1
100 TABLET, FILM COATED ORAL EVERY 8 HOURS SCHEDULED
Status: DISCONTINUED | OUTPATIENT
Start: 2024-01-01 | End: 2024-04-10 | Stop reason: HOSPADM

## 2024-01-01 RX ORDER — DEXAMETHASONE SODIUM PHOSPHATE 10 MG/ML
10 INJECTION, SOLUTION INTRAMUSCULAR; INTRAVENOUS EVERY 24 HOURS
Status: DISCONTINUED | OUTPATIENT
Start: 2024-01-01 | End: 2024-03-16 | Stop reason: HOSPADM

## 2024-01-01 RX ORDER — HEPARIN SODIUM 1000 [USP'U]/ML
INJECTION, SOLUTION INTRAVENOUS; SUBCUTANEOUS
Status: DISCONTINUED | OUTPATIENT
Start: 2024-01-01 | End: 2024-01-01 | Stop reason: HOSPADM

## 2024-01-01 RX ORDER — FUROSEMIDE 10 MG/ML
120 INJECTION INTRAMUSCULAR; INTRAVENOUS ONCE
Status: COMPLETED | OUTPATIENT
Start: 2024-01-01 | End: 2024-01-01

## 2024-01-01 RX ORDER — FLUTICASONE PROPIONATE 50 MCG
1 SPRAY, SUSPENSION (ML) NASAL DAILY
Status: DISCONTINUED | OUTPATIENT
Start: 2024-01-01 | End: 2024-01-01

## 2024-01-01 RX ORDER — IPRATROPIUM BROMIDE AND ALBUTEROL SULFATE 2.5; .5 MG/3ML; MG/3ML
3 SOLUTION RESPIRATORY (INHALATION) EVERY 4 HOURS
Status: DISCONTINUED | OUTPATIENT
Start: 2024-01-01 | End: 2024-01-01

## 2024-01-01 RX ORDER — ATORVASTATIN CALCIUM 40 MG/1
40 TABLET, FILM COATED ORAL
COMMUNITY
Start: 2023-01-01 | End: 2024-09-05

## 2024-01-01 RX ORDER — CARBOXYMETHYLCELLULOSE SODIUM 5 MG/ML
1 SOLUTION/ DROPS OPHTHALMIC
Status: DISCONTINUED | OUTPATIENT
Start: 2024-01-01 | End: 2024-01-01

## 2024-01-01 RX ORDER — LEVOTHYROXINE SODIUM ANHYDROUS 100 UG/5ML
150 INJECTION, POWDER, LYOPHILIZED, FOR SOLUTION INTRAVENOUS DAILY
Status: DISCONTINUED | OUTPATIENT
Start: 2024-01-01 | End: 2024-01-01

## 2024-01-01 RX ORDER — GABAPENTIN 250 MG/5ML
900 SOLUTION ORAL EVERY 8 HOURS SCHEDULED
Status: DISCONTINUED | OUTPATIENT
Start: 2024-01-01 | End: 2024-01-01

## 2024-01-01 RX ADMIN — SODIUM CHLORIDE SOLN NEBU 3% 3 ML: 3 NEBU SOLN at 16:38

## 2024-01-01 RX ADMIN — IPRATROPIUM BROMIDE AND ALBUTEROL SULFATE 3 ML: .5; 3 SOLUTION RESPIRATORY (INHALATION) at 15:46

## 2024-01-01 RX ADMIN — SODIUM CHLORIDE SOLN NEBU 3% 3 ML: 3 NEBU SOLN at 08:52

## 2024-01-01 RX ADMIN — OXYCODONE HYDROCHLORIDE 10 MG: 10 TABLET ORAL at 23:37

## 2024-01-01 RX ADMIN — Medication 125 MCG: at 07:38

## 2024-01-01 RX ADMIN — LACOSAMIDE ORAL SOLUTION 100 MG: 10 SOLUTION ORAL at 08:20

## 2024-01-01 RX ADMIN — INSULIN GLARGINE 18 UNITS: 100 INJECTION, SOLUTION SUBCUTANEOUS at 10:22

## 2024-01-01 RX ADMIN — OXYCODONE HYDROCHLORIDE 10 MG: 10 TABLET ORAL at 16:00

## 2024-01-01 RX ADMIN — LEVETIRACETAM 1500 MG: 750 TABLET, FILM COATED ORAL at 20:18

## 2024-01-01 RX ADMIN — DEXMEDETOMIDINE HYDROCHLORIDE 1.2 MCG/KG/HR: 400 INJECTION INTRAVENOUS at 14:51

## 2024-01-01 RX ADMIN — GABAPENTIN 900 MG: 250 SUSPENSION ORAL at 14:06

## 2024-01-01 RX ADMIN — POTASSIUM CHLORIDE 40 MEQ: 1.5 POWDER, FOR SOLUTION ORAL at 07:34

## 2024-01-01 RX ADMIN — METHOCARBAMOL 1000 MG: 500 TABLET ORAL at 21:52

## 2024-01-01 RX ADMIN — INSULIN ASPART 9 UNITS: 100 INJECTION, SOLUTION INTRAVENOUS; SUBCUTANEOUS at 09:46

## 2024-01-01 RX ADMIN — POTASSIUM CHLORIDE 40 MEQ: 1.5 POWDER, FOR SOLUTION ORAL at 05:04

## 2024-01-01 RX ADMIN — Medication 200 MCG/HR: at 12:18

## 2024-01-01 RX ADMIN — IPRATROPIUM BROMIDE AND ALBUTEROL SULFATE 3 ML: .5; 3 SOLUTION RESPIRATORY (INHALATION) at 04:48

## 2024-01-01 RX ADMIN — Medication 15 ML: at 07:40

## 2024-01-01 RX ADMIN — CHLORHEXIDINE GLUCONATE 0.12% ORAL RINSE 15 ML: 1.2 LIQUID ORAL at 19:26

## 2024-01-01 RX ADMIN — POTASSIUM CHLORIDE 20 MEQ: 29.8 INJECTION, SOLUTION INTRAVENOUS at 01:01

## 2024-01-01 RX ADMIN — GABAPENTIN 300 MG: 250 SUSPENSION ORAL at 05:13

## 2024-01-01 RX ADMIN — PIPERACILLIN AND TAZOBACTAM 3.38 G: 3; .375 INJECTION, POWDER, LYOPHILIZED, FOR SOLUTION INTRAVENOUS at 09:50

## 2024-01-01 RX ADMIN — MIRTAZAPINE 30 MG: 7.5 TABLET, FILM COATED ORAL at 21:10

## 2024-01-01 RX ADMIN — LEVETIRACETAM 1500 MG: 750 TABLET, FILM COATED ORAL at 20:40

## 2024-01-01 RX ADMIN — Medication 1 PACKET: at 14:06

## 2024-01-01 RX ADMIN — METHOCARBAMOL 1000 MG: 500 TABLET ORAL at 13:56

## 2024-01-01 RX ADMIN — LEVETIRACETAM 1500 MG: 750 TABLET, FILM COATED ORAL at 08:49

## 2024-01-01 RX ADMIN — SODIUM CHLORIDE SOLN NEBU 3% 3 ML: 3 NEBU SOLN at 00:27

## 2024-01-01 RX ADMIN — INSULIN ASPART: 100 INJECTION, SOLUTION INTRAVENOUS; SUBCUTANEOUS at 09:33

## 2024-01-01 RX ADMIN — PIPERACILLIN AND TAZOBACTAM 3.38 G: 3; .375 INJECTION, POWDER, LYOPHILIZED, FOR SOLUTION INTRAVENOUS at 22:58

## 2024-01-01 RX ADMIN — FENTANYL CITRATE 100 MCG: 50 INJECTION INTRAMUSCULAR; INTRAVENOUS at 00:58

## 2024-01-01 RX ADMIN — PROPOFOL 75 MCG/KG/MIN: 10 INJECTION, EMULSION INTRAVENOUS at 20:19

## 2024-01-01 RX ADMIN — ALBUTEROL SULFATE 2.5 MG: 2.5 SOLUTION RESPIRATORY (INHALATION) at 00:42

## 2024-01-01 RX ADMIN — POTASSIUM CHLORIDE 20 MEQ: 29.8 INJECTION, SOLUTION INTRAVENOUS at 05:49

## 2024-01-01 RX ADMIN — SODIUM CHLORIDE SOLN NEBU 3% 3 ML: 3 NEBU SOLN at 04:30

## 2024-01-01 RX ADMIN — LEVOTHYROXINE SODIUM 150 MCG: 75 TABLET ORAL at 08:44

## 2024-01-01 RX ADMIN — OLANZAPINE 10 MG: 2.5 TABLET, FILM COATED ORAL at 07:46

## 2024-01-01 RX ADMIN — Medication 20 NG/KG/MIN: at 02:07

## 2024-01-01 RX ADMIN — INSULIN ASPART 9 UNITS: 100 INJECTION, SOLUTION INTRAVENOUS; SUBCUTANEOUS at 10:46

## 2024-01-01 RX ADMIN — SODIUM CHLORIDE SOLN NEBU 3% 3 ML: 3 NEBU SOLN at 16:01

## 2024-01-01 RX ADMIN — SODIUM CHLORIDE 3 ML: 9 INJECTION INTRAMUSCULAR; INTRAVENOUS; SUBCUTANEOUS at 15:45

## 2024-01-01 RX ADMIN — ASPIRIN 81 MG CHEWABLE TABLET 324 MG: 81 TABLET CHEWABLE at 23:48

## 2024-01-01 RX ADMIN — OXYCODONE HYDROCHLORIDE 10 MG: 10 TABLET ORAL at 03:56

## 2024-01-01 RX ADMIN — PIPERACILLIN AND TAZOBACTAM 4.5 G: 4; .5 INJECTION, POWDER, FOR SOLUTION INTRAVENOUS; PARENTERAL at 10:32

## 2024-01-01 RX ADMIN — DEXAMETHASONE SODIUM PHOSPHATE 20 MG: 10 INJECTION, SOLUTION INTRAMUSCULAR; INTRAVENOUS at 07:31

## 2024-01-01 RX ADMIN — LEVOTHYROXINE SODIUM 150 MCG: 0.15 TABLET ORAL at 07:50

## 2024-01-01 RX ADMIN — OXYCODONE HYDROCHLORIDE 10 MG: 10 TABLET ORAL at 00:04

## 2024-01-01 RX ADMIN — LEVETIRACETAM 1500 MG: 750 TABLET, FILM COATED ORAL at 08:10

## 2024-01-01 RX ADMIN — PANTOPRAZOLE SODIUM 40 MG: 40 INJECTION, POWDER, FOR SOLUTION INTRAVENOUS at 07:45

## 2024-01-01 RX ADMIN — MIRTAZAPINE 30 MG: 7.5 TABLET, FILM COATED ORAL at 21:34

## 2024-01-01 RX ADMIN — FLUTICASONE PROPIONATE 1 SPRAY: 50 SPRAY, METERED NASAL at 08:14

## 2024-01-01 RX ADMIN — HYDROXYZINE HYDROCHLORIDE 50 MG: 50 TABLET, FILM COATED ORAL at 17:43

## 2024-01-01 RX ADMIN — PROPOFOL 20 MCG/KG/MIN: 10 INJECTION, EMULSION INTRAVENOUS at 11:23

## 2024-01-01 RX ADMIN — HYDROXYZINE HYDROCHLORIDE 25 MG: 25 TABLET, FILM COATED ORAL at 17:10

## 2024-01-01 RX ADMIN — FUROSEMIDE 80 MG: 10 INJECTION, SOLUTION INTRAVENOUS at 15:47

## 2024-01-01 RX ADMIN — POTASSIUM CHLORIDE 20 MEQ: 29.8 INJECTION, SOLUTION INTRAVENOUS at 05:48

## 2024-01-01 RX ADMIN — OXYCODONE HYDROCHLORIDE 5 MG: 5 TABLET ORAL at 07:43

## 2024-01-01 RX ADMIN — LACOSAMIDE 100 MG: 50 TABLET, FILM COATED ORAL at 17:40

## 2024-01-01 RX ADMIN — QUETIAPINE FUMARATE 100 MG: 100 TABLET ORAL at 14:04

## 2024-01-01 RX ADMIN — INSULIN HUMAN 6 UNITS/HR: 1 INJECTION, SOLUTION INTRAVENOUS at 23:12

## 2024-01-01 RX ADMIN — ASPIRIN 81 MG CHEWABLE TABLET 81 MG: 81 TABLET CHEWABLE at 07:43

## 2024-01-01 RX ADMIN — ASPIRIN 81 MG CHEWABLE TABLET 81 MG: 81 TABLET CHEWABLE at 09:00

## 2024-01-01 RX ADMIN — Medication 1 PACKET: at 07:35

## 2024-01-01 RX ADMIN — LACOSAMIDE ORAL SOLUTION 100 MG: 10 SOLUTION ORAL at 07:35

## 2024-01-01 RX ADMIN — LACOSAMIDE ORAL SOLUTION 100 MG: 10 SOLUTION ORAL at 08:05

## 2024-01-01 RX ADMIN — GABAPENTIN 900 MG: 250 SUSPENSION ORAL at 07:39

## 2024-01-01 RX ADMIN — LACOSAMIDE 100 MG: 50 TABLET, FILM COATED ORAL at 18:06

## 2024-01-01 RX ADMIN — POTASSIUM CHLORIDE 20 MEQ: 29.8 INJECTION, SOLUTION INTRAVENOUS at 06:11

## 2024-01-01 RX ADMIN — ENOXAPARIN SODIUM 40 MG: 40 INJECTION SUBCUTANEOUS at 09:20

## 2024-01-01 RX ADMIN — GABAPENTIN 900 MG: 300 CAPSULE ORAL at 21:10

## 2024-01-01 RX ADMIN — MIRTAZAPINE 30 MG: 7.5 TABLET, FILM COATED ORAL at 23:48

## 2024-01-01 RX ADMIN — ENOXAPARIN SODIUM 40 MG: 40 INJECTION SUBCUTANEOUS at 08:48

## 2024-01-01 RX ADMIN — GABAPENTIN 300 MG: 250 SUSPENSION ORAL at 05:44

## 2024-01-01 RX ADMIN — INSULIN ASPART 9 UNITS: 100 INJECTION, SOLUTION INTRAVENOUS; SUBCUTANEOUS at 17:57

## 2024-01-01 RX ADMIN — GABAPENTIN 900 MG: 300 CAPSULE ORAL at 19:38

## 2024-01-01 RX ADMIN — HYDROXYZINE HYDROCHLORIDE 25 MG: 25 TABLET, FILM COATED ORAL at 10:59

## 2024-01-01 RX ADMIN — ASPIRIN 81 MG CHEWABLE TABLET 81 MG: 81 TABLET CHEWABLE at 08:23

## 2024-01-01 RX ADMIN — PROPOFOL 50 MCG/KG/MIN: 10 INJECTION, EMULSION INTRAVENOUS at 03:15

## 2024-01-01 RX ADMIN — Medication 1 PACKET: at 18:09

## 2024-01-01 RX ADMIN — LEVETIRACETAM 1500 MG: 100 SOLUTION ORAL at 08:06

## 2024-01-01 RX ADMIN — WHITE PETROLATUM 57.7 %-MINERAL OIL 31.9 % EYE OINTMENT: at 04:25

## 2024-01-01 RX ADMIN — Medication 20 NG/KG/MIN: at 04:55

## 2024-01-01 RX ADMIN — ENOXAPARIN SODIUM 40 MG: 40 INJECTION SUBCUTANEOUS at 08:33

## 2024-01-01 RX ADMIN — GABAPENTIN 900 MG: 250 SUSPENSION ORAL at 19:52

## 2024-01-01 RX ADMIN — Medication 50 MCG: at 18:13

## 2024-01-01 RX ADMIN — THERA TABS 1 TABLET: TAB at 07:23

## 2024-01-01 RX ADMIN — PROPOFOL 75 MCG/KG/MIN: 10 INJECTION, EMULSION INTRAVENOUS at 09:21

## 2024-01-01 RX ADMIN — POTASSIUM CHLORIDE 20 MEQ: 29.8 INJECTION, SOLUTION INTRAVENOUS at 17:52

## 2024-01-01 RX ADMIN — ACETAZOLAMIDE 500 MG: 500 INJECTION, POWDER, LYOPHILIZED, FOR SOLUTION INTRAVENOUS at 15:37

## 2024-01-01 RX ADMIN — Medication 10 MG: at 21:17

## 2024-01-01 RX ADMIN — Medication 125 MCG: at 07:45

## 2024-01-01 RX ADMIN — GABAPENTIN 900 MG: 250 SUSPENSION ORAL at 07:46

## 2024-01-01 RX ADMIN — LACOSAMIDE ORAL SOLUTION 100 MG: 10 SOLUTION ORAL at 20:07

## 2024-01-01 RX ADMIN — Medication 50 MCG: at 18:44

## 2024-01-01 RX ADMIN — LEVALBUTEROL HYDROCHLORIDE 1.25 MG: 1.25 SOLUTION RESPIRATORY (INHALATION) at 08:58

## 2024-01-01 RX ADMIN — BIVALIRUDIN: 250 INJECTION, POWDER, LYOPHILIZED, FOR SOLUTION INTRAVENOUS at 10:02

## 2024-01-01 RX ADMIN — INSULIN HUMAN 4.5 UNITS/HR: 1 INJECTION, SOLUTION INTRAVENOUS at 14:12

## 2024-01-01 RX ADMIN — OXYCODONE HYDROCHLORIDE 10 MG: 10 TABLET ORAL at 07:45

## 2024-01-01 RX ADMIN — INSULIN ASPART 5 UNITS: 100 INJECTION, SOLUTION INTRAVENOUS; SUBCUTANEOUS at 12:29

## 2024-01-01 RX ADMIN — NOREPINEPHRINE BITARTRATE 0.16 MCG/KG/MIN: 0.06 INJECTION, SOLUTION INTRAVENOUS at 21:00

## 2024-01-01 RX ADMIN — LACOSAMIDE 100 MG: 50 TABLET, FILM COATED ORAL at 17:50

## 2024-01-01 RX ADMIN — OXYCODONE HYDROCHLORIDE 5 MG: 5 TABLET ORAL at 20:54

## 2024-01-01 RX ADMIN — GABAPENTIN 900 MG: 300 CAPSULE ORAL at 14:23

## 2024-01-01 RX ADMIN — ACETAMINOPHEN 650 MG: 325 TABLET, FILM COATED ORAL at 19:38

## 2024-01-01 RX ADMIN — PIPERACILLIN AND TAZOBACTAM 3.38 G: 3; .375 INJECTION, POWDER, LYOPHILIZED, FOR SOLUTION INTRAVENOUS at 22:12

## 2024-01-01 RX ADMIN — DEXTROSE MONOHYDRATE 1000 ML: 100 INJECTION, SOLUTION INTRAVENOUS at 08:49

## 2024-01-01 RX ADMIN — ALBUTEROL SULFATE 2.5 MG: 2.5 SOLUTION RESPIRATORY (INHALATION) at 04:40

## 2024-01-01 RX ADMIN — HYDROCORTISONE SODIUM SUCCINATE 50 MG: 100 INJECTION, POWDER, FOR SOLUTION INTRAMUSCULAR; INTRAVENOUS at 11:57

## 2024-01-01 RX ADMIN — Medication 125 MCG: at 08:44

## 2024-01-01 RX ADMIN — PANTOPRAZOLE SODIUM 40 MG: 40 INJECTION, POWDER, FOR SOLUTION INTRAVENOUS at 08:07

## 2024-01-01 RX ADMIN — LEVETIRACETAM 1500 MG: 750 TABLET, FILM COATED ORAL at 20:35

## 2024-01-01 RX ADMIN — ATORVASTATIN CALCIUM 40 MG: 40 TABLET, FILM COATED ORAL at 08:51

## 2024-01-01 RX ADMIN — LACOSAMIDE 100 MG: 10 INJECTION INTRAVENOUS at 00:59

## 2024-01-01 RX ADMIN — WHITE PETROLATUM 57.7 %-MINERAL OIL 31.9 % EYE OINTMENT: at 11:54

## 2024-01-01 RX ADMIN — QUETIAPINE FUMARATE 100 MG: 100 TABLET ORAL at 05:12

## 2024-01-01 RX ADMIN — LEVETIRACETAM 1500 MG: 100 SOLUTION ORAL at 20:22

## 2024-01-01 RX ADMIN — PROPOFOL 50 MCG/KG/MIN: 10 INJECTION, EMULSION INTRAVENOUS at 23:51

## 2024-01-01 RX ADMIN — GABAPENTIN 900 MG: 250 SUSPENSION ORAL at 20:58

## 2024-01-01 RX ADMIN — ALBUTEROL SULFATE 2.5 MG: 2.5 SOLUTION RESPIRATORY (INHALATION) at 20:22

## 2024-01-01 RX ADMIN — LEVALBUTEROL HYDROCHLORIDE 1.25 MG: 1.25 SOLUTION RESPIRATORY (INHALATION) at 09:30

## 2024-01-01 RX ADMIN — Medication 20 NG/KG/MIN: at 23:14

## 2024-01-01 RX ADMIN — OXYCODONE HYDROCHLORIDE 5 MG: 5 TABLET ORAL at 07:40

## 2024-01-01 RX ADMIN — Medication 50 MCG: at 11:00

## 2024-01-01 RX ADMIN — LEVETIRACETAM 1500 MG: 100 SOLUTION ORAL at 07:51

## 2024-01-01 RX ADMIN — ATORVASTATIN CALCIUM 40 MG: 40 TABLET, FILM COATED ORAL at 09:20

## 2024-01-01 RX ADMIN — Medication 10 MG: at 13:52

## 2024-01-01 RX ADMIN — Medication 20 NG/KG/MIN: at 03:19

## 2024-01-01 RX ADMIN — LEVOTHYROXINE SODIUM 150 MCG: 0.15 TABLET ORAL at 08:34

## 2024-01-01 RX ADMIN — Medication 10 MG: at 17:54

## 2024-01-01 RX ADMIN — FUROSEMIDE 40 MG: 10 INJECTION, SOLUTION INTRAVENOUS at 16:21

## 2024-01-01 RX ADMIN — PROPOFOL 50 MCG/KG/MIN: 10 INJECTION, EMULSION INTRAVENOUS at 23:42

## 2024-01-01 RX ADMIN — Medication 50 MCG: at 19:37

## 2024-01-01 RX ADMIN — ALBUTEROL SULFATE 2.5 MG: 2.5 SOLUTION RESPIRATORY (INHALATION) at 01:42

## 2024-01-01 RX ADMIN — INSULIN HUMAN 8 UNITS/HR: 1 INJECTION, SOLUTION INTRAVENOUS at 03:56

## 2024-01-01 RX ADMIN — Medication 20 NG/KG/MIN: at 17:41

## 2024-01-01 RX ADMIN — PROPOFOL 40 MCG/KG/MIN: 10 INJECTION, EMULSION INTRAVENOUS at 21:57

## 2024-01-01 RX ADMIN — PROPOFOL 50 MCG/KG/MIN: 10 INJECTION, EMULSION INTRAVENOUS at 05:14

## 2024-01-01 RX ADMIN — LACOSAMIDE 100 MG: 50 TABLET, FILM COATED ORAL at 17:22

## 2024-01-01 RX ADMIN — GABAPENTIN 900 MG: 300 CAPSULE ORAL at 07:50

## 2024-01-01 RX ADMIN — PANTOPRAZOLE SODIUM 40 MG: 40 INJECTION, POWDER, FOR SOLUTION INTRAVENOUS at 07:35

## 2024-01-01 RX ADMIN — IPRATROPIUM BROMIDE AND ALBUTEROL SULFATE 3 ML: .5; 3 SOLUTION RESPIRATORY (INHALATION) at 00:00

## 2024-01-01 RX ADMIN — Medication 10 MG: at 21:16

## 2024-01-01 RX ADMIN — INSULIN HUMAN 4 UNITS/HR: 1 INJECTION, SOLUTION INTRAVENOUS at 14:09

## 2024-01-01 RX ADMIN — LEVALBUTEROL HYDROCHLORIDE 1.25 MG: 1.25 SOLUTION RESPIRATORY (INHALATION) at 19:56

## 2024-01-01 RX ADMIN — PROPOFOL 50 MCG/KG/MIN: 10 INJECTION, EMULSION INTRAVENOUS at 20:38

## 2024-01-01 RX ADMIN — Medication 10 MG: at 21:43

## 2024-01-01 RX ADMIN — LEVETIRACETAM 1500 MG: 750 TABLET, FILM COATED ORAL at 19:57

## 2024-01-01 RX ADMIN — FUROSEMIDE 80 MG: 10 INJECTION, SOLUTION INTRAVENOUS at 07:35

## 2024-01-01 RX ADMIN — GABAPENTIN 900 MG: 250 SUSPENSION ORAL at 21:56

## 2024-01-01 RX ADMIN — PROPOFOL 60 MCG/KG/MIN: 10 INJECTION, EMULSION INTRAVENOUS at 20:40

## 2024-01-01 RX ADMIN — Medication 20 NG/KG/MIN: at 00:31

## 2024-01-01 RX ADMIN — LACOSAMIDE ORAL SOLUTION 100 MG: 10 SOLUTION ORAL at 20:05

## 2024-01-01 RX ADMIN — ALBUTEROL SULFATE 2.5 MG: 2.5 SOLUTION RESPIRATORY (INHALATION) at 13:00

## 2024-01-01 RX ADMIN — HYDROCORTISONE SODIUM SUCCINATE 50 MG: 100 INJECTION, POWDER, FOR SOLUTION INTRAMUSCULAR; INTRAVENOUS at 00:05

## 2024-01-01 RX ADMIN — LEVETIRACETAM 1500 MG: 100 SOLUTION ORAL at 07:23

## 2024-01-01 RX ADMIN — HYDROXYZINE HYDROCHLORIDE 25 MG: 25 TABLET, FILM COATED ORAL at 21:20

## 2024-01-01 RX ADMIN — SODIUM CHLORIDE SOLN NEBU 3% 3 ML: 3 NEBU SOLN at 07:53

## 2024-01-01 RX ADMIN — POTASSIUM CHLORIDE 40 MEQ: 1.5 POWDER, FOR SOLUTION ORAL at 20:05

## 2024-01-01 RX ADMIN — LEVETIRACETAM 1500 MG: 750 TABLET, FILM COATED ORAL at 08:46

## 2024-01-01 RX ADMIN — QUETIAPINE FUMARATE 100 MG: 100 TABLET ORAL at 05:46

## 2024-01-01 RX ADMIN — FUROSEMIDE 80 MG: 10 INJECTION, SOLUTION INTRAVENOUS at 11:15

## 2024-01-01 RX ADMIN — POTASSIUM PHOSPHATE, MONOBASIC POTASSIUM PHOSPHATE, DIBASIC 15 MMOL: 224; 236 INJECTION, SOLUTION, CONCENTRATE INTRAVENOUS at 06:35

## 2024-01-01 RX ADMIN — PANTOPRAZOLE SODIUM 40 MG: 40 INJECTION, POWDER, FOR SOLUTION INTRAVENOUS at 22:20

## 2024-01-01 RX ADMIN — Medication 10 MG: at 11:00

## 2024-01-01 RX ADMIN — IPRATROPIUM BROMIDE AND ALBUTEROL SULFATE 3 ML: .5; 3 SOLUTION RESPIRATORY (INHALATION) at 21:23

## 2024-01-01 RX ADMIN — MIRTAZAPINE 30 MG: 7.5 TABLET, FILM COATED ORAL at 21:23

## 2024-01-01 RX ADMIN — DEXMEDETOMIDINE HYDROCHLORIDE 1.2 MCG/KG/HR: 400 INJECTION INTRAVENOUS at 23:12

## 2024-01-01 RX ADMIN — INSULIN GLARGINE 20 UNITS: 100 INJECTION, SOLUTION SUBCUTANEOUS at 21:50

## 2024-01-01 RX ADMIN — SODIUM CHLORIDE SOLN NEBU 3% 3 ML: 3 NEBU SOLN at 15:26

## 2024-01-01 RX ADMIN — LEVALBUTEROL HYDROCHLORIDE 1.25 MG: 1.25 SOLUTION RESPIRATORY (INHALATION) at 08:05

## 2024-01-01 RX ADMIN — SODIUM CHLORIDE SOLN NEBU 3% 3 ML: 3 NEBU SOLN at 11:35

## 2024-01-01 RX ADMIN — HYDROXYZINE HYDROCHLORIDE 25 MG: 25 TABLET, FILM COATED ORAL at 14:30

## 2024-01-01 RX ADMIN — LACOSAMIDE ORAL SOLUTION 100 MG: 10 SOLUTION ORAL at 19:46

## 2024-01-01 RX ADMIN — PROPOFOL 75 MCG/KG/MIN: 10 INJECTION, EMULSION INTRAVENOUS at 01:45

## 2024-01-01 RX ADMIN — FLUOCINONIDE: 0.5 OINTMENT TOPICAL at 12:33

## 2024-01-01 RX ADMIN — LEVETIRACETAM 1500 MG: 750 TABLET, FILM COATED ORAL at 21:10

## 2024-01-01 RX ADMIN — LACOSAMIDE 100 MG: 50 TABLET, FILM COATED ORAL at 09:43

## 2024-01-01 RX ADMIN — OLANZAPINE 10 MG: 2.5 TABLET, FILM COATED ORAL at 07:45

## 2024-01-01 RX ADMIN — PIPERACILLIN SODIUM AND TAZOBACTAM SODIUM 3.38 G: 3; .375 INJECTION, POWDER, LYOPHILIZED, FOR SOLUTION INTRAVENOUS at 21:40

## 2024-01-01 RX ADMIN — HYDROXYZINE HYDROCHLORIDE 25 MG: 25 TABLET, FILM COATED ORAL at 15:44

## 2024-01-01 RX ADMIN — OXYCODONE HYDROCHLORIDE 10 MG: 10 TABLET ORAL at 15:10

## 2024-01-01 RX ADMIN — PROPOFOL 50 MCG/KG/MIN: 10 INJECTION, EMULSION INTRAVENOUS at 12:05

## 2024-01-01 RX ADMIN — IPRATROPIUM BROMIDE AND ALBUTEROL SULFATE 3 ML: .5; 3 SOLUTION RESPIRATORY (INHALATION) at 00:21

## 2024-01-01 RX ADMIN — POTASSIUM CHLORIDE 20 MEQ: 29.8 INJECTION, SOLUTION INTRAVENOUS at 23:42

## 2024-01-01 RX ADMIN — ALBUTEROL SULFATE 2.5 MG: 2.5 SOLUTION RESPIRATORY (INHALATION) at 16:43

## 2024-01-01 RX ADMIN — LACOSAMIDE 100 MG: 50 TABLET, FILM COATED ORAL at 09:26

## 2024-01-01 RX ADMIN — Medication 20 NG/KG/MIN: at 13:44

## 2024-01-01 RX ADMIN — EPINEPHRINE 0.06 MCG/KG/MIN: 1 INJECTION INTRAMUSCULAR; INTRAVENOUS; SUBCUTANEOUS at 21:07

## 2024-01-01 RX ADMIN — CHLORHEXIDINE GLUCONATE 0.12% ORAL RINSE 15 ML: 1.2 LIQUID ORAL at 07:38

## 2024-01-01 RX ADMIN — Medication 20 NG/KG/MIN: at 13:48

## 2024-01-01 RX ADMIN — Medication 1 PACKET: at 10:09

## 2024-01-01 RX ADMIN — ALBUTEROL SULFATE 2.5 MG: 2.5 SOLUTION RESPIRATORY (INHALATION) at 23:52

## 2024-01-01 RX ADMIN — LEVALBUTEROL HYDROCHLORIDE 1.25 MG: 1.25 SOLUTION RESPIRATORY (INHALATION) at 20:03

## 2024-01-01 RX ADMIN — POTASSIUM CHLORIDE 40 MEQ: 1.5 POWDER, FOR SOLUTION ORAL at 17:00

## 2024-01-01 RX ADMIN — GABAPENTIN 900 MG: 300 CAPSULE ORAL at 08:49

## 2024-01-01 RX ADMIN — LACOSAMIDE ORAL SOLUTION 100 MG: 10 SOLUTION ORAL at 07:23

## 2024-01-01 RX ADMIN — GABAPENTIN 900 MG: 300 CAPSULE ORAL at 09:43

## 2024-01-01 RX ADMIN — ALBUTEROL SULFATE 2.5 MG: 2.5 SOLUTION RESPIRATORY (INHALATION) at 15:50

## 2024-01-01 RX ADMIN — PROPOFOL 75 MCG/KG/MIN: 10 INJECTION, EMULSION INTRAVENOUS at 08:24

## 2024-01-01 RX ADMIN — LEVETIRACETAM 1500 MG: 15 INJECTION INTRAVENOUS at 11:57

## 2024-01-01 RX ADMIN — SODIUM CHLORIDE 500 ML: 9 INJECTION, SOLUTION INTRAVENOUS at 12:30

## 2024-01-01 RX ADMIN — IPRATROPIUM BROMIDE AND ALBUTEROL SULFATE 3 ML: .5; 3 SOLUTION RESPIRATORY (INHALATION) at 12:57

## 2024-01-01 RX ADMIN — ATORVASTATIN CALCIUM 40 MG: 40 TABLET, FILM COATED ORAL at 08:58

## 2024-01-01 RX ADMIN — OXYCODONE HYDROCHLORIDE 10 MG: 10 TABLET ORAL at 15:40

## 2024-01-01 RX ADMIN — OXYCODONE HYDROCHLORIDE 10 MG: 10 TABLET ORAL at 04:02

## 2024-01-01 RX ADMIN — PROPOFOL 30 MCG/KG/MIN: 10 INJECTION, EMULSION INTRAVENOUS at 13:11

## 2024-01-01 RX ADMIN — PANTOPRAZOLE SODIUM 40 MG: 40 INJECTION, POWDER, FOR SOLUTION INTRAVENOUS at 19:59

## 2024-01-01 RX ADMIN — POTASSIUM & SODIUM PHOSPHATES POWDER PACK 280-160-250 MG 1 PACKET: 280-160-250 PACK at 09:05

## 2024-01-01 RX ADMIN — INSULIN GLARGINE 50 UNITS: 100 INJECTION, SOLUTION SUBCUTANEOUS at 20:51

## 2024-01-01 RX ADMIN — Medication 125 MCG: at 08:08

## 2024-01-01 RX ADMIN — HEPARIN SODIUM 730 UNITS/HR: 10000 INJECTION, SOLUTION INTRAVENOUS at 21:59

## 2024-01-01 RX ADMIN — LEVOTHYROXINE SODIUM ANHYDROUS 112.5 MCG: 100 INJECTION, POWDER, LYOPHILIZED, FOR SOLUTION INTRAVENOUS at 07:38

## 2024-01-01 RX ADMIN — Medication 10 MG: at 23:25

## 2024-01-01 RX ADMIN — LACOSAMIDE 100 MG: 50 TABLET, FILM COATED ORAL at 08:47

## 2024-01-01 RX ADMIN — DEXAMETHASONE SODIUM PHOSPHATE 20 MG: 4 INJECTION, SOLUTION INTRA-ARTICULAR; INTRALESIONAL; INTRAMUSCULAR; INTRAVENOUS; SOFT TISSUE at 14:27

## 2024-01-01 RX ADMIN — VANCOMYCIN HYDROCHLORIDE 1500 MG: 10 INJECTION, POWDER, LYOPHILIZED, FOR SOLUTION INTRAVENOUS at 02:13

## 2024-01-01 RX ADMIN — Medication 200 MCG/HR: at 00:26

## 2024-01-01 RX ADMIN — HYDRALAZINE HYDROCHLORIDE 10 MG: 10 TABLET, FILM COATED ORAL at 02:50

## 2024-01-01 RX ADMIN — Medication 1 PACKET: at 18:18

## 2024-01-01 RX ADMIN — Medication 20 NG/KG/MIN: at 22:22

## 2024-01-01 RX ADMIN — Medication 125 MCG: at 07:43

## 2024-01-01 RX ADMIN — IPRATROPIUM BROMIDE AND ALBUTEROL SULFATE 3 ML: .5; 3 SOLUTION RESPIRATORY (INHALATION) at 00:42

## 2024-01-01 RX ADMIN — LEVETIRACETAM 1500 MG: 100 SOLUTION ORAL at 07:43

## 2024-01-01 RX ADMIN — OXYCODONE HYDROCHLORIDE 10 MG: 10 TABLET ORAL at 23:45

## 2024-01-01 RX ADMIN — IPRATROPIUM BROMIDE AND ALBUTEROL SULFATE 3 ML: .5; 3 SOLUTION RESPIRATORY (INHALATION) at 11:46

## 2024-01-01 RX ADMIN — GABAPENTIN 900 MG: 250 SUSPENSION ORAL at 05:46

## 2024-01-01 RX ADMIN — Medication 20 NG/KG/MIN: at 19:32

## 2024-01-01 RX ADMIN — WHITE PETROLATUM 57.7 %-MINERAL OIL 31.9 % EYE OINTMENT: at 20:34

## 2024-01-01 RX ADMIN — PROPOFOL 40 MCG/KG/MIN: 10 INJECTION, EMULSION INTRAVENOUS at 15:10

## 2024-01-01 RX ADMIN — FLUOCINONIDE: 0.5 OINTMENT TOPICAL at 11:14

## 2024-01-01 RX ADMIN — Medication 10 MG: at 20:43

## 2024-01-01 RX ADMIN — PROPOFOL 50 MCG/KG/MIN: 10 INJECTION, EMULSION INTRAVENOUS at 17:01

## 2024-01-01 RX ADMIN — Medication 125 MCG: at 07:40

## 2024-01-01 RX ADMIN — LACOSAMIDE ORAL SOLUTION 100 MG: 10 SOLUTION ORAL at 07:22

## 2024-01-01 RX ADMIN — LACOSAMIDE 100 MG: 50 TABLET, FILM COATED ORAL at 17:34

## 2024-01-01 RX ADMIN — OLANZAPINE 10 MG: 2.5 TABLET, FILM COATED ORAL at 10:46

## 2024-01-01 RX ADMIN — ALBUTEROL SULFATE 2.5 MG: 2.5 SOLUTION RESPIRATORY (INHALATION) at 11:21

## 2024-01-01 RX ADMIN — HYDROCORTISONE SODIUM SUCCINATE 50 MG: 100 INJECTION, POWDER, FOR SOLUTION INTRAMUSCULAR; INTRAVENOUS at 06:16

## 2024-01-01 RX ADMIN — LEVOTHYROXINE SODIUM 150 MCG: 0.15 TABLET ORAL at 06:36

## 2024-01-01 RX ADMIN — Medication 125 MCG: at 08:16

## 2024-01-01 RX ADMIN — BIVALIRUDIN 0.15 MG/KG/HR: 250 INJECTION, POWDER, LYOPHILIZED, FOR SOLUTION INTRAVENOUS at 10:36

## 2024-01-01 RX ADMIN — DULOXETINE HYDROCHLORIDE 30 MG: 30 CAPSULE, DELAYED RELEASE ORAL at 08:33

## 2024-01-01 RX ADMIN — PROPOFOL 30 MCG/KG/MIN: 10 INJECTION, EMULSION INTRAVENOUS at 18:15

## 2024-01-01 RX ADMIN — OXYCODONE HYDROCHLORIDE 10 MG: 10 TABLET ORAL at 03:52

## 2024-01-01 RX ADMIN — HEPARIN SODIUM 1200 UNITS/HR: 10000 INJECTION, SOLUTION INTRAVENOUS at 17:06

## 2024-01-01 RX ADMIN — Medication 10 MG: at 15:32

## 2024-01-01 RX ADMIN — METHOCARBAMOL 1000 MG: 500 TABLET ORAL at 07:37

## 2024-01-01 RX ADMIN — OXYMETAZOLINE HYDROCHLORIDE 2 SPRAY: 0.05 SPRAY NASAL at 15:25

## 2024-01-01 RX ADMIN — GABAPENTIN 900 MG: 300 CAPSULE ORAL at 09:31

## 2024-01-01 RX ADMIN — CHLORHEXIDINE GLUCONATE 0.12% ORAL RINSE 15 ML: 1.2 LIQUID ORAL at 20:09

## 2024-01-01 RX ADMIN — LEVETIRACETAM 1500 MG: 750 TABLET, FILM COATED ORAL at 09:20

## 2024-01-01 RX ADMIN — Medication 125 MCG: at 07:37

## 2024-01-01 RX ADMIN — SODIUM CHLORIDE SOLN NEBU 3% 3 ML: 3 NEBU SOLN at 11:21

## 2024-01-01 RX ADMIN — OXYCODONE HYDROCHLORIDE 10 MG: 10 TABLET ORAL at 19:29

## 2024-01-01 RX ADMIN — PROPOFOL 30 MCG/KG/MIN: 10 INJECTION, EMULSION INTRAVENOUS at 19:00

## 2024-01-01 RX ADMIN — GABAPENTIN 300 MG: 250 SUSPENSION ORAL at 22:17

## 2024-01-01 RX ADMIN — SODIUM CHLORIDE SOLN NEBU 3% 3 ML: 3 NEBU SOLN at 08:22

## 2024-01-01 RX ADMIN — ALBUTEROL SULFATE 2.5 MG: 2.5 SOLUTION RESPIRATORY (INHALATION) at 11:35

## 2024-01-01 RX ADMIN — ALBUTEROL SULFATE 2.5 MG: 2.5 SOLUTION RESPIRATORY (INHALATION) at 21:52

## 2024-01-01 RX ADMIN — OXYCODONE HYDROCHLORIDE 10 MG: 10 TABLET ORAL at 19:39

## 2024-01-01 RX ADMIN — Medication 50 MEQ: at 01:37

## 2024-01-01 RX ADMIN — PROPOFOL 30 MCG/KG/MIN: 10 INJECTION, EMULSION INTRAVENOUS at 05:44

## 2024-01-01 RX ADMIN — GABAPENTIN 900 MG: 250 SUSPENSION ORAL at 20:07

## 2024-01-01 RX ADMIN — ALBUTEROL SULFATE 2.5 MG: 2.5 SOLUTION RESPIRATORY (INHALATION) at 16:09

## 2024-01-01 RX ADMIN — INSULIN HUMAN 4 UNITS/HR: 1 INJECTION, SOLUTION INTRAVENOUS at 22:26

## 2024-01-01 RX ADMIN — METOLAZONE 5 MG: 5 TABLET ORAL at 11:54

## 2024-01-01 RX ADMIN — DEXTROSE MONOHYDRATE 25 ML: 25 INJECTION, SOLUTION INTRAVENOUS at 22:18

## 2024-01-01 RX ADMIN — Medication 1 PACKET: at 12:21

## 2024-01-01 RX ADMIN — INSULIN ASPART 4 UNITS: 100 INJECTION, SOLUTION INTRAVENOUS; SUBCUTANEOUS at 21:03

## 2024-01-01 RX ADMIN — ALBUTEROL SULFATE 2.5 MG: 2.5 SOLUTION RESPIRATORY (INHALATION) at 08:12

## 2024-01-01 RX ADMIN — Medication 1 PACKET: at 17:07

## 2024-01-01 RX ADMIN — POTASSIUM CHLORIDE 20 MEQ: 29.8 INJECTION, SOLUTION INTRAVENOUS at 00:28

## 2024-01-01 RX ADMIN — Medication 50 MCG: at 15:00

## 2024-01-01 RX ADMIN — PROPOFOL 50 MCG/KG/MIN: 10 INJECTION, EMULSION INTRAVENOUS at 21:00

## 2024-01-01 RX ADMIN — LACOSAMIDE ORAL SOLUTION 100 MG: 10 SOLUTION ORAL at 19:39

## 2024-01-01 RX ADMIN — OXYCODONE HYDROCHLORIDE 10 MG: 10 TABLET ORAL at 11:32

## 2024-01-01 RX ADMIN — LACOSAMIDE 100 MG: 50 TABLET, FILM COATED ORAL at 08:49

## 2024-01-01 RX ADMIN — OXYCODONE HYDROCHLORIDE 10 MG: 10 TABLET ORAL at 19:31

## 2024-01-01 RX ADMIN — LEVETIRACETAM 1500 MG: 100 SOLUTION ORAL at 20:58

## 2024-01-01 RX ADMIN — Medication 20 NG/KG/MIN: at 16:19

## 2024-01-01 RX ADMIN — PIPERACILLIN AND TAZOBACTAM 3.38 G: 3; .375 INJECTION, POWDER, LYOPHILIZED, FOR SOLUTION INTRAVENOUS at 15:39

## 2024-01-01 RX ADMIN — ATORVASTATIN CALCIUM 40 MG: 40 TABLET, FILM COATED ORAL at 09:00

## 2024-01-01 RX ADMIN — POTASSIUM CHLORIDE 20 MEQ: 29.8 INJECTION, SOLUTION INTRAVENOUS at 05:55

## 2024-01-01 RX ADMIN — ALBUTEROL SULFATE 2.5 MG: 2.5 SOLUTION RESPIRATORY (INHALATION) at 07:53

## 2024-01-01 RX ADMIN — CHLORHEXIDINE GLUCONATE 0.12% ORAL RINSE 15 ML: 1.2 LIQUID ORAL at 19:59

## 2024-01-01 RX ADMIN — Medication 10 MG: at 11:34

## 2024-01-01 RX ADMIN — Medication 50 MCG: at 14:20

## 2024-01-01 RX ADMIN — PROPOFOL 60 MCG/KG/MIN: 10 INJECTION, EMULSION INTRAVENOUS at 15:53

## 2024-01-01 RX ADMIN — PROPOFOL 30 MCG/KG/MIN: 10 INJECTION, EMULSION INTRAVENOUS at 21:22

## 2024-01-01 RX ADMIN — VANCOMYCIN HYDROCHLORIDE 1500 MG: 10 INJECTION, POWDER, LYOPHILIZED, FOR SOLUTION INTRAVENOUS at 13:55

## 2024-01-01 RX ADMIN — MIRTAZAPINE 30 MG: 7.5 TABLET, FILM COATED ORAL at 22:03

## 2024-01-01 RX ADMIN — OXYCODONE HYDROCHLORIDE 5 MG: 5 TABLET ORAL at 04:08

## 2024-01-01 RX ADMIN — LEVETIRACETAM 1500 MG: 100 SOLUTION ORAL at 22:13

## 2024-01-01 RX ADMIN — IPRATROPIUM BROMIDE AND ALBUTEROL SULFATE 3 ML: .5; 3 SOLUTION RESPIRATORY (INHALATION) at 20:57

## 2024-01-01 RX ADMIN — VANCOMYCIN HYDROCHLORIDE 1250 MG: 500 INJECTION, POWDER, LYOPHILIZED, FOR SOLUTION INTRAVENOUS at 11:42

## 2024-01-01 RX ADMIN — POTASSIUM CHLORIDE 20 MEQ: 29.8 INJECTION, SOLUTION INTRAVENOUS at 18:07

## 2024-01-01 RX ADMIN — DEXAMETHASONE SODIUM PHOSPHATE 10 MG: 10 INJECTION, SOLUTION INTRAMUSCULAR; INTRAVENOUS at 08:17

## 2024-01-01 RX ADMIN — GABAPENTIN 900 MG: 300 CAPSULE ORAL at 20:02

## 2024-01-01 RX ADMIN — DEXTROSE MONOHYDRATE 50 ML: 25 INJECTION, SOLUTION INTRAVENOUS at 16:38

## 2024-01-01 RX ADMIN — VANCOMYCIN HYDROCHLORIDE 1500 MG: 1 INJECTION, POWDER, LYOPHILIZED, FOR SOLUTION INTRAVENOUS at 22:59

## 2024-01-01 RX ADMIN — LACOSAMIDE ORAL SOLUTION 100 MG: 10 SOLUTION ORAL at 07:44

## 2024-01-01 RX ADMIN — DEXTROSE MONOHYDRATE 25 ML: 25 INJECTION, SOLUTION INTRAVENOUS at 02:18

## 2024-01-01 RX ADMIN — PROPOFOL 40 MCG/KG/MIN: 10 INJECTION, EMULSION INTRAVENOUS at 01:17

## 2024-01-01 RX ADMIN — OXYCODONE HYDROCHLORIDE 10 MG: 10 TABLET ORAL at 11:18

## 2024-01-01 RX ADMIN — PANTOPRAZOLE SODIUM 40 MG: 40 INJECTION, POWDER, FOR SOLUTION INTRAVENOUS at 07:30

## 2024-01-01 RX ADMIN — PROPOFOL 70 MCG/KG/MIN: 10 INJECTION, EMULSION INTRAVENOUS at 13:52

## 2024-01-01 RX ADMIN — MIDAZOLAM IN SODIUM CHLORIDE 2 MG/HR: 1 INJECTION INTRAVENOUS at 21:47

## 2024-01-01 RX ADMIN — MIDAZOLAM HYDROCHLORIDE 8 MG/HR: 1 INJECTION, SOLUTION INTRAVENOUS at 16:24

## 2024-01-01 RX ADMIN — DULOXETINE HYDROCHLORIDE 30 MG: 30 CAPSULE, DELAYED RELEASE ORAL at 08:12

## 2024-01-01 RX ADMIN — INSULIN HUMAN 8 UNITS/HR: 1 INJECTION, SOLUTION INTRAVENOUS at 18:04

## 2024-01-01 RX ADMIN — MIRTAZAPINE 30 MG: 7.5 TABLET, FILM COATED ORAL at 22:51

## 2024-01-01 RX ADMIN — MIRTAZAPINE 30 MG: 7.5 TABLET, FILM COATED ORAL at 21:24

## 2024-01-01 RX ADMIN — SODIUM CHLORIDE SOLN NEBU 3% 3 ML: 3 NEBU SOLN at 11:28

## 2024-01-01 RX ADMIN — OXYCODONE HYDROCHLORIDE 10 MG: 10 TABLET ORAL at 04:05

## 2024-01-01 RX ADMIN — SODIUM CHLORIDE SOLN NEBU 3% 3 ML: 3 NEBU SOLN at 19:29

## 2024-01-01 RX ADMIN — LEVOTHYROXINE SODIUM 150 MCG: 75 TABLET ORAL at 07:38

## 2024-01-01 RX ADMIN — Medication 150 MCG/HR: at 14:12

## 2024-01-01 RX ADMIN — QUETIAPINE FUMARATE 100 MG: 100 TABLET ORAL at 22:08

## 2024-01-01 RX ADMIN — SODIUM CHLORIDE SOLN NEBU 3% 3 ML: 3 NEBU SOLN at 12:44

## 2024-01-01 RX ADMIN — Medication 2 UNITS/HR: at 22:03

## 2024-01-01 RX ADMIN — Medication 1 PACKET: at 11:18

## 2024-01-01 RX ADMIN — INSULIN ASPART 3 UNITS: 100 INJECTION, SOLUTION INTRAVENOUS; SUBCUTANEOUS at 17:19

## 2024-01-01 RX ADMIN — MIDAZOLAM HYDROCHLORIDE 2 MG/HR: 1 INJECTION, SOLUTION INTRAVENOUS at 15:09

## 2024-01-01 RX ADMIN — OXYCODONE HYDROCHLORIDE 10 MG: 10 TABLET ORAL at 23:25

## 2024-01-01 RX ADMIN — OXYCODONE HYDROCHLORIDE 10 MG: 10 TABLET ORAL at 04:20

## 2024-01-01 RX ADMIN — PANTOPRAZOLE SODIUM 40 MG: 40 INJECTION, POWDER, FOR SOLUTION INTRAVENOUS at 07:38

## 2024-01-01 RX ADMIN — Medication 40 MG: at 07:36

## 2024-01-01 RX ADMIN — Medication 50 MCG: at 15:16

## 2024-01-01 RX ADMIN — OXYCODONE HYDROCHLORIDE 10 MG: 10 TABLET ORAL at 19:45

## 2024-01-01 RX ADMIN — LEVETIRACETAM 1500 MG: 100 SOLUTION ORAL at 19:26

## 2024-01-01 RX ADMIN — LEVETIRACETAM 1500 MG: 100 SOLUTION ORAL at 08:21

## 2024-01-01 RX ADMIN — PROPOFOL 30 MCG/KG/MIN: 10 INJECTION, EMULSION INTRAVENOUS at 13:17

## 2024-01-01 RX ADMIN — LEVETIRACETAM 1500 MG: 100 SOLUTION ORAL at 20:09

## 2024-01-01 RX ADMIN — ALBUTEROL SULFATE 2.5 MG: 2.5 SOLUTION RESPIRATORY (INHALATION) at 00:08

## 2024-01-01 RX ADMIN — Medication 50 MCG: at 05:15

## 2024-01-01 RX ADMIN — FUROSEMIDE 40 MG: 10 INJECTION, SOLUTION INTRAVENOUS at 12:12

## 2024-01-01 RX ADMIN — WHITE PETROLATUM 57.7 %-MINERAL OIL 31.9 % EYE OINTMENT: at 19:44

## 2024-01-01 RX ADMIN — LEVETIRACETAM 1500 MG: 750 TABLET, FILM COATED ORAL at 08:33

## 2024-01-01 RX ADMIN — OXYCODONE HYDROCHLORIDE 10 MG: 10 TABLET ORAL at 08:16

## 2024-01-01 RX ADMIN — ASPIRIN 81 MG CHEWABLE TABLET 81 MG: 81 TABLET CHEWABLE at 08:51

## 2024-01-01 RX ADMIN — GABAPENTIN 900 MG: 300 CAPSULE ORAL at 20:34

## 2024-01-01 RX ADMIN — LEVETIRACETAM 1500 MG: 750 TABLET, FILM COATED ORAL at 09:42

## 2024-01-01 RX ADMIN — ASPIRIN 81 MG CHEWABLE TABLET 81 MG: 81 TABLET CHEWABLE at 07:23

## 2024-01-01 RX ADMIN — POTASSIUM CHLORIDE 20 MEQ: 29.8 INJECTION, SOLUTION INTRAVENOUS at 12:28

## 2024-01-01 RX ADMIN — SODIUM CHLORIDE SOLN NEBU 3% 3 ML: 3 NEBU SOLN at 00:43

## 2024-01-01 RX ADMIN — Medication 1 PACKET: at 07:46

## 2024-01-01 RX ADMIN — POLYETHYLENE GLYCOL 3350 17 G: 17 POWDER, FOR SOLUTION ORAL at 10:02

## 2024-01-01 RX ADMIN — LACOSAMIDE 100 MG: 50 TABLET, FILM COATED ORAL at 09:10

## 2024-01-01 RX ADMIN — LEVALBUTEROL HYDROCHLORIDE 1.25 MG: 1.25 SOLUTION RESPIRATORY (INHALATION) at 21:03

## 2024-01-01 RX ADMIN — LEVETIRACETAM 1500 MG: 100 SOLUTION ORAL at 08:45

## 2024-01-01 RX ADMIN — PROPOFOL 20 MCG/KG/MIN: 10 INJECTION, EMULSION INTRAVENOUS at 12:33

## 2024-01-01 RX ADMIN — LEVALBUTEROL HYDROCHLORIDE 1.25 MG: 1.25 SOLUTION RESPIRATORY (INHALATION) at 20:14

## 2024-01-01 RX ADMIN — WHITE PETROLATUM 57.7 %-MINERAL OIL 31.9 % EYE OINTMENT: at 22:24

## 2024-01-01 RX ADMIN — ASPIRIN 81 MG CHEWABLE TABLET 81 MG: 81 TABLET CHEWABLE at 07:37

## 2024-01-01 RX ADMIN — Medication 50 MCG: at 12:35

## 2024-01-01 RX ADMIN — METOLAZONE 5 MG: 5 TABLET ORAL at 11:34

## 2024-01-01 RX ADMIN — QUETIAPINE FUMARATE 100 MG: 100 TABLET ORAL at 13:56

## 2024-01-01 RX ADMIN — MAGNESIUM SULFATE IN WATER 2 G: 40 INJECTION, SOLUTION INTRAVENOUS at 20:11

## 2024-01-01 RX ADMIN — IPRATROPIUM BROMIDE AND ALBUTEROL SULFATE 3 ML: .5; 3 SOLUTION RESPIRATORY (INHALATION) at 20:05

## 2024-01-01 RX ADMIN — METOLAZONE 5 MG: 5 TABLET ORAL at 10:46

## 2024-01-01 RX ADMIN — PIPERACILLIN AND TAZOBACTAM 4.5 G: 4; .5 INJECTION, POWDER, FOR SOLUTION INTRAVENOUS; PARENTERAL at 10:03

## 2024-01-01 RX ADMIN — ENOXAPARIN SODIUM 40 MG: 40 INJECTION SUBCUTANEOUS at 09:27

## 2024-01-01 RX ADMIN — OXYCODONE HYDROCHLORIDE 5 MG: 5 TABLET ORAL at 03:48

## 2024-01-01 RX ADMIN — Medication 20 NG/KG/MIN: at 09:58

## 2024-01-01 RX ADMIN — LEVALBUTEROL HYDROCHLORIDE 1.25 MG: 1.25 SOLUTION RESPIRATORY (INHALATION) at 08:53

## 2024-01-01 RX ADMIN — WHITE PETROLATUM 57.7 %-MINERAL OIL 31.9 % EYE OINTMENT: at 11:58

## 2024-01-01 RX ADMIN — Medication 10 MG: at 11:03

## 2024-01-01 RX ADMIN — IPRATROPIUM BROMIDE AND ALBUTEROL SULFATE 3 ML: .5; 3 SOLUTION RESPIRATORY (INHALATION) at 15:33

## 2024-01-01 RX ADMIN — FUROSEMIDE 80 MG: 10 INJECTION, SOLUTION INTRAVENOUS at 19:40

## 2024-01-01 RX ADMIN — PIPERACILLIN AND TAZOBACTAM 4.5 G: 4; .5 INJECTION, POWDER, FOR SOLUTION INTRAVENOUS at 17:48

## 2024-01-01 RX ADMIN — OXYCODONE HYDROCHLORIDE 10 MG: 10 TABLET ORAL at 11:40

## 2024-01-01 RX ADMIN — ATORVASTATIN CALCIUM 40 MG: 40 TABLET, FILM COATED ORAL at 07:50

## 2024-01-01 RX ADMIN — DULOXETINE HYDROCHLORIDE 30 MG: 30 CAPSULE, DELAYED RELEASE ORAL at 09:42

## 2024-01-01 RX ADMIN — INSULIN GLARGINE 15 UNITS: 100 INJECTION, SOLUTION SUBCUTANEOUS at 21:40

## 2024-01-01 RX ADMIN — ASPIRIN 81 MG CHEWABLE TABLET 81 MG: 81 TABLET CHEWABLE at 08:55

## 2024-01-01 RX ADMIN — LACOSAMIDE ORAL SOLUTION 100 MG: 10 SOLUTION ORAL at 19:27

## 2024-01-01 RX ADMIN — OXYCODONE HYDROCHLORIDE 10 MG: 10 TABLET ORAL at 00:09

## 2024-01-01 RX ADMIN — SODIUM CHLORIDE SOLN NEBU 3% 3 ML: 3 NEBU SOLN at 19:32

## 2024-01-01 RX ADMIN — SODIUM CHLORIDE SOLN NEBU 3% 3 ML: 3 NEBU SOLN at 16:43

## 2024-01-01 RX ADMIN — IOPAMIDOL 135 ML: 755 INJECTION, SOLUTION INTRAVENOUS at 16:21

## 2024-01-01 RX ADMIN — LEVETIRACETAM 1500 MG: 100 SOLUTION ORAL at 07:34

## 2024-01-01 RX ADMIN — ALBUTEROL SULFATE 2.5 MG: 2.5 SOLUTION RESPIRATORY (INHALATION) at 08:25

## 2024-01-01 RX ADMIN — OXYCODONE HYDROCHLORIDE 10 MG: 10 TABLET ORAL at 07:38

## 2024-01-01 RX ADMIN — INSULIN ASPART 10 UNITS: 100 INJECTION, SOLUTION INTRAVENOUS; SUBCUTANEOUS at 14:17

## 2024-01-01 RX ADMIN — PROPOFOL 60 MCG/KG/MIN: 10 INJECTION, EMULSION INTRAVENOUS at 18:09

## 2024-01-01 RX ADMIN — Medication 50 MCG: at 22:00

## 2024-01-01 RX ADMIN — SENNOSIDES AND DOCUSATE SODIUM 1 TABLET: 8.6; 5 TABLET ORAL at 13:41

## 2024-01-01 RX ADMIN — PROPOFOL 55 MCG/KG/MIN: 10 INJECTION, EMULSION INTRAVENOUS at 12:43

## 2024-01-01 RX ADMIN — HEPARIN SODIUM 1200 UNITS/HR: 10000 INJECTION, SOLUTION INTRAVENOUS at 11:39

## 2024-01-01 RX ADMIN — PROPOFOL 40 MCG/KG/MIN: 10 INJECTION, EMULSION INTRAVENOUS at 13:07

## 2024-01-01 RX ADMIN — QUETIAPINE FUMARATE 100 MG: 100 TABLET ORAL at 21:01

## 2024-01-01 RX ADMIN — Medication 50 MCG: at 13:20

## 2024-01-01 RX ADMIN — HEPARIN SODIUM 3 ML/HR: 200 INJECTION, SOLUTION INTRAVENOUS at 18:17

## 2024-01-01 RX ADMIN — MIRTAZAPINE 30 MG: 7.5 TABLET, FILM COATED ORAL at 22:21

## 2024-01-01 RX ADMIN — OXYCODONE HYDROCHLORIDE 10 MG: 10 TABLET ORAL at 07:35

## 2024-01-01 RX ADMIN — GLYCOPYRROLATE 0.2 MG: 0.2 INJECTION INTRAMUSCULAR; INTRAVENOUS at 21:46

## 2024-01-01 RX ADMIN — PROPOFOL 40 MCG/KG/MIN: 10 INJECTION, EMULSION INTRAVENOUS at 10:21

## 2024-01-01 RX ADMIN — LEVALBUTEROL HYDROCHLORIDE 1.25 MG: 1.25 SOLUTION RESPIRATORY (INHALATION) at 21:14

## 2024-01-01 RX ADMIN — Medication 10 MG: at 16:20

## 2024-01-01 RX ADMIN — ASPIRIN 81 MG CHEWABLE TABLET 81 MG: 81 TABLET CHEWABLE at 08:11

## 2024-01-01 RX ADMIN — CHLORHEXIDINE GLUCONATE 0.12% ORAL RINSE 15 ML: 1.2 LIQUID ORAL at 07:30

## 2024-01-01 RX ADMIN — PROPOFOL 30 MCG/KG/MIN: 10 INJECTION, EMULSION INTRAVENOUS at 16:53

## 2024-01-01 RX ADMIN — FLUOCINONIDE: 0.5 OINTMENT TOPICAL at 16:41

## 2024-01-01 RX ADMIN — PROPOFOL 75 MCG/KG/MIN: 10 INJECTION, EMULSION INTRAVENOUS at 10:25

## 2024-01-01 RX ADMIN — DEXTROSE MONOHYDRATE: 100 INJECTION, SOLUTION INTRAVENOUS at 02:41

## 2024-01-01 RX ADMIN — ATORVASTATIN CALCIUM 40 MG: 40 TABLET, FILM COATED ORAL at 07:38

## 2024-01-01 RX ADMIN — MIDAZOLAM 2 MG: 1 INJECTION INTRAMUSCULAR; INTRAVENOUS at 14:45

## 2024-01-01 RX ADMIN — SODIUM CHLORIDE SOLN NEBU 3% 3 ML: 3 NEBU SOLN at 04:11

## 2024-01-01 RX ADMIN — GABAPENTIN 900 MG: 250 SUSPENSION ORAL at 13:32

## 2024-01-01 RX ADMIN — GABAPENTIN 900 MG: 300 CAPSULE ORAL at 13:27

## 2024-01-01 RX ADMIN — OXYCODONE HYDROCHLORIDE 10 MG: 10 TABLET ORAL at 11:54

## 2024-01-01 RX ADMIN — INSULIN GLARGINE 50 UNITS: 100 INJECTION, SOLUTION SUBCUTANEOUS at 09:13

## 2024-01-01 RX ADMIN — Medication 20 NG/KG/MIN: at 21:07

## 2024-01-01 RX ADMIN — GABAPENTIN 900 MG: 250 SUSPENSION ORAL at 19:53

## 2024-01-01 RX ADMIN — PROPOFOL 55 MCG/KG/MIN: 10 INJECTION, EMULSION INTRAVENOUS at 18:23

## 2024-01-01 RX ADMIN — LEVALBUTEROL HYDROCHLORIDE 1.25 MG: 1.25 SOLUTION RESPIRATORY (INHALATION) at 08:28

## 2024-01-01 RX ADMIN — Medication 50 MCG: at 13:58

## 2024-01-01 RX ADMIN — PROPOFOL 50 MCG/KG/MIN: 10 INJECTION, EMULSION INTRAVENOUS at 14:19

## 2024-01-01 RX ADMIN — ASPIRIN 81 MG CHEWABLE TABLET 81 MG: 81 TABLET CHEWABLE at 07:45

## 2024-01-01 RX ADMIN — PANTOPRAZOLE SODIUM 40 MG: 40 INJECTION, POWDER, FOR SOLUTION INTRAVENOUS at 07:40

## 2024-01-01 RX ADMIN — Medication 10 MG: at 14:20

## 2024-01-01 RX ADMIN — WHITE PETROLATUM 57.7 %-MINERAL OIL 31.9 % EYE OINTMENT: at 11:36

## 2024-01-01 RX ADMIN — LEVETIRACETAM 1500 MG: 100 SOLUTION ORAL at 19:52

## 2024-01-01 RX ADMIN — QUETIAPINE FUMARATE 100 MG: 100 TABLET ORAL at 13:36

## 2024-01-01 RX ADMIN — CHLORHEXIDINE GLUCONATE 0.12% ORAL RINSE 15 ML: 1.2 LIQUID ORAL at 07:43

## 2024-01-01 RX ADMIN — ASPIRIN 81 MG CHEWABLE TABLET 81 MG: 81 TABLET CHEWABLE at 09:32

## 2024-01-01 RX ADMIN — LEVOTHYROXINE SODIUM 150 MCG: 75 TABLET ORAL at 08:08

## 2024-01-01 RX ADMIN — Medication 1 PACKET: at 10:10

## 2024-01-01 RX ADMIN — OXYCODONE HYDROCHLORIDE 5 MG: 5 TABLET ORAL at 14:39

## 2024-01-01 RX ADMIN — PIPERACILLIN AND TAZOBACTAM 3.38 G: 3; .375 INJECTION, POWDER, LYOPHILIZED, FOR SOLUTION INTRAVENOUS at 04:05

## 2024-01-01 RX ADMIN — DEXMEDETOMIDINE HYDROCHLORIDE 0.6 MCG/KG/HR: 400 INJECTION INTRAVENOUS at 21:19

## 2024-01-01 RX ADMIN — THERA TABS 1 TABLET: TAB at 07:35

## 2024-01-01 RX ADMIN — OLANZAPINE 10 MG: 2.5 TABLET, FILM COATED ORAL at 13:31

## 2024-01-01 RX ADMIN — LEVETIRACETAM 1500 MG: 100 SOLUTION ORAL at 19:31

## 2024-01-01 RX ADMIN — Medication 1 PACKET: at 13:31

## 2024-01-01 RX ADMIN — PROPOFOL 50 MCG/KG/MIN: 10 INJECTION, EMULSION INTRAVENOUS at 09:04

## 2024-01-01 RX ADMIN — METHOCARBAMOL 1000 MG: 500 TABLET ORAL at 13:06

## 2024-01-01 RX ADMIN — CHLORHEXIDINE GLUCONATE 0.12% ORAL RINSE 15 ML: 1.2 LIQUID ORAL at 20:04

## 2024-01-01 RX ADMIN — CHLORHEXIDINE GLUCONATE 0.12% ORAL RINSE 15 ML: 1.2 LIQUID ORAL at 19:49

## 2024-01-01 RX ADMIN — Medication 100 MCG/HR: at 16:41

## 2024-01-01 RX ADMIN — ATORVASTATIN CALCIUM 40 MG: 40 TABLET, FILM COATED ORAL at 08:32

## 2024-01-01 RX ADMIN — ALBUTEROL SULFATE 2.5 MG: 2.5 SOLUTION RESPIRATORY (INHALATION) at 19:50

## 2024-01-01 RX ADMIN — PIPERACILLIN AND TAZOBACTAM 4.5 G: 4; .5 INJECTION, POWDER, FOR SOLUTION INTRAVENOUS; PARENTERAL at 10:09

## 2024-01-01 RX ADMIN — GABAPENTIN 300 MG: 250 SUSPENSION ORAL at 13:46

## 2024-01-01 RX ADMIN — IPRATROPIUM BROMIDE AND ALBUTEROL SULFATE 3 ML: .5; 3 SOLUTION RESPIRATORY (INHALATION) at 07:22

## 2024-01-01 RX ADMIN — HEPARIN SODIUM 1750 UNITS/HR: 10000 INJECTION, SOLUTION INTRAVENOUS at 08:20

## 2024-01-01 RX ADMIN — MIDAZOLAM 2 MG: 1 INJECTION INTRAMUSCULAR; INTRAVENOUS at 08:15

## 2024-01-01 RX ADMIN — POTASSIUM CHLORIDE 20 MEQ: 29.8 INJECTION, SOLUTION INTRAVENOUS at 06:50

## 2024-01-01 RX ADMIN — Medication 20 NG/KG/MIN: at 08:44

## 2024-01-01 RX ADMIN — CHLORHEXIDINE GLUCONATE 0.12% ORAL RINSE 15 ML: 1.2 LIQUID ORAL at 07:46

## 2024-01-01 RX ADMIN — LACOSAMIDE 100 MG: 50 TABLET, FILM COATED ORAL at 08:32

## 2024-01-01 RX ADMIN — OXYMETAZOLINE HYDROCHLORIDE 2 SPRAY: 0.05 SPRAY NASAL at 19:48

## 2024-01-01 RX ADMIN — GABAPENTIN 900 MG: 250 SUSPENSION ORAL at 08:44

## 2024-01-01 RX ADMIN — Medication 50 MCG: at 14:51

## 2024-01-01 RX ADMIN — FLUTICASONE PROPIONATE 1 SPRAY: 50 SPRAY, METERED NASAL at 11:40

## 2024-01-01 RX ADMIN — IPRATROPIUM BROMIDE AND ALBUTEROL SULFATE 3 ML: .5; 3 SOLUTION RESPIRATORY (INHALATION) at 20:37

## 2024-01-01 RX ADMIN — FUROSEMIDE 40 MG: 10 INJECTION, SOLUTION INTRAVENOUS at 10:17

## 2024-01-01 RX ADMIN — INSULIN ASPART 13 UNITS: 100 INJECTION, SOLUTION INTRAVENOUS; SUBCUTANEOUS at 18:05

## 2024-01-01 RX ADMIN — ASPIRIN 81 MG CHEWABLE TABLET 81 MG: 81 TABLET CHEWABLE at 09:16

## 2024-01-01 RX ADMIN — Medication 50 MCG: at 14:18

## 2024-01-01 RX ADMIN — Medication 50 MCG: at 08:29

## 2024-01-01 RX ADMIN — LEVOTHYROXINE SODIUM 150 MCG: 0.15 TABLET ORAL at 08:11

## 2024-01-01 RX ADMIN — Medication 50 MCG: at 22:50

## 2024-01-01 RX ADMIN — ASPIRIN 81 MG CHEWABLE TABLET 81 MG: 81 TABLET CHEWABLE at 08:16

## 2024-01-01 RX ADMIN — GABAPENTIN 900 MG: 300 CAPSULE ORAL at 14:35

## 2024-01-01 RX ADMIN — Medication 100 MCG/HR: at 08:36

## 2024-01-01 RX ADMIN — PROPOFOL 50 MCG/KG/MIN: 10 INJECTION, EMULSION INTRAVENOUS at 13:50

## 2024-01-01 RX ADMIN — METHOCARBAMOL 1000 MG: 500 TABLET ORAL at 13:29

## 2024-01-01 RX ADMIN — LEVOTHYROXINE SODIUM 150 MCG: 0.15 TABLET ORAL at 09:43

## 2024-01-01 RX ADMIN — Medication 1 PACKET: at 19:53

## 2024-01-01 RX ADMIN — LEVETIRACETAM 1500 MG: 750 TABLET, FILM COATED ORAL at 09:16

## 2024-01-01 RX ADMIN — FUROSEMIDE 80 MG: 10 INJECTION, SOLUTION INTRAVENOUS at 08:20

## 2024-01-01 RX ADMIN — LEVETIRACETAM 1500 MG: 100 SOLUTION ORAL at 07:41

## 2024-01-01 RX ADMIN — LEVALBUTEROL HYDROCHLORIDE 1.25 MG: 1.25 SOLUTION RESPIRATORY (INHALATION) at 09:09

## 2024-01-01 RX ADMIN — Medication 20 NG/KG/MIN: at 01:09

## 2024-01-01 RX ADMIN — SODIUM CHLORIDE SOLN NEBU 3% 3 ML: 3 NEBU SOLN at 13:00

## 2024-01-01 RX ADMIN — LEVETIRACETAM 1500 MG: 750 TABLET, FILM COATED ORAL at 21:17

## 2024-01-01 RX ADMIN — LACOSAMIDE ORAL SOLUTION 100 MG: 10 SOLUTION ORAL at 07:37

## 2024-01-01 RX ADMIN — PROPOFOL 50 MCG/KG/MIN: 10 INJECTION, EMULSION INTRAVENOUS at 06:19

## 2024-01-01 RX ADMIN — LEVETIRACETAM 1500 MG: 100 SOLUTION ORAL at 19:28

## 2024-01-01 RX ADMIN — MIDAZOLAM HYDROCHLORIDE 2 MG/HR: 1 INJECTION, SOLUTION INTRAVENOUS at 12:48

## 2024-01-01 RX ADMIN — LEVETIRACETAM 1500 MG: 100 SOLUTION ORAL at 19:40

## 2024-01-01 RX ADMIN — LEVALBUTEROL HYDROCHLORIDE 1.25 MG: 1.25 SOLUTION RESPIRATORY (INHALATION) at 19:43

## 2024-01-01 RX ADMIN — OXYCODONE HYDROCHLORIDE 10 MG: 10 TABLET ORAL at 15:06

## 2024-01-01 RX ADMIN — LACOSAMIDE ORAL SOLUTION 100 MG: 10 SOLUTION ORAL at 19:31

## 2024-01-01 RX ADMIN — OXYMETAZOLINE HYDROCHLORIDE 2 SPRAY: 0.05 SPRAY NASAL at 13:30

## 2024-01-01 RX ADMIN — GABAPENTIN 900 MG: 300 CAPSULE ORAL at 09:32

## 2024-01-01 RX ADMIN — Medication 15 ML: at 07:46

## 2024-01-01 RX ADMIN — ALBUTEROL SULFATE 2.5 MG: 2.5 SOLUTION RESPIRATORY (INHALATION) at 16:01

## 2024-01-01 RX ADMIN — POTASSIUM CHLORIDE 40 MEQ: 1.5 POWDER, FOR SOLUTION ORAL at 05:48

## 2024-01-01 RX ADMIN — PROPOFOL 40 MCG/KG/MIN: 10 INJECTION, EMULSION INTRAVENOUS at 07:45

## 2024-01-01 RX ADMIN — DULOXETINE HYDROCHLORIDE 30 MG: 30 CAPSULE, DELAYED RELEASE ORAL at 09:20

## 2024-01-01 RX ADMIN — METHOCARBAMOL 1000 MG: 500 TABLET ORAL at 05:05

## 2024-01-01 RX ADMIN — SODIUM CHLORIDE SOLN NEBU 3% 3 ML: 3 NEBU SOLN at 04:17

## 2024-01-01 RX ADMIN — POTASSIUM CHLORIDE 40 MEQ: 1.5 POWDER, FOR SOLUTION ORAL at 09:23

## 2024-01-01 RX ADMIN — Medication 50 MCG: at 22:48

## 2024-01-01 RX ADMIN — PROPOFOL 20 MCG/KG/MIN: 10 INJECTION, EMULSION INTRAVENOUS at 03:06

## 2024-01-01 RX ADMIN — GABAPENTIN 900 MG: 300 CAPSULE ORAL at 08:58

## 2024-01-01 RX ADMIN — FUROSEMIDE 40 MG: 10 INJECTION, SOLUTION INTRAVENOUS at 15:46

## 2024-01-01 RX ADMIN — GABAPENTIN 900 MG: 250 SUSPENSION ORAL at 05:06

## 2024-01-01 RX ADMIN — INSULIN HUMAN 7 UNITS/HR: 1 INJECTION, SOLUTION INTRAVENOUS at 16:24

## 2024-01-01 RX ADMIN — POTASSIUM CHLORIDE 20 MEQ: 29.8 INJECTION, SOLUTION INTRAVENOUS at 17:34

## 2024-01-01 RX ADMIN — LEVOTHYROXINE SODIUM 150 MCG: 75 TABLET ORAL at 07:43

## 2024-01-01 RX ADMIN — GABAPENTIN 900 MG: 300 CAPSULE ORAL at 08:51

## 2024-01-01 RX ADMIN — LEVETIRACETAM 1500 MG: 750 TABLET, FILM COATED ORAL at 20:32

## 2024-01-01 RX ADMIN — DEXTROSE MONOHYDRATE 25 ML: 25 INJECTION, SOLUTION INTRAVENOUS at 22:38

## 2024-01-01 RX ADMIN — DEXMEDETOMIDINE HYDROCHLORIDE 1.2 MCG/KG/HR: 400 INJECTION INTRAVENOUS at 06:34

## 2024-01-01 RX ADMIN — INSULIN HUMAN 3 UNITS/HR: 1 INJECTION, SOLUTION INTRAVENOUS at 19:50

## 2024-01-01 RX ADMIN — PROPOFOL 60 MCG/KG/MIN: 10 INJECTION, EMULSION INTRAVENOUS at 13:29

## 2024-01-01 RX ADMIN — Medication 20 NG/KG/MIN: at 17:15

## 2024-01-01 RX ADMIN — FUROSEMIDE 80 MG: 10 INJECTION, SOLUTION INTRAVENOUS at 11:54

## 2024-01-01 RX ADMIN — Medication 50 MCG/HR: at 16:07

## 2024-01-01 RX ADMIN — DEXAMETHASONE SODIUM PHOSPHATE 20 MG: 10 INJECTION, SOLUTION INTRAMUSCULAR; INTRAVENOUS at 08:21

## 2024-01-01 RX ADMIN — METHOCARBAMOL 1000 MG: 500 TABLET ORAL at 21:01

## 2024-01-01 RX ADMIN — ASPIRIN 81 MG CHEWABLE TABLET 81 MG: 81 TABLET CHEWABLE at 07:38

## 2024-01-01 RX ADMIN — HEPARIN SODIUM 1400 UNITS/HR: 10000 INJECTION, SOLUTION INTRAVENOUS at 16:34

## 2024-01-01 RX ADMIN — HEPARIN SODIUM AND DEXTROSE 750 UNITS/HR: 10000; 5 INJECTION INTRAVENOUS at 16:58

## 2024-01-01 RX ADMIN — ALBUTEROL SULFATE 2.5 MG: 2.5 SOLUTION RESPIRATORY (INHALATION) at 13:08

## 2024-01-01 RX ADMIN — MIRTAZAPINE 30 MG: 7.5 TABLET, FILM COATED ORAL at 22:39

## 2024-01-01 RX ADMIN — OXYCODONE HYDROCHLORIDE 5 MG: 5 TABLET ORAL at 23:25

## 2024-01-01 RX ADMIN — LEVOTHYROXINE SODIUM 150 MCG: 75 TABLET ORAL at 07:23

## 2024-01-01 RX ADMIN — METHOCARBAMOL 1000 MG: 500 TABLET ORAL at 22:12

## 2024-01-01 RX ADMIN — LACOSAMIDE 100 MG: 50 TABLET, FILM COATED ORAL at 08:33

## 2024-01-01 RX ADMIN — SODIUM CHLORIDE SOLN NEBU 3% 3 ML: 3 NEBU SOLN at 01:42

## 2024-01-01 RX ADMIN — POTASSIUM CHLORIDE 40 MEQ: 1.5 POWDER, FOR SOLUTION ORAL at 05:43

## 2024-01-01 RX ADMIN — LACOSAMIDE ORAL SOLUTION 100 MG: 10 SOLUTION ORAL at 07:38

## 2024-01-01 RX ADMIN — LEVOTHYROXINE SODIUM 150 MCG: 75 TABLET ORAL at 07:46

## 2024-01-01 RX ADMIN — FUROSEMIDE 40 MG: 10 INJECTION, SOLUTION INTRAVENOUS at 12:29

## 2024-01-01 RX ADMIN — ENOXAPARIN SODIUM 40 MG: 40 INJECTION SUBCUTANEOUS at 09:16

## 2024-01-01 RX ADMIN — Medication 1 PACKET: at 08:05

## 2024-01-01 RX ADMIN — OLANZAPINE 10 MG: 2.5 TABLET, FILM COATED ORAL at 20:11

## 2024-01-01 RX ADMIN — GABAPENTIN 900 MG: 300 CAPSULE ORAL at 19:12

## 2024-01-01 RX ADMIN — POTASSIUM CHLORIDE 20 MEQ: 29.8 INJECTION, SOLUTION INTRAVENOUS at 05:08

## 2024-01-01 RX ADMIN — PROPOFOL 50 MCG/KG/MIN: 10 INJECTION, EMULSION INTRAVENOUS at 00:43

## 2024-01-01 RX ADMIN — Medication 50 MCG: at 15:49

## 2024-01-01 RX ADMIN — GABAPENTIN 900 MG: 250 SUSPENSION ORAL at 22:08

## 2024-01-01 RX ADMIN — VANCOMYCIN HYDROCHLORIDE 1500 MG: 10 INJECTION, POWDER, LYOPHILIZED, FOR SOLUTION INTRAVENOUS at 14:09

## 2024-01-01 RX ADMIN — PROPOFOL 20 MCG/KG/MIN: 10 INJECTION, EMULSION INTRAVENOUS at 22:39

## 2024-01-01 RX ADMIN — LACOSAMIDE 100 MG: 50 TABLET, FILM COATED ORAL at 08:51

## 2024-01-01 RX ADMIN — HEPARIN SODIUM AND DEXTROSE 1100 UNITS/HR: 10000; 5 INJECTION INTRAVENOUS at 17:32

## 2024-01-01 RX ADMIN — GABAPENTIN 900 MG: 250 SUSPENSION ORAL at 20:21

## 2024-01-01 RX ADMIN — VANCOMYCIN HYDROCHLORIDE 1500 MG: 10 INJECTION, POWDER, LYOPHILIZED, FOR SOLUTION INTRAVENOUS at 08:47

## 2024-01-01 RX ADMIN — GABAPENTIN 900 MG: 300 CAPSULE ORAL at 20:40

## 2024-01-01 RX ADMIN — SODIUM CHLORIDE SOLN NEBU 3% 3 ML: 3 NEBU SOLN at 00:00

## 2024-01-01 RX ADMIN — LEVETIRACETAM 1500 MG: 750 TABLET, FILM COATED ORAL at 09:26

## 2024-01-01 RX ADMIN — Medication 1 PACKET: at 07:44

## 2024-01-01 RX ADMIN — LEVETIRACETAM 1500 MG: 100 SOLUTION ORAL at 19:58

## 2024-01-01 RX ADMIN — VANCOMYCIN HYDROCHLORIDE 1500 MG: 10 INJECTION, POWDER, LYOPHILIZED, FOR SOLUTION INTRAVENOUS at 22:34

## 2024-01-01 RX ADMIN — VANCOMYCIN HYDROCHLORIDE 1250 MG: 500 INJECTION, POWDER, LYOPHILIZED, FOR SOLUTION INTRAVENOUS at 23:22

## 2024-01-01 RX ADMIN — VANCOMYCIN HYDROCHLORIDE 2000 MG: 1 INJECTION, POWDER, LYOPHILIZED, FOR SOLUTION INTRAVENOUS at 11:16

## 2024-01-01 RX ADMIN — LACOSAMIDE 100 MG: 50 TABLET, FILM COATED ORAL at 09:32

## 2024-01-01 RX ADMIN — Medication 20 NG/KG/MIN: at 06:22

## 2024-01-01 RX ADMIN — ALBUTEROL SULFATE 2.5 MG: 2.5 SOLUTION RESPIRATORY (INHALATION) at 00:00

## 2024-01-01 RX ADMIN — Medication 1 PACKET: at 14:01

## 2024-01-01 RX ADMIN — OXYCODONE HYDROCHLORIDE 10 MG: 10 TABLET ORAL at 15:47

## 2024-01-01 RX ADMIN — PROPOFOL 75 MCG/KG/MIN: 10 INJECTION, EMULSION INTRAVENOUS at 23:45

## 2024-01-01 RX ADMIN — Medication 20 NG/KG/MIN: at 20:36

## 2024-01-01 RX ADMIN — OLANZAPINE 10 MG: 2.5 TABLET, FILM COATED ORAL at 20:07

## 2024-01-01 RX ADMIN — Medication 1 PACKET: at 07:47

## 2024-01-01 RX ADMIN — GABAPENTIN 900 MG: 250 SUSPENSION ORAL at 20:04

## 2024-01-01 RX ADMIN — Medication 1 PACKET: at 19:51

## 2024-01-01 RX ADMIN — IPRATROPIUM BROMIDE AND ALBUTEROL SULFATE 3 ML: .5; 3 SOLUTION RESPIRATORY (INHALATION) at 12:28

## 2024-01-01 RX ADMIN — GABAPENTIN 900 MG: 250 SUSPENSION ORAL at 07:41

## 2024-01-01 RX ADMIN — INSULIN GLARGINE 18 UNITS: 100 INJECTION, SOLUTION SUBCUTANEOUS at 09:45

## 2024-01-01 RX ADMIN — Medication 20 NG/KG/MIN: at 01:35

## 2024-01-01 RX ADMIN — ENOXAPARIN SODIUM 40 MG: 40 INJECTION SUBCUTANEOUS at 08:56

## 2024-01-01 RX ADMIN — PROPOFOL 50 MCG/KG/MIN: 10 INJECTION, EMULSION INTRAVENOUS at 07:56

## 2024-01-01 RX ADMIN — PROPOFOL 50 MCG/KG/MIN: 10 INJECTION, EMULSION INTRAVENOUS at 09:13

## 2024-01-01 RX ADMIN — OXYCODONE HYDROCHLORIDE 5 MG: 5 TABLET ORAL at 19:44

## 2024-01-01 RX ADMIN — PROPOFOL 40 MCG/KG/MIN: 10 INJECTION, EMULSION INTRAVENOUS at 04:59

## 2024-01-01 RX ADMIN — SODIUM CHLORIDE SOLN NEBU 3% 3 ML: 3 NEBU SOLN at 16:09

## 2024-01-01 RX ADMIN — PIPERACILLIN AND TAZOBACTAM 4.5 G: 4; .5 INJECTION, POWDER, FOR SOLUTION INTRAVENOUS at 21:43

## 2024-01-01 RX ADMIN — LEVALBUTEROL HYDROCHLORIDE 1.25 MG: 1.25 SOLUTION RESPIRATORY (INHALATION) at 09:08

## 2024-01-01 RX ADMIN — GABAPENTIN 900 MG: 300 CAPSULE ORAL at 20:17

## 2024-01-01 RX ADMIN — SODIUM CHLORIDE, PRESERVATIVE FREE 90 ML: 5 INJECTION INTRAVENOUS at 16:21

## 2024-01-01 RX ADMIN — LEVETIRACETAM 1500 MG: 100 SOLUTION ORAL at 07:36

## 2024-01-01 RX ADMIN — INSULIN GLARGINE 20 UNITS: 100 INJECTION, SOLUTION SUBCUTANEOUS at 21:06

## 2024-01-01 RX ADMIN — PROPOFOL 50 MCG/KG/MIN: 10 INJECTION, EMULSION INTRAVENOUS at 11:54

## 2024-01-01 RX ADMIN — Medication 20 NG/KG/MIN: at 09:39

## 2024-01-01 RX ADMIN — HYDROXYZINE HYDROCHLORIDE 50 MG: 50 TABLET, FILM COATED ORAL at 18:11

## 2024-01-01 RX ADMIN — Medication 20 NG/KG/MIN: at 12:24

## 2024-01-01 RX ADMIN — LEVOTHYROXINE SODIUM 150 MCG: 75 TABLET ORAL at 07:40

## 2024-01-01 RX ADMIN — THERA TABS 1 TABLET: TAB at 08:16

## 2024-01-01 RX ADMIN — IPRATROPIUM BROMIDE AND ALBUTEROL SULFATE 3 ML: .5; 3 SOLUTION RESPIRATORY (INHALATION) at 16:43

## 2024-01-01 RX ADMIN — INSULIN GLARGINE 15 UNITS: 100 INJECTION, SOLUTION SUBCUTANEOUS at 00:40

## 2024-01-01 RX ADMIN — INSULIN ASPART 8 UNITS: 100 INJECTION, SOLUTION INTRAVENOUS; SUBCUTANEOUS at 18:33

## 2024-01-01 RX ADMIN — Medication 40 MG: at 07:52

## 2024-01-01 RX ADMIN — ASPIRIN 81 MG CHEWABLE TABLET 81 MG: 81 TABLET CHEWABLE at 08:10

## 2024-01-01 RX ADMIN — POTASSIUM CHLORIDE 20 MEQ: 29.8 INJECTION, SOLUTION INTRAVENOUS at 05:56

## 2024-01-01 RX ADMIN — IPRATROPIUM BROMIDE AND ALBUTEROL SULFATE 3 ML: .5; 3 SOLUTION RESPIRATORY (INHALATION) at 14:28

## 2024-01-01 RX ADMIN — OXYCODONE HYDROCHLORIDE 10 MG: 10 TABLET ORAL at 15:49

## 2024-01-01 RX ADMIN — OLANZAPINE 10 MG: 2.5 TABLET, FILM COATED ORAL at 07:40

## 2024-01-01 RX ADMIN — VANCOMYCIN HYDROCHLORIDE 1250 MG: 10 INJECTION, POWDER, LYOPHILIZED, FOR SOLUTION INTRAVENOUS at 18:56

## 2024-01-01 RX ADMIN — Medication 50 MCG: at 08:36

## 2024-01-01 RX ADMIN — CEFTRIAXONE SODIUM 2 G: 2 INJECTION, POWDER, FOR SOLUTION INTRAMUSCULAR; INTRAVENOUS at 14:48

## 2024-01-01 RX ADMIN — OXYMETAZOLINE HYDROCHLORIDE 2 SPRAY: 0.05 SPRAY NASAL at 19:44

## 2024-01-01 RX ADMIN — OXYMETAZOLINE HYDROCHLORIDE 2 SPRAY: 0.05 SPRAY NASAL at 13:55

## 2024-01-01 RX ADMIN — HYDROXYZINE HYDROCHLORIDE 25 MG: 25 TABLET, FILM COATED ORAL at 07:54

## 2024-01-01 RX ADMIN — OXYCODONE HYDROCHLORIDE 10 MG: 10 TABLET ORAL at 15:30

## 2024-01-01 RX ADMIN — PROPOFOL 40 MCG/KG/MIN: 10 INJECTION, EMULSION INTRAVENOUS at 22:10

## 2024-01-01 RX ADMIN — QUETIAPINE FUMARATE 100 MG: 100 TABLET ORAL at 21:52

## 2024-01-01 RX ADMIN — INSULIN ASPART 11 UNITS: 100 INJECTION, SOLUTION INTRAVENOUS; SUBCUTANEOUS at 11:25

## 2024-01-01 RX ADMIN — Medication 20 NG/KG/MIN: at 23:30

## 2024-01-01 RX ADMIN — PROPOFOL 60 MCG/KG/MIN: 10 INJECTION, EMULSION INTRAVENOUS at 16:09

## 2024-01-01 RX ADMIN — PROPOFOL 40 MCG/KG/MIN: 10 INJECTION, EMULSION INTRAVENOUS at 04:24

## 2024-01-01 RX ADMIN — Medication 1 PACKET: at 14:30

## 2024-01-01 RX ADMIN — HYDROCORTISONE SODIUM SUCCINATE 50 MG: 100 INJECTION, POWDER, FOR SOLUTION INTRAMUSCULAR; INTRAVENOUS at 21:42

## 2024-01-01 RX ADMIN — MIDAZOLAM HYDROCHLORIDE 3 MG/HR: 1 INJECTION, SOLUTION INTRAVENOUS at 08:38

## 2024-01-01 RX ADMIN — Medication 50 MCG: at 09:04

## 2024-01-01 RX ADMIN — ATORVASTATIN CALCIUM 40 MG: 40 TABLET, FILM COATED ORAL at 09:32

## 2024-01-01 RX ADMIN — OXYCODONE HYDROCHLORIDE 10 MG: 10 TABLET ORAL at 03:55

## 2024-01-01 RX ADMIN — METOLAZONE 5 MG: 5 TABLET ORAL at 10:02

## 2024-01-01 RX ADMIN — DULOXETINE HYDROCHLORIDE 30 MG: 30 CAPSULE, DELAYED RELEASE ORAL at 09:16

## 2024-01-01 RX ADMIN — Medication 10 MG: at 16:04

## 2024-01-01 RX ADMIN — POTASSIUM CHLORIDE 20 MEQ: 29.8 INJECTION, SOLUTION INTRAVENOUS at 05:45

## 2024-01-01 RX ADMIN — ALBUTEROL SULFATE 2.5 MG: 2.5 SOLUTION RESPIRATORY (INHALATION) at 20:03

## 2024-01-01 RX ADMIN — DULOXETINE HYDROCHLORIDE 30 MG: 30 CAPSULE, DELAYED RELEASE ORAL at 08:23

## 2024-01-01 RX ADMIN — MIRTAZAPINE 30 MG: 7.5 TABLET, FILM COATED ORAL at 21:04

## 2024-01-01 RX ADMIN — CHLORHEXIDINE GLUCONATE 0.12% ORAL RINSE 15 ML: 1.2 LIQUID ORAL at 20:10

## 2024-01-01 RX ADMIN — GABAPENTIN 900 MG: 300 CAPSULE ORAL at 19:57

## 2024-01-01 RX ADMIN — OXYCODONE HYDROCHLORIDE 5 MG: 5 TABLET ORAL at 11:44

## 2024-01-01 RX ADMIN — IPRATROPIUM BROMIDE AND ALBUTEROL SULFATE 3 ML: .5; 3 SOLUTION RESPIRATORY (INHALATION) at 00:44

## 2024-01-01 RX ADMIN — ASPIRIN 81 MG CHEWABLE TABLET 81 MG: 81 TABLET CHEWABLE at 07:40

## 2024-01-01 RX ADMIN — LEVETIRACETAM 1500 MG: 750 TABLET, FILM COATED ORAL at 19:48

## 2024-01-01 RX ADMIN — LACOSAMIDE ORAL SOLUTION 100 MG: 10 SOLUTION ORAL at 20:09

## 2024-01-01 RX ADMIN — Medication 15 ML: at 11:47

## 2024-01-01 RX ADMIN — Medication 50 MCG: at 16:16

## 2024-01-01 RX ADMIN — IOPAMIDOL 75 ML: 755 INJECTION, SOLUTION INTRAVENOUS at 11:21

## 2024-01-01 RX ADMIN — PROPOFOL 30 MCG/KG/MIN: 10 INJECTION, EMULSION INTRAVENOUS at 17:52

## 2024-01-01 RX ADMIN — LACOSAMIDE ORAL SOLUTION 100 MG: 10 SOLUTION ORAL at 20:00

## 2024-01-01 RX ADMIN — HYDROCORTISONE SODIUM SUCCINATE 50 MG: 100 INJECTION, POWDER, FOR SOLUTION INTRAMUSCULAR; INTRAVENOUS at 17:53

## 2024-01-01 RX ADMIN — LEVALBUTEROL HYDROCHLORIDE 1.25 MG: 1.25 SOLUTION RESPIRATORY (INHALATION) at 19:41

## 2024-01-01 RX ADMIN — Medication 10 MG: at 18:32

## 2024-01-01 RX ADMIN — PROPOFOL 60 MCG/KG/MIN: 10 INJECTION, EMULSION INTRAVENOUS at 08:28

## 2024-01-01 RX ADMIN — OXYCODONE HYDROCHLORIDE 5 MG: 5 TABLET ORAL at 13:44

## 2024-01-01 RX ADMIN — GABAPENTIN 900 MG: 250 SUSPENSION ORAL at 14:48

## 2024-01-01 RX ADMIN — LEVALBUTEROL HYDROCHLORIDE 1.25 MG: 1.25 SOLUTION RESPIRATORY (INHALATION) at 07:41

## 2024-01-01 RX ADMIN — SODIUM CHLORIDE SOLN NEBU 3% 3 ML: 3 NEBU SOLN at 03:38

## 2024-01-01 RX ADMIN — POTASSIUM CHLORIDE 20 MEQ: 29.8 INJECTION, SOLUTION INTRAVENOUS at 11:31

## 2024-01-01 RX ADMIN — INSULIN ASPART 8 UNITS: 100 INJECTION, SOLUTION INTRAVENOUS; SUBCUTANEOUS at 18:44

## 2024-01-01 RX ADMIN — GABAPENTIN 900 MG: 250 SUSPENSION ORAL at 08:06

## 2024-01-01 RX ADMIN — INSULIN HUMAN 6 UNITS/HR: 1 INJECTION, SOLUTION INTRAVENOUS at 20:35

## 2024-01-01 RX ADMIN — ATORVASTATIN CALCIUM 40 MG: 40 TABLET, FILM COATED ORAL at 09:10

## 2024-01-01 RX ADMIN — HYDROXYZINE HYDROCHLORIDE 50 MG: 50 TABLET, FILM COATED ORAL at 09:26

## 2024-01-01 RX ADMIN — METHOCARBAMOL 1000 MG: 500 TABLET ORAL at 06:03

## 2024-01-01 RX ADMIN — ENOXAPARIN SODIUM 40 MG: 40 INJECTION SUBCUTANEOUS at 09:32

## 2024-01-01 RX ADMIN — POTASSIUM CHLORIDE 20 MEQ: 29.8 INJECTION, SOLUTION INTRAVENOUS at 17:02

## 2024-01-01 RX ADMIN — HYDROXYZINE HYDROCHLORIDE 25 MG: 25 TABLET, FILM COATED ORAL at 17:22

## 2024-01-01 RX ADMIN — ENOXAPARIN SODIUM 40 MG: 40 INJECTION SUBCUTANEOUS at 09:31

## 2024-01-01 RX ADMIN — GABAPENTIN 900 MG: 300 CAPSULE ORAL at 09:10

## 2024-01-01 RX ADMIN — Medication 200 MCG/HR: at 22:55

## 2024-01-01 RX ADMIN — ATORVASTATIN CALCIUM 40 MG: 40 TABLET, FILM COATED ORAL at 08:10

## 2024-01-01 RX ADMIN — INSULIN HUMAN 7 UNITS/HR: 1 INJECTION, SOLUTION INTRAVENOUS at 05:05

## 2024-01-01 RX ADMIN — CHLORHEXIDINE GLUCONATE 0.12% ORAL RINSE 15 ML: 1.2 LIQUID ORAL at 07:35

## 2024-01-01 RX ADMIN — METOLAZONE 5 MG: 5 TABLET ORAL at 13:36

## 2024-01-01 RX ADMIN — Medication 50 MCG: at 08:30

## 2024-01-01 RX ADMIN — DEXAMETHASONE SODIUM PHOSPHATE 20 MG: 10 INJECTION, SOLUTION INTRAMUSCULAR; INTRAVENOUS at 09:24

## 2024-01-01 RX ADMIN — DULOXETINE HYDROCHLORIDE 30 MG: 30 CAPSULE, DELAYED RELEASE ORAL at 08:51

## 2024-01-01 RX ADMIN — DEXAMETHASONE SODIUM PHOSPHATE 20 MG: 10 INJECTION, SOLUTION INTRAMUSCULAR; INTRAVENOUS at 07:44

## 2024-01-01 RX ADMIN — INSULIN GLARGINE 50 UNITS: 100 INJECTION, SOLUTION SUBCUTANEOUS at 08:29

## 2024-01-01 RX ADMIN — GABAPENTIN 900 MG: 300 CAPSULE ORAL at 14:46

## 2024-01-01 RX ADMIN — POTASSIUM CHLORIDE 20 MEQ: 29.8 INJECTION, SOLUTION INTRAVENOUS at 16:40

## 2024-01-01 RX ADMIN — SODIUM CHLORIDE SOLN NEBU 3% 3 ML: 3 NEBU SOLN at 19:50

## 2024-01-01 RX ADMIN — INSULIN GLARGINE 50 UNITS: 100 INJECTION, SOLUTION SUBCUTANEOUS at 20:44

## 2024-01-01 RX ADMIN — LEVALBUTEROL HYDROCHLORIDE 1.25 MG: 1.25 SOLUTION RESPIRATORY (INHALATION) at 20:23

## 2024-01-01 RX ADMIN — Medication 50 MCG: at 16:10

## 2024-01-01 RX ADMIN — EPINEPHRINE 0.1 MCG/KG/MIN: 1 INJECTION INTRAMUSCULAR; INTRAVENOUS; SUBCUTANEOUS at 22:02

## 2024-01-01 RX ADMIN — Medication 50 MCG: at 10:28

## 2024-01-01 RX ADMIN — ACETAZOLAMIDE 500 MG: 250 TABLET ORAL at 15:47

## 2024-01-01 RX ADMIN — Medication 50 MCG: at 08:00

## 2024-01-01 RX ADMIN — IPRATROPIUM BROMIDE AND ALBUTEROL SULFATE 3 ML: .5; 3 SOLUTION RESPIRATORY (INHALATION) at 04:06

## 2024-01-01 RX ADMIN — POTASSIUM CHLORIDE 20 MEQ: 29.8 INJECTION, SOLUTION INTRAVENOUS at 05:28

## 2024-01-01 RX ADMIN — IPRATROPIUM BROMIDE AND ALBUTEROL SULFATE 3 ML: .5; 3 SOLUTION RESPIRATORY (INHALATION) at 08:30

## 2024-01-01 RX ADMIN — Medication 50 MCG: at 10:00

## 2024-01-01 RX ADMIN — METHOCARBAMOL 1000 MG: 500 TABLET ORAL at 05:12

## 2024-01-01 RX ADMIN — Medication 20 NG/KG/MIN: at 06:10

## 2024-01-01 RX ADMIN — ENOXAPARIN SODIUM 40 MG: 40 INJECTION SUBCUTANEOUS at 08:52

## 2024-01-01 RX ADMIN — LEVOTHYROXINE SODIUM 150 MCG: 0.15 TABLET ORAL at 08:23

## 2024-01-01 RX ADMIN — PROPOFOL 50 MCG/KG/MIN: 10 INJECTION, EMULSION INTRAVENOUS at 14:39

## 2024-01-01 RX ADMIN — Medication 150 MCG/HR: at 08:42

## 2024-01-01 RX ADMIN — OXYMETAZOLINE HYDROCHLORIDE 2 SPRAY: 0.05 SPRAY NASAL at 20:55

## 2024-01-01 RX ADMIN — PROPOFOL 40 MCG/KG/MIN: 10 INJECTION, EMULSION INTRAVENOUS at 01:32

## 2024-01-01 RX ADMIN — PROPOFOL 50 MCG/KG/MIN: 10 INJECTION, EMULSION INTRAVENOUS at 03:30

## 2024-01-01 RX ADMIN — HYDROCORTISONE SODIUM SUCCINATE 50 MG: 100 INJECTION, POWDER, FOR SOLUTION INTRAMUSCULAR; INTRAVENOUS at 17:38

## 2024-01-01 RX ADMIN — MIDAZOLAM 2 MG: 1 INJECTION INTRAMUSCULAR; INTRAVENOUS at 12:43

## 2024-01-01 RX ADMIN — OXYMETAZOLINE HYDROCHLORIDE 2 SPRAY: 0.05 SPRAY NASAL at 07:45

## 2024-01-01 RX ADMIN — Medication 20 NG/KG/MIN: at 06:03

## 2024-01-01 RX ADMIN — FLUOCINONIDE: 0.5 OINTMENT TOPICAL at 18:03

## 2024-01-01 RX ADMIN — THERA TABS 1 TABLET: TAB at 07:22

## 2024-01-01 RX ADMIN — HEPARIN SODIUM 1200 UNITS/HR: 10000 INJECTION, SOLUTION INTRAVENOUS at 07:44

## 2024-01-01 RX ADMIN — Medication 200 MCG/HR: at 03:32

## 2024-01-01 RX ADMIN — LACOSAMIDE ORAL SOLUTION 100 MG: 10 SOLUTION ORAL at 08:18

## 2024-01-01 RX ADMIN — MIDAZOLAM IN SODIUM CHLORIDE 2 MG/HR: 1 INJECTION INTRAVENOUS at 23:12

## 2024-01-01 RX ADMIN — Medication 50 MCG: at 12:00

## 2024-01-01 RX ADMIN — PROPOFOL 50 MCG/KG/MIN: 10 INJECTION, EMULSION INTRAVENOUS at 20:48

## 2024-01-01 RX ADMIN — VANCOMYCIN HYDROCHLORIDE 1250 MG: 10 INJECTION, POWDER, LYOPHILIZED, FOR SOLUTION INTRAVENOUS at 18:04

## 2024-01-01 RX ADMIN — HYDROXYZINE HYDROCHLORIDE 25 MG: 25 TABLET, FILM COATED ORAL at 09:31

## 2024-01-01 RX ADMIN — CHLORHEXIDINE GLUCONATE 0.12% ORAL RINSE 15 ML: 1.2 LIQUID ORAL at 07:59

## 2024-01-01 RX ADMIN — LEVETIRACETAM 1500 MG: 750 TABLET, FILM COATED ORAL at 19:26

## 2024-01-01 RX ADMIN — Medication 50 MCG: at 16:06

## 2024-01-01 RX ADMIN — LACOSAMIDE ORAL SOLUTION 100 MG: 10 SOLUTION ORAL at 22:13

## 2024-01-01 RX ADMIN — OXYCODONE HYDROCHLORIDE 5 MG: 5 TABLET ORAL at 11:34

## 2024-01-01 RX ADMIN — SODIUM BICARBONATE 100 MEQ: 84 INJECTION, SOLUTION INTRAVENOUS at 21:34

## 2024-01-01 RX ADMIN — Medication 100 MEQ: at 21:34

## 2024-01-01 RX ADMIN — MIRTAZAPINE 30 MG: 7.5 TABLET, FILM COATED ORAL at 22:10

## 2024-01-01 RX ADMIN — MIDAZOLAM HYDROCHLORIDE 3 MG/HR: 1 INJECTION, SOLUTION INTRAVENOUS at 20:14

## 2024-01-01 RX ADMIN — Medication 50 MCG/HR: at 21:33

## 2024-01-01 RX ADMIN — PROPOFOL 50 MCG/KG/MIN: 10 INJECTION, EMULSION INTRAVENOUS at 11:33

## 2024-01-01 RX ADMIN — SODIUM CHLORIDE SOLN NEBU 3% 3 ML: 3 NEBU SOLN at 20:20

## 2024-01-01 RX ADMIN — ENOXAPARIN SODIUM 40 MG: 40 INJECTION SUBCUTANEOUS at 07:52

## 2024-01-01 RX ADMIN — SODIUM CHLORIDE SOLN NEBU 3% 3 ML: 3 NEBU SOLN at 00:44

## 2024-01-01 RX ADMIN — ATORVASTATIN CALCIUM 40 MG: 40 TABLET, FILM COATED ORAL at 09:42

## 2024-01-01 RX ADMIN — LEVALBUTEROL HYDROCHLORIDE 1.25 MG: 1.25 SOLUTION RESPIRATORY (INHALATION) at 20:29

## 2024-01-01 RX ADMIN — INSULIN GLARGINE 50 UNITS: 100 INJECTION, SOLUTION SUBCUTANEOUS at 21:11

## 2024-01-01 RX ADMIN — METHOCARBAMOL 1000 MG: 500 TABLET ORAL at 06:13

## 2024-01-01 RX ADMIN — ENOXAPARIN SODIUM 40 MG: 40 INJECTION SUBCUTANEOUS at 09:43

## 2024-01-01 RX ADMIN — ALBUTEROL SULFATE 2.5 MG: 2.5 SOLUTION RESPIRATORY (INHALATION) at 08:22

## 2024-01-01 RX ADMIN — OXYCODONE HYDROCHLORIDE 5 MG: 5 TABLET ORAL at 16:06

## 2024-01-01 RX ADMIN — QUETIAPINE FUMARATE 100 MG: 100 TABLET ORAL at 05:05

## 2024-01-01 RX ADMIN — DEXMEDETOMIDINE HYDROCHLORIDE 1.2 MCG/KG/HR: 400 INJECTION INTRAVENOUS at 17:03

## 2024-01-01 RX ADMIN — LEVOTHYROXINE SODIUM 150 MCG: 75 TABLET ORAL at 07:22

## 2024-01-01 RX ADMIN — LACOSAMIDE 100 MG: 50 TABLET, FILM COATED ORAL at 17:10

## 2024-01-01 RX ADMIN — OXYCODONE HYDROCHLORIDE 10 MG: 10 TABLET ORAL at 00:19

## 2024-01-01 RX ADMIN — MIRTAZAPINE 30 MG: 7.5 TABLET, FILM COATED ORAL at 20:39

## 2024-01-01 RX ADMIN — HYDROCORTISONE SODIUM SUCCINATE 50 MG: 100 INJECTION, POWDER, FOR SOLUTION INTRAMUSCULAR; INTRAVENOUS at 04:21

## 2024-01-01 RX ADMIN — INSULIN HUMAN 1.5 UNITS/HR: 1 INJECTION, SOLUTION INTRAVENOUS at 02:32

## 2024-01-01 RX ADMIN — LEVOTHYROXINE SODIUM 150 MCG: 0.15 TABLET ORAL at 09:32

## 2024-01-01 RX ADMIN — IPRATROPIUM BROMIDE AND ALBUTEROL SULFATE 3 ML: .5; 3 SOLUTION RESPIRATORY (INHALATION) at 17:09

## 2024-01-01 RX ADMIN — QUETIAPINE FUMARATE 100 MG: 100 TABLET ORAL at 19:51

## 2024-01-01 RX ADMIN — Medication 1 PACKET: at 19:45

## 2024-01-01 RX ADMIN — DULOXETINE HYDROCHLORIDE 30 MG: 30 CAPSULE, DELAYED RELEASE ORAL at 09:10

## 2024-01-01 RX ADMIN — Medication 1 PACKET: at 20:56

## 2024-01-01 RX ADMIN — MIRTAZAPINE 30 MG: 7.5 TABLET, FILM COATED ORAL at 21:40

## 2024-01-01 RX ADMIN — FUROSEMIDE 40 MG: 10 INJECTION, SOLUTION INTRAVENOUS at 18:00

## 2024-01-01 RX ADMIN — GABAPENTIN 900 MG: 250 SUSPENSION ORAL at 19:46

## 2024-01-01 RX ADMIN — CHLORHEXIDINE GLUCONATE 0.12% ORAL RINSE 15 ML: 1.2 LIQUID ORAL at 07:22

## 2024-01-01 RX ADMIN — Medication 15 ML: at 07:38

## 2024-01-01 RX ADMIN — GABAPENTIN 900 MG: 250 SUSPENSION ORAL at 06:13

## 2024-01-01 RX ADMIN — GABAPENTIN 900 MG: 300 CAPSULE ORAL at 20:24

## 2024-01-01 RX ADMIN — OXYCODONE HYDROCHLORIDE 10 MG: 10 TABLET ORAL at 19:51

## 2024-01-01 RX ADMIN — OXYMETAZOLINE HYDROCHLORIDE 2 SPRAY: 0.05 SPRAY NASAL at 08:45

## 2024-01-01 RX ADMIN — Medication 1 PACKET: at 08:02

## 2024-01-01 RX ADMIN — GABAPENTIN 900 MG: 300 CAPSULE ORAL at 21:17

## 2024-01-01 RX ADMIN — ALBUTEROL SULFATE 2.5 MG: 2.5 SOLUTION RESPIRATORY (INHALATION) at 15:26

## 2024-01-01 RX ADMIN — ALBUTEROL SULFATE 2.5 MG: 2.5 SOLUTION RESPIRATORY (INHALATION) at 04:35

## 2024-01-01 RX ADMIN — METHOCARBAMOL 1000 MG: 500 TABLET ORAL at 13:42

## 2024-01-01 RX ADMIN — PROPOFOL 75 MCG/KG/MIN: 10 INJECTION, EMULSION INTRAVENOUS at 11:19

## 2024-01-01 RX ADMIN — Medication 10 MG: at 04:06

## 2024-01-01 RX ADMIN — POTASSIUM CHLORIDE 20 MEQ: 29.8 INJECTION, SOLUTION INTRAVENOUS at 22:01

## 2024-01-01 RX ADMIN — LACOSAMIDE ORAL SOLUTION 100 MG: 10 SOLUTION ORAL at 20:10

## 2024-01-01 RX ADMIN — POTASSIUM CHLORIDE 40 MEQ: 1.5 POWDER, FOR SOLUTION ORAL at 19:59

## 2024-01-01 RX ADMIN — LEVETIRACETAM 1500 MG: 100 SOLUTION ORAL at 07:39

## 2024-01-01 RX ADMIN — SODIUM CHLORIDE SOLN NEBU 3% 3 ML: 3 NEBU SOLN at 00:08

## 2024-01-01 RX ADMIN — Medication 50 MCG: at 19:38

## 2024-01-01 RX ADMIN — ATORVASTATIN CALCIUM 40 MG: 40 TABLET, FILM COATED ORAL at 08:46

## 2024-01-01 RX ADMIN — OXYCODONE HYDROCHLORIDE 10 MG: 10 TABLET ORAL at 11:50

## 2024-01-01 RX ADMIN — HEPARIN SODIUM 33700 UNITS: 10000 INJECTION INTRAVENOUS; SUBCUTANEOUS at 22:18

## 2024-01-01 RX ADMIN — PROPOFOL 50 MCG/KG/MIN: 10 INJECTION, EMULSION INTRAVENOUS at 23:18

## 2024-01-01 RX ADMIN — LEVALBUTEROL HYDROCHLORIDE 1.25 MG: 1.25 SOLUTION RESPIRATORY (INHALATION) at 07:49

## 2024-01-01 RX ADMIN — Medication 50 MCG: at 12:05

## 2024-01-01 RX ADMIN — ALBUTEROL SULFATE 2.5 MG: 2.5 SOLUTION RESPIRATORY (INHALATION) at 13:06

## 2024-01-01 RX ADMIN — QUETIAPINE FUMARATE 100 MG: 100 TABLET ORAL at 13:38

## 2024-01-01 RX ADMIN — ALBUTEROL SULFATE 2.5 MG: 2.5 SOLUTION RESPIRATORY (INHALATION) at 16:38

## 2024-01-01 RX ADMIN — PANTOPRAZOLE SODIUM 40 MG: 40 INJECTION, POWDER, FOR SOLUTION INTRAVENOUS at 19:51

## 2024-01-01 RX ADMIN — Medication 10 MG: at 13:57

## 2024-01-01 RX ADMIN — GABAPENTIN 900 MG: 300 CAPSULE ORAL at 13:56

## 2024-01-01 RX ADMIN — GABAPENTIN 900 MG: 300 CAPSULE ORAL at 13:28

## 2024-01-01 RX ADMIN — QUETIAPINE FUMARATE 100 MG: 100 TABLET ORAL at 13:29

## 2024-01-01 RX ADMIN — LEVETIRACETAM 1500 MG: 750 TABLET, FILM COATED ORAL at 07:50

## 2024-01-01 RX ADMIN — MIDAZOLAM HYDROCHLORIDE 4 MG/HR: 1 INJECTION, SOLUTION INTRAVENOUS at 04:56

## 2024-01-01 RX ADMIN — PIPERACILLIN SODIUM AND TAZOBACTAM SODIUM 3.38 G: 3; .375 INJECTION, POWDER, LYOPHILIZED, FOR SOLUTION INTRAVENOUS at 02:18

## 2024-01-01 RX ADMIN — CHLORHEXIDINE GLUCONATE 0.12% ORAL RINSE 15 ML: 1.2 LIQUID ORAL at 08:07

## 2024-01-01 RX ADMIN — OXYCODONE HYDROCHLORIDE 10 MG: 10 TABLET ORAL at 12:06

## 2024-01-01 RX ADMIN — POTASSIUM CHLORIDE 40 MEQ: 1.5 POWDER, FOR SOLUTION ORAL at 19:27

## 2024-01-01 RX ADMIN — OXYMETAZOLINE HYDROCHLORIDE 2 SPRAY: 0.05 SPRAY NASAL at 08:06

## 2024-01-01 RX ADMIN — HYDROXYZINE HYDROCHLORIDE 25 MG: 25 TABLET, FILM COATED ORAL at 10:04

## 2024-01-01 RX ADMIN — LACOSAMIDE ORAL SOLUTION 100 MG: 10 SOLUTION ORAL at 08:44

## 2024-01-01 RX ADMIN — ATORVASTATIN CALCIUM 40 MG: 40 TABLET, FILM COATED ORAL at 08:23

## 2024-01-01 RX ADMIN — ATORVASTATIN CALCIUM 40 MG: 40 TABLET, FILM COATED ORAL at 09:26

## 2024-01-01 RX ADMIN — OXYCODONE HYDROCHLORIDE 10 MG: 10 TABLET ORAL at 16:09

## 2024-01-01 RX ADMIN — POTASSIUM CHLORIDE 20 MEQ: 29.8 INJECTION, SOLUTION INTRAVENOUS at 17:10

## 2024-01-01 RX ADMIN — GABAPENTIN 900 MG: 300 CAPSULE ORAL at 20:35

## 2024-01-01 RX ADMIN — GABAPENTIN 900 MG: 300 CAPSULE ORAL at 19:26

## 2024-01-01 RX ADMIN — LACOSAMIDE 100 MG: 50 TABLET, FILM COATED ORAL at 08:10

## 2024-01-01 RX ADMIN — ATORVASTATIN CALCIUM 40 MG: 40 TABLET, FILM COATED ORAL at 09:16

## 2024-01-01 RX ADMIN — PROPOFOL 60 MCG/KG/MIN: 10 INJECTION, EMULSION INTRAVENOUS at 20:21

## 2024-01-01 RX ADMIN — SODIUM CHLORIDE SOLN NEBU 3% 3 ML: 3 NEBU SOLN at 16:36

## 2024-01-01 RX ADMIN — SODIUM CHLORIDE SOLN NEBU 3% 3 ML: 3 NEBU SOLN at 08:30

## 2024-01-01 RX ADMIN — Medication 50 MCG: at 15:31

## 2024-01-01 RX ADMIN — WHITE PETROLATUM 57.7 %-MINERAL OIL 31.9 % EYE OINTMENT: at 03:48

## 2024-01-01 RX ADMIN — INSULIN HUMAN 3 UNITS/HR: 1 INJECTION, SOLUTION INTRAVENOUS at 09:05

## 2024-01-01 RX ADMIN — GABAPENTIN 900 MG: 300 CAPSULE ORAL at 13:51

## 2024-01-01 RX ADMIN — Medication 2 UNITS/HR: at 12:11

## 2024-01-01 RX ADMIN — Medication 200 MCG/HR: at 10:39

## 2024-01-01 RX ADMIN — LEVALBUTEROL HYDROCHLORIDE 1.25 MG: 1.25 SOLUTION RESPIRATORY (INHALATION) at 08:27

## 2024-01-01 RX ADMIN — VANCOMYCIN HYDROCHLORIDE 1500 MG: 10 INJECTION, POWDER, LYOPHILIZED, FOR SOLUTION INTRAVENOUS at 15:31

## 2024-01-01 RX ADMIN — LEVOTHYROXINE SODIUM 150 MCG: 0.15 TABLET ORAL at 08:51

## 2024-01-01 RX ADMIN — QUETIAPINE FUMARATE 100 MG: 100 TABLET ORAL at 13:28

## 2024-01-01 RX ADMIN — Medication 1 PACKET: at 20:23

## 2024-01-01 RX ADMIN — OXYCODONE HYDROCHLORIDE 10 MG: 10 TABLET ORAL at 20:09

## 2024-01-01 RX ADMIN — POTASSIUM CHLORIDE 20 MEQ: 1.5 POWDER, FOR SOLUTION ORAL at 13:56

## 2024-01-01 RX ADMIN — POTASSIUM CHLORIDE 20 MEQ: 29.8 INJECTION, SOLUTION INTRAVENOUS at 20:06

## 2024-01-01 RX ADMIN — Medication 20 NG/KG/MIN: at 02:42

## 2024-01-01 RX ADMIN — QUETIAPINE FUMARATE 100 MG: 100 TABLET ORAL at 05:43

## 2024-01-01 RX ADMIN — OLANZAPINE 10 MG: 2.5 TABLET, FILM COATED ORAL at 19:49

## 2024-01-01 RX ADMIN — FUROSEMIDE 40 MG: 10 INJECTION, SOLUTION INTRAVENOUS at 10:59

## 2024-01-01 RX ADMIN — OXYMETAZOLINE HYDROCHLORIDE 2 SPRAY: 0.05 SPRAY NASAL at 07:42

## 2024-01-01 RX ADMIN — POTASSIUM CHLORIDE 40 MEQ: 1.5 POWDER, FOR SOLUTION ORAL at 08:19

## 2024-01-01 RX ADMIN — IPRATROPIUM BROMIDE AND ALBUTEROL SULFATE 3 ML: .5; 3 SOLUTION RESPIRATORY (INHALATION) at 19:54

## 2024-01-01 RX ADMIN — CHLORHEXIDINE GLUCONATE 0.12% ORAL RINSE 15 ML: 1.2 LIQUID ORAL at 20:54

## 2024-01-01 RX ADMIN — GABAPENTIN 900 MG: 300 CAPSULE ORAL at 13:36

## 2024-01-01 RX ADMIN — IPRATROPIUM BROMIDE AND ALBUTEROL SULFATE 3 ML: .5; 3 SOLUTION RESPIRATORY (INHALATION) at 04:29

## 2024-01-01 RX ADMIN — OXYCODONE HYDROCHLORIDE 10 MG: 10 TABLET ORAL at 12:13

## 2024-01-01 RX ADMIN — LEVETIRACETAM 1500 MG: 100 SOLUTION ORAL at 20:05

## 2024-01-01 RX ADMIN — FLUTICASONE PROPIONATE 1 SPRAY: 50 SPRAY, METERED NASAL at 14:19

## 2024-01-01 RX ADMIN — WHITE PETROLATUM 57.7 %-MINERAL OIL 31.9 % EYE OINTMENT: at 04:31

## 2024-01-01 RX ADMIN — OXYCODONE HYDROCHLORIDE 10 MG: 10 TABLET ORAL at 19:27

## 2024-01-01 RX ADMIN — GABAPENTIN 900 MG: 300 CAPSULE ORAL at 14:20

## 2024-01-01 RX ADMIN — GABAPENTIN 900 MG: 300 CAPSULE ORAL at 13:53

## 2024-01-01 RX ADMIN — PROPOFOL 75 MCG/KG/MIN: 10 INJECTION, EMULSION INTRAVENOUS at 22:13

## 2024-01-01 RX ADMIN — ENOXAPARIN SODIUM 40 MG: 40 INJECTION SUBCUTANEOUS at 08:46

## 2024-01-01 RX ADMIN — POTASSIUM CHLORIDE 20 MEQ: 29.8 INJECTION, SOLUTION INTRAVENOUS at 13:07

## 2024-01-01 RX ADMIN — CHLORHEXIDINE GLUCONATE 0.12% ORAL RINSE 15 ML: 1.2 LIQUID ORAL at 19:27

## 2024-01-01 RX ADMIN — GABAPENTIN 900 MG: 300 CAPSULE ORAL at 19:48

## 2024-01-01 RX ADMIN — MIRTAZAPINE 30 MG: 7.5 TABLET, FILM COATED ORAL at 21:16

## 2024-01-01 RX ADMIN — POTASSIUM CHLORIDE 20 MEQ: 29.8 INJECTION, SOLUTION INTRAVENOUS at 06:35

## 2024-01-01 RX ADMIN — ASPIRIN 81 MG CHEWABLE TABLET 81 MG: 81 TABLET CHEWABLE at 08:58

## 2024-01-01 RX ADMIN — VANCOMYCIN HYDROCHLORIDE 1500 MG: 10 INJECTION, POWDER, LYOPHILIZED, FOR SOLUTION INTRAVENOUS at 14:20

## 2024-01-01 RX ADMIN — Medication 10 MG: at 08:29

## 2024-01-01 RX ADMIN — PIPERACILLIN AND TAZOBACTAM 4.5 G: 4; .5 INJECTION, POWDER, FOR SOLUTION INTRAVENOUS at 10:28

## 2024-01-01 RX ADMIN — MIRTAZAPINE 30 MG: 7.5 TABLET, FILM COATED ORAL at 21:07

## 2024-01-01 RX ADMIN — POTASSIUM CHLORIDE 20 MEQ: 29.8 INJECTION, SOLUTION INTRAVENOUS at 23:25

## 2024-01-01 RX ADMIN — PROPOFOL 50 MCG/KG/MIN: 10 INJECTION, EMULSION INTRAVENOUS at 06:33

## 2024-01-01 RX ADMIN — OXYCODONE HYDROCHLORIDE 10 MG: 10 TABLET ORAL at 20:05

## 2024-01-01 RX ADMIN — POTASSIUM CHLORIDE 40 MEQ: 1.5 POWDER, FOR SOLUTION ORAL at 20:09

## 2024-01-01 RX ADMIN — ASPIRIN 81 MG CHEWABLE TABLET 81 MG: 81 TABLET CHEWABLE at 08:44

## 2024-01-01 RX ADMIN — MAGNESIUM SULFATE IN WATER 2 G: 40 INJECTION, SOLUTION INTRAVENOUS at 17:34

## 2024-01-01 RX ADMIN — PROPOFOL 40 MCG/KG/MIN: 10 INJECTION, EMULSION INTRAVENOUS at 06:16

## 2024-01-01 RX ADMIN — Medication 20 NG/KG/MIN: at 12:08

## 2024-01-01 RX ADMIN — MIRTAZAPINE 30 MG: 7.5 TABLET, FILM COATED ORAL at 21:42

## 2024-01-01 RX ADMIN — DULOXETINE HYDROCHLORIDE 30 MG: 30 CAPSULE, DELAYED RELEASE ORAL at 09:31

## 2024-01-01 RX ADMIN — ENOXAPARIN SODIUM 40 MG: 40 INJECTION SUBCUTANEOUS at 09:03

## 2024-01-01 RX ADMIN — DEXMEDETOMIDINE HYDROCHLORIDE 1.2 MCG/KG/HR: 400 INJECTION INTRAVENOUS at 23:59

## 2024-01-01 RX ADMIN — OXYCODONE HYDROCHLORIDE 10 MG: 10 TABLET ORAL at 20:00

## 2024-01-01 RX ADMIN — PIPERACILLIN AND TAZOBACTAM 4.5 G: 4; .5 INJECTION, POWDER, FOR SOLUTION INTRAVENOUS; PARENTERAL at 04:16

## 2024-01-01 RX ADMIN — LACOSAMIDE ORAL SOLUTION 100 MG: 10 SOLUTION ORAL at 07:46

## 2024-01-01 RX ADMIN — IPRATROPIUM BROMIDE AND ALBUTEROL SULFATE 3 ML: .5; 3 SOLUTION RESPIRATORY (INHALATION) at 08:11

## 2024-01-01 RX ADMIN — OXYCODONE HYDROCHLORIDE 10 MG: 10 TABLET ORAL at 04:16

## 2024-01-01 RX ADMIN — LEVALBUTEROL HYDROCHLORIDE 1.25 MG: 1.25 SOLUTION RESPIRATORY (INHALATION) at 08:43

## 2024-01-01 RX ADMIN — PANTOPRAZOLE SODIUM 40 MG: 40 INJECTION, POWDER, FOR SOLUTION INTRAVENOUS at 20:05

## 2024-01-01 RX ADMIN — ENOXAPARIN SODIUM 40 MG: 40 INJECTION SUBCUTANEOUS at 08:11

## 2024-01-01 RX ADMIN — LACOSAMIDE 100 MG: 50 TABLET, FILM COATED ORAL at 17:18

## 2024-01-01 RX ADMIN — Medication 50 MCG: at 15:30

## 2024-01-01 RX ADMIN — ALBUTEROL SULFATE 2.5 MG: 2.5 SOLUTION RESPIRATORY (INHALATION) at 19:26

## 2024-01-01 RX ADMIN — Medication 1 PACKET: at 07:23

## 2024-01-01 RX ADMIN — PIPERACILLIN AND TAZOBACTAM 4.5 G: 4; .5 INJECTION, POWDER, FOR SOLUTION INTRAVENOUS at 16:22

## 2024-01-01 RX ADMIN — PROPOFOL 50 MCG/KG/MIN: 10 INJECTION, EMULSION INTRAVENOUS at 02:12

## 2024-01-01 RX ADMIN — LEVETIRACETAM 1500 MG: 750 TABLET, FILM COATED ORAL at 20:24

## 2024-01-01 RX ADMIN — LACOSAMIDE 100 MG: 50 TABLET, FILM COATED ORAL at 08:55

## 2024-01-01 RX ADMIN — PROPOFOL 50 MCG/KG/MIN: 10 INJECTION, EMULSION INTRAVENOUS at 17:41

## 2024-01-01 RX ADMIN — KETAMINE HYDROCHLORIDE 0.5 MG/KG/HR: 100 INJECTION, SOLUTION, CONCENTRATE INTRAMUSCULAR; INTRAVENOUS at 11:45

## 2024-01-01 RX ADMIN — Medication 200 MCG/HR: at 20:20

## 2024-01-01 RX ADMIN — LACOSAMIDE 100 MG: 50 TABLET, FILM COATED ORAL at 18:11

## 2024-01-01 RX ADMIN — DEXTROSE MONOHYDRATE 25 ML: 25 INJECTION, SOLUTION INTRAVENOUS at 00:32

## 2024-01-01 RX ADMIN — METHOCARBAMOL 1000 MG: 500 TABLET ORAL at 19:51

## 2024-01-01 RX ADMIN — PANTOPRAZOLE SODIUM 40 MG: 40 INJECTION, POWDER, FOR SOLUTION INTRAVENOUS at 07:47

## 2024-01-01 RX ADMIN — INSULIN GLARGINE 18 UNITS: 100 INJECTION, SOLUTION SUBCUTANEOUS at 10:09

## 2024-01-01 RX ADMIN — SODIUM CHLORIDE SOLN NEBU 3% 3 ML: 3 NEBU SOLN at 21:52

## 2024-01-01 RX ADMIN — CHLORHEXIDINE GLUCONATE 0.12% ORAL RINSE 15 ML: 1.2 LIQUID ORAL at 19:51

## 2024-01-01 RX ADMIN — PROPOFOL 55 MCG/KG/MIN: 10 INJECTION, EMULSION INTRAVENOUS at 15:37

## 2024-01-01 RX ADMIN — GABAPENTIN 900 MG: 300 CAPSULE ORAL at 21:23

## 2024-01-01 RX ADMIN — INSULIN HUMAN 8 UNITS/HR: 1 INJECTION, SOLUTION INTRAVENOUS at 14:31

## 2024-01-01 RX ADMIN — HYDROXYZINE HYDROCHLORIDE 25 MG: 25 TABLET, FILM COATED ORAL at 16:06

## 2024-01-01 RX ADMIN — HEPARIN SODIUM 1700 UNITS/HR: 10000 INJECTION, SOLUTION INTRAVENOUS at 06:57

## 2024-01-01 RX ADMIN — IPRATROPIUM BROMIDE AND ALBUTEROL SULFATE 3 ML: .5; 3 SOLUTION RESPIRATORY (INHALATION) at 07:51

## 2024-01-01 RX ADMIN — Medication 1 PACKET: at 21:56

## 2024-01-01 RX ADMIN — Medication 50 MCG: at 04:26

## 2024-01-01 RX ADMIN — Medication 200 MCG/HR: at 13:34

## 2024-01-01 RX ADMIN — LEVALBUTEROL HYDROCHLORIDE 1.25 MG: 1.25 SOLUTION RESPIRATORY (INHALATION) at 08:30

## 2024-01-01 RX ADMIN — Medication 10 MG: at 15:48

## 2024-01-01 RX ADMIN — PIPERACILLIN AND TAZOBACTAM 4.5 G: 4; .5 INJECTION, POWDER, FOR SOLUTION INTRAVENOUS; PARENTERAL at 16:14

## 2024-01-01 RX ADMIN — GABAPENTIN 900 MG: 250 SUSPENSION ORAL at 13:20

## 2024-01-01 RX ADMIN — ENOXAPARIN SODIUM 40 MG: 40 INJECTION SUBCUTANEOUS at 08:23

## 2024-01-01 RX ADMIN — PANTOPRAZOLE SODIUM 40 MG: 40 INJECTION, POWDER, FOR SOLUTION INTRAVENOUS at 20:09

## 2024-01-01 RX ADMIN — OXYCODONE HYDROCHLORIDE 10 MG: 10 TABLET ORAL at 07:37

## 2024-01-01 RX ADMIN — Medication 10 MG: at 22:31

## 2024-01-01 RX ADMIN — GABAPENTIN 900 MG: 300 CAPSULE ORAL at 13:41

## 2024-01-01 RX ADMIN — PROPOFOL 60 MCG/KG/MIN: 10 INJECTION, EMULSION INTRAVENOUS at 11:00

## 2024-01-01 RX ADMIN — PIPERACILLIN AND TAZOBACTAM 4.5 G: 4; .5 INJECTION, POWDER, FOR SOLUTION INTRAVENOUS; PARENTERAL at 15:47

## 2024-01-01 RX ADMIN — ALBUTEROL SULFATE 2.5 MG: 2.5 SOLUTION RESPIRATORY (INHALATION) at 11:34

## 2024-01-01 RX ADMIN — LEVETIRACETAM 1500 MG: 100 SOLUTION ORAL at 19:38

## 2024-01-01 RX ADMIN — METHOCARBAMOL 1000 MG: 500 TABLET ORAL at 05:48

## 2024-01-01 RX ADMIN — LACOSAMIDE 100 MG: 50 TABLET, FILM COATED ORAL at 18:20

## 2024-01-01 RX ADMIN — POTASSIUM CHLORIDE 20 MEQ: 29.8 INJECTION, SOLUTION INTRAVENOUS at 17:35

## 2024-01-01 RX ADMIN — VANCOMYCIN HYDROCHLORIDE 1500 MG: 10 INJECTION, POWDER, LYOPHILIZED, FOR SOLUTION INTRAVENOUS at 01:34

## 2024-01-01 RX ADMIN — GABAPENTIN 300 MG: 250 SUSPENSION ORAL at 21:01

## 2024-01-01 RX ADMIN — GABAPENTIN 900 MG: 300 CAPSULE ORAL at 13:21

## 2024-01-01 RX ADMIN — POTASSIUM CHLORIDE 40 MEQ: 1.5 POWDER, FOR SOLUTION ORAL at 08:08

## 2024-01-01 RX ADMIN — CEFTRIAXONE SODIUM 2 G: 2 INJECTION, POWDER, FOR SOLUTION INTRAMUSCULAR; INTRAVENOUS at 14:01

## 2024-01-01 RX ADMIN — PROPOFOL 65 MCG/KG/MIN: 10 INJECTION, EMULSION INTRAVENOUS at 01:39

## 2024-01-01 RX ADMIN — ALBUTEROL SULFATE 2.5 MG: 2.5 SOLUTION RESPIRATORY (INHALATION) at 05:08

## 2024-01-01 RX ADMIN — ASPIRIN 81 MG CHEWABLE TABLET 81 MG: 81 TABLET CHEWABLE at 07:35

## 2024-01-01 RX ADMIN — OXYMETAZOLINE HYDROCHLORIDE 2 SPRAY: 0.05 SPRAY NASAL at 13:44

## 2024-01-01 RX ADMIN — Medication 20 NG/KG/MIN: at 01:15

## 2024-01-01 RX ADMIN — QUETIAPINE FUMARATE 100 MG: 100 TABLET ORAL at 21:55

## 2024-01-01 RX ADMIN — HEPARIN SODIUM 3 ML/HR: 200 INJECTION, SOLUTION INTRAVENOUS at 20:42

## 2024-01-01 RX ADMIN — METHOCARBAMOL 1000 MG: 500 TABLET ORAL at 05:46

## 2024-01-01 RX ADMIN — Medication 1 PACKET: at 08:43

## 2024-01-01 RX ADMIN — LEVETIRACETAM 1500 MG: 100 SOLUTION ORAL at 07:47

## 2024-01-01 RX ADMIN — HEPARIN SODIUM 1600 UNITS/HR: 10000 INJECTION, SOLUTION INTRAVENOUS at 11:01

## 2024-01-01 RX ADMIN — PROPOFOL 30 MCG/KG/MIN: 10 INJECTION, EMULSION INTRAVENOUS at 13:55

## 2024-01-01 RX ADMIN — SODIUM CHLORIDE 3 ML: 9 INJECTION INTRAMUSCULAR; INTRAVENOUS; SUBCUTANEOUS at 16:03

## 2024-01-01 RX ADMIN — INSULIN GLARGINE 18 UNITS: 100 INJECTION, SOLUTION SUBCUTANEOUS at 10:02

## 2024-01-01 RX ADMIN — OXYCODONE HYDROCHLORIDE 5 MG: 5 TABLET ORAL at 04:39

## 2024-01-01 RX ADMIN — PIPERACILLIN AND TAZOBACTAM 4.5 G: 4; .5 INJECTION, POWDER, FOR SOLUTION INTRAVENOUS; PARENTERAL at 21:53

## 2024-01-01 RX ADMIN — LEVETIRACETAM 1500 MG: 750 TABLET, FILM COATED ORAL at 08:51

## 2024-01-01 RX ADMIN — ALBUTEROL SULFATE 2.5 MG: 2.5 SOLUTION RESPIRATORY (INHALATION) at 19:29

## 2024-01-01 RX ADMIN — VANCOMYCIN HYDROCHLORIDE 1250 MG: 10 INJECTION, POWDER, LYOPHILIZED, FOR SOLUTION INTRAVENOUS at 17:31

## 2024-01-01 RX ADMIN — IPRATROPIUM BROMIDE AND ALBUTEROL SULFATE 3 ML: .5; 3 SOLUTION RESPIRATORY (INHALATION) at 16:09

## 2024-01-01 RX ADMIN — INSULIN ASPART 10 UNITS: 100 INJECTION, SOLUTION INTRAVENOUS; SUBCUTANEOUS at 14:22

## 2024-01-01 RX ADMIN — INSULIN GLARGINE 20 UNITS: 100 INJECTION, SOLUTION SUBCUTANEOUS at 22:49

## 2024-01-01 RX ADMIN — METHOCARBAMOL 1000 MG: 500 TABLET ORAL at 13:38

## 2024-01-01 RX ADMIN — PANTOPRAZOLE SODIUM 40 MG: 40 INJECTION, POWDER, FOR SOLUTION INTRAVENOUS at 19:26

## 2024-01-01 RX ADMIN — GABAPENTIN 900 MG: 250 SUSPENSION ORAL at 13:39

## 2024-01-01 RX ADMIN — LEVETIRACETAM 1500 MG: 750 TABLET, FILM COATED ORAL at 19:13

## 2024-01-01 RX ADMIN — Medication 100 MCG/HR: at 16:38

## 2024-01-01 RX ADMIN — INSULIN GLARGINE 18 UNITS: 100 INJECTION, SOLUTION SUBCUTANEOUS at 10:05

## 2024-01-01 RX ADMIN — INSULIN ASPART 9 UNITS: 100 INJECTION, SOLUTION INTRAVENOUS; SUBCUTANEOUS at 11:14

## 2024-01-01 RX ADMIN — CHLORHEXIDINE GLUCONATE 0.12% ORAL RINSE 15 ML: 1.2 LIQUID ORAL at 19:39

## 2024-01-01 RX ADMIN — Medication 50 MCG: at 14:37

## 2024-01-01 RX ADMIN — ALBUTEROL SULFATE 2.5 MG: 2.5 SOLUTION RESPIRATORY (INHALATION) at 09:08

## 2024-01-01 RX ADMIN — LEVETIRACETAM 1500 MG: 750 TABLET, FILM COATED ORAL at 20:02

## 2024-01-01 RX ADMIN — OXYCODONE HYDROCHLORIDE 5 MG: 5 TABLET ORAL at 23:42

## 2024-01-01 RX ADMIN — SODIUM CHLORIDE SOLN NEBU 3% 3 ML: 3 NEBU SOLN at 04:40

## 2024-01-01 RX ADMIN — POTASSIUM CHLORIDE 40 MEQ: 1.5 POWDER, FOR SOLUTION ORAL at 19:26

## 2024-01-01 RX ADMIN — VANCOMYCIN HYDROCHLORIDE 1500 MG: 10 INJECTION, POWDER, LYOPHILIZED, FOR SOLUTION INTRAVENOUS at 02:49

## 2024-01-01 RX ADMIN — INSULIN HUMAN 20 UNITS: 100 INJECTION, SUSPENSION SUBCUTANEOUS at 21:58

## 2024-01-01 RX ADMIN — Medication 50 MCG: at 07:30

## 2024-01-01 RX ADMIN — OLANZAPINE 10 MG: 2.5 TABLET, FILM COATED ORAL at 07:43

## 2024-01-01 RX ADMIN — FUROSEMIDE 80 MG: 10 INJECTION, SOLUTION INTRAVENOUS at 12:11

## 2024-01-01 RX ADMIN — Medication 50 MCG: at 10:25

## 2024-01-01 RX ADMIN — HYDROCORTISONE SODIUM SUCCINATE 50 MG: 100 INJECTION, POWDER, FOR SOLUTION INTRAMUSCULAR; INTRAVENOUS at 11:47

## 2024-01-01 RX ADMIN — HUMAN ALBUMIN MICROSPHERES AND PERFLUTREN 7 ML: 10; .22 INJECTION, SOLUTION INTRAVENOUS at 10:18

## 2024-01-01 RX ADMIN — METHOCARBAMOL 1000 MG: 500 TABLET ORAL at 13:28

## 2024-01-01 RX ADMIN — PROPOFOL 75 MCG/KG/MIN: 10 INJECTION, EMULSION INTRAVENOUS at 05:15

## 2024-01-01 RX ADMIN — Medication 100 MCG/HR: at 11:34

## 2024-01-01 RX ADMIN — Medication 200 MCG/HR: at 11:15

## 2024-01-01 RX ADMIN — DULOXETINE HYDROCHLORIDE 30 MG: 30 CAPSULE, DELAYED RELEASE ORAL at 08:58

## 2024-01-01 RX ADMIN — MIRTAZAPINE 30 MG: 7.5 TABLET, FILM COATED ORAL at 22:25

## 2024-01-01 RX ADMIN — FUROSEMIDE 80 MG: 10 INJECTION, SOLUTION INTRAVENOUS at 17:43

## 2024-01-01 RX ADMIN — Medication 1 PACKET: at 07:42

## 2024-01-01 RX ADMIN — DEXMEDETOMIDINE HYDROCHLORIDE 1.2 MCG/KG/HR: 400 INJECTION INTRAVENOUS at 08:47

## 2024-01-01 RX ADMIN — METHOCARBAMOL 1000 MG: 500 TABLET ORAL at 14:04

## 2024-01-01 RX ADMIN — INSULIN HUMAN 6 UNITS/HR: 1 INJECTION, SOLUTION INTRAVENOUS at 15:14

## 2024-01-01 RX ADMIN — ASPIRIN 81 MG CHEWABLE TABLET 81 MG: 81 TABLET CHEWABLE at 09:43

## 2024-01-01 RX ADMIN — LEVETIRACETAM 1500 MG: 750 TABLET, FILM COATED ORAL at 08:52

## 2024-01-01 RX ADMIN — POTASSIUM CHLORIDE 20 MEQ: 29.8 INJECTION, SOLUTION INTRAVENOUS at 23:04

## 2024-01-01 RX ADMIN — LEVALBUTEROL HYDROCHLORIDE 1.25 MG: 1.25 SOLUTION RESPIRATORY (INHALATION) at 08:39

## 2024-01-01 RX ADMIN — Medication 20 NG/KG/MIN: at 12:18

## 2024-01-01 RX ADMIN — LEVALBUTEROL HYDROCHLORIDE 1.25 MG: 1.25 SOLUTION RESPIRATORY (INHALATION) at 08:09

## 2024-01-01 RX ADMIN — INSULIN GLARGINE 15 UNITS: 100 INJECTION, SOLUTION SUBCUTANEOUS at 22:10

## 2024-01-01 RX ADMIN — LEVETIRACETAM 1500 MG: 100 SOLUTION ORAL at 08:18

## 2024-01-01 RX ADMIN — Medication 1 PACKET: at 10:53

## 2024-01-01 RX ADMIN — OLANZAPINE 10 MG: 2.5 TABLET, FILM COATED ORAL at 20:54

## 2024-01-01 RX ADMIN — OXYCODONE HYDROCHLORIDE 10 MG: 10 TABLET ORAL at 16:22

## 2024-01-01 RX ADMIN — PROPOFOL 40 MCG/KG/MIN: 10 INJECTION, EMULSION INTRAVENOUS at 14:31

## 2024-01-01 RX ADMIN — Medication 50 MCG: at 22:43

## 2024-01-01 RX ADMIN — OXYCODONE HYDROCHLORIDE 10 MG: 10 TABLET ORAL at 07:23

## 2024-01-01 RX ADMIN — SODIUM BICARBONATE 50 MEQ: 84 INJECTION, SOLUTION INTRAVENOUS at 01:37

## 2024-01-01 RX ADMIN — Medication 100 MCG/HR: at 14:04

## 2024-01-01 RX ADMIN — POTASSIUM CHLORIDE 40 MEQ: 1.5 POWDER, FOR SOLUTION ORAL at 07:23

## 2024-01-01 RX ADMIN — GABAPENTIN 900 MG: 300 CAPSULE ORAL at 14:19

## 2024-01-01 RX ADMIN — OLANZAPINE 2.5 MG: 10 INJECTION, POWDER, LYOPHILIZED, FOR SOLUTION INTRAMUSCULAR at 22:37

## 2024-01-01 RX ADMIN — OXYMETAZOLINE HYDROCHLORIDE 2 SPRAY: 0.05 SPRAY NASAL at 20:14

## 2024-01-01 RX ADMIN — GABAPENTIN 900 MG: 250 SUSPENSION ORAL at 07:45

## 2024-01-01 RX ADMIN — FENTANYL CITRATE 100 MCG: 50 INJECTION, SOLUTION INTRAMUSCULAR; INTRAVENOUS at 17:00

## 2024-01-01 RX ADMIN — INSULIN HUMAN 5 UNITS/HR: 1 INJECTION, SOLUTION INTRAVENOUS at 09:21

## 2024-01-01 RX ADMIN — IPRATROPIUM BROMIDE AND ALBUTEROL SULFATE 3 ML: .5; 3 SOLUTION RESPIRATORY (INHALATION) at 04:21

## 2024-01-01 RX ADMIN — Medication 125 MCG: at 07:22

## 2024-01-01 RX ADMIN — Medication 1 PACKET: at 13:39

## 2024-01-01 RX ADMIN — LEVOTHYROXINE SODIUM 150 MCG: 0.15 TABLET ORAL at 09:10

## 2024-01-01 RX ADMIN — HYDROXYZINE HYDROCHLORIDE 25 MG: 25 TABLET, FILM COATED ORAL at 09:33

## 2024-01-01 RX ADMIN — PROPOFOL 75 MCG/KG/MIN: 10 INJECTION, EMULSION INTRAVENOUS at 21:52

## 2024-01-01 RX ADMIN — LEVALBUTEROL HYDROCHLORIDE 1.25 MG: 1.25 SOLUTION RESPIRATORY (INHALATION) at 20:44

## 2024-01-01 RX ADMIN — Medication 1 PACKET: at 07:39

## 2024-01-01 RX ADMIN — MIDAZOLAM HYDROCHLORIDE 3 MG/HR: 1 INJECTION, SOLUTION INTRAVENOUS at 02:06

## 2024-01-01 RX ADMIN — FLUOCINONIDE: 0.5 OINTMENT TOPICAL at 13:24

## 2024-01-01 RX ADMIN — OXYMETAZOLINE HYDROCHLORIDE 2 SPRAY: 0.05 SPRAY NASAL at 07:47

## 2024-01-01 RX ADMIN — PROPOFOL 50 MCG/KG/MIN: 10 INJECTION, EMULSION INTRAVENOUS at 09:00

## 2024-01-01 RX ADMIN — INSULIN ASPART 2 UNITS: 100 INJECTION, SOLUTION INTRAVENOUS; SUBCUTANEOUS at 17:55

## 2024-01-01 RX ADMIN — OXYCODONE HYDROCHLORIDE 10 MG: 10 TABLET ORAL at 20:13

## 2024-01-01 RX ADMIN — DULOXETINE HYDROCHLORIDE 30 MG: 30 CAPSULE, DELAYED RELEASE ORAL at 09:00

## 2024-01-01 RX ADMIN — PROPOFOL 50 MCG/KG/MIN: 10 INJECTION, EMULSION INTRAVENOUS at 05:00

## 2024-01-01 RX ADMIN — CHLORHEXIDINE GLUCONATE 0.12% ORAL RINSE 15 ML: 1.2 LIQUID ORAL at 19:44

## 2024-01-01 RX ADMIN — PROPOFOL 45 MCG/KG/MIN: 10 INJECTION, EMULSION INTRAVENOUS at 23:34

## 2024-01-01 RX ADMIN — IPRATROPIUM BROMIDE AND ALBUTEROL SULFATE 3 ML: .5; 3 SOLUTION RESPIRATORY (INHALATION) at 08:52

## 2024-01-01 RX ADMIN — ASPIRIN 81 MG CHEWABLE TABLET 81 MG: 81 TABLET CHEWABLE at 08:46

## 2024-01-01 RX ADMIN — PANTOPRAZOLE SODIUM 40 MG: 40 INJECTION, POWDER, FOR SOLUTION INTRAVENOUS at 19:28

## 2024-01-01 RX ADMIN — LEVETIRACETAM 1500 MG: 750 TABLET, FILM COATED ORAL at 09:32

## 2024-01-01 RX ADMIN — MAGNESIUM SULFATE IN WATER 2 G: 40 INJECTION, SOLUTION INTRAVENOUS at 17:56

## 2024-01-01 RX ADMIN — METOLAZONE 5 MG: 5 TABLET ORAL at 09:23

## 2024-01-01 RX ADMIN — Medication 10 MG: at 11:24

## 2024-01-01 RX ADMIN — POTASSIUM & SODIUM PHOSPHATES POWDER PACK 280-160-250 MG 1 PACKET: 280-160-250 PACK at 13:45

## 2024-01-01 RX ADMIN — QUETIAPINE FUMARATE 100 MG: 100 TABLET ORAL at 06:13

## 2024-01-01 RX ADMIN — GABAPENTIN 900 MG: 250 SUSPENSION ORAL at 21:43

## 2024-01-01 RX ADMIN — QUETIAPINE FUMARATE 100 MG: 100 TABLET ORAL at 07:38

## 2024-01-01 RX ADMIN — LEVETIRACETAM 1500 MG: 15 INJECTION INTRAVENOUS at 00:19

## 2024-01-01 RX ADMIN — DEXMEDETOMIDINE HYDROCHLORIDE 1 MCG/KG/HR: 400 INJECTION INTRAVENOUS at 06:05

## 2024-01-01 RX ADMIN — Medication 50 MCG: at 11:24

## 2024-01-01 RX ADMIN — FUROSEMIDE 40 MG: 10 INJECTION, SOLUTION INTRAVENOUS at 10:46

## 2024-01-01 RX ADMIN — LEVALBUTEROL HYDROCHLORIDE 1.25 MG: 1.25 SOLUTION RESPIRATORY (INHALATION) at 08:04

## 2024-01-01 RX ADMIN — ALBUTEROL SULFATE 2.5 MG: 2.5 SOLUTION RESPIRATORY (INHALATION) at 12:43

## 2024-01-01 RX ADMIN — HYDROXYZINE HYDROCHLORIDE 50 MG: 50 TABLET, FILM COATED ORAL at 12:35

## 2024-01-01 RX ADMIN — LEVETIRACETAM 1500 MG: 750 TABLET, FILM COATED ORAL at 08:58

## 2024-01-01 RX ADMIN — Medication 20 NG/KG/MIN: at 12:30

## 2024-01-01 RX ADMIN — LEVALBUTEROL HYDROCHLORIDE 1.25 MG: 1.25 SOLUTION RESPIRATORY (INHALATION) at 08:22

## 2024-01-01 RX ADMIN — OXYCODONE HYDROCHLORIDE 10 MG: 10 TABLET ORAL at 12:17

## 2024-01-01 RX ADMIN — QUETIAPINE FUMARATE 100 MG: 100 TABLET ORAL at 20:05

## 2024-01-01 RX ADMIN — METHOCARBAMOL 1000 MG: 500 TABLET ORAL at 16:33

## 2024-01-01 RX ADMIN — LACOSAMIDE 100 MG: 50 TABLET, FILM COATED ORAL at 18:30

## 2024-01-01 RX ADMIN — OXYCODONE HYDROCHLORIDE 10 MG: 10 TABLET ORAL at 00:11

## 2024-01-01 RX ADMIN — Medication 10 MG: at 05:43

## 2024-01-01 RX ADMIN — Medication 15 ML: at 08:05

## 2024-01-01 RX ADMIN — LEVALBUTEROL HYDROCHLORIDE 1.25 MG: 1.25 SOLUTION RESPIRATORY (INHALATION) at 21:27

## 2024-01-01 RX ADMIN — OXYCODONE HYDROCHLORIDE 5 MG: 5 TABLET ORAL at 10:25

## 2024-01-01 RX ADMIN — ALBUTEROL SULFATE 2.5 MG: 2.5 SOLUTION RESPIRATORY (INHALATION) at 03:39

## 2024-01-01 RX ADMIN — Medication 200 MCG/HR: at 03:16

## 2024-01-01 RX ADMIN — IPRATROPIUM BROMIDE AND ALBUTEROL SULFATE 3 ML: .5; 3 SOLUTION RESPIRATORY (INHALATION) at 11:53

## 2024-01-01 RX ADMIN — OXYCODONE HYDROCHLORIDE 10 MG: 10 TABLET ORAL at 12:11

## 2024-01-01 RX ADMIN — LACOSAMIDE 100 MG: 50 TABLET, FILM COATED ORAL at 18:36

## 2024-01-01 RX ADMIN — GABAPENTIN 900 MG: 300 CAPSULE ORAL at 08:34

## 2024-01-01 RX ADMIN — PROPOFOL 40 MCG/KG/MIN: 10 INJECTION, EMULSION INTRAVENOUS at 22:01

## 2024-01-01 RX ADMIN — QUETIAPINE FUMARATE 100 MG: 100 TABLET ORAL at 13:45

## 2024-01-01 RX ADMIN — IPRATROPIUM BROMIDE AND ALBUTEROL SULFATE 3 ML: .5; 3 SOLUTION RESPIRATORY (INHALATION) at 20:25

## 2024-01-01 RX ADMIN — CHLORHEXIDINE GLUCONATE 0.12% ORAL RINSE 15 ML: 1.2 LIQUID ORAL at 08:44

## 2024-01-01 RX ADMIN — Medication 200 MCG/HR: at 02:07

## 2024-01-01 RX ADMIN — FUROSEMIDE 40 MG: 10 INJECTION, SOLUTION INTRAMUSCULAR; INTRAVENOUS at 16:39

## 2024-01-01 RX ADMIN — ALBUTEROL SULFATE 2.5 MG: 2.5 SOLUTION RESPIRATORY (INHALATION) at 04:17

## 2024-01-01 RX ADMIN — GABAPENTIN 900 MG: 300 CAPSULE ORAL at 19:53

## 2024-01-01 RX ADMIN — Medication 200 MCG/HR: at 00:18

## 2024-01-01 RX ADMIN — CEFTRIAXONE SODIUM 2 G: 2 INJECTION, POWDER, FOR SOLUTION INTRAMUSCULAR; INTRAVENOUS at 13:55

## 2024-01-01 RX ADMIN — MIRTAZAPINE 30 MG: 7.5 TABLET, FILM COATED ORAL at 21:37

## 2024-01-01 RX ADMIN — LEVETIRACETAM 1500 MG: 750 TABLET, FILM COATED ORAL at 20:34

## 2024-01-01 RX ADMIN — INSULIN GLARGINE 50 UNITS: 100 INJECTION, SOLUTION SUBCUTANEOUS at 08:23

## 2024-01-01 RX ADMIN — HEPARIN SODIUM 1600 UNITS/HR: 10000 INJECTION, SOLUTION INTRAVENOUS at 00:02

## 2024-01-01 RX ADMIN — OLANZAPINE 10 MG: 2.5 TABLET, FILM COATED ORAL at 19:44

## 2024-01-01 RX ADMIN — Medication 150 MCG/HR: at 09:31

## 2024-01-01 RX ADMIN — FLUOCINONIDE: 0.5 OINTMENT TOPICAL at 17:37

## 2024-01-01 RX ADMIN — GABAPENTIN 900 MG: 300 CAPSULE ORAL at 08:10

## 2024-01-01 RX ADMIN — PIPERACILLIN AND TAZOBACTAM 4.5 G: 4; .5 INJECTION, POWDER, FOR SOLUTION INTRAVENOUS; PARENTERAL at 22:08

## 2024-01-01 RX ADMIN — HYDROXYZINE HYDROCHLORIDE 25 MG: 25 TABLET, FILM COATED ORAL at 07:18

## 2024-01-01 RX ADMIN — OLANZAPINE 10 MG: 2.5 TABLET, FILM COATED ORAL at 13:44

## 2024-01-01 RX ADMIN — LEVETIRACETAM 1500 MG: 750 TABLET, FILM COATED ORAL at 19:53

## 2024-01-01 RX ADMIN — CHLORHEXIDINE GLUCONATE 0.12% ORAL RINSE 15 ML: 1.2 LIQUID ORAL at 07:47

## 2024-01-01 RX ADMIN — LEVETIRACETAM 1500 MG: 750 TABLET, FILM COATED ORAL at 09:00

## 2024-01-01 RX ADMIN — INSULIN GLARGINE 18 UNITS: 100 INJECTION, SOLUTION SUBCUTANEOUS at 10:04

## 2024-01-01 RX ADMIN — OXYCODONE HYDROCHLORIDE 5 MG: 5 TABLET ORAL at 07:38

## 2024-01-01 RX ADMIN — DEXMEDETOMIDINE HYDROCHLORIDE 1.2 MCG/KG/HR: 400 INJECTION INTRAVENOUS at 04:30

## 2024-01-01 RX ADMIN — Medication 50 MCG: at 10:04

## 2024-01-01 RX ADMIN — SODIUM CHLORIDE SOLN NEBU 3% 3 ML: 3 NEBU SOLN at 09:08

## 2024-01-01 RX ADMIN — POTASSIUM CHLORIDE 20 MEQ: 1.5 POWDER, FOR SOLUTION ORAL at 18:04

## 2024-01-01 RX ADMIN — ASPIRIN 81 MG CHEWABLE TABLET 81 MG: 81 TABLET CHEWABLE at 09:10

## 2024-01-01 RX ADMIN — Medication 50 MCG: at 19:47

## 2024-01-01 RX ADMIN — IPRATROPIUM BROMIDE AND ALBUTEROL SULFATE 3 ML: .5; 3 SOLUTION RESPIRATORY (INHALATION) at 12:43

## 2024-01-01 RX ADMIN — HYDROCORTISONE SODIUM SUCCINATE 50 MG: 100 INJECTION, POWDER, FOR SOLUTION INTRAMUSCULAR; INTRAVENOUS at 06:20

## 2024-01-01 RX ADMIN — SODIUM CHLORIDE SOLN NEBU 3% 3 ML: 3 NEBU SOLN at 11:46

## 2024-01-01 RX ADMIN — ENOXAPARIN SODIUM 40 MG: 40 INJECTION SUBCUTANEOUS at 08:22

## 2024-01-01 RX ADMIN — Medication 125 MCG: at 08:05

## 2024-01-01 RX ADMIN — Medication 50 MCG: at 02:46

## 2024-01-01 RX ADMIN — PROPOFOL 30 MCG/KG/MIN: 10 INJECTION, EMULSION INTRAVENOUS at 09:27

## 2024-01-01 RX ADMIN — LEVOTHYROXINE SODIUM 150 MCG: 0.15 TABLET ORAL at 08:33

## 2024-01-01 RX ADMIN — GABAPENTIN 900 MG: 250 SUSPENSION ORAL at 13:55

## 2024-01-01 RX ADMIN — LACOSAMIDE ORAL SOLUTION 100 MG: 10 SOLUTION ORAL at 19:51

## 2024-01-01 RX ADMIN — HEPARIN SODIUM 1700 UNITS/HR: 10000 INJECTION, SOLUTION INTRAVENOUS at 23:37

## 2024-01-01 RX ADMIN — Medication 125 MCG: at 07:35

## 2024-01-01 RX ADMIN — OXYCODONE HYDROCHLORIDE 10 MG: 10 TABLET ORAL at 08:08

## 2024-01-01 RX ADMIN — VANCOMYCIN HYDROCHLORIDE 1500 MG: 10 INJECTION, POWDER, LYOPHILIZED, FOR SOLUTION INTRAVENOUS at 13:38

## 2024-01-01 RX ADMIN — ASPIRIN 81 MG CHEWABLE TABLET 81 MG: 81 TABLET CHEWABLE at 08:33

## 2024-01-01 RX ADMIN — FUROSEMIDE 80 MG: 10 INJECTION, SOLUTION INTRAVENOUS at 11:39

## 2024-01-01 RX ADMIN — LEVETIRACETAM 1500 MG: 750 TABLET, FILM COATED ORAL at 08:23

## 2024-01-01 RX ADMIN — ASPIRIN 81 MG CHEWABLE TABLET 81 MG: 81 TABLET CHEWABLE at 08:08

## 2024-01-01 RX ADMIN — WHITE PETROLATUM 57.7 %-MINERAL OIL 31.9 % EYE OINTMENT: at 20:15

## 2024-01-01 RX ADMIN — LACOSAMIDE ORAL SOLUTION 100 MG: 10 SOLUTION ORAL at 19:49

## 2024-01-01 RX ADMIN — DULOXETINE HYDROCHLORIDE 30 MG: 30 CAPSULE, DELAYED RELEASE ORAL at 08:46

## 2024-01-01 RX ADMIN — ALBUTEROL SULFATE 2.5 MG: 2.5 SOLUTION RESPIRATORY (INHALATION) at 16:00

## 2024-01-01 RX ADMIN — GABAPENTIN 900 MG: 300 CAPSULE ORAL at 09:20

## 2024-01-01 RX ADMIN — SODIUM CHLORIDE SOLN NEBU 3% 3 ML: 3 NEBU SOLN at 04:35

## 2024-01-01 RX ADMIN — LEVOTHYROXINE SODIUM 150 MCG: 0.15 TABLET ORAL at 09:26

## 2024-01-01 RX ADMIN — HYDROCORTISONE SODIUM SUCCINATE 50 MG: 100 INJECTION, POWDER, FOR SOLUTION INTRAMUSCULAR; INTRAVENOUS at 00:06

## 2024-01-01 RX ADMIN — MAGNESIUM SULFATE IN WATER 2 G: 40 INJECTION, SOLUTION INTRAVENOUS at 11:36

## 2024-01-01 RX ADMIN — QUETIAPINE FUMARATE 100 MG: 100 TABLET ORAL at 21:42

## 2024-01-01 RX ADMIN — LACOSAMIDE ORAL SOLUTION 100 MG: 10 SOLUTION ORAL at 19:37

## 2024-01-01 RX ADMIN — LEVOTHYROXINE SODIUM 150 MCG: 0.15 TABLET ORAL at 08:19

## 2024-01-01 RX ADMIN — OXYCODONE HYDROCHLORIDE 10 MG: 10 TABLET ORAL at 19:38

## 2024-01-01 RX ADMIN — GABAPENTIN 900 MG: 300 CAPSULE ORAL at 17:08

## 2024-01-01 RX ADMIN — GABAPENTIN 900 MG: 300 CAPSULE ORAL at 14:05

## 2024-01-01 RX ADMIN — GABAPENTIN 900 MG: 250 SUSPENSION ORAL at 13:56

## 2024-01-01 RX ADMIN — ACETAMINOPHEN 650 MG: 325 TABLET, FILM COATED ORAL at 20:54

## 2024-01-01 RX ADMIN — Medication 50 MCG: at 22:28

## 2024-01-01 RX ADMIN — GABAPENTIN 300 MG: 250 SUSPENSION ORAL at 14:07

## 2024-01-01 RX ADMIN — IPRATROPIUM BROMIDE AND ALBUTEROL SULFATE 3 ML: .5; 3 SOLUTION RESPIRATORY (INHALATION) at 11:36

## 2024-01-01 RX ADMIN — PROPOFOL 50 MCG/KG/MIN: 10 INJECTION, EMULSION INTRAVENOUS at 11:45

## 2024-01-01 RX ADMIN — WHITE PETROLATUM 57.7 %-MINERAL OIL 31.9 % EYE OINTMENT: at 12:13

## 2024-01-01 RX ADMIN — OXYMETAZOLINE HYDROCHLORIDE 2 SPRAY: 0.05 SPRAY NASAL at 13:28

## 2024-01-01 RX ADMIN — IPRATROPIUM BROMIDE AND ALBUTEROL SULFATE 3 ML: .5; 3 SOLUTION RESPIRATORY (INHALATION) at 17:41

## 2024-01-01 RX ADMIN — FUROSEMIDE 80 MG: 10 INJECTION, SOLUTION INTRAVENOUS at 17:21

## 2024-01-01 RX ADMIN — OXYCODONE HYDROCHLORIDE 10 MG: 10 TABLET ORAL at 08:06

## 2024-01-01 RX ADMIN — SODIUM CHLORIDE SOLN NEBU 3% 3 ML: 3 NEBU SOLN at 17:41

## 2024-01-01 RX ADMIN — WHITE PETROLATUM 57.7 %-MINERAL OIL 31.9 % EYE OINTMENT: at 12:06

## 2024-01-01 RX ADMIN — POTASSIUM & SODIUM PHOSPHATES POWDER PACK 280-160-250 MG 1 PACKET: 280-160-250 PACK at 17:26

## 2024-01-01 RX ADMIN — PANTOPRAZOLE SODIUM 40 MG: 40 INJECTION, POWDER, FOR SOLUTION INTRAVENOUS at 08:43

## 2024-01-01 RX ADMIN — WHITE PETROLATUM 57.7 %-MINERAL OIL 31.9 % EYE OINTMENT: at 20:07

## 2024-01-01 RX ADMIN — DEXMEDETOMIDINE HYDROCHLORIDE 1.2 MCG/KG/HR: 400 INJECTION INTRAVENOUS at 20:20

## 2024-01-01 RX ADMIN — WHITE PETROLATUM 57.7 %-MINERAL OIL 31.9 % EYE OINTMENT: at 11:44

## 2024-01-01 RX ADMIN — MAGNESIUM SULFATE 4 G: 4 INJECTION INTRAVENOUS at 05:13

## 2024-01-01 RX ADMIN — IPRATROPIUM BROMIDE AND ALBUTEROL SULFATE 3 ML: .5; 3 SOLUTION RESPIRATORY (INHALATION) at 04:11

## 2024-01-01 RX ADMIN — Medication 15 ML: at 07:45

## 2024-01-01 RX ADMIN — QUETIAPINE FUMARATE 100 MG: 100 TABLET ORAL at 05:48

## 2024-01-01 RX ADMIN — LEVALBUTEROL HYDROCHLORIDE 1.25 MG: 1.25 SOLUTION RESPIRATORY (INHALATION) at 19:31

## 2024-01-01 RX ADMIN — LACOSAMIDE ORAL SOLUTION 100 MG: 10 SOLUTION ORAL at 07:45

## 2024-01-01 RX ADMIN — PROPOFOL 40 MCG/KG/MIN: 10 INJECTION, EMULSION INTRAVENOUS at 02:18

## 2024-01-01 RX ADMIN — PROPOFOL 20 MCG/KG/MIN: 10 INJECTION, EMULSION INTRAVENOUS at 05:02

## 2024-01-01 RX ADMIN — LEVETIRACETAM 1500 MG: 750 TABLET, FILM COATED ORAL at 21:24

## 2024-01-01 RX ADMIN — INSULIN ASPART 7 UNITS: 100 INJECTION, SOLUTION INTRAVENOUS; SUBCUTANEOUS at 10:27

## 2024-01-01 RX ADMIN — PROPOFOL 60 MCG/KG/MIN: 10 INJECTION, EMULSION INTRAVENOUS at 18:13

## 2024-01-01 RX ADMIN — PROPOFOL 50 MCG/KG/MIN: 10 INJECTION, EMULSION INTRAVENOUS at 06:16

## 2024-01-01 RX ADMIN — GABAPENTIN 900 MG: 250 SUSPENSION ORAL at 19:44

## 2024-01-01 RX ADMIN — LACOSAMIDE 100 MG: 50 TABLET, FILM COATED ORAL at 09:20

## 2024-01-01 RX ADMIN — PROPOFOL 50 MCG/KG/MIN: 10 INJECTION, EMULSION INTRAVENOUS at 05:53

## 2024-01-01 RX ADMIN — PROPOFOL 50 MCG/KG/MIN: 10 INJECTION, EMULSION INTRAVENOUS at 08:36

## 2024-01-01 RX ADMIN — IPRATROPIUM BROMIDE AND ALBUTEROL SULFATE 3 ML: .5; 3 SOLUTION RESPIRATORY (INHALATION) at 00:52

## 2024-01-01 RX ADMIN — Medication 20 NG/KG/MIN: at 07:53

## 2024-01-01 RX ADMIN — MIDAZOLAM 2 MG: 1 INJECTION INTRAMUSCULAR; INTRAVENOUS at 08:43

## 2024-01-01 RX ADMIN — Medication 50 MCG: at 20:30

## 2024-01-01 RX ADMIN — INSULIN HUMAN 2 UNITS/HR: 1 INJECTION, SOLUTION INTRAVENOUS at 20:26

## 2024-01-01 RX ADMIN — PIPERACILLIN AND TAZOBACTAM 4.5 G: 4; .5 INJECTION, POWDER, FOR SOLUTION INTRAVENOUS; PARENTERAL at 03:49

## 2024-01-01 RX ADMIN — IPRATROPIUM BROMIDE AND ALBUTEROL SULFATE 3 ML: .5; 3 SOLUTION RESPIRATORY (INHALATION) at 19:52

## 2024-01-01 RX ADMIN — Medication 15 ML: at 07:43

## 2024-01-01 RX ADMIN — Medication 1 PACKET: at 21:52

## 2024-01-01 RX ADMIN — OXYCODONE HYDROCHLORIDE 10 MG: 10 TABLET ORAL at 23:23

## 2024-01-01 RX ADMIN — LACOSAMIDE 100 MG: 50 TABLET, FILM COATED ORAL at 17:00

## 2024-01-01 RX ADMIN — PANTOPRAZOLE SODIUM 40 MG: 40 INJECTION, POWDER, FOR SOLUTION INTRAVENOUS at 08:05

## 2024-01-01 RX ADMIN — GABAPENTIN 900 MG: 250 SUSPENSION ORAL at 07:37

## 2024-01-01 RX ADMIN — INSULIN GLARGINE 50 UNITS: 100 INJECTION, SOLUTION SUBCUTANEOUS at 09:02

## 2024-01-01 RX ADMIN — Medication 50 MCG: at 13:30

## 2024-01-01 RX ADMIN — HEPARIN SODIUM AND DEXTROSE 600 UNITS/HR: 10000; 5 INJECTION INTRAVENOUS at 06:33

## 2024-01-01 RX ADMIN — PROPOFOL 20 MCG/KG/MIN: 10 INJECTION, EMULSION INTRAVENOUS at 16:51

## 2024-01-01 RX ADMIN — Medication 20 NG/KG/MIN: at 01:19

## 2024-01-01 RX ADMIN — PIPERACILLIN SODIUM AND TAZOBACTAM SODIUM 3.38 G: 3; .375 INJECTION, POWDER, LYOPHILIZED, FOR SOLUTION INTRAVENOUS at 07:38

## 2024-01-01 RX ADMIN — POTASSIUM CHLORIDE 20 MEQ: 29.8 INJECTION, SOLUTION INTRAVENOUS at 11:57

## 2024-01-01 RX ADMIN — Medication 150 MCG/HR: at 06:41

## 2024-01-01 RX ADMIN — OXYCODONE HYDROCHLORIDE 5 MG: 5 TABLET ORAL at 20:07

## 2024-01-01 RX ADMIN — OXYMETAZOLINE HYDROCHLORIDE 2 SPRAY: 0.05 SPRAY NASAL at 16:21

## 2024-01-01 RX ADMIN — PROPOFOL 50 MCG/KG/MIN: 10 INJECTION, EMULSION INTRAVENOUS at 23:05

## 2024-01-01 RX ADMIN — FUROSEMIDE 80 MG: 10 INJECTION, SOLUTION INTRAVENOUS at 07:45

## 2024-01-01 RX ADMIN — LEVOTHYROXINE SODIUM 150 MCG: 0.15 TABLET ORAL at 09:00

## 2024-01-01 RX ADMIN — PROPOFOL 50 MCG/KG/MIN: 10 INJECTION, EMULSION INTRAVENOUS at 02:49

## 2024-01-01 RX ADMIN — HYDROXYZINE HYDROCHLORIDE 50 MG: 50 TABLET, FILM COATED ORAL at 09:46

## 2024-01-01 RX ADMIN — POTASSIUM CHLORIDE 20 MEQ: 29.8 INJECTION, SOLUTION INTRAVENOUS at 18:08

## 2024-01-01 RX ADMIN — Medication 20 NG/KG/MIN: at 19:40

## 2024-01-01 RX ADMIN — PROPOFOL 70 MCG/KG/MIN: 10 INJECTION, EMULSION INTRAVENOUS at 03:35

## 2024-01-01 RX ADMIN — LACOSAMIDE 100 MG: 50 TABLET, FILM COATED ORAL at 08:58

## 2024-01-01 RX ADMIN — OXYCODONE HYDROCHLORIDE 10 MG: 10 TABLET ORAL at 16:53

## 2024-01-01 RX ADMIN — POTASSIUM CHLORIDE 20 MEQ: 1.5 POWDER, FOR SOLUTION ORAL at 19:26

## 2024-01-01 RX ADMIN — Medication 125 MCG: at 07:23

## 2024-01-01 RX ADMIN — PROPOFOL 75 MCG/KG/MIN: 10 INJECTION, EMULSION INTRAVENOUS at 06:35

## 2024-01-01 RX ADMIN — Medication 100 MCG/HR: at 21:49

## 2024-01-01 RX ADMIN — GABAPENTIN 300 MG: 250 SUSPENSION ORAL at 13:06

## 2024-01-01 RX ADMIN — GABAPENTIN 900 MG: 300 CAPSULE ORAL at 09:16

## 2024-01-01 RX ADMIN — INSULIN HUMAN 8 UNITS/HR: 1 INJECTION, SOLUTION INTRAVENOUS at 15:47

## 2024-01-01 RX ADMIN — PROPOFOL 30 MCG/KG/MIN: 10 INJECTION, EMULSION INTRAVENOUS at 11:01

## 2024-01-01 RX ADMIN — Medication 10 MG: at 21:07

## 2024-01-01 RX ADMIN — IPRATROPIUM BROMIDE AND ALBUTEROL SULFATE 3 ML: .5; 3 SOLUTION RESPIRATORY (INHALATION) at 00:56

## 2024-01-01 RX ADMIN — FUROSEMIDE 80 MG: 10 INJECTION, SOLUTION INTRAVENOUS at 10:01

## 2024-01-01 RX ADMIN — Medication 50 MCG: at 16:20

## 2024-01-01 RX ADMIN — INSULIN ASPART 5 UNITS: 100 INJECTION, SOLUTION INTRAVENOUS; SUBCUTANEOUS at 13:16

## 2024-01-01 RX ADMIN — LEVOTHYROXINE SODIUM 150 MCG: 75 TABLET ORAL at 07:45

## 2024-01-01 RX ADMIN — LACOSAMIDE ORAL SOLUTION 100 MG: 10 SOLUTION ORAL at 07:40

## 2024-01-01 RX ADMIN — METHOCARBAMOL 1000 MG: 500 TABLET ORAL at 13:36

## 2024-01-01 RX ADMIN — THERA TABS 1 TABLET: TAB at 08:08

## 2024-01-01 RX ADMIN — INSULIN HUMAN 4 UNITS/HR: 1 INJECTION, SOLUTION INTRAVENOUS at 02:49

## 2024-01-01 RX ADMIN — SODIUM CHLORIDE SOLN NEBU 3% 3 ML: 3 NEBU SOLN at 20:03

## 2024-01-01 RX ADMIN — MIDAZOLAM 2 MG: 1 INJECTION INTRAMUSCULAR; INTRAVENOUS at 22:50

## 2024-01-01 RX ADMIN — Medication 1 PACKET: at 13:37

## 2024-01-01 RX ADMIN — FUROSEMIDE 40 MG: 10 INJECTION, SOLUTION INTRAVENOUS at 11:12

## 2024-01-01 RX ADMIN — CHLORHEXIDINE GLUCONATE 0.12% ORAL RINSE 15 ML: 1.2 LIQUID ORAL at 08:05

## 2024-01-01 RX ADMIN — Medication 50 MCG: at 13:00

## 2024-01-01 RX ADMIN — OXYCODONE HYDROCHLORIDE 10 MG: 10 TABLET ORAL at 11:42

## 2024-01-01 RX ADMIN — ACETAMINOPHEN 650 MG: 325 TABLET, FILM COATED ORAL at 23:23

## 2024-01-01 RX ADMIN — QUETIAPINE FUMARATE 100 MG: 100 TABLET ORAL at 13:06

## 2024-01-01 RX ADMIN — INSULIN ASPART 8 UNITS: 100 INJECTION, SOLUTION INTRAVENOUS; SUBCUTANEOUS at 13:22

## 2024-01-01 RX ADMIN — METOLAZONE 5 MG: 5 TABLET ORAL at 09:35

## 2024-01-01 RX ADMIN — FUROSEMIDE 80 MG: 10 INJECTION, SOLUTION INTRAVENOUS at 11:03

## 2024-01-01 RX ADMIN — LEVETIRACETAM 1500 MG: 100 SOLUTION ORAL at 20:00

## 2024-01-01 RX ADMIN — INSULIN GLARGINE 50 UNITS: 100 INJECTION, SOLUTION SUBCUTANEOUS at 21:07

## 2024-01-01 RX ADMIN — VANCOMYCIN HYDROCHLORIDE 1500 MG: 1 INJECTION, POWDER, LYOPHILIZED, FOR SOLUTION INTRAVENOUS at 10:53

## 2024-01-01 RX ADMIN — INSULIN HUMAN 7 UNITS/HR: 1 INJECTION, SOLUTION INTRAVENOUS at 19:58

## 2024-01-01 RX ADMIN — THERA TABS 1 TABLET: TAB at 07:41

## 2024-01-01 RX ADMIN — OXYMETAZOLINE HYDROCHLORIDE 2 SPRAY: 0.05 SPRAY NASAL at 20:15

## 2024-01-01 RX ADMIN — METHOCARBAMOL 1000 MG: 500 TABLET ORAL at 22:13

## 2024-01-01 RX ADMIN — GABAPENTIN 900 MG: 250 SUSPENSION ORAL at 13:44

## 2024-01-01 RX ADMIN — METHOCARBAMOL 1000 MG: 500 TABLET ORAL at 22:08

## 2024-01-01 RX ADMIN — LACOSAMIDE 100 MG: 50 TABLET, FILM COATED ORAL at 07:50

## 2024-01-01 RX ADMIN — ENOXAPARIN SODIUM 40 MG: 40 INJECTION SUBCUTANEOUS at 09:10

## 2024-01-01 RX ADMIN — ASPIRIN 81 MG CHEWABLE TABLET 81 MG: 81 TABLET CHEWABLE at 08:05

## 2024-01-01 RX ADMIN — GABAPENTIN 900 MG: 250 SUSPENSION ORAL at 13:37

## 2024-01-01 RX ADMIN — SODIUM CHLORIDE SOLN NEBU 3% 3 ML: 3 NEBU SOLN at 05:01

## 2024-01-01 RX ADMIN — Medication 20 NG/KG/MIN: at 19:59

## 2024-01-01 RX ADMIN — Medication 50 MCG: at 10:30

## 2024-01-01 RX ADMIN — PROPOFOL 55 MCG/KG/MIN: 10 INJECTION, EMULSION INTRAVENOUS at 20:54

## 2024-01-01 RX ADMIN — LEVETIRACETAM 1500 MG: 750 TABLET, FILM COATED ORAL at 09:10

## 2024-01-01 RX ADMIN — GABAPENTIN 900 MG: 250 SUSPENSION ORAL at 14:30

## 2024-01-01 RX ADMIN — HEPARIN SODIUM 1200 UNITS/HR: 10000 INJECTION, SOLUTION INTRAVENOUS at 04:17

## 2024-01-01 RX ADMIN — LEVOTHYROXINE SODIUM 150 MCG: 75 TABLET ORAL at 08:05

## 2024-01-01 RX ADMIN — HYDROXYZINE HYDROCHLORIDE 25 MG: 25 TABLET, FILM COATED ORAL at 17:59

## 2024-01-01 RX ADMIN — ASPIRIN 81 MG CHEWABLE TABLET 81 MG: 81 TABLET CHEWABLE at 09:20

## 2024-01-01 RX ADMIN — Medication 20 NG/KG/MIN: at 14:32

## 2024-01-01 RX ADMIN — LEVALBUTEROL HYDROCHLORIDE 1.25 MG: 1.25 SOLUTION RESPIRATORY (INHALATION) at 20:37

## 2024-01-01 RX ADMIN — Medication 1 PACKET: at 22:09

## 2024-01-01 RX ADMIN — HYDROXYZINE HYDROCHLORIDE 25 MG: 25 TABLET, FILM COATED ORAL at 20:33

## 2024-01-01 RX ADMIN — Medication 100 MCG/HR: at 16:39

## 2024-01-01 RX ADMIN — GABAPENTIN 900 MG: 300 CAPSULE ORAL at 15:22

## 2024-01-01 RX ADMIN — DULOXETINE HYDROCHLORIDE 30 MG: 30 CAPSULE, DELAYED RELEASE ORAL at 09:32

## 2024-01-01 RX ADMIN — GABAPENTIN 300 MG: 250 SUSPENSION ORAL at 22:13

## 2024-01-01 RX ADMIN — QUETIAPINE FUMARATE 100 MG: 100 TABLET ORAL at 22:13

## 2024-01-01 RX ADMIN — PIPERACILLIN AND TAZOBACTAM 4.5 G: 4; .5 INJECTION, POWDER, FOR SOLUTION INTRAVENOUS at 04:20

## 2024-01-01 RX ADMIN — PROPOFOL 50 MCG/KG/MIN: 10 INJECTION, EMULSION INTRAVENOUS at 05:23

## 2024-01-01 RX ADMIN — OXYMETAZOLINE HYDROCHLORIDE 2 SPRAY: 0.05 SPRAY NASAL at 14:06

## 2024-01-01 RX ADMIN — KIT FOR THE PREPARATION OF TECHNETIUM TC 99M ALBUMIN AGGREGATED 6.6 MILLICURIE: 2.5 INJECTION, POWDER, FOR SOLUTION INTRAVENOUS at 14:11

## 2024-01-01 RX ADMIN — IPRATROPIUM BROMIDE AND ALBUTEROL SULFATE 3 ML: .5; 3 SOLUTION RESPIRATORY (INHALATION) at 00:30

## 2024-01-01 RX ADMIN — SODIUM CHLORIDE SOLN NEBU 3% 3 ML: 3 NEBU SOLN at 08:25

## 2024-01-01 RX ADMIN — GABAPENTIN 900 MG: 300 CAPSULE ORAL at 09:00

## 2024-01-01 RX ADMIN — CEFTRIAXONE SODIUM 2 G: 2 INJECTION, POWDER, FOR SOLUTION INTRAMUSCULAR; INTRAVENOUS at 13:44

## 2024-01-01 RX ADMIN — DULOXETINE HYDROCHLORIDE 30 MG: 30 CAPSULE, DELAYED RELEASE ORAL at 08:10

## 2024-01-01 RX ADMIN — INSULIN ASPART 9 UNITS: 100 INJECTION, SOLUTION INTRAVENOUS; SUBCUTANEOUS at 09:40

## 2024-01-01 RX ADMIN — ATORVASTATIN CALCIUM 40 MG: 40 TABLET, FILM COATED ORAL at 08:11

## 2024-01-01 RX ADMIN — Medication 1 PACKET: at 20:09

## 2024-01-01 RX ADMIN — Medication 50 MCG: at 12:30

## 2024-01-01 RX ADMIN — Medication 50 MCG: at 09:00

## 2024-01-01 RX ADMIN — LACOSAMIDE ORAL SOLUTION 100 MG: 10 SOLUTION ORAL at 19:44

## 2024-01-01 RX ADMIN — POTASSIUM CHLORIDE 40 MEQ: 1.5 POWDER, FOR SOLUTION ORAL at 19:37

## 2024-01-01 RX ADMIN — CHLORHEXIDINE GLUCONATE 0.12% ORAL RINSE 15 ML: 1.2 LIQUID ORAL at 19:31

## 2024-01-01 RX ADMIN — IPRATROPIUM BROMIDE AND ALBUTEROL SULFATE 3 ML: .5; 3 SOLUTION RESPIRATORY (INHALATION) at 20:39

## 2024-01-01 RX ADMIN — ENOXAPARIN SODIUM 40 MG: 40 INJECTION SUBCUTANEOUS at 08:58

## 2024-01-01 RX ADMIN — PIPERACILLIN AND TAZOBACTAM 4.5 G: 4; .5 INJECTION, POWDER, FOR SOLUTION INTRAVENOUS at 12:16

## 2024-01-01 RX ADMIN — OLANZAPINE 10 MG: 2.5 TABLET, FILM COATED ORAL at 10:08

## 2024-01-01 RX ADMIN — DULOXETINE HYDROCHLORIDE 30 MG: 30 CAPSULE, DELAYED RELEASE ORAL at 08:52

## 2024-01-01 RX ADMIN — GABAPENTIN 900 MG: 250 SUSPENSION ORAL at 06:02

## 2024-01-01 RX ADMIN — ALBUTEROL SULFATE 2.5 MG: 2.5 SOLUTION RESPIRATORY (INHALATION) at 16:29

## 2024-01-01 RX ADMIN — PROPOFOL 75 MCG/KG/MIN: 10 INJECTION, EMULSION INTRAVENOUS at 23:59

## 2024-01-01 RX ADMIN — Medication 20 NG/KG/MIN: at 11:16

## 2024-01-01 RX ADMIN — FENTANYL CITRATE 100 MCG: 50 INJECTION INTRAMUSCULAR; INTRAVENOUS at 08:42

## 2024-01-01 RX ADMIN — SODIUM CHLORIDE SOLN NEBU 3% 3 ML: 3 NEBU SOLN at 20:26

## 2024-01-01 RX ADMIN — Medication 50 MCG: at 13:51

## 2024-01-01 RX ADMIN — FLUTICASONE PROPIONATE 1 SPRAY: 50 SPRAY, METERED NASAL at 08:46

## 2024-01-01 RX ADMIN — PIPERACILLIN AND TAZOBACTAM 4.5 G: 4; .5 INJECTION, POWDER, FOR SOLUTION INTRAVENOUS; PARENTERAL at 21:52

## 2024-01-01 RX ADMIN — PROPOFOL 75 MCG/KG/MIN: 10 INJECTION, EMULSION INTRAVENOUS at 08:41

## 2024-01-01 RX ADMIN — PANTOPRAZOLE SODIUM 40 MG: 40 INJECTION, POWDER, FOR SOLUTION INTRAVENOUS at 10:02

## 2024-01-01 RX ADMIN — LEVETIRACETAM 1500 MG: 100 SOLUTION ORAL at 20:21

## 2024-01-01 RX ADMIN — PANTOPRAZOLE SODIUM 40 MG: 40 INJECTION, POWDER, FOR SOLUTION INTRAVENOUS at 08:24

## 2024-01-01 RX ADMIN — Medication 200 MCG: at 22:20

## 2024-01-01 RX ADMIN — ALBUTEROL SULFATE 2.5 MG: 2.5 SOLUTION RESPIRATORY (INHALATION) at 04:30

## 2024-01-01 RX ADMIN — HEPARIN SODIUM AND DEXTROSE 700 UNITS/HR: 10000; 5 INJECTION INTRAVENOUS at 18:17

## 2024-01-01 RX ADMIN — LEVALBUTEROL HYDROCHLORIDE 1.25 MG: 1.25 SOLUTION RESPIRATORY (INHALATION) at 20:02

## 2024-01-01 RX ADMIN — ASPIRIN 81 MG CHEWABLE TABLET 81 MG: 81 TABLET CHEWABLE at 07:50

## 2024-01-01 RX ADMIN — METHOCARBAMOL 1000 MG: 500 TABLET ORAL at 20:04

## 2024-01-01 RX ADMIN — GABAPENTIN 900 MG: 300 CAPSULE ORAL at 08:32

## 2024-01-01 RX ADMIN — LEVALBUTEROL HYDROCHLORIDE 1.25 MG: 1.25 SOLUTION RESPIRATORY (INHALATION) at 21:33

## 2024-01-01 RX ADMIN — METHOCARBAMOL 1000 MG: 500 TABLET ORAL at 05:43

## 2024-01-01 RX ADMIN — PIPERACILLIN AND TAZOBACTAM 4.5 G: 4; .5 INJECTION, POWDER, FOR SOLUTION INTRAVENOUS; PARENTERAL at 15:49

## 2024-01-01 RX ADMIN — DULOXETINE HYDROCHLORIDE 30 MG: 30 CAPSULE, DELAYED RELEASE ORAL at 09:26

## 2024-01-01 RX ADMIN — Medication 1 PACKET: at 13:44

## 2024-01-01 RX ADMIN — PROPOFOL 50 MCG/KG/MIN: 10 INJECTION, EMULSION INTRAVENOUS at 17:39

## 2024-01-01 RX ADMIN — INSULIN ASPART 7 UNITS: 100 INJECTION, SOLUTION INTRAVENOUS; SUBCUTANEOUS at 09:55

## 2024-01-01 RX ADMIN — ATORVASTATIN CALCIUM 40 MG: 40 TABLET, FILM COATED ORAL at 08:33

## 2024-01-01 RX ADMIN — LEVALBUTEROL HYDROCHLORIDE 1.25 MG: 1.25 SOLUTION RESPIRATORY (INHALATION) at 08:03

## 2024-01-01 RX ADMIN — ALBUTEROL SULFATE 2.5 MG: 2.5 SOLUTION RESPIRATORY (INHALATION) at 11:28

## 2024-01-01 RX ADMIN — LACOSAMIDE 100 MG: 50 TABLET, FILM COATED ORAL at 17:30

## 2024-01-01 RX ADMIN — OXYCODONE HYDROCHLORIDE 10 MG: 10 TABLET ORAL at 16:15

## 2024-01-01 RX ADMIN — INSULIN ASPART 10 UNITS: 100 INJECTION, SOLUTION INTRAVENOUS; SUBCUTANEOUS at 17:44

## 2024-01-01 RX ADMIN — ALBUTEROL SULFATE 2.5 MG: 2.5 SOLUTION RESPIRATORY (INHALATION) at 20:20

## 2024-01-01 RX ADMIN — OXYMETAZOLINE HYDROCHLORIDE 2 SPRAY: 0.05 SPRAY NASAL at 07:44

## 2024-01-01 RX ADMIN — INSULIN GLARGINE 34 UNITS: 100 INJECTION, SOLUTION SUBCUTANEOUS at 23:21

## 2024-01-01 RX ADMIN — Medication 50 MCG: at 00:07

## 2024-01-01 RX ADMIN — ASPIRIN 81 MG CHEWABLE TABLET 81 MG: 81 TABLET CHEWABLE at 07:22

## 2024-01-01 RX ADMIN — LACOSAMIDE 100 MG: 50 TABLET, FILM COATED ORAL at 18:03

## 2024-01-01 RX ADMIN — INSULIN HUMAN 7 UNITS/HR: 1 INJECTION, SOLUTION INTRAVENOUS at 12:32

## 2024-01-01 RX ADMIN — DEXMEDETOMIDINE HYDROCHLORIDE 1.2 MCG/KG/HR: 400 INJECTION INTRAVENOUS at 11:22

## 2024-01-01 RX ADMIN — PIPERACILLIN AND TAZOBACTAM 4.5 G: 4; .5 INJECTION, POWDER, FOR SOLUTION INTRAVENOUS at 19:27

## 2024-01-01 RX ADMIN — OXYCODONE HYDROCHLORIDE 10 MG: 10 TABLET ORAL at 00:28

## 2024-01-01 RX ADMIN — LEVETIRACETAM 1500 MG: 100 SOLUTION ORAL at 19:44

## 2024-01-01 RX ADMIN — GABAPENTIN 900 MG: 250 SUSPENSION ORAL at 21:52

## 2024-01-01 RX ADMIN — LEVALBUTEROL HYDROCHLORIDE 1.25 MG: 1.25 SOLUTION RESPIRATORY (INHALATION) at 08:37

## 2024-01-01 RX ADMIN — GABAPENTIN 900 MG: 250 SUSPENSION ORAL at 13:29

## 2024-01-01 RX ADMIN — Medication 1 PACKET: at 21:43

## 2024-01-01 RX ADMIN — Medication 1 PACKET: at 13:56

## 2024-01-01 RX ADMIN — LACOSAMIDE 100 MG: 50 TABLET, FILM COATED ORAL at 09:16

## 2024-01-01 RX ADMIN — QUETIAPINE FUMARATE 100 MG: 100 TABLET ORAL at 22:12

## 2024-01-01 RX ADMIN — MIDAZOLAM HYDROCHLORIDE 3 MG/HR: 1 INJECTION, SOLUTION INTRAVENOUS at 02:42

## 2024-01-01 RX ADMIN — GABAPENTIN 900 MG: 300 CAPSULE ORAL at 08:23

## 2024-01-01 RX ADMIN — WHITE PETROLATUM 57.7 %-MINERAL OIL 31.9 % EYE OINTMENT: at 12:21

## 2024-01-01 RX ADMIN — INSULIN ASPART 5 UNITS: 100 INJECTION, SOLUTION INTRAVENOUS; SUBCUTANEOUS at 15:18

## 2024-01-01 RX ADMIN — PROPOFOL 35 MCG/KG/MIN: 10 INJECTION, EMULSION INTRAVENOUS at 20:26

## 2024-01-01 RX ADMIN — ATORVASTATIN CALCIUM 40 MG: 40 TABLET, FILM COATED ORAL at 08:56

## 2024-01-01 RX ADMIN — Medication 2 UNITS/HR: at 22:41

## 2024-01-01 RX ADMIN — LACOSAMIDE ORAL SOLUTION 100 MG: 10 SOLUTION ORAL at 20:54

## 2024-01-01 RX ADMIN — LEVETIRACETAM 1500 MG: 100 SOLUTION ORAL at 20:37

## 2024-01-01 RX ADMIN — Medication 1 PACKET: at 07:36

## 2024-01-01 RX ADMIN — OXYCODONE HYDROCHLORIDE 10 MG: 10 TABLET ORAL at 03:53

## 2024-01-01 RX ADMIN — GABAPENTIN 900 MG: 300 CAPSULE ORAL at 08:55

## 2024-01-01 RX ADMIN — LEVOTHYROXINE SODIUM 150 MCG: 75 TABLET ORAL at 07:35

## 2024-01-01 RX ADMIN — PROPOFOL 10 MCG/KG/MIN: 10 INJECTION, EMULSION INTRAVENOUS at 09:23

## 2024-01-01 RX ADMIN — HEPARIN SODIUM 1500 UNITS/HR: 10000 INJECTION, SOLUTION INTRAVENOUS at 13:28

## 2024-01-01 RX ADMIN — Medication 20 NG/KG/MIN: at 12:43

## 2024-01-01 RX ADMIN — GABAPENTIN 900 MG: 300 CAPSULE ORAL at 13:10

## 2024-01-01 RX ADMIN — Medication 125 MCG: at 07:46

## 2024-01-01 RX ADMIN — ONDANSETRON 4 MG: 2 INJECTION INTRAMUSCULAR; INTRAVENOUS at 11:51

## 2024-01-01 RX ADMIN — Medication 100 MCG/HR: at 02:05

## 2024-01-01 RX ADMIN — DEXMEDETOMIDINE HYDROCHLORIDE 1.2 MCG/KG/HR: 400 INJECTION INTRAVENOUS at 01:48

## 2024-01-01 RX ADMIN — SODIUM CHLORIDE SOLN NEBU 3% 3 ML: 3 NEBU SOLN at 05:08

## 2024-01-01 RX ADMIN — IPRATROPIUM BROMIDE AND ALBUTEROL SULFATE 3 ML: .5; 3 SOLUTION RESPIRATORY (INHALATION) at 23:54

## 2024-01-01 RX ADMIN — PANTOPRAZOLE SODIUM 40 MG: 40 INJECTION, POWDER, FOR SOLUTION INTRAVENOUS at 19:31

## 2024-01-01 RX ADMIN — FUROSEMIDE 40 MG: 10 INJECTION, SOLUTION INTRAVENOUS at 09:57

## 2024-01-01 RX ADMIN — SODIUM CHLORIDE SOLN NEBU 3% 3 ML: 3 NEBU SOLN at 15:50

## 2024-01-01 RX ADMIN — LEVETIRACETAM 1500 MG: 750 TABLET, FILM COATED ORAL at 08:11

## 2024-01-01 RX ADMIN — GABAPENTIN 900 MG: 250 SUSPENSION ORAL at 07:36

## 2024-01-01 RX ADMIN — METHOCARBAMOL 1000 MG: 500 TABLET ORAL at 21:55

## 2024-01-01 RX ADMIN — PANTOPRAZOLE SODIUM 40 MG: 40 INJECTION, POWDER, FOR SOLUTION INTRAVENOUS at 07:43

## 2024-01-01 RX ADMIN — MAGNESIUM SULFATE IN WATER 2 G: 40 INJECTION, SOLUTION INTRAVENOUS at 17:35

## 2024-01-01 RX ADMIN — Medication 200 MCG/HR: at 11:46

## 2024-01-01 RX ADMIN — CHLORHEXIDINE GLUCONATE 0.12% ORAL RINSE 15 ML: 1.2 LIQUID ORAL at 20:07

## 2024-01-01 RX ADMIN — PROPOFOL 30 MCG/KG/MIN: 10 INJECTION, EMULSION INTRAVENOUS at 01:00

## 2024-01-01 RX ADMIN — LEVOTHYROXINE SODIUM 150 MCG: 0.15 TABLET ORAL at 09:16

## 2024-01-01 RX ADMIN — PROPOFOL 60 MCG/KG/MIN: 10 INJECTION, EMULSION INTRAVENOUS at 01:26

## 2024-01-01 RX ADMIN — BIVALIRUDIN 0.15 MG/KG/HR: 250 INJECTION, POWDER, LYOPHILIZED, FOR SOLUTION INTRAVENOUS at 02:20

## 2024-01-01 RX ADMIN — GABAPENTIN 900 MG: 300 CAPSULE ORAL at 20:32

## 2024-01-01 RX ADMIN — LEVOTHYROXINE SODIUM 150 MCG: 0.15 TABLET ORAL at 08:55

## 2024-01-01 RX ADMIN — MIRTAZAPINE 30 MG: 7.5 TABLET, FILM COATED ORAL at 21:58

## 2024-01-01 RX ADMIN — LACOSAMIDE 100 MG: 50 TABLET, FILM COATED ORAL at 18:00

## 2024-01-01 RX ADMIN — QUETIAPINE FUMARATE 100 MG: 100 TABLET ORAL at 06:03

## 2024-01-01 RX ADMIN — HUMAN ALBUMIN MICROSPHERES AND PERFLUTREN 5 ML: 10; .22 INJECTION, SOLUTION INTRAVENOUS at 15:49

## 2024-01-01 RX ADMIN — LACOSAMIDE 100 MG: 50 TABLET, FILM COATED ORAL at 17:52

## 2024-01-01 RX ADMIN — CHLORHEXIDINE GLUCONATE 0.12% ORAL RINSE 15 ML: 1.2 LIQUID ORAL at 07:40

## 2024-01-01 RX ADMIN — POTASSIUM CHLORIDE 40 MEQ: 1.5 POWDER, FOR SOLUTION ORAL at 06:37

## 2024-01-01 RX ADMIN — ACETAZOLAMIDE 500 MG: 250 TABLET ORAL at 16:14

## 2024-01-01 RX ADMIN — LEVOTHYROXINE SODIUM 150 MCG: 0.15 TABLET ORAL at 08:47

## 2024-01-01 RX ADMIN — LEVOTHYROXINE SODIUM 150 MCG: 75 TABLET ORAL at 08:16

## 2024-01-01 RX ADMIN — IPRATROPIUM BROMIDE AND ALBUTEROL SULFATE 3 ML: .5; 3 SOLUTION RESPIRATORY (INHALATION) at 04:17

## 2024-01-01 RX ADMIN — PROPOFOL 50 MCG/KG/MIN: 10 INJECTION, EMULSION INTRAVENOUS at 02:11

## 2024-01-01 RX ADMIN — FUROSEMIDE 80 MG: 10 INJECTION, SOLUTION INTRAVENOUS at 07:38

## 2024-01-01 RX ADMIN — LEVALBUTEROL HYDROCHLORIDE 1.25 MG: 1.25 SOLUTION RESPIRATORY (INHALATION) at 20:16

## 2024-01-01 RX ADMIN — OLANZAPINE 10 MG: 2.5 TABLET, FILM COATED ORAL at 07:38

## 2024-01-01 RX ADMIN — INSULIN HUMAN 6 UNITS/HR: 1 INJECTION, SOLUTION INTRAVENOUS at 11:22

## 2024-01-01 RX ADMIN — VANCOMYCIN HYDROCHLORIDE 1250 MG: 500 INJECTION, POWDER, LYOPHILIZED, FOR SOLUTION INTRAVENOUS at 23:04

## 2024-01-01 RX ADMIN — Medication 50 MCG: at 10:31

## 2024-01-01 RX ADMIN — SODIUM CHLORIDE SOLN NEBU 3% 3 ML: 3 NEBU SOLN at 13:06

## 2024-01-01 RX ADMIN — HYDROCORTISONE SODIUM SUCCINATE 50 MG: 100 INJECTION, POWDER, FOR SOLUTION INTRAMUSCULAR; INTRAVENOUS at 18:07

## 2024-01-01 RX ADMIN — VANCOMYCIN HYDROCHLORIDE 1500 MG: 1 INJECTION, POWDER, LYOPHILIZED, FOR SOLUTION INTRAVENOUS at 11:48

## 2024-01-01 RX ADMIN — PROPOFOL 75 MCG/KG/MIN: 10 INJECTION, EMULSION INTRAVENOUS at 03:31

## 2024-01-01 RX ADMIN — WHITE PETROLATUM 57.7 %-MINERAL OIL 31.9 % EYE OINTMENT: at 11:47

## 2024-01-01 RX ADMIN — GABAPENTIN 900 MG: 300 CAPSULE ORAL at 09:26

## 2024-01-01 RX ADMIN — NOREPINEPHRINE BITARTRATE 0.02 MCG/KG/MIN: 0.06 INJECTION, SOLUTION INTRAVENOUS at 06:58

## 2024-01-01 RX ADMIN — Medication 20 NG/KG/MIN: at 15:46

## 2024-01-01 RX ADMIN — POTASSIUM CHLORIDE 20 MEQ: 29.8 INJECTION, SOLUTION INTRAVENOUS at 23:51

## 2024-01-01 RX ADMIN — DOCUSATE SODIUM 50 MG AND SENNOSIDES 8.6 MG 2 TABLET: 8.6; 5 TABLET, FILM COATED ORAL at 19:27

## 2024-01-01 RX ADMIN — MIDAZOLAM HYDROCHLORIDE 3 MG/HR: 1 INJECTION, SOLUTION INTRAVENOUS at 07:57

## 2024-01-01 RX ADMIN — Medication 50 MCG: at 13:34

## 2024-01-01 RX ADMIN — ATORVASTATIN CALCIUM 40 MG: 40 TABLET, FILM COATED ORAL at 08:50

## 2024-01-01 RX ADMIN — OXYCODONE HYDROCHLORIDE 5 MG: 5 TABLET ORAL at 23:18

## 2024-01-01 RX ADMIN — Medication 200 MCG/HR: at 20:57

## 2024-01-01 RX ADMIN — CALCIUM CHLORIDE 1 G: 100 INJECTION, SOLUTION INTRAVENOUS at 23:39

## 2024-01-01 RX ADMIN — POTASSIUM CHLORIDE 20 MEQ: 29.8 INJECTION, SOLUTION INTRAVENOUS at 23:18

## 2024-01-01 RX ADMIN — ASPIRIN 81 MG CHEWABLE TABLET 81 MG: 81 TABLET CHEWABLE at 09:26

## 2024-01-01 RX ADMIN — METOLAZONE 2.5 MG: 2.5 TABLET ORAL at 11:03

## 2024-01-01 RX ADMIN — HEPARIN SODIUM 1700 UNITS/HR: 10000 INJECTION, SOLUTION INTRAVENOUS at 14:45

## 2024-01-01 RX ADMIN — PROPOFOL 20 MCG/KG/MIN: 10 INJECTION, EMULSION INTRAVENOUS at 05:04

## 2024-01-01 RX ADMIN — VANCOMYCIN HYDROCHLORIDE 1500 MG: 10 INJECTION, POWDER, LYOPHILIZED, FOR SOLUTION INTRAVENOUS at 02:12

## 2024-01-01 RX ADMIN — IPRATROPIUM BROMIDE AND ALBUTEROL SULFATE 3 ML: .5; 3 SOLUTION RESPIRATORY (INHALATION) at 08:36

## 2024-01-01 RX ADMIN — FLUOCINONIDE: 0.5 OINTMENT TOPICAL at 11:16

## 2024-01-01 RX ADMIN — OLANZAPINE 10 MG: 2.5 TABLET, FILM COATED ORAL at 14:06

## 2024-01-01 RX ADMIN — PIPERACILLIN AND TAZOBACTAM 4.5 G: 4; .5 INJECTION, POWDER, FOR SOLUTION INTRAVENOUS; PARENTERAL at 10:11

## 2024-01-01 RX ADMIN — Medication 50 MCG: at 17:59

## 2024-01-01 RX ADMIN — ASPIRIN 81 MG CHEWABLE TABLET 81 MG: 81 TABLET CHEWABLE at 07:46

## 2024-01-01 RX ADMIN — Medication 1 PACKET: at 13:07

## 2024-01-01 RX ADMIN — PIPERACILLIN AND TAZOBACTAM 3.38 G: 3; .375 INJECTION, POWDER, LYOPHILIZED, FOR SOLUTION INTRAVENOUS at 15:52

## 2024-01-01 RX ADMIN — Medication 1 PACKET: at 07:40

## 2024-01-01 RX ADMIN — PROPOFOL 25 MCG/KG/MIN: 10 INJECTION, EMULSION INTRAVENOUS at 13:16

## 2024-01-01 RX ADMIN — OLANZAPINE 10 MG: 2.5 TABLET, FILM COATED ORAL at 13:38

## 2024-01-01 RX ADMIN — LEVALBUTEROL HYDROCHLORIDE 1.25 MG: 1.25 SOLUTION RESPIRATORY (INHALATION) at 09:12

## 2024-01-01 RX ADMIN — OXYMETAZOLINE HYDROCHLORIDE 2 SPRAY: 0.05 SPRAY NASAL at 20:07

## 2024-01-01 RX ADMIN — GABAPENTIN 300 MG: 250 SUSPENSION ORAL at 13:35

## 2024-01-01 RX ADMIN — Medication 1 PACKET: at 19:48

## 2024-01-01 RX ADMIN — Medication 10 MG: at 14:29

## 2024-01-01 RX ADMIN — QUETIAPINE FUMARATE 100 MG: 100 TABLET ORAL at 13:42

## 2024-01-01 RX ADMIN — IPRATROPIUM BROMIDE AND ALBUTEROL SULFATE 3 ML: .5; 3 SOLUTION RESPIRATORY (INHALATION) at 17:03

## 2024-01-01 RX ADMIN — Medication 10 MG: at 18:38

## 2024-01-01 RX ADMIN — INSULIN HUMAN 10 UNITS/HR: 1 INJECTION, SOLUTION INTRAVENOUS at 13:24

## 2024-01-01 RX ADMIN — INSULIN ASPART 4 UNITS: 100 INJECTION, SOLUTION INTRAVENOUS; SUBCUTANEOUS at 09:59

## 2024-01-01 RX ADMIN — HYDROXYZINE HYDROCHLORIDE 25 MG: 25 TABLET, FILM COATED ORAL at 13:08

## 2024-01-01 RX ADMIN — MIDAZOLAM 2 MG: 1 INJECTION INTRAMUSCULAR; INTRAVENOUS at 13:52

## 2024-01-01 RX ADMIN — DEXMEDETOMIDINE HYDROCHLORIDE 1.2 MCG/KG/HR: 400 INJECTION INTRAVENOUS at 02:30

## 2024-01-01 RX ADMIN — VANCOMYCIN HYDROCHLORIDE 1500 MG: 10 INJECTION, POWDER, LYOPHILIZED, FOR SOLUTION INTRAVENOUS at 02:10

## 2024-01-01 RX ADMIN — Medication 125 MCG: at 20:04

## 2024-01-01 RX ADMIN — PROPOFOL 50 MCG/KG/MIN: 10 INJECTION, EMULSION INTRAVENOUS at 20:26

## 2024-01-01 RX ADMIN — GABAPENTIN 900 MG: 300 CAPSULE ORAL at 20:18

## 2024-01-01 RX ADMIN — VANCOMYCIN HYDROCHLORIDE 1500 MG: 10 INJECTION, POWDER, LYOPHILIZED, FOR SOLUTION INTRAVENOUS at 13:32

## 2024-01-01 RX ADMIN — FUROSEMIDE 40 MG: 10 INJECTION, SOLUTION INTRAVENOUS at 13:42

## 2024-01-01 RX ADMIN — PIPERACILLIN AND TAZOBACTAM 4.5 G: 4; .5 INJECTION, POWDER, FOR SOLUTION INTRAVENOUS; PARENTERAL at 03:56

## 2024-01-01 RX ADMIN — OXYCODONE HYDROCHLORIDE 10 MG: 10 TABLET ORAL at 23:51

## 2024-01-01 RX ADMIN — INSULIN ASPART 8 UNITS: 100 INJECTION, SOLUTION INTRAVENOUS; SUBCUTANEOUS at 09:30

## 2024-01-01 RX ADMIN — PROPOFOL 50 MCG/KG/MIN: 10 INJECTION, EMULSION INTRAVENOUS at 02:31

## 2024-01-01 RX ADMIN — LEVOTHYROXINE SODIUM 150 MCG: 0.15 TABLET ORAL at 09:20

## 2024-01-01 RX ADMIN — PROPOFOL 20 MCG/KG/MIN: 10 INJECTION, EMULSION INTRAVENOUS at 17:45

## 2024-01-01 RX ADMIN — ALBUMIN HUMAN 50 G: 0.05 INJECTION, SOLUTION INTRAVENOUS at 21:50

## 2024-01-01 RX ADMIN — LEVETIRACETAM 1500 MG: 750 TABLET, FILM COATED ORAL at 20:17

## 2024-01-01 RX ADMIN — METHOCARBAMOL 1000 MG: 500 TABLET ORAL at 21:43

## 2024-01-01 RX ADMIN — ASPIRIN 81 MG CHEWABLE TABLET 81 MG: 81 TABLET CHEWABLE at 08:50

## 2024-01-01 RX ADMIN — DULOXETINE HYDROCHLORIDE 30 MG: 30 CAPSULE, DELAYED RELEASE ORAL at 07:50

## 2024-01-01 RX ADMIN — DULOXETINE HYDROCHLORIDE 30 MG: 30 CAPSULE, DELAYED RELEASE ORAL at 08:49

## 2024-01-01 RX ADMIN — LACOSAMIDE 100 MG: 10 INJECTION INTRAVENOUS at 14:01

## 2024-01-01 RX ADMIN — LACOSAMIDE 100 MG: 50 TABLET, FILM COATED ORAL at 08:23

## 2024-01-01 RX ADMIN — PROPOFOL 30 MCG/KG/MIN: 10 INJECTION, EMULSION INTRAVENOUS at 23:29

## 2024-01-01 RX ADMIN — FUROSEMIDE 40 MG: 10 INJECTION, SOLUTION INTRAVENOUS at 10:56

## 2024-01-01 RX ADMIN — LEVALBUTEROL HYDROCHLORIDE 1.25 MG: 1.25 SOLUTION RESPIRATORY (INHALATION) at 19:55

## 2024-01-01 RX ADMIN — ALBUTEROL SULFATE 2.5 MG: 2.5 SOLUTION RESPIRATORY (INHALATION) at 05:01

## 2024-01-01 RX ADMIN — POTASSIUM CHLORIDE 20 MEQ: 1.5 POWDER, FOR SOLUTION ORAL at 17:28

## 2024-01-01 RX ADMIN — GABAPENTIN 300 MG: 250 SUSPENSION ORAL at 05:49

## 2024-01-01 RX ADMIN — ALBUTEROL SULFATE 2.5 MG: 2.5 SOLUTION RESPIRATORY (INHALATION) at 08:08

## 2024-01-01 RX ADMIN — POTASSIUM CHLORIDE 40 MEQ: 1.5 POWDER, FOR SOLUTION ORAL at 09:35

## 2024-01-01 RX ADMIN — HYDROCORTISONE SODIUM SUCCINATE 50 MG: 100 INJECTION, POWDER, FOR SOLUTION INTRAMUSCULAR; INTRAVENOUS at 05:28

## 2024-01-01 RX ADMIN — OXYCODONE HYDROCHLORIDE 10 MG: 10 TABLET ORAL at 07:22

## 2024-01-01 RX ADMIN — ALBUTEROL SULFATE 2.5 MG: 2.5 SOLUTION RESPIRATORY (INHALATION) at 00:25

## 2024-01-01 RX ADMIN — INSULIN GLARGINE 15 UNITS: 100 INJECTION, SOLUTION SUBCUTANEOUS at 21:04

## 2024-01-01 RX ADMIN — GABAPENTIN 900 MG: 250 SUSPENSION ORAL at 07:43

## 2024-01-01 RX ADMIN — PROPOFOL 60 MCG/KG/MIN: 10 INJECTION, EMULSION INTRAVENOUS at 23:20

## 2024-01-01 RX ADMIN — CHLORHEXIDINE GLUCONATE 0.12% ORAL RINSE 15 ML: 1.2 LIQUID ORAL at 08:09

## 2024-01-01 RX ADMIN — PIPERACILLIN AND TAZOBACTAM 4.5 G: 4; .5 INJECTION, POWDER, FOR SOLUTION INTRAVENOUS at 00:22

## 2024-01-01 RX ADMIN — LACOSAMIDE 100 MG: 50 TABLET, FILM COATED ORAL at 09:03

## 2024-01-01 RX ADMIN — LEVALBUTEROL HYDROCHLORIDE 1.25 MG: 1.25 SOLUTION RESPIRATORY (INHALATION) at 22:09

## 2024-01-01 RX ADMIN — MIDAZOLAM IN SODIUM CHLORIDE 4 MG/HR: 1 INJECTION INTRAVENOUS at 21:05

## 2024-01-01 RX ADMIN — PIPERACILLIN AND TAZOBACTAM 4.5 G: 4; .5 INJECTION, POWDER, FOR SOLUTION INTRAVENOUS at 05:13

## 2024-01-01 RX ADMIN — GABAPENTIN 900 MG: 250 SUSPENSION ORAL at 13:38

## 2024-01-01 RX ADMIN — LEVOTHYROXINE SODIUM 150 MCG: 0.15 TABLET ORAL at 08:49

## 2024-01-01 RX ADMIN — Medication 15 ML: at 08:43

## 2024-01-01 RX ADMIN — IPRATROPIUM BROMIDE AND ALBUTEROL SULFATE 3 ML: .5; 3 SOLUTION RESPIRATORY (INHALATION) at 08:21

## 2024-01-01 RX ADMIN — SODIUM CHLORIDE SOLN NEBU 3% 3 ML: 3 NEBU SOLN at 20:39

## 2024-01-01 RX ADMIN — PROPOFOL 30 MCG/KG/MIN: 10 INJECTION, EMULSION INTRAVENOUS at 02:55

## 2024-01-01 RX ADMIN — INSULIN GLARGINE 50 UNITS: 100 INJECTION, SOLUTION SUBCUTANEOUS at 08:20

## 2024-01-01 RX ADMIN — POTASSIUM CHLORIDE 20 MEQ: 29.8 INJECTION, SOLUTION INTRAVENOUS at 19:32

## 2024-01-01 RX ADMIN — LEVETIRACETAM 1500 MG: 750 TABLET, FILM COATED ORAL at 19:38

## 2024-01-01 RX ADMIN — PROPOFOL 50 MCG/KG/MIN: 10 INJECTION, EMULSION INTRAVENOUS at 17:54

## 2024-01-01 RX ADMIN — POTASSIUM CHLORIDE 20 MEQ: 29.8 INJECTION, SOLUTION INTRAVENOUS at 17:43

## 2024-01-01 RX ADMIN — PROPOFOL 25 MCG/KG/MIN: 10 INJECTION, EMULSION INTRAVENOUS at 18:27

## 2024-01-01 RX ADMIN — LACOSAMIDE 100 MG: 50 TABLET, FILM COATED ORAL at 17:44

## 2024-01-01 RX ADMIN — ONDANSETRON 4 MG: 2 INJECTION INTRAMUSCULAR; INTRAVENOUS at 16:49

## 2024-01-01 RX ADMIN — GABAPENTIN 900 MG: 300 CAPSULE ORAL at 08:46

## 2024-01-01 RX ADMIN — LEVALBUTEROL HYDROCHLORIDE 1.25 MG: 1.25 SOLUTION RESPIRATORY (INHALATION) at 20:13

## 2024-01-01 RX ADMIN — GABAPENTIN 900 MG: 250 SUSPENSION ORAL at 20:14

## 2024-01-01 RX ADMIN — PROPOFOL 40 MCG/KG/MIN: 10 INJECTION, EMULSION INTRAVENOUS at 05:06

## 2024-01-01 RX ADMIN — HYDROXYZINE HYDROCHLORIDE 50 MG: 50 TABLET, FILM COATED ORAL at 18:47

## 2024-01-01 RX ADMIN — POTASSIUM CHLORIDE 20 MEQ: 29.8 INJECTION, SOLUTION INTRAVENOUS at 06:17

## 2024-01-01 RX ADMIN — LEVALBUTEROL HYDROCHLORIDE 1.25 MG: 1.25 SOLUTION RESPIRATORY (INHALATION) at 07:47

## 2024-01-01 RX ADMIN — METHOCARBAMOL 1000 MG: 500 TABLET ORAL at 13:45

## 2024-01-01 RX ADMIN — OXYCODONE HYDROCHLORIDE 5 MG: 5 TABLET ORAL at 15:41

## 2024-01-01 RX ADMIN — INSULIN HUMAN 6 UNITS/HR: 1 INJECTION, SOLUTION INTRAVENOUS at 02:17

## 2024-01-01 RX ADMIN — VANCOMYCIN HYDROCHLORIDE 2000 MG: 1 INJECTION, POWDER, LYOPHILIZED, FOR SOLUTION INTRAVENOUS at 22:18

## 2024-01-01 RX ADMIN — OXYMETAZOLINE HYDROCHLORIDE 2 SPRAY: 0.05 SPRAY NASAL at 19:52

## 2024-01-01 RX ADMIN — Medication 50 MCG: at 17:00

## 2024-01-01 RX ADMIN — HEPARIN SODIUM 1400 UNITS/HR: 10000 INJECTION, SOLUTION INTRAVENOUS at 22:26

## 2024-01-01 RX ADMIN — SODIUM CHLORIDE SOLN NEBU 3% 3 ML: 3 NEBU SOLN at 13:08

## 2024-01-01 RX ADMIN — MIDAZOLAM 2 MG: 1 INJECTION INTRAMUSCULAR; INTRAVENOUS at 22:49

## 2024-01-01 ASSESSMENT — ACTIVITIES OF DAILY LIVING (ADL)
ADLS_ACUITY_SCORE: 32
ADLS_ACUITY_SCORE: 45
ADLS_ACUITY_SCORE: 32
ADLS_ACUITY_SCORE: 44
ADLS_ACUITY_SCORE: 43
ADLS_ACUITY_SCORE: 27
ADLS_ACUITY_SCORE: 41
ADLS_ACUITY_SCORE: 44
ADLS_ACUITY_SCORE: 44
ADLS_ACUITY_SCORE: 26
ADLS_ACUITY_SCORE: 45
ADLS_ACUITY_SCORE: 44
ADLS_ACUITY_SCORE: 41
ADLS_ACUITY_SCORE: 45
ADLS_ACUITY_SCORE: 44
ADLS_ACUITY_SCORE: 39
ADLS_ACUITY_SCORE: 45
ADLS_ACUITY_SCORE: 32
ADLS_ACUITY_SCORE: 26
ADLS_ACUITY_SCORE: 49
ADLS_ACUITY_SCORE: 39
ADLS_ACUITY_SCORE: 43
ADLS_ACUITY_SCORE: 44
ADLS_ACUITY_SCORE: 47
ADLS_ACUITY_SCORE: 45
ADLS_ACUITY_SCORE: 44
ADLS_ACUITY_SCORE: 45
ADLS_ACUITY_SCORE: 43
ADLS_ACUITY_SCORE: 44
ADLS_ACUITY_SCORE: 25
ADLS_ACUITY_SCORE: 26
ADLS_ACUITY_SCORE: 44
ADLS_ACUITY_SCORE: 45
ADLS_ACUITY_SCORE: 25
ADLS_ACUITY_SCORE: 44
ADLS_ACUITY_SCORE: 49
ADLS_ACUITY_SCORE: 43
ADLS_ACUITY_SCORE: 25
ADLS_ACUITY_SCORE: 43
ADLS_ACUITY_SCORE: 30
ADLS_ACUITY_SCORE: 27
ADLS_ACUITY_SCORE: 44
ADLS_ACUITY_SCORE: 48
ADLS_ACUITY_SCORE: 44
ADLS_ACUITY_SCORE: 31
ADLS_ACUITY_SCORE: 44
ADLS_ACUITY_SCORE: 48
ADLS_ACUITY_SCORE: 45
ADLS_ACUITY_SCORE: 29
ADLS_ACUITY_SCORE: 48
ADLS_ACUITY_SCORE: 48
ADLS_ACUITY_SCORE: 45
ADLS_ACUITY_SCORE: 43
ADLS_ACUITY_SCORE: 44
ADLS_ACUITY_SCORE: 31
ADLS_ACUITY_SCORE: 48
ADLS_ACUITY_SCORE: 26
ADLS_ACUITY_SCORE: 45
ADLS_ACUITY_SCORE: 41
ADLS_ACUITY_SCORE: 39
ADLS_ACUITY_SCORE: 44
ADLS_ACUITY_SCORE: 45
ADLS_ACUITY_SCORE: 32
ADLS_ACUITY_SCORE: 30
ADLS_ACUITY_SCORE: 32
ADLS_ACUITY_SCORE: 27
ADLS_ACUITY_SCORE: 30
ADLS_ACUITY_SCORE: 26
ADLS_ACUITY_SCORE: 45
ADLS_ACUITY_SCORE: 41
ADLS_ACUITY_SCORE: 45
ADLS_ACUITY_SCORE: 43
ADLS_ACUITY_SCORE: 44
ADLS_ACUITY_SCORE: 27
ADLS_ACUITY_SCORE: 43
ADLS_ACUITY_SCORE: 45
ADLS_ACUITY_SCORE: 45
ADLS_ACUITY_SCORE: 44
ADLS_ACUITY_SCORE: 45
ADLS_ACUITY_SCORE: 27
ADLS_ACUITY_SCORE: 44
ADLS_ACUITY_SCORE: 27
ADLS_ACUITY_SCORE: 44
ADLS_ACUITY_SCORE: 31
ADLS_ACUITY_SCORE: 25
ADLS_ACUITY_SCORE: 25
ADLS_ACUITY_SCORE: 32
ADLS_ACUITY_SCORE: 31
ADLS_ACUITY_SCORE: 44
ADLS_ACUITY_SCORE: 26
ADLS_ACUITY_SCORE: 31
ADLS_ACUITY_SCORE: 30
ADLS_ACUITY_SCORE: 48
ADLS_ACUITY_SCORE: 47
ADLS_ACUITY_SCORE: 45
ADLS_ACUITY_SCORE: 26
ADLS_ACUITY_SCORE: 48
ADLS_ACUITY_SCORE: 45
ADLS_ACUITY_SCORE: 48
ADLS_ACUITY_SCORE: 26
ADLS_ACUITY_SCORE: 35
ADLS_ACUITY_SCORE: 45
ADLS_ACUITY_SCORE: 44
ADLS_ACUITY_SCORE: 44
ADLS_ACUITY_SCORE: 43
ADLS_ACUITY_SCORE: 31
ADLS_ACUITY_SCORE: 27
ADLS_ACUITY_SCORE: 44
ADLS_ACUITY_SCORE: 27
ADLS_ACUITY_SCORE: 43
ADLS_ACUITY_SCORE: 30
ADLS_ACUITY_SCORE: 27
ADLS_ACUITY_SCORE: 39
ADLS_ACUITY_SCORE: 45
ADLS_ACUITY_SCORE: 30
ADLS_ACUITY_SCORE: 31
ADLS_ACUITY_SCORE: 25
ADLS_ACUITY_SCORE: 44
ADLS_ACUITY_SCORE: 28
ADLS_ACUITY_SCORE: 47
ADLS_ACUITY_SCORE: 27
ADLS_ACUITY_SCORE: 44
ADLS_ACUITY_SCORE: 27
ADLS_ACUITY_SCORE: 48
ADLS_ACUITY_SCORE: 43
ADLS_ACUITY_SCORE: 25
ADLS_ACUITY_SCORE: 41
ADLS_ACUITY_SCORE: 44
ADLS_ACUITY_SCORE: 45
ADLS_ACUITY_SCORE: 48
ADLS_ACUITY_SCORE: 45
ADLS_ACUITY_SCORE: 43
ADLS_ACUITY_SCORE: 44
ADLS_ACUITY_SCORE: 48
ADLS_ACUITY_SCORE: 27
ADLS_ACUITY_SCORE: 44
ADLS_ACUITY_SCORE: 27
DEPENDENT_IADLS:: INDEPENDENT
ADLS_ACUITY_SCORE: 45
ADLS_ACUITY_SCORE: 47
ADLS_ACUITY_SCORE: 31
ADLS_ACUITY_SCORE: 45
ADLS_ACUITY_SCORE: 32
ADLS_ACUITY_SCORE: 45
ADLS_ACUITY_SCORE: 27
ADLS_ACUITY_SCORE: 43
ADLS_ACUITY_SCORE: 47
ADLS_ACUITY_SCORE: 45
ADLS_ACUITY_SCORE: 31
ADLS_ACUITY_SCORE: 45
ADLS_ACUITY_SCORE: 27
ADLS_ACUITY_SCORE: 44
ADLS_ACUITY_SCORE: 32
ADLS_ACUITY_SCORE: 27
ADLS_ACUITY_SCORE: 44
ADLS_ACUITY_SCORE: 45
ADLS_ACUITY_SCORE: 35
ADLS_ACUITY_SCORE: 45
ADLS_ACUITY_SCORE: 45
ADLS_ACUITY_SCORE: 44
ADLS_ACUITY_SCORE: 23
ADLS_ACUITY_SCORE: 43
ADLS_ACUITY_SCORE: 45
ADLS_ACUITY_SCORE: 39
ADLS_ACUITY_SCORE: 43
ADLS_ACUITY_SCORE: 44
ADLS_ACUITY_SCORE: 44
ADLS_ACUITY_SCORE: 27
ADLS_ACUITY_SCORE: 25
ADLS_ACUITY_SCORE: 27
ADLS_ACUITY_SCORE: 45
ADLS_ACUITY_SCORE: 32
ADLS_ACUITY_SCORE: 32
ADLS_ACUITY_SCORE: 44
ADLS_ACUITY_SCORE: 30
ADLS_ACUITY_SCORE: 44
ADLS_ACUITY_SCORE: 32
ADLS_ACUITY_SCORE: 30
ADLS_ACUITY_SCORE: 27
ADLS_ACUITY_SCORE: 48
ADLS_ACUITY_SCORE: 30
ADLS_ACUITY_SCORE: 48
ADLS_ACUITY_SCORE: 43
ADLS_ACUITY_SCORE: 49
ADLS_ACUITY_SCORE: 45
ADLS_ACUITY_SCORE: 43
ADLS_ACUITY_SCORE: 47
ADLS_ACUITY_SCORE: 45
ADLS_ACUITY_SCORE: 48
ADLS_ACUITY_SCORE: 27
ADLS_ACUITY_SCORE: 32
ADLS_ACUITY_SCORE: 48
ADLS_ACUITY_SCORE: 45
ADLS_ACUITY_SCORE: 25
ADLS_ACUITY_SCORE: 25
ADLS_ACUITY_SCORE: 31
ADLS_ACUITY_SCORE: 32
ADLS_ACUITY_SCORE: 32
ADLS_ACUITY_SCORE: 45
ADLS_ACUITY_SCORE: 32
ADLS_ACUITY_SCORE: 45
ADLS_ACUITY_SCORE: 44
ADLS_ACUITY_SCORE: 32
ADLS_ACUITY_SCORE: 32
ADLS_ACUITY_SCORE: 44
ADLS_ACUITY_SCORE: 43
ADLS_ACUITY_SCORE: 44
ADLS_ACUITY_SCORE: 30
ADLS_ACUITY_SCORE: 44
ADLS_ACUITY_SCORE: 29
ADLS_ACUITY_SCORE: 31
ADLS_ACUITY_SCORE: 48
ADLS_ACUITY_SCORE: 35
ADLS_ACUITY_SCORE: 44
ADLS_ACUITY_SCORE: 27
ADLS_ACUITY_SCORE: 49
ADLS_ACUITY_SCORE: 25
ADLS_ACUITY_SCORE: 44
ADLS_ACUITY_SCORE: 25
ADLS_ACUITY_SCORE: 32
ADLS_ACUITY_SCORE: 44
ADLS_ACUITY_SCORE: 45
ADLS_ACUITY_SCORE: 30
ADLS_ACUITY_SCORE: 32
ADLS_ACUITY_SCORE: 27
ADLS_ACUITY_SCORE: 27
ADLS_ACUITY_SCORE: 25
ADLS_ACUITY_SCORE: 31
ADLS_ACUITY_SCORE: 45
ADLS_ACUITY_SCORE: 47
ADLS_ACUITY_SCORE: 48
ADLS_ACUITY_SCORE: 44
ADLS_ACUITY_SCORE: 25
ADLS_ACUITY_SCORE: 27
ADLS_ACUITY_SCORE: 45
ADLS_ACUITY_SCORE: 44
ADLS_ACUITY_SCORE: 44
ADLS_ACUITY_SCORE: 47
ADLS_ACUITY_SCORE: 45
ADLS_ACUITY_SCORE: 43
ADLS_ACUITY_SCORE: 45
ADLS_ACUITY_SCORE: 43
ADLS_ACUITY_SCORE: 49
ADLS_ACUITY_SCORE: 45
ADLS_ACUITY_SCORE: 26
ADLS_ACUITY_SCORE: 44
ADLS_ACUITY_SCORE: 44
ADLS_ACUITY_SCORE: 39
ADLS_ACUITY_SCORE: 32
ADLS_ACUITY_SCORE: 44
ADLS_ACUITY_SCORE: 32
ADLS_ACUITY_SCORE: 30
ADLS_ACUITY_SCORE: 31
ADLS_ACUITY_SCORE: 32
ADLS_ACUITY_SCORE: 32
ADLS_ACUITY_SCORE: 23
ADLS_ACUITY_SCORE: 45
ADLS_ACUITY_SCORE: 25
ADLS_ACUITY_SCORE: 44
ADLS_ACUITY_SCORE: 44
ADLS_ACUITY_SCORE: 30
ADLS_ACUITY_SCORE: 44
ADLS_ACUITY_SCORE: 45
ADLS_ACUITY_SCORE: 31
ADLS_ACUITY_SCORE: 27
ADLS_ACUITY_SCORE: 44
ADLS_ACUITY_SCORE: 25
ADLS_ACUITY_SCORE: 48
ADLS_ACUITY_SCORE: 35
ADLS_ACUITY_SCORE: 39
ADLS_ACUITY_SCORE: 49
ADLS_ACUITY_SCORE: 25
ADLS_ACUITY_SCORE: 26
ADLS_ACUITY_SCORE: 44
ADLS_ACUITY_SCORE: 44
ADLS_ACUITY_SCORE: 39
ADLS_ACUITY_SCORE: 26
ADLS_ACUITY_SCORE: 27
ADLS_ACUITY_SCORE: 39
ADLS_ACUITY_SCORE: 47
ADLS_ACUITY_SCORE: 48
ADLS_ACUITY_SCORE: 44
ADLS_ACUITY_SCORE: 30
ADLS_ACUITY_SCORE: 23
ADLS_ACUITY_SCORE: 47
ADLS_ACUITY_SCORE: 28
ADLS_ACUITY_SCORE: 31
ADLS_ACUITY_SCORE: 45
ADLS_ACUITY_SCORE: 47
ADLS_ACUITY_SCORE: 48
ADLS_ACUITY_SCORE: 27
ADLS_ACUITY_SCORE: 44
ADLS_ACUITY_SCORE: 47
ADLS_ACUITY_SCORE: 32
ADLS_ACUITY_SCORE: 44
ADLS_ACUITY_SCORE: 45
ADLS_ACUITY_SCORE: 32
ADLS_ACUITY_SCORE: 45
ADLS_ACUITY_SCORE: 45
ADLS_ACUITY_SCORE: 26
ADLS_ACUITY_SCORE: 32
ADLS_ACUITY_SCORE: 26
ADLS_ACUITY_SCORE: 44
ADLS_ACUITY_SCORE: 45
ADLS_ACUITY_SCORE: 48
ADLS_ACUITY_SCORE: 47
ADLS_ACUITY_SCORE: 27
ADLS_ACUITY_SCORE: 32
ADLS_ACUITY_SCORE: 23
ADLS_ACUITY_SCORE: 44
ADLS_ACUITY_SCORE: 25
ADLS_ACUITY_SCORE: 30
ADLS_ACUITY_SCORE: 45
ADLS_ACUITY_SCORE: 25
ADLS_ACUITY_SCORE: 30
ADLS_ACUITY_SCORE: 44
ADLS_ACUITY_SCORE: 44
ADLS_ACUITY_SCORE: 31
ADLS_ACUITY_SCORE: 44
ADLS_ACUITY_SCORE: 49
ADLS_ACUITY_SCORE: 26
ADLS_ACUITY_SCORE: 48
ADLS_ACUITY_SCORE: 47
ADLS_ACUITY_SCORE: 44
ADLS_ACUITY_SCORE: 25
ADLS_ACUITY_SCORE: 27
ADLS_ACUITY_SCORE: 26
ADLS_ACUITY_SCORE: 48
ADLS_ACUITY_SCORE: 44
ADLS_ACUITY_SCORE: 48
ADLS_ACUITY_SCORE: 43
ADLS_ACUITY_SCORE: 35
ADLS_ACUITY_SCORE: 32
ADLS_ACUITY_SCORE: 43
ADLS_ACUITY_SCORE: 48
ADLS_ACUITY_SCORE: 45
ADLS_ACUITY_SCORE: 44
ADLS_ACUITY_SCORE: 30
ADLS_ACUITY_SCORE: 48
ADLS_ACUITY_SCORE: 44
ADLS_ACUITY_SCORE: 44
DOING_ERRANDS_INDEPENDENTLY_DIFFICULTY: YES
ADLS_ACUITY_SCORE: 44
ADLS_ACUITY_SCORE: 44
ADLS_ACUITY_SCORE: 45
ADLS_ACUITY_SCORE: 47
ADLS_ACUITY_SCORE: 32
ADLS_ACUITY_SCORE: 27
ADLS_ACUITY_SCORE: 32
ADLS_ACUITY_SCORE: 25
ADLS_ACUITY_SCORE: 45
ADLS_ACUITY_SCORE: 30
ADLS_ACUITY_SCORE: 30
ADLS_ACUITY_SCORE: 32
ADLS_ACUITY_SCORE: 45
ADLS_ACUITY_SCORE: 44
ADLS_ACUITY_SCORE: 35
ADLS_ACUITY_SCORE: 43
ADLS_ACUITY_SCORE: 48
ADLS_ACUITY_SCORE: 47
ADLS_ACUITY_SCORE: 30
ADLS_ACUITY_SCORE: 45
ADLS_ACUITY_SCORE: 47
ADLS_ACUITY_SCORE: 31
ADLS_ACUITY_SCORE: 48
ADLS_ACUITY_SCORE: 45
ADLS_ACUITY_SCORE: 27
ADLS_ACUITY_SCORE: 43
ADLS_ACUITY_SCORE: 44
ADLS_ACUITY_SCORE: 44
ADLS_ACUITY_SCORE: 43
ADLS_ACUITY_SCORE: 44
ADLS_ACUITY_SCORE: 43
ADLS_ACUITY_SCORE: 32
ADLS_ACUITY_SCORE: 25
ADLS_ACUITY_SCORE: 45
ADLS_ACUITY_SCORE: 45
ADLS_ACUITY_SCORE: 48
ADLS_ACUITY_SCORE: 31
ADLS_ACUITY_SCORE: 45
ADLS_ACUITY_SCORE: 44
ADLS_ACUITY_SCORE: 45
ADLS_ACUITY_SCORE: 39
ADLS_ACUITY_SCORE: 45
ADLS_ACUITY_SCORE: 44
ADLS_ACUITY_SCORE: 45
ADLS_ACUITY_SCORE: 44
ADLS_ACUITY_SCORE: 45
ADLS_ACUITY_SCORE: 24
ADLS_ACUITY_SCORE: 30
ADLS_ACUITY_SCORE: 43
ADLS_ACUITY_SCORE: 45
ADLS_ACUITY_SCORE: 32
ADLS_ACUITY_SCORE: 49
ADLS_ACUITY_SCORE: 45
ADLS_ACUITY_SCORE: 44
ADLS_ACUITY_SCORE: 26
ADLS_ACUITY_SCORE: 30
ADLS_ACUITY_SCORE: 44
ADLS_ACUITY_SCORE: 26
ADLS_ACUITY_SCORE: 47
ADLS_ACUITY_SCORE: 27
ADLS_ACUITY_SCORE: 45
ADLS_ACUITY_SCORE: 27
ADLS_ACUITY_SCORE: 45
ADLS_ACUITY_SCORE: 26
ADLS_ACUITY_SCORE: 32
ADLS_ACUITY_SCORE: 27
ADLS_ACUITY_SCORE: 27
ADLS_ACUITY_SCORE: 49
ADLS_ACUITY_SCORE: 43
ADLS_ACUITY_SCORE: 25
ADLS_ACUITY_SCORE: 41
ADLS_ACUITY_SCORE: 30
ADLS_ACUITY_SCORE: 43
ADLS_ACUITY_SCORE: 43
ADLS_ACUITY_SCORE: 25
ADLS_ACUITY_SCORE: 45
ADLS_ACUITY_SCORE: 27
ADLS_ACUITY_SCORE: 44
ADLS_ACUITY_SCORE: 30
ADLS_ACUITY_SCORE: 49
ADLS_ACUITY_SCORE: 48
ADLS_ACUITY_SCORE: 26
ADLS_ACUITY_SCORE: 43
ADLS_ACUITY_SCORE: 31
ADLS_ACUITY_SCORE: 25
ADLS_ACUITY_SCORE: 44
ADLS_ACUITY_SCORE: 45
ADLS_ACUITY_SCORE: 39
ADLS_ACUITY_SCORE: 45
ADLS_ACUITY_SCORE: 32
ADLS_ACUITY_SCORE: 49
ADLS_ACUITY_SCORE: 25
ADLS_ACUITY_SCORE: 25
ADLS_ACUITY_SCORE: 44
ADLS_ACUITY_SCORE: 44
ADLS_ACUITY_SCORE: 47
ADLS_ACUITY_SCORE: 26
ADLS_ACUITY_SCORE: 44
ADLS_ACUITY_SCORE: 32
ADLS_ACUITY_SCORE: 43
ADLS_ACUITY_SCORE: 48
ADLS_ACUITY_SCORE: 41
ADLS_ACUITY_SCORE: 24
ADLS_ACUITY_SCORE: 27
ADLS_ACUITY_SCORE: 26
ADLS_ACUITY_SCORE: 44
ADLS_ACUITY_SCORE: 45
ADLS_ACUITY_SCORE: 48
ADLS_ACUITY_SCORE: 27
ADLS_ACUITY_SCORE: 49
ADLS_ACUITY_SCORE: 48
ADLS_ACUITY_SCORE: 32
ADLS_ACUITY_SCORE: 43
ADLS_ACUITY_SCORE: 32
ADLS_ACUITY_SCORE: 44
ADLS_ACUITY_SCORE: 27
ADLS_ACUITY_SCORE: 45
ADLS_ACUITY_SCORE: 27
ADLS_ACUITY_SCORE: 26
ADLS_ACUITY_SCORE: 32
ADLS_ACUITY_SCORE: 43
ADLS_ACUITY_SCORE: 27
ADLS_ACUITY_SCORE: 44
ADLS_ACUITY_SCORE: 45
ADLS_ACUITY_SCORE: 44
ADLS_ACUITY_SCORE: 27
ADLS_ACUITY_SCORE: 32
ADLS_ACUITY_SCORE: 27
ADLS_ACUITY_SCORE: 35
ADLS_ACUITY_SCORE: 45
ADLS_ACUITY_SCORE: 44
ADLS_ACUITY_SCORE: 26
ADLS_ACUITY_SCORE: 45
ADLS_ACUITY_SCORE: 26
ADLS_ACUITY_SCORE: 31
ADLS_ACUITY_SCORE: 48
ADLS_ACUITY_SCORE: 43
ADLS_ACUITY_SCORE: 30
ADLS_ACUITY_SCORE: 45
ADLS_ACUITY_SCORE: 45
ADLS_ACUITY_SCORE: 23
ADLS_ACUITY_SCORE: 30
ADLS_ACUITY_SCORE: 45
ADLS_ACUITY_SCORE: 44
ADLS_ACUITY_SCORE: 26
ADLS_ACUITY_SCORE: 44
ADLS_ACUITY_SCORE: 45
ADLS_ACUITY_SCORE: 44
ADLS_ACUITY_SCORE: 26
ADLS_ACUITY_SCORE: 32
ADLS_ACUITY_SCORE: 44
ADLS_ACUITY_SCORE: 32
ADLS_ACUITY_SCORE: 26
ADLS_ACUITY_SCORE: 49
ADLS_ACUITY_SCORE: 31
ADLS_ACUITY_SCORE: 44
ADLS_ACUITY_SCORE: 27
ADLS_ACUITY_SCORE: 27
ADLS_ACUITY_SCORE: 26
ADLS_ACUITY_SCORE: 44
ADLS_ACUITY_SCORE: 45
ADLS_ACUITY_SCORE: 43
ADLS_ACUITY_SCORE: 44
ADLS_ACUITY_SCORE: 49
ADLS_ACUITY_SCORE: 45
ADLS_ACUITY_SCORE: 45
ADLS_ACUITY_SCORE: 44
ADLS_ACUITY_SCORE: 44
ADLS_ACUITY_SCORE: 45
ADLS_ACUITY_SCORE: 44
ADLS_ACUITY_SCORE: 26
ADLS_ACUITY_SCORE: 45
ADLS_ACUITY_SCORE: 44
ADLS_ACUITY_SCORE: 49
ADLS_ACUITY_SCORE: 34
ADLS_ACUITY_SCORE: 26
ADLS_ACUITY_SCORE: 32
ADLS_ACUITY_SCORE: 30
ADLS_ACUITY_SCORE: 44
ADLS_ACUITY_SCORE: 27
ADLS_ACUITY_SCORE: 25
ADLS_ACUITY_SCORE: 31
ADLS_ACUITY_SCORE: 47
ADLS_ACUITY_SCORE: 45
ADLS_ACUITY_SCORE: 45
ADLS_ACUITY_SCORE: 44
ADLS_ACUITY_SCORE: 43
ADLS_ACUITY_SCORE: 27
ADLS_ACUITY_SCORE: 35
ADLS_ACUITY_SCORE: 44
ADLS_ACUITY_SCORE: 27
ADLS_ACUITY_SCORE: 30
ADLS_ACUITY_SCORE: 32
ADLS_ACUITY_SCORE: 32
ADLS_ACUITY_SCORE: 44
ADLS_ACUITY_SCORE: 44
ADLS_ACUITY_SCORE: 27
ADLS_ACUITY_SCORE: 44
ADLS_ACUITY_SCORE: 32
ADLS_ACUITY_SCORE: 44
ADLS_ACUITY_SCORE: 43
ADLS_ACUITY_SCORE: 44
ADLS_ACUITY_SCORE: 49
ADLS_ACUITY_SCORE: 45
ADLS_ACUITY_SCORE: 32
ADLS_ACUITY_SCORE: 45
ADLS_ACUITY_SCORE: 25
ADLS_ACUITY_SCORE: 48
ADLS_ACUITY_SCORE: 32
ADLS_ACUITY_SCORE: 27
ADLS_ACUITY_SCORE: 32
ADLS_ACUITY_SCORE: 44
ADLS_ACUITY_SCORE: 25
ADLS_ACUITY_SCORE: 45
ADLS_ACUITY_SCORE: 30
ADLS_ACUITY_SCORE: 27
ADLS_ACUITY_SCORE: 49
ADLS_ACUITY_SCORE: 45
ADLS_ACUITY_SCORE: 47
ADLS_ACUITY_SCORE: 44
ADLS_ACUITY_SCORE: 47
ADLS_ACUITY_SCORE: 48
ADLS_ACUITY_SCORE: 23
ADLS_ACUITY_SCORE: 27
ADLS_ACUITY_SCORE: 26
ADLS_ACUITY_SCORE: 30
ADLS_ACUITY_SCORE: 30
ADLS_ACUITY_SCORE: 48
ADLS_ACUITY_SCORE: 43
ADLS_ACUITY_SCORE: 27
ADLS_ACUITY_SCORE: 44
ADLS_ACUITY_SCORE: 31
ADLS_ACUITY_SCORE: 44
ADLS_ACUITY_SCORE: 45
ADLS_ACUITY_SCORE: 31
ADLS_ACUITY_SCORE: 44
ADLS_ACUITY_SCORE: 43
ADLS_ACUITY_SCORE: 45
ADLS_ACUITY_SCORE: 39
ADLS_ACUITY_SCORE: 27
ADLS_ACUITY_SCORE: 44
ADLS_ACUITY_SCORE: 32
ADLS_ACUITY_SCORE: 44
ADLS_ACUITY_SCORE: 32
ADLS_ACUITY_SCORE: 26
ADLS_ACUITY_SCORE: 41
ADLS_ACUITY_SCORE: 27
ADLS_ACUITY_SCORE: 45
ADLS_ACUITY_SCORE: 45
ADLS_ACUITY_SCORE: 44
ADLS_ACUITY_SCORE: 44
ADLS_ACUITY_SCORE: 48
ADLS_ACUITY_SCORE: 35
ADLS_ACUITY_SCORE: 27
ADLS_ACUITY_SCORE: 44
ADLS_ACUITY_SCORE: 27
ADLS_ACUITY_SCORE: 44
ADLS_ACUITY_SCORE: 26
ADLS_ACUITY_SCORE: 44
ADLS_ACUITY_SCORE: 25
ADLS_ACUITY_SCORE: 26
ADLS_ACUITY_SCORE: 31
ADLS_ACUITY_SCORE: 49
ADLS_ACUITY_SCORE: 25
ADLS_ACUITY_SCORE: 44
ADLS_ACUITY_SCORE: 45
ADLS_ACUITY_SCORE: 44
ADLS_ACUITY_SCORE: 27
ADLS_ACUITY_SCORE: 35
ADLS_ACUITY_SCORE: 30
ADLS_ACUITY_SCORE: 27
ADLS_ACUITY_SCORE: 30
ADLS_ACUITY_SCORE: 41
ADLS_ACUITY_SCORE: 30
ADLS_ACUITY_SCORE: 43
ADLS_ACUITY_SCORE: 44
ADLS_ACUITY_SCORE: 43
ADLS_ACUITY_SCORE: 44
ADLS_ACUITY_SCORE: 27
ADLS_ACUITY_SCORE: 32
ADLS_ACUITY_SCORE: 30
ADLS_ACUITY_SCORE: 45

## 2024-01-12 NOTE — TELEPHONE ENCOUNTER
RECORDS RECEIVED FROM:    DATE RECEIVED:    GENERAL RECORDS STATUS DETAILS   OFFICE NOTE from cardiologists N/A    EKG (STRIPS & REPORTS) Received 10-3-23  8-27-22  8-23-22   ECHOS (IMAGES AND REPORTS) Received 8-31-23  10-4-23 TTE   PULMONARY HYPERTENSION      6 MINUTE WALK TEST N/A    PULMONARY FUNCTION TESTS Received 1-12-24   RIGHT HEART CATH (IMAGES) Received 10-5-23   SLEEP STUDY / OVERNIGHT OXIMETRY N/A    XR CHEST   (IMAGES AND REPORTS) Received 10-3-23   CHEST CT  (IMAGES AND REPORTS) Received 10-8-23   V/Q SCAN (IMAGES) Received 12-28-23   LIVER US  (IMAGES AND REPORTS) N/A    ANGIOGRAMS (IMAGES) Received CTA Chest 11-15-23   STRESS TEST   (IMAGES AND REPORTS) N/A      Action    Action Taken [facility] Records and Imaging Requested:    EKG/ ECG Strips  PFT Full Report  Cardiac Cath Report 10-3-23, 8-27-22, 8-23-22  11-3-23  10-5-23   Cardiac Cath Images 10-5-23   CTA Chest Images  CT Chest Images  XR Chest Images  NM Lung Scan Images 11-15-23  10-8-23  10-3-23  12-28-23       2nd Request sent for XR images     Action    Action Taken [facility] Requested:    ECHO Images 8-31-23 and 10-5-23

## 2024-01-15 NOTE — TELEPHONE ENCOUNTER
Called and no answer or VM was set up. Wanted to schedule NEW PH , labs and 6 min walk test.    Odalys Coleman  Clinic    Pulmonary Hypertension   [facility]  (p) [phone]

## 2024-01-16 NOTE — TELEPHONE ENCOUNTER
Called and LVM Wanted to schedule NEW PH , labs and 6 min walk test.     Odalys Coleman  Clinic    Pulmonary Hypertension   [facility]  (p) [phone]

## 2024-01-16 NOTE — TELEPHONE ENCOUNTER
Patient Name: Silvano Motta Age: 58 year old, male, 1965 MRN: [id number]   Referring Provider:  Ye Chan and Dr. Lynn Appt:         PH Medications:        Patient History: Pt has a history of hypoxia (on oxygen 6-8L), dyspnea, severe sleep apnea, depression, lymphocytic thyroiditis, anxiety, HLD, seizure disorder, DM1,     Issues started in October 2023 when he was admitted for hypoxemic respiratory failure. Etiology of dyspnea remains unclear as PH is mild. In history patient had CPAP that was not used for some time. Restarted after hospitalization. He was referred to pulmonary and cardiology through [facility]. [facility] [place] [facility] [place] [facility]: [phone]  Pulmonologist MD: [facility] [phone]  Cardiologist: Shane    Extensive workup noted from Dr. Lynn  PFT: normal spirometry. FVC 4.2, FEV1 of 3.39, but a DLCO that was reduced to 40%.     2. CTPE negative    3. High-resolution CT scan of the chest: mild subpleural reticular and ground-glass opacities throughout both lungs on the prone images, more pronounced at the lung bases, but no honeycombing or bronchiectasis. The CT pattern was felt to be indeterminate for UIP.     4.  BNP level which was normal at 17. Troponins were normal.     5. Echocardiogram 10/4/23 showed normal RV size and function with no major valve issues and no evidence for pulmonary hypertension. The TR jet velocity could not be assessed. There was no pericardial effusion. The patient then underwent a transesophageal echocardiogram October 5th which again demonstrated normal right ventricular size and function, and a bubble study that was positive late suggesting a transpulmonary shunt. There was no interatrial shunting.     6. RHC: Qp/Qs of 1.02 normal with a PA pressure of 45/24, mean of 31. Pulmonary capillary wedge pressure was 6. This was felt to be due to group 3 pulmonary hypertension as opposed to intrinsic  pulmonary vascular disease or precapillary pulmonary hypertension. Subsequently, a CT cardiac structures test was done which showed no obvious intracardiac shunt to account for hypoxia with normal pulmonary venous anatomy and calcific plaque present within the coronary arteries, but no flow-limiting coronary artery stenosis was identified.     7..  Negative labs included RF, ROSAMARIA with reflex, HIV, hemoglobin, ROSAMARIA 12.                 Recent Testing:       Date Test Epic Media/Scan Care Everywhere    10/5/23 RHC               1. Mild pulmonary hypertension in setting of hypoxemia (Group 3).  PA   45/24,   31 mmHg, PCW 6 mmHg.     2. No evidence of significant intracardiac shunt.  Qp/Qs 1.02.     Recommendations    * If ongoing concern for right to left shunting, consider cardiac CT for   structure as well as imaging to assess for pulmonary AV fistula. Shunt run   may   not as reliably assess for right to left shunts as compared to left to   right   shunts.  []  []   [x]    10/3/23  Echo               Positive bubble study.   The interatrial septum seems to be intact; late arriving bubbles are   seen in right pulmonary veins, suggestive of transpulmonary shunt.       FINDINGS     MITRAL VALVE   Normal mitral valve structure and function.   Trace mitral regurgitation.     AORTIC VALVE   Normal aortic valve structure and function.   The aortic valve is tricuspid.     TRICUSPID VALVE   Normal tricuspid valve structure and function.   Pulmonary artery pressures cannot be estimated due to absence of   adequate TR jet.     PULMONIC VALVE   Normal pulmonic valve structure and function.     LEFT ATRIUM   Normal left atrium.   The left atrial appendage is free of thrombus.   Positive bubble study.   The interatrial septum seems to be intact; late arriving bubbles are   seen in right pulmonary veins, suggestive of transpulmonary shunt.     LEFT VENTRICLE   Left ventricular chamber size is normal.   Normal left ventricular wall  thickness.   Left ventricular ejection fraction is visually estimated at 65%.     RIGHT ATRIUM   Normal right atrium.     RIGHT VENTRICLE   Normal right ventricle size and normal global function.     PERICARDIAL EFFUSION   There is no pericardial effusion.  []  []   [x]      Sleep Study      Sleep apnea diagnosed 2012.   Follow up with Silvano Li 12/13/22 patient was non-compliant for some time   []  []   []     11/15/23 Chest CTPE  1. No pulmonary embolism.   2. No acute findings.   3. No acute change from priors.     CT   1. Mild subpleural reticular and groundglass opacities throughout both lungs persist on the prone images and are more pronounced at the lung bases. No honeycombing or bronchiectasis. CT pattern is indeterminate for UIP.   2. Subcarinal lymph node measures 1.0 cm in short axis.   3. Focus of gas in the left midabdomen on the most inferior image is favored to represent bowel gas. However, if patient has abdominal symptoms or there is concern for free intraperitoneal air, abdominal radiograph with upright views is recommended.     CT CORS  1. No obvious intracardiac shunt to account for hypoxia.   2. Normal pulmonary venous anatomy.   3. Mild dilatation of the sinuses of Valsalva measuring at 4 cm.   4. Mild calcific plaques in the proximal and mid left anterior descending coronary artery (non-flow limiting and estimated to be mildly stenotic). No flow-limiting coronary artery disease in the left main, proximal LAD, proximal circumflex and proximal right coronary artery        []  []   [x]     12/28/23 V/Q Scan    Right anterior: 92,698   Left anterior: 67,285   Right posterior: 114,677   Left posterior: 151,618     BRAIN COUNTS AS FOLLOWS:   Right lateral: 1036   Left lateral: 1250     SHUNT INDEX (head count/lung count): 0.53% (normal value less than 5%)     IMPRESSION:  Shut index within normal limits.  []   []   [x]     12/28/23 Abd/Liver US    1. Patent hepatic vasculature with appropriate  directional flow.   2. Cholelithiasis. No sonographic evidence of cholecystitis.  []   []   [x]      Misc:  []   []   []         []   []   []

## 2024-01-18 NOTE — TELEPHONE ENCOUNTER
Tim, Care Coordinator with [facility] is calling stating that patient is continuing to have new vertigo symptoms. Tim is requesting a call back at [phone] to follow up.

## 2024-01-19 NOTE — TELEPHONE ENCOUNTER
Patient has not been seen since 12/2023. Patient needs to make appointment to follow-up regarding these symptoms.

## 2024-02-04 PROBLEM — I27.20 PULMONARY HYPERTENSION (H): Status: ACTIVE | Noted: 2024-01-01

## 2024-02-04 NOTE — Clinical Note
The first balloon was inserted into the other vessel and LPA A9.Max pressure = 10 kavita.     5 minutes occluding A9.

## 2024-02-04 NOTE — Clinical Note
Called to Anuja NELSON on 4A CVICU. All questions answered. All belongings sent with patient. Patient transported to 4A CIVCU via stretcher with CCL RN, ICU RN's, ECMO specialist and RN on all monitoring equipment.

## 2024-02-04 NOTE — Clinical Note
The first balloon was inserted into the other vessel and LPA A10.Max pressure = 10 kavita.     5 minutes occluding A10.

## 2024-02-04 NOTE — Clinical Note
The first balloon was inserted into the other vessel and LPA.Max pressure = 10 kavita.     5 minutes occluding A.

## 2024-02-04 NOTE — Clinical Note
The first balloon was inserted into the other vessel and LPA A8.Max pressure = 10 kavita. Total duration = 28 seconds.     Max pressure = 10 kavita.    Balloon reinflated a second time: Max pressure = 10 kavita.5 minutes occluding A8.

## 2024-02-04 NOTE — Clinical Note
The first balloon was inserted into the other vessel and LPA A8.Max pressure = 10 kavita.     5 minutes occluding A8.

## 2024-02-04 NOTE — Clinical Note
dry, intact, no bleeding and no hematoma. 6 Fr sheath in the right femoral vein. Biopatch. Sutured. Capped and left in place. Tegaderm for dressing applied.

## 2024-02-04 NOTE — Clinical Note
Conscious sedation (ongoing ICU sedation) is planned for this procedure.    Provider immediate assessment completed.

## 2024-02-04 NOTE — Clinical Note
dry, intact, no bleeding and no hematoma. All sheaths and catheters sutured place. Ioban for securement. See flowsheet for details.

## 2024-02-04 NOTE — Clinical Note
Prepped: right groin. Prepped with: ChloraPrep. The patient was draped. .Pre-procedure site marking:N/A

## 2024-02-04 NOTE — Clinical Note
Potential access sites were evaluated for patency using ultrasound.   The left femoral artery and left femoral vein were selected. Access was obtained under with Sonosite guidance using a standard 18 guage needle with direct visualization of needle entry.

## 2024-02-04 NOTE — Clinical Note
Prepped: right radial and right neck. Prepped with: ChloraPrep. The patient was draped. .Pre-procedure site marking:Insertion site not predetermined

## 2024-02-04 NOTE — Clinical Note
Called to Dang NELSON on 4A CVICU. All questions answered. All belongings sent with patient. Patient transported to 4A [facility] via ICU bed with CCL RN, ECMO specialist, RT and CVICU RN on all monitoring equipment.

## 2024-02-05 NOTE — PROGRESS NOTES
Admission          2/4/2024  7:19 PM  -----------------------------------------------------------  Reason for admission: Pulmonary hypertension  Primary team notified of pt arrival.  Admitted from: [facility]  Via: stretcher  Belongings: Placed in closet  Admission Profile: complete  Teaching: orientation to unit and call light- call light within reach, call don't fall, use of console, meal times, when to call for the RN, and enforced importance of safety   Access: L PIVx1  Telemetry: Placed on pt  Ht./Wt.: complete  Code Status verified on armband: yes  2 RN Skin Assessment Completed By: Shannon RN, Odalys WILLIAMSON RN. Refused full skin check.   Med Rec completed: yes  Suction/Ambu bag/Flowmeter at bedside: yes  Is patient having diarrhea upon admission- if YES fill out testing algorithm : no          Pt status: A&Ox4, SR. VSS; On HFNC 80% FiO2 60LPM, Using bedside urinal. NPO midnight. SBA. Denies pain. Abdominal bruising.     Will continue to monitor and follow plan of care.

## 2024-02-05 NOTE — PLAN OF CARE
Hours of Care: [id number]    Neuro: A&Ox4. Pt stated he can be forgetful.   Cardiac: SR HR 70's. 's. VSS. Afebrile.  Respiratory: Sating >88% on Bipap 55% FiO2. When on HFNC pt was at % FiO2, 60 LPM.  GI/: Adequate urine output via urinal bedside. No BM during shift.   Diet/appetite: NPO midnight.   Activity:  SBA up to chair/commode d/t SOB/NGUYEN.  Pain: At acceptable level on current regimen. Denies during shift.   Skin: Scattered bruising along abdomen, blanchable redness on bridge of nose, dry bilateral feet. Pt refused skin assessment of ken area, bottom/coccyx.   LDA's: L PIV SL    Pt BG elevated at 430 this morning, provider notified, see provider notification note for details.     Plan: Continue to monitor and follow POC. Notify primary team with changes.

## 2024-02-05 NOTE — PROVIDER NOTIFICATION
Time of notification: 4:43 AM  Provider notified: CARDS cross cover  Patient status: Pt BG currently is 321.    Orders received: provide 5 units insulin aspart, and recheck BG 1hr after providing insulin.     --------------------------------------    Time of notification: 6:20 AM  Provider notified: CARDS cross cover  Patient status: FYI: BG recheck was 303. Pt has sliding scale scheduled in about an hour.     Orders received: None at this time, this was an FYI notification.
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
## 2024-02-05 NOTE — PLAN OF CARE
Neuro: A&Ox4.   Cardiac: SR. VSS.   Respiratory: Sating >90% BiPAP 55% or HFNC 100%, 60L. Pt dyspneic and desats with activity, talking and eating. Occasionally needs oxymask 15L over HFNC during conversation. LS clear, diminished.  GI/: Adequate urine output. BM x 1.  Diet/appetite: Tolerating 2gm sodium diet with 2L fluid restriction. Eating well. NPO at MN for possible procedure tomorrow.  Activity:  Up to bedside commode and chair, SBA. Activity limited by oxygen requirements.  Pain: Denies  Skin: Blanchable redness on bridge of nose, foam dressing applied and alternating oxygen devices.  LDA's:   Left PIV - SL    Plan: Continue with POC. NPO at midnight for possible right heart cath tomorrow. Plan for cardiac MRI on Wednesday. Notify primary team with changes.

## 2024-02-05 NOTE — PLAN OF CARE
Goal Outcome Evaluation:      Plan of Care Reviewed With: patient    Overall Patient Progress: improvingOverall Patient Progress: improving    Outcome Evaluation: Care management will follow for updates.
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
## 2024-02-05 NOTE — PHARMACY-ADMISSION MEDICATION HISTORY
Pharmacist Admission Medication History    Admission medication history is complete. The information provided in this note is only as accurate as the sources available at the time of the update.    Information Source(s): Patient, Hospital records, and CareEverywhere/SureScripts via in-person    Pertinent Information:   - Pt is a moderately reliable historian, able to answer interview questions with some prompting  - Pt uses fluocinonide gel in left internal cheek for irritation from past chewing tobacco usage  - Pt's Lantus was recently changed to current dosing from last [facility] visit d/t hypoglycemia  - Lorazepam was filled for 9 tablets/11 day supply, not a frequent user      Changes made to PTA medication list:  Added:   Fluocinonide 0.05% gel  Deleted:   Dihydroergotamine nasal spray  Duloxetine 60 mg cap  BREO ELLIPTA  HUMALOG KWIKPEN  Ondansetron ODT tab  PEG  Senna-doc tab  Changed:   Levothyroxine 137-->150  Glargine: 40 Units-->15 Units qday  Flovent: 1-->2 puffs daily  Lorazepam: added SIG  Atorvastatin: added evening    Allergies reviewed with patient and updates made in EHR: yes    Medication History Completed By: Maximino James [facility] 2/5/2024 11:30 AM    PTA Med List   Medication Sig Last Dose    albuterol (PROAIR HFA/PROVENTIL HFA/VENTOLIN HFA) 108 (90 Base) MCG/ACT inhaler INHALE 1 TO 2 PUFFS BY MOUTH EVERY 4 HOURS AS NEEDED FOR WHEEZING OR SHORTNESS OF BREATH More than a month at PRN    aspirin (ASA) 81 MG chewable tablet Take 81 mg by mouth every evening 2/3/2024 at PM    atorvastatin (LIPITOR) 40 MG tablet Take 40 mg by mouth 2/3/2024 at PM    calcium carbonate 500 mg, elemental, 1250 (500 Ca) MG tablet chewable Take 500 mg by mouth Chew and swallow 1 Tablet (500 mg) by mouth every 4 hours as needed Past Week at PRN    DULoxetine (CYMBALTA) 30 MG capsule Take 30 mg by mouth daily 2/4/2024 at AM    FLOVENT  MCG/ACT inhaler Inhale 2 puffs into the lungs daily 2/4/2024 at AM     fluocinonide (LIDEX) 0.05 % external gel Apply topically 2 times daily as needed (for left sided internal cheek irritation) Past Week at PRN    gabapentin (NEURONTIN) 300 MG capsule Take 900 mg by mouth 3 times daily 2/4/2024 at noon    hydrOXYzine (VISTARIL) 25 MG capsule TAKE 1 TO 2 CAPSULES BY MOUTH THREE TIMES A DAY AS NEEDED FOR ANXIETY Past Week at PRN    insulin aspart (NOVOLOG PEN) 100 UNIT/ML pen 10-15 units per meal; up to 60 units per day 2/4/2024 at noon    insulin glargine (LANTUS PEN) 100 UNIT/ML pen Inject 15 Units Subcutaneous at bedtime 2/3/2024 at PM    Lacosamide (VIMPAT) 100 MG TABS tablet Take 100 mg by mouth 2 times daily 2/4/2024 at AM    levETIRAcetam (KEPPRA) 750 MG tablet Take 1,500 mg by mouth 2 times daily 2/4/2024 at AM    levothyroxine (SYNTHROID/LEVOTHROID) 150 MCG tablet Take 1 tablet by mouth daily 2/4/2024 at AM    LORazepam (ATIVAN) 0.5 MG tablet Take 1 tablet by mouth 2 times daily as needed for anxiety Past Week at PRN    mirtazapine (REMERON) 30 MG tablet Take 30 mg by mouth At Bedtime 2/4/2024 at PM
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
## 2024-02-06 NOTE — PLAN OF CARE
Neuro: A&Ox 4.   Cardiac: SR, HR 70's. 's/80's. Afebrile.   Respiratory: Sating > 92% on BiPAP @ 70%. HFNC as tolerated when awake @ 100% and 65L.  GI/: Urine output via urinal, 700mL between 9813-9727. No BM this shift.  Diet/appetite: Tolerating low saturated fat, 2.4g Na diet with 2L FR. ACHS and 0200 Bg checks.  at 2300, Cards2 resident paged. Sliding scale added, insulin given, request for carb coverage and endocrine consult.  Activity:  IND in room, patient not OOB this shift.  Pain: Patient reports no pain.   Skin: No new deficits noted. Protective mepilex on bridge of nose d/t BiPAP mask, face mask changed to under the nose mask by RT.  LDA's: L PIV.    Plan: Continue with POC. Notify primary team with changes.     R heart cath 2/7, NPO at midnight
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
## 2024-02-06 NOTE — PLAN OF CARE
Neuro: A&Ox4. Pleasant, able to make needs known.  Cardiac: SR 60s-70s. BP within parameters.   Respiratory: Sating >94% on either HFNC 100%/65LPM or Bipap 55%. Desats with activity.  GI/: Adequate urine output. No BM this shift.  Diet/appetite: Tolerating 2gm Na diet and 2L FR. Eating well.  Activity:  Independent in room, up to chair.  Pain: At acceptable level on current regimen.   Skin: Blanchable redness on bridge of nose, pinpoint open area starting - small mepilex in place. RT notified, now under-nose bipap mask in place.  LDA's:  -PIV: SL    Shift events: Cards 2 resident notified of  between afternoon snack (no insulin coverage available in MAR) and dinner. Orders for 5units of Novolog with each meal placed. Per resident, will reassess blood sugar regimen in the morning.    Plan: Right heart cath on 2/7, cardiac MRI when available. Continue with POC. Notify primary team with changes.      Goal Outcome Evaluation: progressing

## 2024-02-06 NOTE — PROGRESS NOTES
Care Management Follow Up    Length of Stay (days): 2    Expected Discharge Date: 02/08/2024     Concerns to be Addressed: discharge planning  Patient plan of care discussed at interdisciplinary rounds: Yes    Anticipated Discharge Disposition:  tbd     Anticipated Discharge Services:  tbd  Anticipated Discharge DME:  tbd    Patient/family educated on Medicare website which has current facility and service quality ratings:  tbd  Education Provided on the Discharge Plan:  tbd  Patient/Family in Agreement with the Plan:  tbd    Referrals Placed by CM/SW:  tbd  Private pay costs discussed: Not applicable    Additional Information:  SW received an in basket message back from  letting SW know that they only help screen pt's for MA if it is a barrier to discharge. SW was referred to look into  tip sheet and refer pt to apply for MA through [place] [employer] or the [place] [employer]. SW updated pt at bedside and provided MA application, MA tip sheet, and MA income requirements. Pt was appreciative of SW's help. Care management will follow for updates.     CATY Guevara, LGSW  6B   Ph: [phone]  Pager: [phone]

## 2024-02-06 NOTE — PLAN OF CARE
"/84 (BP Location: Right arm)   Pulse 81   Temp 97.4  F (36.3  C) (Axillary)   Resp 30   Ht 1.765 m (5' 9.5\")   Wt 111.8 kg (246 lb 6.4 oz)   SpO2 97%   BMI 35.87 kg/m      Hours of Care: [id number].    Neuro: AO x 4, anxious at times.  Vitals: VSS.   Respiratory: HFNC at 100%, sometimes using oxyplus mask over HFNC. Bipap at 65-70%  Cardiac: SR.    GI/: BM today. Voiding spontaneously.  Skin/Wounds: Erythema on bridge of nose covered with Mepilex.  Lines: L PIV SL.  Diet: Low Fat, 2L FR, 2g Na  Activity: Up independently.  Pain: denies.    Continue to monitor and follow POC    Silvano Menezes RN on 2/6/2024 at 6:36 PM    [facility]-Inter[facility]           "

## 2024-02-07 NOTE — PLAN OF CARE
"/79 (BP Location: Right arm)   Pulse 81   Temp 98.5  F (36.9  C) (Axillary)   Resp 28   Ht 1.765 m (5' 9.5\")   Wt 111.8 kg (246 lb 6.4 oz)   SpO2 93%   BMI 35.87 kg/m        Hours of Care: 1149-7608.     Neuro: AO x 4, anxious at times.  Vitals: VSS.   Respiratory: HFNC at 100%, sometimes using oxyplus mask over HFNC. Bipap at 65%  Cardiac: SR.    GI/: BM today. Voiding spontaneously.  Skin/Wounds: Erythema on bridge of nose covered with Mepilex.  Lines: L PIV SL.  Diet: Low Fat, 2L FR, 2g Na  Activity: Up independently.  Pain: denies.    R heart cath performed today.     Continue to monitor and follow POC     Silvano Menezes RN on 2/7/2024 at 6:13 PM     [facility]-Intermediate Care      "
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
## 2024-02-08 NOTE — PLAN OF CARE
Goal Outcome Evaluation:  Neuro: A&Ox4.   Cardiac: SR - ST. VSS.Afebrile.    Respiratory: Sating above 88% on Bipap at 65% fio2, alt with HFNC at 100%, 70lpm.  GI/: Adequate urine output.  Diet/appetite: Tolerating low sodium low fat diet. Eating well. BS every 4 hrs,with sliding scale.   Activity:  Independent  up and about.   Pain: At acceptable level on current regimen. No complaints of pain.   Skin: No new deficits noted.  LDA's: PIV left arm, SL.     Plan: For HRCT, cardiac MRI. Continue with POC. Notify primary team with changes.

## 2024-02-08 NOTE — PROGRESS NOTES
[facility] Pulmonology Follow up    Silvano Motta  MRN: [id number]  : 1965    Date of Admission: 2024  Date of Service: 2024    Assessment:  Acute on Chronic Hypoxemic Respiratory Failure  Mild Pulmonary Hypertension, ddx of etiology broad  ROSALIO on CPAP  Interstitial changes not consistent with an ILD    Progressive hypoxemia initially evaluated in . Initial workup suggestive of hypoventilation with normal A-a gradient, and findings of mild pulmonary hypertension felt to be due to obstructive sleep apnea. PSG in  with AHI 51, successfully started on CPAP 10/2023.  Since 10/2023, significant workup has occurred as outpatient and during inpatient admissions for ongoing worsening hypoxemic respiratory failure. His Aa gradient remains significantly elevated on admission to [facility].  The parenchymal changes on CT chest are not consistent with any typical ILD, and may be more related to atelectasis/volume than parenchymal process. There is minimal mosaicism to suggest alternate parenchymal process. PFTs also suggesting against primary pulmonary disease with normal spirometry, lung volumes and only a markedly decreased DLCO.   From a CV standpoint: on review of imaging, veins are prominent which may be consistent with LV dysfunction, but his EF has been preserved on multiple studies. His pulmonary hypertension is mild with normal wedge on RHC . This is not c/w PVOD (nor are his imaging findings classic) but as the obstruction occurs at the venuole and not vein, this does not rule out PVOD.  Vascular shunt, R to L based on delayed appearance of bubbles 10/5/23 and 24, remains a possible etiology, however CT imaging does not suggest pulmonary AVMs, no liver disease, and NM MAA study complete 23 showed normal shunt index suggesting against this as etiology.    Recommendations:  -- HRCT obtained today  -- suggest gentle diuresis given venous prominence on CT imaging  -- out  "of bed, use of incentive spirometer as much as possible    Subjective:  no new concerns today. Breathing feels ok.    Review of systems: focused ROS performed and noted as above.    Objective:  /75 (BP Location: Right arm)   Pulse 86   Temp 97.8  F (36.6  C) (Axillary)   Resp 16   Ht 1.765 m (5' 9.5\")   Wt 108.5 kg (239 lb 3.2 oz)   SpO2 96%   BMI 34.82 kg/m      Gen: in no acute distress, lying in bed on HFNC  CV: RRR, no peripheral edema  Pulm: breathing comfortably on HFNC, no crackles (dry or wet) appreciated  Integ: no rashes or lesions appreciated  Neuro: speech clear, alert and oriented  Psych: calm, appropriate    Data:  I have personally reviewed new labs, CT imaging, and Echo:  ILD panel 2/6-7:  ROSAMARIA 1:160 dense fine speckled  IgG subclasses in process  HP 1 and 2 in process  Aldolase in process  SSA, SSB, Jo1 in process    Negative: RF, CRP, CK, IgG, ANCA, ESR, CCP,     Vianey Mitchell, DO  Pulmonary/critical care fellow  "
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
## 2024-02-08 NOTE — PLAN OF CARE
"/89 (BP Location: Right arm)   Pulse 98   Temp 97.5  F (36.4  C) (Oral)   Resp 18   Ht 1.765 m (5' 9.5\")   Wt 108.5 kg (239 lb 3.2 oz)   SpO2 96%   BMI 34.82 kg/m       Hours of Care: 3210-6347.     Neuro: AO x 4, anxious at times.  Vitals: VSS.   Respiratory: HFNC at 100%, sometimes using oxyplus mask over HFNC. Bipap at 65%  Cardiac: SR.    GI/: BM today. Voiding spontaneously.  Skin/Wounds: Erythema on bridge of nose covered with Mepilex.  Lines: L PIV SL.  Diet: Low Fat, 2L FR, 2g Na  Activity: Up independently.  Pain: denies.     Chest CT performed today.    Silvano Menezes RN on 2/8/2024 at 6:47 PM             "

## 2024-02-09 NOTE — PROGRESS NOTES
[facility] Pulmonology Follow up    Silvano Motta  MRN: [id number]  : 1965    Date of Admission: 2024  Date of Service: 2024    Assessment:  Acute on Chronic Hypoxemic Respiratory Failure, suspect shunt physiology  Mild Pulmonary Hypertension  ROSALIO on CPAP  Interstitial changes not consistent with ILD    Progressive hypoxemia initially evaluated in . Initial workup suggestive of hypoventilation with normal A-a gradient, and findings of mild pulmonary hypertension felt to be due to obstructive sleep apnea. PSG in  with AHI 51, successfully started on CPAP 10/2023.  Since 10/2023, significant workup has occurred as outpatient and during inpatient admissions for ongoing worsening hypoxemic respiratory failure. His Aa gradient remains significantly elevated on admission to [facility].   Broad differential for hypoxemia needs to be considered, and is likely multifactorial. It is possible hypoventilation is contributing, especially with significantly higher FiO2 needs while on HFNC relative to BiPAP. Obesity possible, no clear meds, diaphragm dysfunction we will evaluate with sniff test as below. Diffusion defect unlikely given very minimal parenchymal change. This leaves us with shunt physiology or VQ mismatch. VQ mismatch is broad, and may be contributing in the form of atelectasis noted on CT imaging, parenchyma otherwise clear. RHC grossly unremarkable, mild PH. Appreciate genetic testing for conditions such as PVOD and HHT. We would like to evaluate for increased shunt relative to 2023 by repeating MAA nuc med study. Repeat imaging study given some concern for abnormality on OSH scan, and TTE bubble with optimum anatomic positioning of injected bubbles from lower extremity. Ultimately, lung biopsy may be the only diagnostic test for PVOD or PCCH, but he is requiring too much oxygen at this point.    Recommendations:  -- gentle diuresis given vascular congestion on imaging  -- CTA  "to better evaluate for pulmonary AVM, concern on prior imaging of HARMONY abnormality  -- repeat nuc med MMA for concern of increased shunt relative to 6 weeks ago  -- bedside evaluation of orthodeoxia negative  -- obtain sniff test to rule out hypoventilation from diaphragm dysfunction  -- appreciate genetic counselor consult for PVOD and HHT  -- repeat liver US for any evidence of hepatic abnormality to create hepatopulmonary shunt  -- obtain limited TTE with bubble-- agitated saline administered via IV in foot/leg for best anatomic window  -- discussion with lung transplant team warranted  -- on discharge, will follow up with Dr. Vasques in pulmonary hypertension clinic    Subjective:  no new concerns today. Breathing feels ok. Up at side of bed doing exercises.    Review of systems: focused ROS performed and noted as above.    Objective:  /79 (BP Location: Right arm)   Pulse 92   Temp 98.1  F (36.7  C) (Oral)   Resp 14   Ht 1.765 m (5' 9.5\")   Wt 108.5 kg (239 lb 3.2 oz)   SpO2 100%   BMI 34.82 kg/m      Gen: in no acute distress, standing at side of bed with HFNC in place  CV: RRR, no peripheral edema  Pulm: breathing comfortably on HFNC, no crackles (dry or wet) appreciated  Integ: no rashes or lesions appreciated  Neuro: speech clear, alert and oriented  Psych: calm, appropriate    Data:  I have personally reviewed new labs, CT imaging, and Echo:  ILD panel 2/6-7:  ROSAMARIA 1:160 dense fine speckled  HP 1 and 2 in process    Negative: RF, CRP, CK, IgG and subclassess (IgG2 borderline low), ANCA, ESR, CCP, Aldolase, scleroderma negative, SSa/b negative    Vianey Mitchell, DO  Pulmonary/critical care fellow  Patient seen and discussed with Dr. Mace  "
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
## 2024-02-09 NOTE — PLAN OF CARE
Assumed care of patient at 1000. A&Ox4. Denied pain. Initially anxious at start of care, prn dose of hydroxazine given with good response. Pt takes this med at home in the AM and with dinner as needed. Afebrile. SR, rates 80-90s. Lungs clear with clear/dim bases on % FiO2 and 65LPM via HFNC. Intermittently on bipap 65% for short nap and during his nuc med scan this afternoon. Goal SpO2>92%. Pt better able to maintain his sats >92% on 80% FiO2 and 65LPM on HFNC after dose of hydroxazine. ABG done. Pt had echo with bubble study through IV in his foot. IV removed after echo had been interpreted. Plan for abdominal U/S in AM for which patient will need to be NPO. Plan for pt and nursing to be aware of this need to remain NPO until his U/S, MD aware and agree with plan. Pt tolerating low fat, low Na and 2L fluid restriction diet. ACHS blood sugars with sliding scale and carb coverage. Carb coverage increased to 1 unit for every 8g carbs this afternoon. Adequate UOP via urinal. No BM this shift, active bowel sounds and + gas. Up to chair with SBA. Plan for U/S in AM, nothing to eat after midnight. Continue to support patient and help him manage his anxiety. Continue with current plan of care.
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
## 2024-02-09 NOTE — PLAN OF CARE
Goal Outcome Evaluation:  [id number]    Neuro: A&Ox4.   Cardiac: SR. VSS. Afebrile.    Respiratory: Sating 90% above on Bipap at 65% fio2,   GI/: Adequate urine output. No Bm  Diet/appetite: Tolerating low sodium diet Eating well. Bs monitored on Slising scale and carbs coverage,   Activity:  Independent,   Pain: At acceptable level on current regimen.   Skin: No new deficits noted.  LDA's: PIV at left arm    Plan: Continue with POC. Notify primary team with changes.
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
## 2024-02-10 NOTE — CODE/RAPID RESPONSE
Rapid Response Team Note    Assessment   In assessment a rapid response was called on Silvano Motta due to acute hypoxic respiratory failure, acute encephalopathy, and hypotension. This presentation is likely due to vasovagal episode.     Plan   -  Stat CXR similar to priors  -  Stat EKG with sinus tachycardia   -  S/p 500 mL LR bolus   -  Pending stat blood cultures x2, VBG, trop, CBC, CMP, lactic   -  Keep on BiPAP for now  -  The Internal Medicine primary team was  at bedside and in agreement with the plan.  -  Disposition: The patient will remain on the current unit. We will continue to monitor this patient closely.  -  Reassessment and plan follow-up will be performed by the primary team    Silvano is critically ill with acute hypoxic respiratory failure and hypovolemic shock . These features are alarming and indicate that the patient is at imminent risk of life-threatening organ failure. Acute deterioration is being managed by the following critical interventions: oxygen by BiPAP and IV fluids    Total Critical Care time spent by me, excluding procedures, was 60 minutes.  Vianey Rosenthal PA-C  [facility] RRT [facility] Job Code Contact #[phone]  [facility] Paging/Directory    Hospital Course   Brief Summary of events leading to rapid response:   Rapid called for episode of unresponsiveness. Patient down for CT, escorted back with RT given high O2 needs. Per RT, did report some dizziness during transport. Then had episode of several sneezes. Was very red/flushed on arrival. Once in the room required sternal rub to arouse ~1 minute. O2 needs were stable at that time (on BiPAP with FiO2 60%) Arrived to bedside and patient alert though felt like he was going to vomit. BiPAP removed. Vitals significant for hypotension with lowest BP 66/54 and tachycardia in 120s-130s. IVF bolus started. Stat EKG, labs ordered. Patient felt like he needed to have a bowel movement. Was persistently hypoxic on HFNC + oxymask. Primary team  "to bedside. In discussion we were concerned primarily for a vasovagal event related to hard sneezing vs possible contrast reaction given timing. Switched back to BiPAP due to persistently hypoxic to 70s, required uptitration of BiPAP settings to 100% FiO2 with increased pressure. Stat CXR ordered and reviewed - similar to prior. Vitals began to normalize and patient was able to be weaned down to prior FiO2 settings with ongoing normal mentation. Overall - leading suspicion remains for vasovagal event in setting of tenuous oxygen needs.     Admission Diagnosis:   Pulmonary hypertension (H) [I27.20]    Physical Exam   Temp: 97.7  F (36.5  C) Temp  Min: 96.8  F (36  C)  Max: 97.7  F (36.5  C)  Resp: 27 Resp  Min: 14  Max: 40  SpO2: (!) 82 % SpO2  Min: 77 %  Max: 100 %  Pulse: 114 Pulse  Min: 76  Max: 129    No data recorded  BP: (!) 148/135 Systolic (24hrs), Av , Min:66 , Max:148   Diastolic (24hrs), Av, Min:53, Max:135     I/Os: I/O last 3 completed shifts:  In: 2200 [P.O.:2200]  Out: 3400 [Urine:3400]     Exam:   General: Awake. Alert and oriented x3. Laying in bed. No acute distress, ill-appearing.   HEENT: No tongue swelling.   CV: Regular, tachycardic.  Pulm: Some tachypnea at times. Lungs overall clear.   Skin: No redness or rashes.     Significant Results and Procedures   Lactic Acid: No results for input(s): \"LACT\", \"LACTS\" in the last 62247 hours.  CBC:   Recent Labs   Lab Test 02/10/24  0556 24  0506 24  0448   WBC 6.7 6.4 6.7   HGB 16.0 15.4 14.7   HCT 45.4 43.7 42.8    168 158            "
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
## 2024-02-10 NOTE — PROGRESS NOTES
Pt was switched to BiPAP from HFNC by bedside RN for transport to CT. Pt transported to CT on BiPAP w/ RT and transport with no issues.     On the way back to the room, by the elevators, pt stated that he felt dizzy. Upon getting to the nurses station, pt sneezed approximately 6 times in a row. When backing pt back into the room, transport had noticed that pt had not been responding (pt was talking/responding to RT and transport the whole trip until after he sneezed). RT sternal rubbed pt multiple times. Nurses were called to the room and they also sternal rubbed pt w/ no luck. RRT was called after BP came back as 60/40 and pt was still slow to respond.     Pt eventually woke up and stated that he was feeling nauseous. Pt was taken off BiPAP and placed on HFNC 100% 70L. SpO2 dropped into he 70's (Oxymask 15L added) and did not get higher than 84%. Zofran was given by RN and pt was eventually placed back on BiPAP 12/6, 10, 100%. SpO2 went back up to the low 90's. Per MD, pt is to stay on BiPAP for at least 30min before going back on HFNC.     Marina Isabel, RT on 2/10/2024 at 1:35 PM
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
## 2024-02-11 NOTE — PLAN OF CARE
"/80 (BP Location: Right arm)   Pulse 100   Temp 98.4  F (36.9  C) (Axillary)   Resp 17   Ht 1.765 m (5' 9.5\")   Wt 108.5 kg (239 lb 3.2 oz)   SpO2 95%   BMI 34.82 kg/m      Shift: 1900 - 0730  Cardiac: SR-Stachy. VSS.  Neuro: AOx4  Respiratory: Stating >92% on Bipap 60%. HFNC during day/feeding at 100% FiO2/60 L.  Pain/Nausea/PRN: Denies.  Diet: 2 g Na+ + 2 L FR.  LDA: L PIV - SL.  GI/: No BM/Adequate urine output  Skin: No new deficits noted  Mobility: A1  Plan: Wean O2    Will give report to oncoming nurse. Pt left in stable condition, care relinquished at this time.     "
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
## 2024-02-12 NOTE — PROVIDER NOTIFICATION
"Dr. Monroe paged the follwing @ 6:36 pm\"    \"Would you be able to come to bedside to readdress code status with this patient, flyer nurse was chatting with him and it sounds like there are some discrepancies on whether he is truly full code or not. Also patient transferring to ICU as overflow for closer monitoring/nursing care, so it would be great if you could discuss that with him before he goes!\"     - Provider called writer in response asking writer for further description of conversation had with the patient. Writer informed provider that patient stated he \"would not be okay with being on mechanical ventilator long-term\" but he would be agreeable if it were for \"procedural purposes.\" Provider acknowledged writers statement and informed writer that they have had multiple discussions regarding patients code status and that they don't feel another discussion is necessary at this time.   "

## 2024-02-12 NOTE — CONSULTS
"      [facility] Consult Service Note  Interventional Radiology  02/12/24   8:27 AM    Consult Requested: \"Needs pulmonary angiogram per pulmonology\"    Recommendations/Plan:    Discussed case with Dr. Head after reviewing images and history. Dr. Head reports that if the team is concerned for CTEPH, the diagnostic pulmonary angiogram would provide further information for the pulmonary team. However, after speaking with Dr. Mitchell, it sounds like team is more concerned for vascular malformation. Given the concerns, it is unlikely that the pulmonary angiogram will provide any added information for diagnostic purposes that the CTA hasn't already provided. Pulmonary team plans to discuss with their staff, then can pursue a staff to staff discussion if pulmonary angiogram is still requested.    Case and imaging discussed with IR attending Dr. Swetha Head MD   Recommendations were reviewed with Dr. Vianey Mitchell.     ADDENDUM: 2/12/24 6591  I received a message from Dr. Mitchell, plan to hold off on angiogram for now. They will re-consult IR if indicated.  Jes Wahl PA-C      History of Present Illness:  Silvano Motta is a 59 y/o M with ROSALIO, acute on chronic hypoxemic respiratory failure, unsure of underlying cause, has had extensive workup. Pulmonary requesting pulmonary angiogram to evaluate vessels. Patient has had progressive hypoxemia since 2022 which has worsened from 8/2023. Concerns for shunt vs V/Q mismatch. RHC with only mild PH. Bubble study negative. NM study showed decreased perfusion of upper lobes. Regarding concern for shunt, CTA obtained  concerning for decreased vasculature in upper lobes ,and in the setting of decreased upper lobe perfusion could be indicative of thromboembolic disease of distal upper lobe vessels vs anatomical or structural variant. Recommend pulmonary angiography to assess for abnormalities.       Pertinent Imaging Reviewed:   CT chest pulmonary embolism " "2/10/24  NM lung scan perfusion particulate 2/9/24  CT chest without contrast 2/8/24  NM lung scan perfusion 12/28/23  CTA chest with contrast 11/15/23    Expected date of discharge:  TBD    Vitals:   /79   Pulse 79   Temp 98.4  F (36.9  C) (Axillary)   Resp 20   Ht 1.778 m (5' 10\")   Wt 108 kg (238 lb 1.6 oz)   SpO2 99%   BMI 34.16 kg/m      Pertinent Labs Reviewed:  CBC:  Lab Results   Component Value Date    WBC 7.5 02/12/2024    WBC 9.1 02/11/2024    WBC 12.8 (H) 02/10/2024     Lab Results   Component Value Date    HGB 15.3 02/12/2024    HGB 16.6 02/11/2024    HGB 20.1 02/10/2024     Lab Results   Component Value Date     02/12/2024     02/11/2024     02/10/2024    INR:  Lab Results   Component Value Date    INR 1.07 02/04/2024    PTT 31 02/04/2024          COVID Results:  COVID-19 Antibody Results, Testing for Immunity           No data to display              COVID-19 PCR Results          2/4/2024    21:55 2/7/2024    11:43   COVID-19 PCR Results   SARS CoV2 PCR Negative  Negative     Potassium   Date Value Ref Range Status   02/12/2024 3.6 3.4 - 5.3 mmol/L Final          Jes Wahl PA-C  Interventional Radiology  Pager: [phone]    "
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
## 2024-02-12 NOTE — PLAN OF CARE
ICU End of Shift Summary. See flowsheets for vital signs and detailed assessment.    Changes this shift: VSS. SR 80's-90's. Afebrile. Normotensive. A&O x4. 65L % HFNC today.  Occasional use of 15L oxymask on top of HFNC during activity. Bipap 65% for approx 1 hr this afternoon d/t feeling fatigued. Will desat down to 70's periodically without activity but recovers within 2 minutes. BG high MD aware. Insulin adjusted. Good appetite. 40mG Lasix given this afternoon. No BM.     Plan: Wean O2 as tolerated. Potential transplant w/u        Goal Outcome Evaluation:      Plan of Care Reviewed With: patient    Overall Patient Progress: no changeOverall Patient Progress: no change    Outcome Evaluation: VSS. 65L % HFNC. Bipap 65% when feeling fatigued. Not able to wean O2 today.

## 2024-02-12 NOTE — PROGRESS NOTES
CLINICAL NUTRITION SERVICES    Reviewed nutrition risk factors due to LOS. Pt is ordering 3 meals a day when not NPO per [facility], consuming 100% of meals per flowsheet, and eating well per nursing documentation. Visited with pt, who reports that he's eating the same amount he would at home and denies decreased appetite. No weight loss noted per review of weight history. No nutrition issues identified at this time. RD will follow via rounds at this time, unless consulted.    Mikhail Chen RD, LD  Available on [email or web address] ([facility]) RD pager: [phone]  [facility] [employer] Clinical Dietitian  Weekend/Holiday RD pager: [phone]

## 2024-02-12 NOTE — PLAN OF CARE
Goal Outcome Evaluation:    Neuro: A&Ox4. Patient uses call light appropriately. Anxious - prn atarax given x 2.   Cardiac: SR-ST, HR 's. VSS. Lasix given today.   Respiratory: Sating > 92% on 100% FiO2, 60 LPM HFNC. Intermittent use of BiPAP at 60%FiO2. Frequently desaturates to low 80's - team aware see flowsheets for further details.   GI/: Adequate urine output via urinal. No BM today.   Diet/appetite: Tolerating 2 gm NA w/ 2L FR diet. Eating well.  Activity:  Assist of SB up to chair and in halls.  Pain: At acceptable level on current regimen. No complaints of pain.   Skin: No new deficits noted.   LDA's: L PIV - SL.     Plan: Continue with POC. Notify primary team with changes.

## 2024-02-12 NOTE — PROGRESS NOTES
"Care Management Follow Up    Length of Stay (days): 8    Expected Discharge Date:  TBD     Concerns to be Addressed: all concerns addressed in this encounter     Patient plan of care discussed at interdisciplinary rounds: Yes    Anticipated Discharge Disposition:  PT currently recommending home     Anticipated Discharge Services:  TBD  Anticipated Discharge DME:  TBD    Patient/family educated on Medicare website which has current facility and service quality ratings:  N/A  Education Provided on the Discharge Plan:  Not during this visit   Patient/Family in Agreement with the Plan:      Referrals Placed by CM/SW:    Private pay costs discussed: Not applicable    Additional Information:  SW notified by Charge RN that pt requesting to speak with SW for end of life planning questions. SW met with pt at bedside to follow up on questions. Pt reported that pt did have questions but not exactly sure about what. Pt alluded to having questions about at what point he would like to continue treatments options and not, especially related to having a ventilator. Pt discussed with SW what he would and would not be ok with for ventilation noting that pt's father was on a ventilator prior to pt father passing. Pt described self as a \"social person\" and noted that if he was not able to do things meaningful to him such as spending time with friends or [place] pt was not sure if pt would want to be on ventilator long-term. SW provided pt with both short and long forms of a health care directive and explained document to pt. Pt noted that pt would like pt sister as opposed to pt mother to make medical decisions should pt not be able to. Pt noted that pt also has a 23 y/o daughter. SW explained [place]'s NOK policy should pt not have a valid HCD and encouraged pt to complete HCD if pt would like pt sister (Odalys) to make medical decisions should pt not be able to. SW requested pt daughter name and phone " number to add to pt contacts in case but pt declined giving that information to SW at this time noting that he would like medical team to contact Odalys. Pt also asked for information on donating pt body to [facility] once pt passes. SW to provide pt with this information to pt later today. Pt also had questions about lung txp process and noted that pt was told SW could provide more information on this. SW shared that this SW does not have knowledge on lung txp process and work up. SW explained that it is this SW's understanding that should pt and medical/lung txp team feel that pt may be appropriate for lung txp that the lung txp team would work with pt to complete a work up and should provide more information to pt about this at that time.     ADDENDUM 1500: ROBERT met with pt at bedside and provided pt with information and consent form for [facility] program. SW went over documentation with pt and answered questions. Pt reported that he would plan to fill out this documentation soon. ROBERT noted that CM team could assist with sending consent form once completed. Pt had no further questions at this time for ROBERT.     Jes Burleson, CHARISSA Fontenot   [facility]   Covering 4C/E ROBERT  4C/E ROBERT Ph: [phone]

## 2024-02-12 NOTE — PLAN OF CARE
Goal Outcome Evaluation:    Admitted/transferred from: 6B  Reason for admission/transfer: Closer monitoring  2 RN skin assessment: completed by Shaila   Result of skin assessment and interventions/actions: See flowsheet  Height, weight, drug calc weight: Not Done  Patient belongings (see Flowsheet)  MDRO education added to care plan: N/A  ?   Major Shift Events: Aox4, follows commands, able to make needs known. Up with stand by assist. Afebrile. Bipap worn overnight, FiO2 70%. No BM. Voids spontaneously with adequate output via urinal. No complaints of pain.     Plan: Tests today. Continue plan of care and notify team with change in patient condition.     For vital signs and complete assessments, please see documentation flowsheets.
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
## 2024-02-13 NOTE — CONSULTS
Palliative Care Consultation Note  [facility]      Patient: Silvano Motta  Date of Admission:  2/4/2024    Requesting Clinician / Team: MICU  Reason for consult: Symptom management  Goals of care       Recommendations & Counseling     GOALS OF CARE:   Discussed today - Prior to getting sick, Janes has noted that he has lived a full life. He enjoys  painting, playing bass and guitar and helping perform with bands and friends around the [place] [employer].   Discussed today - [place]iste[place] goals are fully restorative at this time. He is hopeful to get a lung transplant as this would allow him the best return to function. Prior to admission, he notes that he has  really enjoyed life, namely he enjoys making art, walking around the [facility] with his daughter and playing bass with  friends around the [facility]. If he was not to be a lung transplant candidate, he would be okay with being dependent  on respiratory support if it would be life prolonging but only for a short period of time as his father was on a ventilator for a prolonged period of time which was concerning to him.    ADVANCE CARE PLANNING:  No health care directive on file. Per  informed consent policy, next of kin should be involved if patient becomes unable.  There is no POLST form on file, defer to patient and/or next of kin for decisions   Code status: Full Code    MEDICAL MANAGEMENT:   We are not actively managing symptoms at this time.    PSYCHOSOCIAL/SPIRITUAL SUPPORT:  Doing okay. Janes is close to his daughter but has had some interpersonal struggles that have weighed on him recently. He would benefit from Social Work visits.     Palliative Care will continue to follow. Thank you for the consult and allowing us to aid in the care of Silvano Motta.    Seen and discussed with Palliative Medicine Staff, Dr Winter Mcdaniels MD  Palliative Medicine Fellow       Assessment      Silvano Motta is a 58  year old male with a past medical history of DM1 who presented to the hospital for evaluation and work up of acute hypoxic respiratory failure.     Patients hospital course has been complicated by worsening respiratory status. Currently he is on maximum BiPAP settings.    At this time, he is awaiting further work up in regards to a possible lung transplant.     Today, the patient was seen for:  Goals of care   Support   Hypoxic respiratory failure    Palliative Care Summary:   Met with Janes today at bedside.     Introduced the role of palliative care as an interdisciplinary team that cares for patients with serious illness to help support symptom management, communication, coping for patients and their families as well as support with medical decision making.    We spoke at length about Janes's history and condition now. He noted that he is a very active and artistic person. When he was well, [place] enjoyed [place], making acrylic art, and playing bass in local [employer]. He is particularly close to his daughter and hopes to get well and be able to spend more time with her.     He noted that he does have some past trauma regarding his divorce and how it impacted his relationship with his daughter. In addition to this, he has been working through a recent death of a close friend.     Regarding his health, he is aware that he is very sick and may not survive this hospitalization. He is hopeful that he will be a lung transplant candidate. If he does not survive this hospitilazation, it is very important to him that he donates his body to science.         Prognosis, Goals, & Planning:    Functional Status just prior to this current hospitalization:  Before hospitalization, Janes lived on his own and took care of all of his own ADLs    Prognosis, Goals, and/or Advance Care Planning:  We discussed general treatment options (full/restorative, selective/conservatives, and comfort only/hospice). We then discussed how  these specifically apply to Janes's deann.  Based on this discussion, he has decided to remain full code, total restorative cares    Code Status was addressed today:   Yes, We discussed potential risks and rationale of attempting cardiac resuscitation, intubation, and mechanical ventilation.  We also discussed probability of survival as well as quality of life implications.  Based on this discussion, patient or surrogate response/decision: Full code      Patient's decision making preferences: independently        Patient has decision-making capacity today for complex decisions:Intact            Coping, Meaning, & Spirituality:   Discussed - will benefit from Social work involvement    Social:   Living situation:lives alone  Important relationships/caregivers:His daughter  Occupation: On disability since 2020 [employer]  Areas of fulfillment/catie: art, music, playing bass[place]  Contributing stressors (financial, substance abuse, relationship concerns, etc.)  recent death of a close friend. Divorce and impact on his relationship with his daughter    Medications:  I have reviewed this patient's medication profile and medications from this hospitalization     ROS:  Comprehensive ROS is reviewed and is negative except as here & per HPI:     Physical Exam   Vital Signs with Ranges  Temp:  [96.7  F (35.9  C)-98.2  F (36.8  C)] 97  F (36.1  C)  Pulse:  [70-93] 72  Resp:  [15-21] 15  BP: (116-118)/(73-85) 116/73  FiO2 (%):  [65 %-100 %] 100 %  SpO2:  [86 %-99 %] 97 %  238 lbs 1.55 oz    PHYSICAL EXAM:  Middle aged man sitting up in a chair on bipap. Short of breath but speaking in full sentences. Very pleasant.     Data reviewed:  Results for orders placed or performed during the hospital encounter of 02/04/24 (from the past 24 hour(s))   Glucose by meter   Result Value Ref Range    GLUCOSE BY METER POCT 329 (H) 70 - 99 mg/dL   Glucose by meter   Result Value Ref Range    GLUCOSE BY METER POCT 192 (H) 70 - 99  mg/dL   Glucose by meter   Result Value Ref Range    GLUCOSE BY METER POCT 37 (LL) 70 - 99 mg/dL   Glucose by meter   Result Value Ref Range    GLUCOSE BY METER POCT 118 (H) 70 - 99 mg/dL   Glucose by meter   Result Value Ref Range    GLUCOSE BY METER POCT 116 (H) 70 - 99 mg/dL   Glucose by meter   Result Value Ref Range    GLUCOSE BY METER POCT 105 (H) 70 - 99 mg/dL   Glucose by meter   Result Value Ref Range    GLUCOSE BY METER POCT 87 70 - 99 mg/dL   Glucose by meter   Result Value Ref Range    GLUCOSE BY METER POCT 78 70 - 99 mg/dL   Glucose by meter   Result Value Ref Range    GLUCOSE BY METER POCT 82 70 - 99 mg/dL   Glucose by meter   Result Value Ref Range    GLUCOSE BY METER POCT 68 (L) 70 - 99 mg/dL   Glucose by meter   Result Value Ref Range    GLUCOSE BY METER POCT 121 (H) 70 - 99 mg/dL   Glucose by meter   Result Value Ref Range    GLUCOSE BY METER POCT 98 70 - 99 mg/dL   Glucose by meter   Result Value Ref Range    GLUCOSE BY METER POCT 89 70 - 99 mg/dL   Glucose by meter   Result Value Ref Range    GLUCOSE BY METER POCT 78 70 - 99 mg/dL   Glucose by meter   Result Value Ref Range    GLUCOSE BY METER POCT 75 70 - 99 mg/dL   Glucose by meter   Result Value Ref Range    GLUCOSE BY METER POCT 79 70 - 99 mg/dL   Glucose by meter   Result Value Ref Range    GLUCOSE BY METER POCT 86 70 - 99 mg/dL   XR Chest Port 1 View    Narrative    EXAM: Chest radiograph 2/13/2024 4:30 AM    HISTORY: 58 years Male climbing O2 needs on BiPAP - eval for edema vs  opacities vs other.     COMPARISON: Chest x-ray 2/10/2024.    TECHNIQUE: Portable AP view the chest.    FINDINGS:   Trachea is midline. The cardiac silhouette size is stable. Prominent  pulmonary vasculature. Blunting of the bilateral costophrenic angles.  Perihilar and bibasilar interstitial and airspace opacities. No focal  pulmonary consolidation. No pneumothorax. The visualized upper abdomen  is unremarkable. No acute or suspicious osseous or soft  tissue  abnormality.      Impression    IMPRESSION:     1. Increased perihilar and basilar predominant streaky pulmonary  opacities, nonspecific and may represent pulmonary edema, infection  and/or atelectasis in the appropriate clinical settings.  2. Possible trace pleural effusions.    I have personally reviewed the examination and initial interpretation  and I agree with the findings.    MARY BARRY MD         SYSTEM ID:  [id number]   Glucose by meter   Result Value Ref Range    GLUCOSE BY METER POCT 78 70 - 99 mg/dL   Glucose by meter   Result Value Ref Range    GLUCOSE BY METER POCT 102 (H) 70 - 99 mg/dL   CBC with platelets   Result Value Ref Range    WBC Count 7.5 4.0 - 11.0 10e3/uL    RBC Count 4.49 4.40 - 5.90 10e6/uL    Hemoglobin 14.9 13.3 - 17.7 g/dL    Hematocrit 43.0 40.0 - 53.0 %    MCV 96 78 - 100 fL    MCH 33.2 (H) 26.5 - 33.0 pg    MCHC 34.7 31.5 - 36.5 g/dL    RDW 11.9 10.0 - 15.0 %    Platelet Count 155 150 - 450 10e3/uL   Basic metabolic panel   Result Value Ref Range    Sodium 139 135 - 145 mmol/L    Potassium 3.7 3.4 - 5.3 mmol/L    Chloride 100 98 - 107 mmol/L    Carbon Dioxide (CO2) 28 22 - 29 mmol/L    Anion Gap 11 7 - 15 mmol/L    Urea Nitrogen 14.4 6.0 - 20.0 mg/dL    Creatinine 1.08 0.67 - 1.17 mg/dL    GFR Estimate 81 >60 mL/min/1.73m2    Calcium 9.4 8.6 - 10.0 mg/dL    Glucose 103 (H) 70 - 99 mg/dL   Glucose by meter   Result Value Ref Range    GLUCOSE BY METER POCT 102 (H) 70 - 99 mg/dL   Glucose by meter   Result Value Ref Range    GLUCOSE BY METER POCT 140 (H) 70 - 99 mg/dL   Glucose by meter   Result Value Ref Range    GLUCOSE BY METER POCT 340 (H) 70 - 99 mg/dL   Glucose by meter   Result Value Ref Range    GLUCOSE BY METER POCT 419 (H) 70 - 99 mg/dL   Glucose by meter   Result Value Ref Range    GLUCOSE BY METER POCT 386 (H) 70 - 99 mg/dL   Glucose by meter   Result Value Ref Range    GLUCOSE BY METER POCT 338 (H) 70 - 99 mg/dL

## 2024-02-13 NOTE — PROGRESS NOTES
BS continuing to drop. A second 1/2 amp of D50 given.  Called MD.  Changed D5 to D10 at same rate.  Will continue to monitor BS.    Patient also needing an increase amount of FiO2 on BiPapa.  From 65% to 85% to keep at 90-92%.      Will continue to monitor.
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
## 2024-02-13 NOTE — PROGRESS NOTES
[facility]  Report of Consultation    Ese Mcdermott Patient Status:  Inpatient    1951 MRN [id number]   [facility] 6NE-A Attending Luana Mirza MD   [address] Day # 0 PCP Brandie Arredondo MD     Reason for Consultation:  SOB    His ICU End of Shift Summary. See flowsheets for vital signs and detailed assessment.    Changes this shift: No neuro change. Following commands. Reporting no pain. AOx4. SR overnight with rates 70-80. Meeting MAP >65. Remains on BiPAP overnight. No episodes of desat. Pt had blood sugar of 37 at about 0030. Pt requiring x2 doses of 25mL D50. D5 started and switched to D10. Blood suagrs started to maintain around 0500.     Plan:  Continue to monitor FiO2 demand.    Father    • Stroke Father    • Cancer Father    • Cancer Mother    • Cancer Maternal Grandfather    • Heart Disease Paternal Grandfather    • Heart Disease Brother    • Heart Disease Self    • Cancer Son       reports that he has never smoked.  He has never Subcutaneous, TID CC and HS  •  acetaminophen (TYLENOL) tab 650 mg, 650 mg, Oral, Q6H PRN  •  ondansetron HCl (ZOFRAN) injection 4 mg, 4 mg, Intravenous, Q6H PRN  •  furosemide (LASIX) injection 40 mg, 40 mg, Intravenous, Daily  •  nitroGLYCERIN infusion 5 Problem List:     Acute chest pain     Systolic and diastolic CHF, acute on chronic (HCC)     Chest pain on exertion     Nocturnal hypoxia     Morbid obesity (HCC)     Pickwickian syndrome ([facility] [address]     Pulmonary embolism without acute cor pulmonale ([facility] [address] Cont current meds    4. Hx of VT/VF   - Cont amiodarone   - Has ICD in place   - Will have device interrogated to evaluate for arrhythmogenic etiology    5. Hyperlipidemia   - Cont statin    6. HTN   - Controlled now    7.  DM   - Per admitting      Isabela Mckinney

## 2024-02-13 NOTE — PROGRESS NOTES
BS check 37. MD notified and D50 25mL given per protocol. D5 gtt ordered.  Will continue to monitor.

## 2024-02-13 NOTE — PLAN OF CARE
4C OT CX and Defer: Evaluation orders received, chart review complete. Per discussion with PT provider, patient near functional baseline for ADL tasks and mobility, remains limited by respiratory status. PT provider continuing to follow patient during IP stay for functional mobility, disposition needs, and strengthening/endurance in regard to lung transplant work-up. No acute OT needs at this time, please re-consult if/when patient undergoes surgical intervention. Will complete orders.
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
## 2024-02-14 NOTE — PLAN OF CARE
ICU End of Shift Summary. See flowsheets for vital signs and detailed assessment.    Changes this shift: A&Ox4, denies pain. SBA. On [place] 100% 65L, and BiPAP 75% intermittently, SpO2 89-95%. -110. Edema LE. Temp 97. Blood glucose in the 300, primary team aware. 2g Na, 2L FR.     Plan:  Continue monitoring. Plan for pulmonary angiogram. Report changes to [facility] 1.
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
## 2024-02-14 NOTE — PLAN OF CARE
ICU End of Shift Summary. See flowsheets for vital signs and detailed assessment.    Changes this shift:   On BIPAP12/8/85%, Blood sugars well controled. VSS, no significant changes. Desat to 77 on HFNC at 100%.    Plan:    Pulmonary angiogram, continue to monitor resp status and notify team of any  new changes      Goal Outcome Evaluation:      Plan of Care Reviewed With: patient    Overall Patient Progress: improvingOverall Patient Progress: improving    Outcome Evaluation: on BIPAP tolerating well
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
## 2024-02-15 NOTE — PLAN OF CARE
ICU End of Shift Summary. See flowsheets for vital signs and detailed assessment.    Changes this shift: neuro intact, denies pain, vital sign stable, HFNC during the day, BIPAP 12/8, 75% at night, tolerated well, no frequent desaturation, no hypoglycemic episode overnight.    Plan: Continue to monitor hemoglobin and trend oxygen per tolerance.    Goal Outcome Evaluation:      Plan of Care Reviewed With: patient    Overall Patient Progress: no changeOverall Patient Progress: no change
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
## 2024-02-15 NOTE — CONSULTS
Health Psychology          Perlita Narayan, Ph.D.,  [phone]  Ally Huggins, Ph.D.,  [phone]  Denisse Oh, Ph.D.,  [phone]  Blanca Ayala, Ph.D., , [facility] [phone]  Ba Gorman, Ph.D., , [facility] [phone]  Jammie Calhoun, Ph.D.,  [phone]  Taylor Boo, Ph.D., , [facility] [phone]    [facility], 3rd Floor  [address] [place] [address]       Inpatient Health Psychology Consultation     Date of Service: 02/15/24     Spoke with Dr. Vargas regarding updates since consult was ordered. Patient is no longer a transplant candidate. Thus, the initial evaluation request for additional assessment regarding appropriateness for candidacy is no longer needed. However, as plan of care discussions progress, Palliative and Medicine may identify additional needs regarding information about patient's decision-making and recommendations regarding support for decision making.     PLAN: Will defer to ANJELICA Lee, Palliative Care Clinical  for therapeutic support. Will close out consult. Health psychology will remain available for additional evaluation and support as needed and will await new consult from Palliative or Medicine if additional health psychology consultation needs are identified.       Taylor Boo, Ph.D., , [facility]  Clinical Health Psychologist  Phone: [phone]   Pager: [phone]  2/15/2024 4:34PM

## 2024-02-15 NOTE — PLAN OF CARE
Major Shift Events:  Up in chair all day on HFNC 100% 65L. Able to work with PT and complete all ADLs with setup assist. BG in 200s, covered per sliding scale and carb count orders. No major events.     Plan: Continued pretransplant evaluation in the coming days.   For vital signs and complete assessments, please see documentation flowsheets.

## 2024-02-15 NOTE — PROGRESS NOTES
"Care Management Follow Up    Length of Stay (days): 11    Expected Discharge Date:  TBD     Concerns to be Addressed: all concerns addressed in this encounter     Patient plan of care discussed at interdisciplinary rounds: Yes    Anticipated Discharge Disposition:  TBD     Anticipated Discharge Services:  TBD  Anticipated Discharge DME:  TBD    Patient/family educated on Medicare website which has current facility and service quality ratings:  N/A  Education Provided on the Discharge Plan:  N/A  Patient/Family in Agreement with the Plan: N/A     Referrals Placed by CM/SW:    Private pay costs discussed: Not applicable    Additional Information:  SW provided internal hand off yesterday from Palliative SW (Rachana) about pt's interest in resource for electricity bill and questions about Anatomy Bequest paperwork.     ROBERT spoke with Lashell at [facility] Anatomy Bequest program who confirmed that the two witnesses needed to sign the consent form could be two healthcare professionals if needed. SW thanked Lashell for the information.     ROBERT met with pt at bedside to follow up on energy assistance concerns, anatomy bequest paperwork and HCD. Pt was on the phone with [employer] asking about deductions or resources they could provide d/t pt's increased energy bill d/t O2 concentrator at SW arrival. Representative told pt that they could change pt's monthly payment plan to an \"average monthly payment plan\". Pt agreed to this. After phone call, ROBERT also provided pt with energy assistance application through [facility] agency. SW provided education on program and how to apply. ROBERT also shared confirmation that two health care professionals can witness and signed pt consent form for Anatomy Bequest once completed. Pt reported that he has not gotten a chance to fill out the document yet nor the HCD. SW requested that pt notify SW once documents are completed to assist with either signing or getting HCD " notarized. Pt noted that he wanted to discuss HCD with pt sister (Odalys) who he would like to be his HCD agent. Pt noted that Odalys was typically busy during the week so he was hoping to talk with her about it this weekend. Pt had no other questions for SW at this time.     CHARISSA Ayala   [facility]   4C/E ROBERT Ph: [phone]

## 2024-02-16 NOTE — PROGRESS NOTES
"Care Management Follow Up    Length of Stay (days): 12    Expected Discharge Date:  TBD      Concerns to be Addressed:  Care Conference Scheduling   PAdditional Information:    Scheduling Care Conference    Time & Location:   [facility] 1Team has asked to arrange the Care Conference for [facility], Palliative, Pulmonology and Cardiology for Saturday 2/17/24 Provider's meeting at 1245 at 4C conference room and virtual meeting with family over the phone at 4C conference room.     Providers:   Paliative - Dr. Hutton confirmed availability   Paged Cardiology Dr. Jim Cuenca  Paged Pulmonology - Dr. Janie Vargas  [facility] 1- [facility] ram[facility] [phone] confirmed availability     Family:   Odalys Motta, sister ( [phone])- Stated that she will be available only via phone because she is out of town. She did not want to bring patient's mother for the  conference because per Odalys \" She is not handling everything well\". Will e-mail to Odalys Dial into conference information:     Call into conference information:   [phone]   Meeting #[id number]  PIN # [phone]    Addendum 1616: Dr. Vargas responded to [facility] 1 with possible availability.   Notified primary RN.   Pham Bautista, MSN, MA, CCM, RN  4C/4A/4E ICU Care Coordinator  Phone:[phone]  Pager: [phone]    For Weekend & Holiday on call RN Care Coordinator:  [place] [facility] ([phone]) Saturday & Sunday; ([phone]) [facility] Recognized Holidays   Weekend 4C/4A/4E ICUs /6A Care Coordinator  Pager: [phone]    "
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
## 2024-02-16 NOTE — PROGRESS NOTES
PALLIATIVE CARE PROGRESS NOTE  [facility]     Patient Name: Silvano Motta  Date of Admission: 2/4/2024   Today the patient was seen for: goals, decision making support     Recommendations & Counseling       GOALS OF CARE:   Remain fully restorative. Janes knows that he is not a lung transplant candidate and that providers are concerned he will not recover to be able discharge home but remains hopeful fo recovery. He is now is trying to wean his oxygen needs by himself with the hope of discharging from the hospital to be able to seek a second opinion. Thus far he has been unsuccessful in his O2 wean but feels that he is showing progress despite O2 sats as low as 82% while at rest.     Agree with plan for care conference at 1pm Saturday. Palliative team is aware. On call provider's attendance at care conference will depend on weekend triaging needs.     ADVANCE CARE PLANNING:  DECISION MAKING:  No health care directive on file. Per  informed consent policy, next of kin should be involved if patient becomes unable.  In depth discussion 2/16 with patient and his sister Odalys by phone. Silvano is clear that he wants Silvano to be his healthcare agent and Odalys confirms she is willing to play this role and agrees to be named healthcare agent on his healthcare directive. Silvaon also says he would want to have his sister Antonieta be his alternate healthcare agent. He specifically says he does not want his daughter or mother involved in making healthcare decisions and does not want their contact information in the chart as he does not want them burdened by calls from the hospital.   Will ask unit SW to help patient complete the healthcare directive  There is no POLST form on file, defer to patient and/or next of kin for decisions   Code status: Full Code    MEDICAL MANAGEMENT:   We are not actively managing symptoms at this time.    PSYCHOSOCIAL/SPIRITUAL SUPPORT:  Doing  "okay. Janes is close to his daughter but has had some interpersonal struggles that have weighed on him recently. He would benefit from Social Work visits.     Palliative Care will continue to follow. Thank you for the consult and allowing us to aid in the care of Silvano Motta.    Seen and discussed with Palliative Medicine Staff, Dr Winter Mcdaniels MD  Palliative Medicine Fellow     Patient seen and evaluated with Dr. Mcdaniels.   Agree with assessment and recommendations.    Winter Craven MD / Palliative Medicine   Securely message with the Vocera Web Console (learn more here)   Text page via [facility] Paging/Directory          Palliative Summary/HPI          Silvano Motta is a 58 year old male with a past medical history of DM1 who presented to the hospital for evaluation and work up of acute hypoxic respiratory failure.     Patients hospital course has been complicated by worsening respiratory status. He is not a candidate for lung transplant. Despite this he is attempting to wean his oxygen needs with little improvement.     Today, the patient was seen for:  Goals of care   Support   Hypoxic respiratory failure                 Interval History:     Multidisciplinary collaboration:  Not transplant candidate per pulmonary transplant    Patient/family narrative  Met with Janes at bedside. He was hopeful to wean his oxygen with the hope of discharging and seeking a second opinion. He noted that he did not want to accept the fact that he was not a lung transplant candidate and that that would mean his death.     We called his sister Odalys with him who agreed to be his HCA. She noted that she is \"Hoping for a miracle\" at this time in regards to Aristeo ongoing hypoxia.              Medications:     I have reviewed this patient's medication profile and medications from this hospitalization.                 Physical Exam:   Temp:  [97.6  F (36.4  C)-98.2  F (36.8  C)] 97.6  F (36.4  C)  Pulse:  " [100-116] 100  Resp:  [12-19] 19  BP: ()/(72-80) 110/80  SpO2:  [96 %-100 %] 99 %  199 lbs 15.32 oz    Middle aged man sitting up in a chair on HiFlo. Short of breath but speaking in full sentences.              Data Reviewed:     [facility]  Recent Labs   Lab 02/16/24  1839 02/16/24  1430 02/16/24  1135 02/16/24  0949 02/16/24  0547 02/15/24  0827 02/15/24  0639 02/14/24  0700 02/14/24  0538 02/13/24  0603 02/13/24  0556 02/10/24  1305 02/10/24  1224 02/10/24  1137 02/10/24  0556   NA  --   --   --   --  135  --  139  --  138  --  139   < > 134*  --  140   POTASSIUM  --   --   --   --  4.0  --  3.7  --  3.5  --  3.7   < > 3.6  --  3.4   CHLORIDE  --   --   --   --  97*  --  100  --  99  --  100   < > 94*  --  103   CO2  --   --   --   --  22  --  27  --  27  --  28   < > 26  --  26   ANIONGAP  --   --   --   --  16*  --  12  --  12  --  11   < > 14  --  11   * 245* 412* 322* 306*   < > 178*   < > 75   < > 103*   < > 330*   < > 91   BUN  --   --   --   --  17.5  --  15.6  --  15.3  --  14.4   < > 15.9  --  15.6   CR  --   --   --   --  0.98  --  0.97  --  1.07  --  1.08   < > 1.17  --  1.03   GFRESTIMATED  --   --   --   --  >90  --  >90  --  81  --  81   < > 73  --  85   DORINA  --   --   --   --  9.5  --  9.5  --  9.4  --  9.4   < > 9.4  --  9.5   PROTTOTAL  --   --   --   --   --   --   --   --  6.5  --   --   --  6.8  --  6.6   ALBUMIN  --   --   --   --   --   --   --   --  4.2  --   --   --  4.1  --  4.2   BILITOTAL  --   --   --   --   --   --   --   --  0.4  --   --   --  0.7  --  0.5   ALKPHOS  --   --   --   --   --   --   --   --  69  --   --   --  82  --  76   AST  --   --   --   --   --   --   --   --  36  --   --   --  47*  --  40   ALT  --   --   --   --   --   --   --   --  45  --   --   --  54  --  50    < > = values in this interval not displayed.     CBC  Recent Labs   Lab 02/16/24  0547 02/14/24  0538 02/13/24  0556 02/12/24  0340   WBC 6.4 7.5 7.5 7.5   RBC 4.44 4.51 4.49 4.62   HGB 14.5 14.8  14.9 15.3   HCT 42.2 42.2 43.0 43.6   MCV 95 94 96 94   MCH 32.7 32.8 33.2* 33.1*   MCHC 34.4 35.1 34.7 35.1   RDW 12.0 12.0 11.9 11.9   *  143* 166 155 159     INRNo lab results found in last 7 days.  Arterial Blood Gas  Recent Labs   Lab 02/12/24  1241 02/12/24  1237 02/11/24  1940 02/11/24  1923 02/11/24  0746   PH 7.51*  --  7.46*  --  7.51*   PCO2 33*  --  39  --  32*   PO2 55*  --  47*  --  48*   HCO3 26  --  27  --  26   O2PER 100 100 100 60 100           Medical Decision Making       MANAGEMENT DISCUSSED with the following over the past 24 hours: bedside RN, medicine team, pulmonary transplant attending   NOTE(S)/MEDICAL RECORDS REVIEWED over the past 24 hours: medicine, pulmonary, neuropsychiatric notes, nursing  Tests REVIEWED in the past 24 hours:  - BMP  - CBC  - ABG  SUPPLEMENTAL HISTORY, in addition to the patient's history, over the past 24 hours obtained from:   - sister tanja

## 2024-02-16 NOTE — PLAN OF CARE
"ICU End of Shift Summary. See flowsheets for vital signs and detailed assessment.    Changes this shift:  Alert, oriented x4, pleasant and cooperative.  Unable to wean O2 as sats were variable, often desaturating into 70s-80s with any talking, movement.  On 100% high flow nasal cannula, 60L flow, occasionally requiring add'l 15L face mask to recover when desats.  Up to chair in room with standby assist, concern for add'l ambulation due to low reserve and maximum O2 support already.  Per patient, learned was not a transplant candidate today but is unsure exactly why \"I think it might be my diabetes.\" Expressed that he would like a second opinion at [place] but acknowledged that his O2 requirements were too high at present to transport there without ambulance or helicopter.      Plan: Active listening provided.  O2 sats monitored.  Would likely benefit from care conference given new development re: transplant status.        Goal Outcome Evaluation:      Plan of Care Reviewed With: patient    Overall Patient Progress: no changeOverall Patient Progress: no change    Outcome Evaluation: Sats dropping to 80s on 100% high flow, 60 L flow, sometimes needing add'l 15 L facemask to recover      "
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
## 2024-02-16 NOTE — PROGRESS NOTES
Lung Transplant Progress Note    Met with Silvano today to discuss decision by Lung Transplant Selection Committee. Given lack of diagnosis for hypoxemia, medical co-morbidities including poorly controlled Type I Diabetes, and concerns about cognitive status and social support system is not a candidate for lung transplant evaluation. Discussed that are working to see if there are any additional evaluations to be done or therapies to consider but that are concerned given clinical course over last 6 months that will continue to decompensate. Did meet with Palliative team prior to discussion with patient and they will met with Silvano tomorrow. Called Silvano's sister at his request and briefly discussed decision by selection committee, will plan to call tomorrow when she is better able to talk.     Janie Vargas MD PhD  Lung Transplant  Pulmonary Critical Care
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
## 2024-02-16 NOTE — PLAN OF CARE
"Goal Outcome Evaluation:       ICU End of Shift Summary. See flowsheets for vital signs and detailed assessment.    Changes this shift: AOX4. Pleasant, very motivated. Standing in room and worked with PT to do some exercises. Pt wanted to ween down his Fio2 and flow as tolerated. Has been using his IS. Writer asked patient how he felt about not being able to be listed for lung transplant and he stated \"I'm not going to give up\", also mentioned wanted to get a second opinion from [place]. Up in chair and standing throughout the day. HiFlo 90%/40 lpm. Spo2 does drop with talking and some activity, but patient recovers quickly using pursed lip breathing. Continues to have a good appetite. BG elevated. Checked with NP narda maldonado regarding his sliding scale coverage being given a few hours before his meal carb coverage, stated to only give his meal carb coverage. No BM. Palliative care consulted and spoke with patient.    Plan: Titrate O2/flow as tolerated, care conference tomorrow.     Problem: Adult Inpatient Plan of Care  Goal: Plan of Care Review  Description: The Plan of Care Review/Shift note should be completed every shift.  The Outcome Evaluation is a brief statement about your assessment that the patient is improving, declining, or no change.  This information will be displayed automatically on your shift  note.  Outcome: Progressing  Goal: Patient-Specific Goal (Individualized)  Description: You can add care plan individualizations to a care plan. Examples of Individualization might be:  \"Parent requests to be called daily at 9am for status\", \"I have a hard time hearing out of my right ear\", or \"Do not touch me to wake me up as it startles  me\".  Outcome: Progressing  Flowsheets (Taken 2/16/2024 0800)  Individualized Care Needs: Titrate O2/Flow down as tolerated  Goal: Absence of Hospital-Acquired Illness or Injury  Outcome: Progressing  Intervention: Prevent Skin Injury  Recent Flowsheet Documentation  Taken " 2/16/2024 1200 by Favreau-Garsia, Gabriella, RN  Body Position: position changed independently  Skin Protection: pulse oximeter probe site changed  Device Skin Pressure Protection: pressure points protected  Taken 2/16/2024 0800 by Favreau-Garsia, Gabriella, RN  Body Position: position changed independently  Skin Protection: pulse oximeter probe site changed  Device Skin Pressure Protection: pressure points protected  Intervention: Prevent Infection  Recent Flowsheet Documentation  Taken 2/16/2024 1200 by Favreau-Garsia, Gabriella, RN  Infection Prevention:   environmental surveillance performed   equipment surfaces disinfected   hand hygiene promoted   personal protective equipment utilized   single patient room provided   rest/sleep promoted   visitors restricted/screened  Taken 2/16/2024 0800 by Favreau-Garsia, Gabriella, RN  Infection Prevention:   environmental surveillance performed   equipment surfaces disinfected   hand hygiene promoted   personal protective equipment utilized   single patient room provided   rest/sleep promoted   visitors restricted/screened  Goal: Optimal Comfort and Wellbeing  Outcome: Progressing  Intervention: Provide Person-Centered Care  Recent Flowsheet Documentation  Taken 2/16/2024 1200 by Favreau-Garsia, Gabriella, RN  Trust Relationship/Rapport:   care explained   choices provided   emotional support provided   empathic listening provided   questions answered   questions encouraged   reassurance provided   thoughts/feelings acknowledged  Taken 2/16/2024 0800 by Favreau-Garsia, Gabriella, RN  Trust Relationship/Rapport:   care explained   emotional support provided   choices provided   empathic listening provided   questions answered   questions encouraged   reassurance provided   thoughts/feelings acknowledged  Goal: Readiness for Transition of Care  Outcome: Progressing

## 2024-02-16 NOTE — PLAN OF CARE
ICU End of Shift Summary. See flowsheets for vital signs and detailed assessment.    Changes this shift: A/O x 4 and making his needs well known. Actively participated in his cares cleaning and dressing himself with sat's dropping slightly to low 80's and then immediately recovered without needing extra oxygen. Pt continue needing high oxygen concentration on HFNC and desats with activity. Pt was switched to BIPAP overnight with FiO2 @ 65% and he tolerated it well. SR, MAP>65 and afebrile. Pt had adequate UOP and one large BM during the shift and denied pain during the shift.    Plan: Wean oxygen    Goal Outcome Evaluation:      Plan of Care Reviewed With: patient    Overall Patient Progress: no changeOverall Patient Progress: no change    Outcome Evaluation: Pt continues desatting with activity but recovers easily.
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
## 2024-02-17 NOTE — PLAN OF CARE
1500 - 1900    ICU End of Shift Summary. See flowsheets for vital signs and detailed assessment.    Changes this shift:   Assumed cares at 1500, no acute changes during this shift, patients neuro exams remain unchanged, Sinus tachy, HFNC 90% 40 LPM,  Good appetite, x1 BM carb coverage plus sliding scale insulin given.     Plan: Continue with POC, **Care conference with teams and sister 2/17 see care coordinator note

## 2024-02-17 NOTE — PROGRESS NOTES
"PALLIATIVE CARE PROGRESS NOTE  [facility]     Patient Name: Silvano Motta  Date of Admission: 2/4/2024   Today the patient was seen for: goals, decision making support     Recommendations & Counseling       GOALS OF CARE:   Plan of care - restorative measures without limits  Silvano and sister Odalys are aware that he is not a lung transplant candidate  Care conference held today 2/17 with Silvano in his room with medicine, lung transplant, palliative, and RN CC. Sister Odalys was present via conference tablet.  Sister Odalys seems to want closure as to the etiology of Silvano's condition and \"leave no stone unturned\". Her questions and concerns were as follows:   Has genetic testing been done? - Stated Silvano has also met with our genetic counseling team who sent out these tests. Results are pending and can take around 4 weeks to get back. Still need to wait another ~3 weeks or so.    [facility] - Odalys states that Silvano has been in and out of the hospital several times without answers and his respiratory status has only worsened. Noted that we currently/will make attempts to speak to the [facility] lung/heart teams to see what their thoughts are.  Could his respiratory issues be due to fungal or mold issues? - Odalys states that she was recently diagnosed by a naturopathic physician and that he issues were related to fungus/mold. Silvano also recounted at length that the landlord of his \"condo\" he was living in  was delinquent in addressing fungal/mold ventilator concerns and this coincided with his respiratory decline in Fall of 2023. Assured them both that if these issues had affected Silvano to cause such a degree of respiratory failure, they would have been noted on imaging and other tests already.  Noted to Odalys that Silvano has gone through a battery of tests, many of which were repeated at our institution and compared to those at [facility]. " "This includes but is not limited to imaging, pulmonary tests, cardiac tests, blood flow tests, autoimmune and infectious labs, and tests related to tissue conditions.  Silvano continues to make statements about self-weaning down on HFNC (currently at 75% FiO2 40 LPM but desats very rapidly to the 70s even if he speaks for too long). He also recounted his physical efforts to keep in shape. Finally, he states that he would want to continue to fight even if he was on ventilator. It was suggested to Silvano that if he was on a ventilator, he may never be able to get off of it but this did not dissuade his thinking. Will likely need to continue conversations about this moving forward as well as about CODE status.   Primary team notes Silvano \"reads papers\" so it may be worth trying to show him statistical evidence of low benefit of CPR/intubation for irreversible respiratory conditions to ensure he is making an informed decision.  Appreciate primary team helping Odalys out with getting records and for fantastic work towards respecting Silvano and Odalys's wishes while trying to temporize expectations.   Appreciate care management helping Odalys with [email or web address] access when she comes to visit Silvano this coming week  Also appreciate teams providing numbers to [place] so Odalys can call herself.  Appreciate pulm transplant team helping with continued conversations about pulm/cardiac studies and options.     ADVANCE CARE PLANNING:  DECISION MAKING:  No health care directive on file. Per  informed consent policy, next of kin should be involved if patient becomes unable.  In depth discussion 2/16 with patient and his sister Odalys by phone. Silvano is clear that he wants Silvano to be his healthcare agent and Odalys confirms she is willing to play this role and agrees to be named healthcare agent on his healthcare directive. Silvano also says he would want to have his sister Antonieta be his alternate healthcare agent. " He specifically says he does not want his daughter or mother involved in making healthcare decisions and does not want their contact information in the chart as he does not want them burdened by calls from the hospital.   Appreciate unit SW help with HCD completion  There is no POLST form on file, defer to patient and/or next of kin for decisions   Code status: Full Code    MEDICAL MANAGEMENT:   We are not actively managing symptoms at this time.    PSYCHOSOCIAL/SPIRITUAL SUPPORT:  Doing okay. Janes is close to his daughter but has had some interpersonal struggles that have weighed on him recently. He would benefit from Social Work visits.     Palliative Care will continue to follow peripherally. Please page if needed. We are available for care conferences when scheduled.    Total time spent was 80 minutes regarding goals of care and support on the date of the encounter. These recommendations were given to the primary team via this note/in-person.    Cuate Hutton DO / Internal and Palliative Medicine   Securely message with the Vocera Web Console (learn more here)   Text page via [facility] Paging/Directory            Palliative Summary/HPI          Silvano Motta is a 58 year old male with a past medical history of DM1 who presented to the hospital for evaluation and work up of acute hypoxic respiratory failure.     Patients hospital course has been complicated by worsening respiratory status. He is not a candidate for lung transplant. Despite this he is attempting to wean his oxygen needs with little improvement.     Today, the patient was seen for:  Goals of care   Support   Hypoxic respiratory failure  Encounter for palliative care           Interval History:     Multidisciplinary collaboration:  Sinus tachycardia. HFNC from 90%/40LPM to 75%/40LPM this AM to early PM. BiPAP overnight.     Patient/family narrative  Met with Janes at bedside. Care conference as above.             Medications:     I have reviewed this patient's  medication profile and medications from this hospitalization.                 Physical Exam:   Temp:  [97.6  F (36.4  C)-98.2  F (36.8  C)] 97.6  F (36.4  C)  Pulse:  [100-116] 100  Resp:  [12-19] 19  BP: ()/(72-80) 110/80  SpO2:  [96 %-100 %] 99 %  199 lbs 15.32 oz  General: Not in acute distress. Large body habitus  Head: Atraumatic. Normocephalic.   Eyes: Anicteric without injection. Eyes conjugate. Extraocular movements intact.  Ear, Nose, and Throat: Mouth pink and moist without lesions. Neck without overt masses.  Pulmonary: Unlabored at rest but dyspneic and lower sats when speaking at length. HFNC @ 75% FiO2/40LPM  Cardiovascular: Mild LE edema. Perfusing. Slightly cyanotic nail beds  Abdomen: Non-distended.   Skin: Warm.   Musculoskeletal: Joints of hand normal. Muscle bulk and tone normal in UE and LE.  Neuro: Speech fluent. Face symmetric. No focal neurologic deficit noted. Alert. Seemingly oriented.  Psych: Normal mood and affect. Sensorium, gross memory, thought processes, and fund of knowledge intact.               Data Reviewed:     CMP  Recent Labs   Lab 02/17/24  1303 02/17/24  1043 02/17/24  0914 02/17/24  0653 02/16/24  0949 02/16/24  0547 02/15/24  0827 02/15/24  0639 02/14/24  0700 02/14/24  0538   NA  --   --   --  136  --  135  --  139  --  138   POTASSIUM  --   --   --  4.0  --  4.0  --  3.7  --  3.5   CHLORIDE  --   --   --  99  --  97*  --  100  --  99   CO2  --   --   --  25  --  22  --  27  --  27   ANIONGAP  --   --   --  12  --  16*  --  12  --  12   * 288* 297* 255*   < > 306*   < > 178*   < > 75   BUN  --   --   --  17.5  --  17.5  --  15.6  --  15.3   CR  --   --   --  0.96  --  0.98  --  0.97  --  1.07   GFRESTIMATED  --   --   --  >90  --  >90  --  >90  --  81   DORINA  --   --   --  9.3  --  9.5  --  9.5  --  9.4   PROTTOTAL  --   --   --   --   --   --   --   --   --  6.5   ALBUMIN  --   --   --   --   --   --   --   --   --  4.2   BILITOTAL  --   --   --   --   --   --    --   --   --  0.4   ALKPHOS  --   --   --   --   --   --   --   --   --  69   AST  --   --   --   --   --   --   --   --   --  36   ALT  --   --   --   --   --   --   --   --   --  45    < > = values in this interval not displayed.     CBC  Recent Labs   Lab 02/16/24  0547 02/14/24  0538 02/13/24  0556 02/12/24  0340   WBC 6.4 7.5 7.5 7.5   RBC 4.44 4.51 4.49 4.62   HGB 14.5 14.8 14.9 15.3   HCT 42.2 42.2 43.0 43.6   MCV 95 94 96 94   MCH 32.7 32.8 33.2* 33.1*   MCHC 34.4 35.1 34.7 35.1   RDW 12.0 12.0 11.9 11.9   *  143* 166 155 159     INRNo lab results found in last 7 days.  Arterial Blood Gas  Recent Labs   Lab 02/12/24  1241 02/12/24  1237 02/11/24  1940 02/11/24  1923 02/11/24  0746   PH 7.51*  --  7.46*  --  7.51*   PCO2 33*  --  39  --  32*   PO2 55*  --  47*  --  48*   HCO3 26  --  27  --  26   O2PER 100 100 100 60 100           Medical Decision Making       MANAGEMENT DISCUSSED with the following over the past 24 hours: bedside RN, medicine team, pulmonary transplant attending   NOTE(S)/MEDICAL RECORDS REVIEWED over the past 24 hours: medicine, pulmonary, neuropsychiatric notes, nursing  Tests REVIEWED in the past 24 hours:  - BMP  - CBC  - ABG  SUPPLEMENTAL HISTORY, in addition to the patient's history, over the past 24 hours obtained from:   - sister tanja

## 2024-02-17 NOTE — PROGRESS NOTES
Care Management Follow Up    Length of Stay (days): 13    Expected Discharge Date:       Concerns to be Addressed: care coordination/care conferences     Patient plan of care discussed at interdisciplinary rounds: Yes    Anticipated Discharge Disposition:  (TBD pending care conference 2/17)     Anticipated Discharge Services:  (TBD pending care conference 2/17)  Anticipated Discharge DME:  (TBD pending care conference 2/17)    Patient/family educated on Medicare website which has current facility and service quality ratings:    Education Provided on the Discharge Plan:    Patient/Family in Agreement with the Plan: other (see comments)    Referrals Placed by CM/SW:    Private pay costs discussed: Not applicable    Additional Information:  Pt on weekend RNCC list requesting assistance in coordinating care conference scheduled with pt family for today 1 p.m..  Primary RNCC reached out to Palliative care, Cardiology, Pulmonology and [facility] team.    Per primary RNCC note    Time & Location:   [address] has asked to arrange the Care Conference for [facility], Palliative, Pulmonology and Cardiology for Saturday 2/17/24 Provider's meeting at 1245 at 4C conference room and virtual meeting with family over the phone at 4C conference room.     Pt family will attend via phone - Odalys Motat,  ( [phone])   Call into conference information:   [phone]   Meeting #[id number]  PIN # [phone]    Writer paged/vocera messaged the following providers requesting confirmation on their ability to attend:    Dr. Fer Patterson 1 Resident  Dr. Jim Cuenca Cardiology  Dr. Janie Vargas Pulmonology- provider reached out to writer and confirmed she is aware of care conference, plans to attend.   Dr. Hutton Palliative Care - provider reached out to writer and confirmed he is aware of care conference however may or may not be able to attend.      0950: Met with [facility] team.  Plan to include patient in care conference.   Agreed to  check with nursing regarding ipad availability for care conference.  RNCC will attend provider pre conference.  [facility] team will update RNCC post conference.      RNCC will continue to monitor.    Luly Easley, RN BSN, PHN, ACM-RN  2/12/2024  Nurse Coordinator      Social Work and Care Management Department     SEARCHABLE in [facility] - search CARE COORDINATOR     [place] ([phone]) Saturday & Sunday; ([phone]) [facility] Recognized Holidays     Units: 6A Vocera & 4A CVICU Vocera, 4C MICU Vocera, and 4E SICU Vocera   Pager: [phone]      2/17/2024

## 2024-02-17 NOTE — PLAN OF CARE
ICU End of Shift Summary. See flowsheets for vital signs and detailed assessment.    Changes this shift: Neurologically intact. KNOWLES. SBA. Denies pain. Follows commands and makes needs known appropriately. Sinus rhythm. Afebrile. Meeting SBP and MAP goals without intervention. Able to palpate pulses. HFNC FIO2 100%/40L and BiPAP 70% overnight. LS clear/dim. Denies increase of WOB/SOB. Compliant with IS and [employer]. Following 2200 Lantus administration pt reported that BG felt low. PRN check resulted in BG of 61. D50 25mL given with subsequent rechecks WNL. Voids spontaneously via urinal; adequate UOP. No BM per this shift.   Morning lab draw pending.    Plan: Plan for scheduled care conference at 1245/1300. Will contact primary care team with questions and concerns.     Sarah Beth Coleman RN   Critical Care Flex Team     Goal Outcome Evaluation:      Plan of Care Reviewed With: patient    Overall Patient Progress: no changeOverall Patient Progress: no change     Problem: Adult Inpatient Plan of Care  Goal: Plan of Care Review  Description: The Plan of Care Review/Shift note should be completed every shift.  The Outcome Evaluation is a brief statement about your assessment that the patient is improving, declining, or no change.  This information will be displayed automatically on your shift  note.  Outcome: Progressing  Flowsheets (Taken 2/17/2024 5147)  Plan of Care Reviewed With: patient  Overall Patient Progress: no change
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
## 2024-02-17 NOTE — PROGRESS NOTES
Pulmonary Medicine  Cystic Fibrosis - Lung Transplant Team  Progress Note  2024    Patient: Silvano Motta  MRN: [id number]  : 1965 (age 58 year old)  Admission date: 2024    Assessment & Plan:   Silvano Motta is a 58 year old male with a PMH significant for chronic hypoxic respiratory failure of uncertain etiology with oxygen dependence since 10/2023, mild pulmonary hypertension, ROSALIO only recently on CPAP who was admitted to [facility] 24 for acute on chronic hypoxic respiratory failure and transferred to [facility] on 2024 for pulmonary hypertension evaluation and ongoing evaluation and treatment of acute on chronic hypoxic respiratory failure, stably dependent on high flow oxygen.    Acute on Chronic Hypoxemic Respiratory Failure  Mild Pulmonary Hypertension  Hypoxia during hospitalizations in  and . Initially fairly asymptomatic and thought due to hypoventilation and untreated sleep apnea. Significant hypoxemia and new oxygen requirement since Oct 2023 with extensive workup including multiple CT Angio studies, NM Perfusion studies, RHC in Oct 2023 and again this hospitalization showing mild pulmonary hypertension (mean PA 25, PCWP 10). Imaging with stable pleural based HARMONY nodule and subpleural reticulation and thickening of interlobular septi present since . Perfusion studies without obvious shunt but most recently showed minimal perfusion to upper lungs. Repeated CT scans without embolus or AVM. Does have genetic sequencing in process. Thus far auto-immune workup negative (SSA, SSB, Scleroderma, Clara 1, RF, CCP, CRP, Aldolase, HP panels, ANCA, IgG) except for positive ROSAMARIA (speckled 1:160). In Oct also had negative workup (C3 and C4, anti-centromere, anti-Smith, anti-RNP, anti-SSA, anti-SSB, Scl-70, anti-cardiolipin, anti-CCP). Concern for shunt physiology with highly elevated A-a gradient, hypoxemia and ongoing dependence on high flow oxygen that seems out of proportion to  degree of pulmonary hypertension and fibrotic changes.     Discussed at lung transplant selection committee earlier this week. Deemed not to be candidate given lack of clear diagnosis, concerns about cognitive status, social support network, and poorly controlled diabetes. Decision discussed with patient on Thursday (2/15) and over the phone with patient's sister Odalys on Friday (2/16). Care conference today with patient, Odalys, Medicine, and Palliative Teams. Reviewed testing results thus far, fact that genetic testing still in process likely for at least another 1-3 weeks. Per Odalys's request discussed specific question of fungal or mold related disease given possibly exposure in the home, and that with this degree of hypoxia would expect significant changes to imaging or labs (prior galactomannan and beta d glucan normal, has had normal WBC and CRP). Also reviewed outreach efforts with [facility] that have been undertaken by the Cardiology team. Odalys discussed that given rarity of current presentation we need to continue efforts at finding specialists at other sites who may be able to diagnose or provide treatment options. Did not specifically address code status during the meeting but given conversations it does seem that patient and family would want trial of intubation if needed.     Pulmonary service did discuss case with Cardiology on 2/16 following their discussion with contacts at [facility]. At this time pulmonary angiogram unlikely to provide new information. Will review CT imaging again with chest radiology to specially look for any anomalous venous connections that may contribute to shunt. Will discuss perfusion study with nuclear medicine to ensure were truly able to assess for shunt. Cardiology planning to attempt repeat Echo bubble study. Will continue conversations with specialist contacts at [facility] or other centers if appears indicated. Awaiting results of genetic testing which may guide efforts to  reach out to other specialists.      We appreciate the excellent care provided by the [facility] team. Recommendations communicated via in person rounding and this note. Will continue to follow along closely, please do not hesitate to call with any questions or concerns.    Janie Vargas MD PhD  Lung Transplant  Pulmonary Critical Care     I spent 80 minutes reviewing chart, reviewing test results, talking with and examining patient, formulating plan, and documentation on the day of the encounter.     Subjective History:   Nursing notes reviewed. No acute events. Was able to wean high flow L and FiO2 down some in last couple days which has made Silvano hopeful for continued improvement. Continues to try to be very active in his room. Met with Medicine and Palliative teams for care conference as above.     Medical and Surgical History:   DMI  Acquired hypothyroidism  Seizure Disorder  Chronic traumatic headache  MDD/Anxiety    Past Surgical History:   Procedure Laterality Date     CV RIGHT HEART CATH MEASUREMENTS RECORDED N/A 2/7/2024    Procedure: Right Heart Catheterization;  Surgeon: Venkatesh Kaufman MD;  Location:  HEART CARDIAC CATH LAB     CV RIGHT HEART CATH PULMONARY VASODILATOR STUDY N/A 2/7/2024    Procedure: Right Heart Cath Pulmonary Vasodilator Study;  Surgeon: Venkatesh Kaufman MD;  Location: [facility]       Allergies and Home Medications:     Allergies   Allergen Reactions     Fluoxetine Other (See Comments)     PN: Made him feel more depressed.  Worsening of depression  PN: Made him feel more depressed.       Metformin      Other reaction(s): Seizures     Carbamazepine Other (See Comments)     PN: diffuse rash  PN: diffuse rash       Other Environmental Allergy Unknown     dut     Medications Prior to Admission   Medication Sig Dispense Refill Last Dose     albuterol (PROAIR HFA/PROVENTIL HFA/VENTOLIN HFA) 108 (90 Base) MCG/ACT inhaler INHALE 1 TO 2 PUFFS BY MOUTH EVERY  "4 HOURS AS NEEDED FOR WHEEZING OR SHORTNESS OF BREATH   More than a month at PRN     aspirin (ASA) 81 MG chewable tablet Take 81 mg by mouth every evening   2/3/2024 at PM     atorvastatin (LIPITOR) 40 MG tablet Take 40 mg by mouth   2/3/2024 at PM     calcium carbonate 500 mg, elemental, 1250 (500 Ca) MG tablet chewable Take 500 mg by mouth Chew and swallow 1 Tablet (500 mg) by mouth every 4 hours as needed   Past Week at PRN     DULoxetine (CYMBALTA) 30 MG capsule Take 30 mg by mouth daily   2/4/2024 at AM     FLOVENT  MCG/ACT inhaler Inhale 2 puffs into the lungs daily   2/4/2024 at AM     fluocinonide (LIDEX) 0.05 % external gel Apply topically 2 times daily as needed (for left sided internal cheek irritation)   Past Week at PRN     gabapentin (NEURONTIN) 300 MG capsule Take 900 mg by mouth 3 times daily   2/4/2024 at noon     hydrOXYzine (VISTARIL) 25 MG capsule TAKE 1 TO 2 CAPSULES BY MOUTH THREE TIMES A DAY AS NEEDED FOR ANXIETY   Past Week at PRN     insulin aspart (NOVOLOG PEN) 100 UNIT/ML pen 10-15 units per meal; up to 60 units per day   2/4/2024 at noon     insulin glargine (LANTUS PEN) 100 UNIT/ML pen Inject 15 Units Subcutaneous at bedtime   2/3/2024 at PM     Lacosamide (VIMPAT) 100 MG TABS tablet Take 100 mg by mouth 2 times daily   2/4/2024 at AM     levETIRAcetam (KEPPRA) 750 MG tablet Take 1,500 mg by mouth 2 times daily   2/4/2024 at AM     levothyroxine (SYNTHROID/LEVOTHROID) 150 MCG tablet Take 1 tablet by mouth daily   2/4/2024 at AM     LORazepam (ATIVAN) 0.5 MG tablet Take 1 tablet by mouth 2 times daily as needed for anxiety   Past Week at PRN     mirtazapine (REMERON) 30 MG tablet Take 30 mg by mouth At Bedtime   2/4/2024 at PM     blood glucose (ACCU-CHEK SMARTVIEW) test strip USE 6 TO 8 STRIPS TO TEST DAILY        Insulin Pen Needle (PEN NEEDLES 5/16\") 31G X 8 MM MISC 3-5 needles per day for insulin injections        UNIFINE PENTIPS PLUS 31G X 8 MM miscellaneous         Current " "Scheduled Meds    aspirin  81 mg Oral Daily     atorvastatin  40 mg Oral Daily     DULoxetine  30 mg Oral Daily     enoxaparin ANTICOAGULANT  40 mg Subcutaneous Q24H     gabapentin  900 mg Oral TID     insulin aspart  1-10 Units Subcutaneous TID AC     insulin aspart  1-7 Units Subcutaneous At Bedtime     insulin aspart   Subcutaneous Daily with breakfast     insulin aspart   Subcutaneous Daily with lunch     insulin aspart   Subcutaneous Daily with supper     insulin glargine  24 Units Subcutaneous At Bedtime     Lacosamide  100 mg Oral BID     levalbuterol  1.25 mg Nebulization 2 times daily     levETIRAcetam  1,500 mg Oral BID     levothyroxine  150 mcg Oral QAM AC     mirtazapine  30 mg Oral At Bedtime     sodium chloride (PF)  3 mL Intracatheter Q8H      Current PRN Meds  acetaminophen, acetaminophen, alum & mag hydroxide-simethicone, artificial tears, glucose **OR** dextrose **OR** glucagon, fluocinonide, fluticasone, hydrOXYzine HCl **OR** hydrOXYzine HCl, insulin aspart, ipratropium - albuterol 0.5 mg/2.5 mg/3 mL, lidocaine 4%, lidocaine (buffered or not buffered), - MEDICATION INSTRUCTIONS -, - MEDICATION INSTRUCTIONS -, - MEDICATION INSTRUCTIONS -, polyethylene glycol, senna-docusate, sodium chloride (PF)     Physical Exam:     Vital signs:  Temp: 98.1  F (36.7  C) Temp src: Axillary BP: 115/77 Pulse: 94   Resp: 19 SpO2: 91 % O2 Device: High Flow Nasal Cannula (HFNC) Oxygen Delivery: 40 LPM Height: 177.8 cm (5' 10\") Weight: 108.2 kg (238 lb 8.6 oz)  I/O:     Intake/Output Summary (Last 24 hours) at 2/17/2024 1525  Last data filed at 2/17/2024 1400  Gross per 24 hour   Intake 1400 ml   Output 2575 ml   Net -1175 ml     Constitutional: sitting up in chair, in no apparent distress.   HEENT: Eyes with pink conjunctivae, anicteric. Oral mucosa moist without lesions, on high flow nasal cannula  PULM: Desats to 80s consistently with talking and down to 76% with uninterrupted talking  CV: Sinus per tele with " borderline tachycardia  ABD: NABS, soft, nontender, nondistended.    MSK: Moves all extremities. No apparent muscle wasting.   NEURO: Alert and oriented, conversant.   SKIN: Warm, dry. No rash on limited exam.   PSYCH: Mood stable.      Lines, Drains, and Devices:  Peripheral IV 02/10/24 Anterior;Left Lower forearm (Active)   Site Assessment WDL 02/14/24 0400   Line Status Saline locked 02/14/24 0400   Dressing Transparent 02/14/24 0400   Dressing Status clean;dry;intact 02/14/24 0400   Line Intervention Flushed 02/13/24 2114   Phlebitis Scale 0-->no symptoms 02/14/24 0400   Infiltration? no 02/14/24 0400   Number of days: 4     Results:     LABS    CMP:   Recent Labs   Lab 02/17/24  1303 02/17/24  1043 02/17/24  0914 02/17/24  0653 02/16/24  0949 02/16/24  0547 02/15/24  0827 02/15/24  0639 02/14/24  0700 02/14/24  0538   NA  --   --   --  136  --  135  --  139  --  138   POTASSIUM  --   --   --  4.0  --  4.0  --  3.7  --  3.5   CHLORIDE  --   --   --  99  --  97*  --  100  --  99   CO2  --   --   --  25  --  22  --  27  --  27   ANIONGAP  --   --   --  12  --  16*  --  12  --  12   * 288* 297* 255*   < > 306*   < > 178*   < > 75   BUN  --   --   --  17.5  --  17.5  --  15.6  --  15.3   CR  --   --   --  0.96  --  0.98  --  0.97  --  1.07   GFRESTIMATED  --   --   --  >90  --  >90  --  >90  --  81   DORINA  --   --   --  9.3  --  9.5  --  9.5  --  9.4   PROTTOTAL  --   --   --   --   --   --   --   --   --  6.5   ALBUMIN  --   --   --   --   --   --   --   --   --  4.2   BILITOTAL  --   --   --   --   --   --   --   --   --  0.4   ALKPHOS  --   --   --   --   --   --   --   --   --  69   AST  --   --   --   --   --   --   --   --   --  36   ALT  --   --   --   --   --   --   --   --   --  45    < > = values in this interval not displayed.     CBC:   Recent Labs   Lab 02/16/24  0547 02/14/24  0538 02/13/24  0556 02/12/24  0340   WBC 6.4 7.5 7.5 7.5   RBC 4.44 4.51 4.49 4.62   HGB 14.5 14.8 14.9 15.3   HCT 42.2  "42.2 43.0 43.6   MCV 95 94 96 94   MCH 32.7 32.8 33.2* 33.1*   MCHC 34.4 35.1 34.7 35.1   RDW 12.0 12.0 11.9 11.9   *  143* 166 155 159     Glucose:   Recent Labs   Lab 02/17/24  1303 02/17/24  1043 02/17/24  0914 02/17/24  0653 02/16/24  2258 02/16/24  2234   * 288* 297* 255* 123* 61*       Blood Gas:   Recent Labs   Lab 02/12/24  1241 02/12/24  1237 02/11/24  1940 02/11/24  1923   PHV  --  7.47*  --  7.39   PCO2V  --  40  --  54*   PO2V  --  33  --  19*   HCO3V  --  29*  --  33*   CLAUDETTE  --  4.7*  --  5.8*   O2PER 100 100 100 60       Culture Data No results for input(s): \"CULT\" in the last 168 hours.    Virology Data:   Lab Results   Component Value Date    FLUAH1 Not Detected 02/07/2024    FLUAH3 Not Detected 02/07/2024    UW2285 Not Detected 02/07/2024    IFLUB Not Detected 02/07/2024    RSVA Not Detected 02/07/2024    RSVB Not Detected 02/07/2024    PIV1 Not Detected 02/07/2024    PIV2 Not Detected 02/07/2024    PIV3 Not Detected 02/07/2024    HMPV Not Detected 02/07/2024     IMAGING    Recent Results (from the past 48 hour(s))   XR Chest/Heart Fluoro    Narrative    EXAM:  XR CHEST/HEART FLUORO 2/12/2024 4:08 PM    INDICATION: sniff test for diaphragm dysfunction, hypoxia concern for  hypoventilation    COMPARISON:  Chest x-ray 2/10/2024.    Fluoro time: 1.5 minutes     FINDINGS: Slight limitation due to recumbent positioning of the  patient. Fluoroscopy was used to evaluate diaphragm movement during  respiration and sniff maneuver. Symmetric movement of bilateral  hemidiaphragm with quiet, exaggerated respiration and sniff maneuver.  No paradoxical movement with the sniff maneuver. External metallic  opacity projecting over the chest.      Impression    IMPRESSION: Symmetric hemidiaphragm movement with respiration and no  paradoxical motion with sniff maneuver in the recumbent position.    I, MARY BARRY MD, attest that I was present for all critical  portions of the procedure and was " immediately available to provide  guidance and assistance during the remainder of the procedure.      I have personally reviewed the examination and initial interpretation  and I agree with the findings.    MARY BARRY MD         SYSTEM ID:  [id number]   XR Chest Port 1 View    Narrative    EXAM: Chest radiograph 2/13/2024 4:30 AM    HISTORY: 58 years Male climbing O2 needs on BiPAP - eval for edema vs  opacities vs other.     COMPARISON: Chest x-ray 2/10/2024.    TECHNIQUE: Portable AP view the chest.    FINDINGS:   Trachea is midline. The cardiac silhouette size is stable. Prominent  pulmonary vasculature. Blunting of the bilateral costophrenic angles.  Perihilar and bibasilar interstitial and airspace opacities. No focal  pulmonary consolidation. No pneumothorax. The visualized upper abdomen  is unremarkable. No acute or suspicious osseous or soft tissue  abnormality.      Impression    IMPRESSION:     1. Increased perihilar and basilar predominant streaky pulmonary  opacities, nonspecific and may represent pulmonary edema, infection  and/or atelectasis in the appropriate clinical settings.  2. Possible trace pleural effusions.    I have personally reviewed the examination and initial interpretation  and I agree with the findings.    MARY BARRY MD         SYSTEM ID:  [id number]

## 2024-02-17 NOTE — PROGRESS NOTES
Care Conference Note    Care conference attendee's  Patient  Rayray Motta - pt sister via phone  Dr. Vargas Pulmonology  Dr. Hutton Palliative Care   Yesenia Oh 1 Resident  Yesenia Garcia 1 Attending  RNCC    Team inquired as to what patient and his sister would like addressed as they have received updates from the teams involved in the patients care.  The patient and his sister had the following questions    -Would like a clear understanding of what tests have been done.  -Is Brady and option?  What discussions has the [facility] Team had with Brady? How do we get Brady involved?  -Is their any possibility that pt medical issues could be from exposure to allergies, fungal/mold exposure?    Dr. Vargas provided an overview of testing that has been done.  Genetic tests were collected but could take up to 4 weeks to result. Pulmonary team is working with Cardiology team (pulmonary HTN) in having an open dialogue with Brady.  Discussions continue.  Discussed that pt/family can also reach out to [facility] to explore options.  Pt sister requested assistance in secure #'s to contact Brady. Pt sister would like to review test results and provider notes.  Discussed my chart.  Pt can rajinder access to his sister.  Pt notes he struggles with Mychart.  Pt sister will be be here on Tuesday.  Agreed to see if [facility] would be able to assist pt and sister in address mychart access.      Writer reached out to [facility] inquiring about self referral (no PHI shared) and was informed that a patient/family member can call one central # [phone] to discuss situation, rep will address next steps to have pt seen/referred to Brady.  Provided Dr. Felipe with above information.    Will defer to primary RNCC/SW to team for on going management/coordination of patient care.        Luly Easley, RN BSN, PHN, ACM-RN  2/12/2024  Nurse Coordinator        Social Work and Care Management Department     SEARCHABLE in [facility] - search CARE COORDINATOR      [place] ([phone]) Saturday & Sunday; ([phone]) [facility] Recognized Holidays     Units: 6A Vocera & 4A CVICU Vocera, 4C MICU Vocera, and 4E SICU Vocera   Pager: [phone]    2/17/2024

## 2024-02-18 NOTE — PLAN OF CARE
Goal Outcome Evaluation:ICU End of Shift Summary. See flowsheets for vital signs and detailed assessment.    Changes this shift: HFNC 75%/40L for most of the day. Frequently desaturates to 70's with activity/talking but recovers quickly. Pt is very focused on breathing and is motivated to decrease his O2 requirement.  RN attempted on multiple occasions to increase FiO2 to decrease frequency of desaturations, but patient refused.  HFNC currently 80%/40L. RN educated patient on the importance of maintaining adequate O2 sats for perfusion/that we would increase oxygen if pt is no longer able to recover sats quickly. Care conference at bedside.     Plan:  Await genetic sequencing. Continue with plan of care.
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
## 2024-02-18 NOTE — PLAN OF CARE
ICU End of Shift Summary. See flowsheets for vital signs and detailed assessment.    Changes this shift: Patient slept between cares, no acute events overnight. Wore HFNC in evening, frequent desaturation, especially with activity. Frequently titrating to keep sats above 90%. Wears BiPAP overnight, tolerated well. Standby assist in room, noted dyspnea on exertion. 1 BM in evening, voids without difficulty.     Plan: Continue to monitor closely. Notify primary team with changes.     Evelyn Joya RN on 2/18/2024 at 6:45 AM
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
## 2024-02-19 NOTE — PLAN OF CARE
ICU End of Shift Summary. See flowsheets for vital signs and detailed assessment.    Changes this shift: Patient on HFNC 90%/45L all day, unable to be weaned further. Frequent O2 desats to 70's/80's with talking/eating/exercise.  Pt able to recover himself quickly.     Plan:  Continue with plan of care.
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
## 2024-02-19 NOTE — PROGRESS NOTES
PALLIATIVE CARE SOCIAL WORK Progress Note   Location: [facility]      Attempted 2x to see Silvano today to assess coping. He was with other providers each attempt.     Plan: PCSW will continue to follow for ongoing emotional support while Palliative Care Team is following.       ANJELICA Lee  [facility], Palliative Care  Securely message with the [employer] Web Console (learn more here) or  Text page via [employer] Paging/Directory

## 2024-02-19 NOTE — PLAN OF CARE
ICU End of Shift Summary. See flowsheets for vital signs and detailed assessment.    Changes this shift: Patient slept between cares, no acute events overnight. HFNC until bedtime, then transitioned to BiPAP 12/8, 65% FiO2. Adhering to fluid restriction. Voids without difficulty. No BM overnight.     Plan: Continue to monitor closely. Notify primary team with changes.     Evelyn Joya RN on 2/19/2024 at 6:44 AM

## 2024-02-19 NOTE — PLAN OF CARE
ICU End of Shift Summary. See flowsheets for vital signs and detailed assessment.    Changes this shift: No acute changes. 90% 40L majority of the day, in afternoon weaned to 80% 40L. ECHO complete. Up in chair all day.     Plan:  BiPAP at night. Wean oxygen as able.    Goal Outcome Evaluation:      Plan of Care Reviewed With: patient    Overall Patient Progress: no changeOverall Patient Progress: no change    Outcome Evaluation: 80% 40L during the day, high blood sugars

## 2024-02-20 NOTE — PROGRESS NOTES
Transfer 5:35 AM  Transferred from: 4C  Via:bed  Reason for transfer: Pt appropriate for 6B- improved patient condition  Family: Aware of transfer  Belongings: Received with pt  Chart: Received with pt  Medications: Meds received from old unit with pt  Code Status verified on armband: yes  2 RN Skin Assessment Completed By: LESLIE Helm and LESLIE Bird  Med rec completed: yes  Suction/Ambu bag/Flowmeter at bedside: yes    Report received from: LESLIE Armas 4C  Pt status: Patient tolerated transfer well, on Bipap at 12/8 60% O2. VS stable, O2 Sats remained > 90%.  High flow NC in room for patient to use during the day. Patient used his phone to text family about transfer.
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
## 2024-02-20 NOTE — PLAN OF CARE
"[id number] Goal Outcome Evaluation:         Overall Patient Progress: improving    VS:  /83 (BP Location: Right arm)   Pulse 105   Temp 98.3  F (36.8  C) (Oral)   Resp 20   Ht 1.778 m (5' 10\")   Wt 108.2 kg (238 lb 8.6 oz)   SpO2 91%   BMI 34.23 kg/m      Cardiac:  SR, MAP >65   Respiratory:  Stable on Hi flow NC @ 80% FiO2 40L, dyspnea on exertion w/ deep breathing encouraged; BIPAP 60% FiO2 @ bedtime    GI/:  Cont of b&b; voids w/o difficulty w/ urinal @ bedside; no BM this shift   Neuro:  A&O x4; baseline neuropathy per pt, denied new numbness or tingling   Pain/PRN:  Pt denied any pain including CP; PRN Atarax given x2 for anxiety    Activity:  SBA-Ind in room; up in chair for breakfast & lunch   Skin:  BUE & abd bruises from insulin shots & Lovenox SQ;   No further skin issues noted   Dressing:  N/A   Diet/Appetite:  2 g Na diet w/ thin, 2 L fluid restriction; denied N/V   Total input: 1,374 mL this shift;   Pt on sliding scale & carb coverage   LDA:  L PIV SL   Equipment:  BIPAP, hi flow NC, & personal belongings   Plan:  Cont POC; waiting for further opinion on future care   Additional Info:             "
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
## 2024-02-20 NOTE — PROGRESS NOTES
[facility] Pulmonology Follow up    Silvano Motta  MRN: [id number]  : 1965    Date of Admission: 2024  Date of Service: 2024    Assessment:  Acute on chronic hypoxemic respiratory failure  Mild pulmonary hypertension  Severe TBI    Mr. Motta has recorded hypoxia during hospitalizations in  and . Initially fairly asymptomatic and thought due to hypoventilation and untreated sleep apnea. Significant hypoxemia and new oxygen requirement since Oct 2023 with extensive workup including multiple CT Angio studies, NM Perfusion studies, RHC in Oct 2023 and again this hospitalization showing mild pulmonary hypertension (mean PA 25, PCWP 10). Imaging with stable pleural based HARMONY nodule and subpleural reticulation and thickening of interlobular septi present since . Repeated CT scans without embolus or AVM. Does have genetic sequencing in process. Thus far auto-immune workup negative (SSA, SSB, Scleroderma, Clara 1, RF, CCP, CRP, Aldolase, HP panels, ANCA, IgG) except for positive ROSAMARIA (speckled 1:160). In Oct also had negative workup (C3 and C4, anti-centromere, anti-Smith, anti-RNP, anti-SSA, anti-SSB, Scl-70, anti-cardiolipin, anti-CCP). Concern for shunt physiology with highly elevated A-a gradient, multiple TTEs and EDWIGE with positive late bubble, hypoxemia and ongoing dependence on high flow oxygen that seems out of proportion to degree of pulmonary hypertension and fibrotic changes.  Additional workup for diagnostic purposes remains, though initial discussions regarding third opinion from [facility] have been initiated. He is not a candidate for lung transplant at this institution.    Recommendations:  -- continued workup in conjunction with cardiology and primary team    Subjective:  feeling ok today. Feels oxygenation is getting better with exercises. Has increased his exercise intensity and oxygen is not dropping as much. He hopes to recover to rebuild his relationship with his  "daughter.    Review of systems: focused ROS performed and noted as above.    Objective:  /87   Pulse 97   Temp 98  F (36.7  C) (Axillary)   Resp 17   Ht 1.778 m (5' 10\")   Wt 108.2 kg (238 lb 8.6 oz)   SpO2 91%   BMI 34.23 kg/m      Gen: in no acute distress, sitting upright in chair  HEENT: MMM, HFNC in place  CV: RRR, no significant peripheral edema  Pulm: no increased work of breathing, CTAB  MSK: no gross deformities, no joint swelling  Integ: no rashes or lesions appreciated  Neuro: speech clear, alert and oriented  Psych: calm, appropriate    Data:  I have personally reviewed new imaging studies, labs.    Vianey Mitchell DO  Pulmonary fellow  Patient discussed and seen with Dr. Mackenzie.  "

## 2024-02-20 NOTE — PLAN OF CARE
Goal Outcome Evaluation:      Plan of Care Reviewed With: patient    Overall Patient Progress: no changeOverall Patient Progress: no change    Outcome Evaluation: see RD note 2/20    Devi Segal MS, RD, LD, [facility]    6C (beds 3747-2775) + 7C (beds 8192-2805) + ED   RD pager: [phone]  Weekend/Holiday RD pager: [phone]

## 2024-02-20 NOTE — PROGRESS NOTES
Transferred to: (place) at (time) 6B at 0530  Status at time of transfer: VSS, on BiPAP 12/8  60%.  Belongings: With patient in bed sent to 6B room.   Nicole removed? (if no, why?): NA  Chart and medications: With patient.   Family notified:  Patient said he would text his family.

## 2024-02-21 NOTE — PLAN OF CARE
"Goal Outcome Evaluation:    /74 (BP Location: Right arm)   Pulse 84   Temp 97.7  F (36.5  C) (Axillary)   Resp 20   Ht 1.778 m (5' 10\")   Wt 108.2 kg (238 lb 8.6 oz)   SpO2 93%   BMI 34.23 kg/m      [id number]    A&Ox4, denied pain. No change in the last 4 hours. VS remained stable on HF, FiO2 80% and 40LPM sating around 90%. Desats very easy with any activity including talking. Goal >85% for sating. Blood glucose 221 mg/dl before dinner and carb coverage. Independent in room/bedside activity. PIV saline locked. LS diminished. NGUYEN and SOB at baseline. 2g Na+ diet and 2LFR. Will continue to monitor.   "
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
## 2024-02-21 NOTE — PROGRESS NOTES
Brief Progress Note:     Reviewed TTE bubble study with HF staff and PH staff:     Late bubble appearance (after 7-8 cardiac cycles) with positional changes is suggestive of intra-pulmonary shunting, likely from pulmonary AVMs.     Cardiology will sign off at this time. Please reach out if you have further questions.     Stewart Moreno   Cardiology Fellow

## 2024-02-21 NOTE — PROGRESS NOTES
[facility]  Palliative Care Sign-Off Note    Palliative Care was consulted for goals of care. At this time the patient does not have any needs we are actively addressing, and we are signing off.    However we know their condition may change -- please re-consult us if we can be helpful at any time in the future.     Thank you for the opportunity to be involved in the care of this patient. Primary team notified by page.    Jamie Mcdaniels MD  Palliative Medicine Fellow       Joceline Christine MD  Securely message with [employer] (more info)  Text page via [facility] Paging/Directory

## 2024-02-21 NOTE — PROGRESS NOTES
D: Pulmonary hypertension (H)  (primary encounter diagnosis)  Type 1 diabetes mellitus with hyperglycemia (H)    I: Monitored vitals and assessed pt status.   Changed: Increasing O2 needs tonight. Pt desatting to mid 80's. High flow increased to FiO2 100% on 50 L. Pt placed on Bipap FiO2 65%. Oxygen saturation waxes/wanes high 90's to mid 80's when sleeping and with position changes. At bedtime hypoglycemia having blurred vision in 70's despite shaye crackers and juice. D50 25ml IV given, blood sugar recheck 152. Team updated and informed RN to give HS Lantus. NSR 70-80's. Denies pain or nausea. Pt slept most of night.   Running: none  PRN: none    A: Nuero: A&O*4  Tele: NSR 70-80's  Lung: Awake- HHF FiO2 100% on 50L  Asleep-Bipap FiO2 60-65%  GI: BM 2/18 BSC  : Urinal at bedside. Voiding adequately  Skin: Bruising abdomen. BLE 1+ edema  Pain: Denies  Independent in room      Temp:  [97.2  F (36.2  C)-98.8  F (37.1  C)] 97.6  F (36.4  C)  Pulse:  [] 79  Resp:  [18-22] 18  BP: ()/(58-90) 105/76  FiO2 (%):  [60 %-100 %] 65 %  SpO2:  [85 %-98 %] 97 %      P: Continue to monitor Pt status and report changes to treatment team.

## 2024-02-21 NOTE — PROVIDER NOTIFICATION
6B [id number] Silvano Motta: Pt went hypoglycemic to 70. Dany crackers, apple juice, and D50 25 ml given. Blood sugar now 152. He has bedtime Lantus 34 units. Do you want me to give or hold? Sylvain Coon RN

## 2024-02-21 NOTE — PLAN OF CARE
"Highlights/changes today:   Scheduled to work with therapy this am, waiting for session.   Per night RN, last night pt BS dropped to 70's, team paged and insulin/lantus changes this am, last .   Pt states abd and back of arm pain/discomfort. Pt requested to speak with charge RN, pt requesting insulin/lantus/heparin shots given around the navel not just to the sides of navel, charge RN informed and spoke with pt.   Pt requested nasal spray, given x 1.     /80 (BP Location: Left arm)   Pulse 90   Temp 97.6  F (36.4  C) (Oral)   Resp 20   Ht 1.778 m (5' 10\")   Wt 108.2 kg (238 lb 8.6 oz)   SpO2 97%   BMI 34.23 kg/m      Neuro: A&Ox4. Calm, cooperative, and pleasant.   Cardiac: SR, HR 70-80's. Afebrile and VSS.   Respiratory: Sating in the low 90's on 80% with 40 L. Pt O2 sats dropped to 70's with activity, team paged, SaO2 parameters changed and updated.   GI/: Adequate urine output and 1 BM during shift.   Diet/appetite: Tolerating 2 g Na diet with 2 L fluid restirction, good appetite. ACHS with BS checks and carb coverage. Last night hypoglycemic episodes, BS dropped to 70's, last .   Activity: Up ad jesus manuel, up to chair and ambulates in room, pt performs exercises at bedside independently.   Pain: Abd and back of arm pain/discomfrot, no meds needed.   Skin: Bruised abd and back of arm, no adjustments needed.   LDA's: L PIV, SL.     Plan: Possible cards and pulmonary consulted today to discuss further care. Continue with POC. Notify primary team with changes.    "
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
## 2024-02-22 NOTE — PLAN OF CARE
"Highlights/changes today:   Scheduled to work with therapy today, waiting for session.   Pt performs exercises at bedside, the O2 sats will drop below the O2 saturation goals, team is aware and the team updated patient care orders.      /84 (BP Location: Left arm)   Pulse 92   Temp 97.5  F (36.4  C) (Oral)   Resp 24   Ht 1.778 m (5' 10\")   Wt 108.2 kg (238 lb 8.6 oz)   SpO2 94%   BMI 34.23 kg/m       Neuro: A&Ox4. Pt states frustration with SaO2 parameters and was pretty upset with me about following the oxygen SaO2/pluse oximetry orders this am. The team aware of pt's concerns and updated patient care orders to reflect pt's current O2 de-saturations during activity, please reference patient care orders for updates.   Cardiac: SR, HR 70-80's. Afebrile and VSS.              Respiratory: Sating in the low 90's on high flow FiO2 at 80% on 40 Liters, O2 sats drops with activity.   GI/: Adequate urine output and 1 BM during shift.   Diet/appetite: Tolerating 2 g Na diet with 2 L fluid restirction, good appetite. ACHS with BS checks and carb coverage.   Activity: Up ad jesus manuel, up to chair and ambulates in room.  Pain: Abd and back of arm pain/discomfrot, no meds needed.   Skin: Bruised abd and back of arm, no adjustments needed.   LDA's: L PIV, SL.      Plan: Continue with POC. Notify primary team with changes.    "

## 2024-02-22 NOTE — TELEPHONE ENCOUNTER
Called and LVM to reschedule NEW PH with labs prior patient is currently in hospital     [facility]  Clinic    Pulmonary Hypertension   [facility]  (p) [phone]
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
## 2024-02-22 NOTE — PLAN OF CARE
[id number]  Neuro: A&Ox4.  Cardiac: SR- 70-80s. SBP -110s. Afebrile. Minimal edema on BLE.  Respiratory: Sating >94% on HFNC 80% 40L when waking, CPAP at night, weaned to 45% Fio2, Dyspneic upon exertion.   GI/: Adequate urine output. LBM: 2/21/24   Diet/appetite: Compliant and tolerated 2 gm sodium diet with fluid restriction of 2L. Eating well.  Activity Standby assist, up to chair.  Pain: At acceptable level on current regimen.   Skin: No new deficits noted.  LDA's: L PIV, SL, CDI.     Plan: Continue with POC. Notify primary team with changes.

## 2024-02-22 NOTE — TELEPHONE ENCOUNTER
Patient did call to cancel his last appointment due to being in the hospital I did call and LVM letting them know I canceled the appointments and to reschedule on 2/16

## 2024-02-22 NOTE — PROGRESS NOTES
Brief Progress Note:    Contacted by pulmonary service  (fellow and attending) regarding recent TTE with bubble study on 2/19/24 and utility of further testing including EDWIGE and pulmonary angiogram     Reviewed results of 2/19/24 TTE: late appearance of bubbles with positional changes are suggestive of intrapulmonary shunt (as opposed to interatrial shunt). Of note, previous limited echoes with bubble have been negative for interartrial shunting. These results were reviewed and discussed earlier this week with one of our PH specialists who had recommended this study.     Communicated that pulmonary angiogram was also discussed with our PH specialist, who felt there would not be utility in pursuing this.     At this time, we do not recommend further work-up or testing.      Discussed with staff.     Stewart Moreno   Cardiology Fellow

## 2024-02-22 NOTE — PROGRESS NOTES
[facility] Pulmonology Follow up    Silvano Motta  MRN: [id number]  : 1965    Date of Admission: 2024  Date of Service: 2024    Assessment:  Acute on chronic hypoxemic respiratory failure, unclear etiology  Mild pulmonary hypertension  Severe ROSALIO on PAP at night  Severe TBI, seizure disorder on AEDs, T1DM    Mr. Motta has recorded hypoxia during hospitalizations in  and . Initially fairly asymptomatic and thought due to hypoventilation and untreated sleep apnea. Significant hypoxemia and new oxygen requirement since Oct 2023 with extensive workup including multiple CT Angio studies, NM Perfusion studies, RHC in Oct 2023 and again this hospitalization showing mild pulmonary hypertension (mean PA 25, PCWP 10). Imaging with stable pleural based HARMONY nodule and subpleural reticulation and thickening of interlobular septi present since . Repeated CT scans without embolus or AVM. Does have genetic sequencing in process. Thus far auto-immune workup negative (SSA, SSB, Scleroderma, Clara 1, RF, CCP, CRP, Aldolase, HP panels, ANCA, IgG) except for positive ROSAMARIA (speckled 1:160). In Oct also had negative workup (C3 and C4, anti-centromere, anti-Smith, anti-RNP, anti-SSA, anti-SSB, Scl-70, anti-cardiolipin, anti-CCP). Concern for shunt physiology with highly elevated A-a gradient, multiple TTEs and EDWIGE with positive late bubble, hypoxemia and ongoing dependence on high flow oxygen that seems out of proportion to degree of pulmonary hypertension and fibrotic changes. Based on bubble studies, presumably extracardiac shunt, potential diagnosis includes DMVPAVM which is very rare but would explain lack of macrovascular abnormalities.    Recommendations:  -- appreciate cardiology input  -- would pursue pulmonary angiogram to identify some abnormality creating shunt physiology  -- awaiting results of genetic testing  -- no indication to transfer to [place] for pulmonary transplant    Subjective:  "continues to work his oxygen needs down. Has been doing his exercises. Feels a bit better each day. Again driven by his desire to re-engage with his daughter.    Review of systems: focused ROS performed and noted as above.    Objective:  /79 (BP Location: Right arm)   Pulse 85   Temp 97.5  F (36.4  C) (Axillary)   Resp 20   Ht 1.778 m (5' 10\")   Wt 108.2 kg (238 lb 8.6 oz)   SpO2 97%   BMI 34.23 kg/m      Gen: in no acute distress, lying in bed  HEENT: HFNC in place  Pulm: no increased work of breathing, CTAB  MSK: no gross deformities, no joint swelling  Integ: no rashes or lesions appreciated  Neuro: speech clear, alert and oriented  Psych: calm, appropriate    Data:  I have personally reviewed new data.    Vianey Mitchell DO  Pulmonary fellow  Patient discussed and seen with Dr. Mackenzie.  "
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
## 2024-02-23 NOTE — PROVIDER NOTIFICATION
0211: Paged FADI Nick that BS was checked before giving additional 16 units scheduled at 0200. BS 86. Pt expressed concern for giving additional 16 units of lantus (after receiving 15 units after 0000) with the lower BS. Asked provider if okay to hold dose.    Dr. Ambrose paged to give dose and to notify if patient refuses.     16 units of lantus given, will check blood sugar with 0400 vitals.

## 2024-02-23 NOTE — PROGRESS NOTES
"      Neuro: A&Ox4. Able to let his needs be known  Cardiac: SR. VSS.   /78 (BP Location: Right arm)   Pulse 93   Temp 98.2  F (36.8  C) (Oral)   Resp 22   Ht 1.778 m (5' 10\")   Wt 108.2 kg (238 lb 8.6 oz)   SpO2 96%   BMI 34.23 kg/m     Respiratory: Sating greater than 95% on HFNC and Bipap at night.  GI/: Adequate urine output. Receiving IV Lasix BID, voiding in urinal   Diet/appetite: Tolerating 2gmNA with 2L FR diet. Eating well.  Activity:  A Up independently in room  Pain: At acceptable level on current regimen.   Skin: No new deficits noted. Skin intact he has some bruising on abd and arms from Lovenox and insulin inject     LDA's: Right and Left PIV-SL     Plan: Continue with POC. Notify primary team Yesenia 1 with changes.   -CT Abd  "

## 2024-02-23 NOTE — SIGNIFICANT EVENT
The patient was given Atarax 25 mg before his CT procedure. Writer RN, RT and transportation staff transported the patient down to CT.     He was given contrast, the procedure went well. He was then transferred back into his bed and was rolled into the  way. The patient was on Bi-pap at 55%. He squeezed, he was asked if he was okay, we got him into the  way RT spoke to the patient he did not respond.     Writer checked his pulse and the pulse was very faint. CPR was initiated. A code was called and RRT took over.    Plan: Patient was transferred to , writer gave report to nurse.

## 2024-02-23 NOTE — PROGRESS NOTES
D: pt intubated with a 8.0 ETT secured 25 @ the lip  I: pt. Transported  to  unit 4A and  placed  on a Drager ventilator with settings per M.D.  A: color change with ETCO2. Bilateral breath sounds  P: cont to  monitor
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
## 2024-02-23 NOTE — CODE/RAPID RESPONSE
RT transported pt to CT on  BiPAP, coming out of the CT, Pt started sneezing and became unresponsive. Code blue was called, Pt was intubated with 8.0 ETT, secured 25 @lip, transferred to CVICU and placed on full vent support:    Vent Mode: CMV/AC  (Continuous Mandatory Ventilation/ Assist Control)  FiO2 (%): 70 %  Resp Rate (Set): 15 breaths/min  Tidal Volume (Set, mL): 550 mL  PEEP (cm H2O): 8 cmH2O  Resp: 22    RT will continue to monitor pt, no interventions needed at this point.    Diandra Kearney, RT

## 2024-02-23 NOTE — ANESTHESIA PROCEDURE NOTES
Airway       Patient location during procedure: Floor (Imaging Suite)       Procedure Start/Stop Times: 2/23/2024 4:35 PM  Staff -        Anesthesiologist:  Zurdo Livingston MD       Resident/Fellow: Rl Osorio MD       Performed By: resident and anesthesiologist  Consent for Airway        Urgency: emergent       Consent: The procedure was performed in an emergent situation.  Indications and Patient Condition       Indications for airway management: cardiovascular arrest       Mallampati: Not Assessed     Induction type:other (comments) (patient non-responsive)       Mask difficulty assessment: 0 - not attempted    Final Airway Details       Final airway type: endotracheal airway       Successful airway: ETT - single and Oral  Endotracheal Airway Details        ETT size (mm): 8.0       Successful intubation technique: video laryngoscopy       VL Blade Size: Glidescope 4       Grade View of Cords: 1       Adjucts: stylet       Position: Center       Measured from: gums/teeth       Secured at (cm): 25       Bite block used: None    Post intubation assessment        ETT secured, Vent settings by primary/ICU team, Primary/ICU team to review CXR, Sedation to be ordered by primary/ICU team and No apparent complications       Placement verified by: capnometry and chest rise        Number of attempts at approach: 1       Number of other approaches attempted: 0       Secured with: commercial tube omer (per RT)       Ease of procedure: easy       Dentition: Intact and Unchanged    Medication(s) Administered   Medication Administration Time: 2/23/2024 4:35 PM    Additional Comments       Patient emergently intubated in cardiac arrest. No induction agents required. Patient received shock with ROSC. Plan for code team to transport patient to ED for further management. No additional questions from code team prior to sign-off after airway placement. Code team to manage ventilator, sedation, and ETT confirmation. Vitals  per code team.

## 2024-02-23 NOTE — PLAN OF CARE
Neuro: A&Ox4.   Cardiac: SR, HR 80s. VSS  Respiratory: Sating >90% on Bipap 60%. HFNC 75% 40L while awake. Denies SOB at rest.   GI/: Adequate urine output via urinal  Diet/appetite: Tolerating 2g Na, 2L FR  Activity:  Independent in room  Pain: At acceptable level on current regimen. Denied pain throughout the shift  Skin: No new deficits noted. Scattered bruising. C/o tenderness around bruises.   LDA's: L PIV- SL.    Plan: Continue with POC. Notify primary team with changes.

## 2024-02-23 NOTE — PLAN OF CARE
Neuro: A&Ox4.   Cardiac: SR. VSS.   Respiratory: Sating > 88% on 75% FiO2 via HFNC.   GI/: Adequate urine output. BM X1  Diet/appetite: Tolerating low sodium diet. Eating well.  Activity:  Up ad jesus manuel in room.  Pain: At acceptable level on current regimen.   Skin: No new deficits noted.  LDA's: PIV x 1    Plan: Continue with POC. Notify primary team with changes.
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
## 2024-02-23 NOTE — PLAN OF CARE
Neuro: A&Ox4. Pleasant, able to make needs known.  Cardiac: SR 70s-80s. BP within parameters.   Respiratory: Sating >85% on HFNC 75% FiO2/40LPM, weaned from 80% FiO2. Sating >90% on bipap 60% FiO2 when sleeping.  GI/: Adequate urine output. No BM this shift.  Diet/appetite: Tolerating 2gm Na diet/2L FR. Eating well.  Activity:  Independent in room, up to chair most of day, completes own exercise regimen at bedside. Worked with PT in room as well.  Pain: At acceptable level on current regimen.   Skin: No new deficits noted.  LDA's:  -L PIV: SL    Shift events: BG at 9:30pm was 79, pt reported feeling hypoglycemic and seeing floaters. Gave pt 2 apple juices, bg recheck 90. Pt still uncomfortable with taking the full 31units of Lantus this evening. Bettie English paged to discuss insulin plan. Orders updated to give 15units of Lantus at 2300 and 16 units at 0200. Pt agreeable to plan.    Plan: Wean O2 as able. Continue with POC. Notify primary team with changes.      Goal Outcome Evaluation: progressing

## 2024-02-24 NOTE — PLAN OF CARE
Major Shift Events:  Pt arrived from cath lab, post cannulation around 2000.   Neuro: Sedated with propofol and fentanyl. Follows commands, KNOWLES, nods appropriately.   Cardiac: SR 70s. VA ECMO flows around 4.4, sweep 3, FIO2 100. Arrived on high dose pressors, weaned down over course of shift. Currently on low dose epi, levo and vaso. Total fluid given: 4 L LR, 2L albumin. No blood products given overnight. Amio stopped per fellow. Lactic 5.2, stable, MD aware.  Pulm: CMV 15/500/10/100.   : Nicole. Good UOP, around 200 mL/hr  GI: OGT to LIS. Large amounts of watery stool overnight, rectal tube placed.  Skin: scattered bruising.  Lines: R Fem CVC, R Fem art line, R radial art line, L ecmo cannulas.    Plan: Continue to wean pressors as able.   For vital signs and complete assessments, please see documentation flowsheets.      Goal Outcome Evaluation:      Plan of Care Reviewed With: patient    Overall Patient Progress: improvingOverall Patient Progress: improving

## 2024-02-24 NOTE — PROGRESS NOTES
[facility]    ECLS Cannulation Note:     Date on: 2/23/2024  Time on: 1811   Cannulating Physician: Fani  Pre-cannulation ABG: pH 7.09/PaCO2 64/ PaO2 73.9/HCO3 19.6/Lactate 7.7    Arterial Cannula: 17 Fr. In the Left Femoral Artery  Venous Cannula: 29 Fr. In the Left Femoral Vein  Distal Perfusion Cannula: 7 Fr. In the Left Superficial Femoral Artery    ECMO components include:  Console Serial Number: [id number]  Circuit Lot Number: [id number]  Oxygenator Lot Number: [id number]    Cannulation was performed in the Cath Lab, placement was verified by fluoroscopy, cannula position is good.    Min Mendoza, RT  ECMO Specialist  2/23/2024 7:11 PM

## 2024-02-24 NOTE — PROGRESS NOTES
ECMO Attending Progress Note  2024    Silvano Motta is a 58 year old male who was cannulated for ECMO 2024 due to refractory PEA arrest    Cannulation Site:  17 Fr in the L femoral artery  29 Fr in the L femoral vein    Interval events: Cardiac with ROSC then transferred to the ICU.  Recurrent hypoxic PEA arrest with weak pulses and narrow pulsatility suggestive of cardiogenic shock.  Transferred to the cath lab for VA ECMO cannulation    Pulsatilty (IABP paused if applicable):10 mmHG     Physical Exam:  Temp:  [96.3  F (35.7  C)-98.8  F (37.1  C)] 98.8  F (37.1  C)  Pulse:  [] 73  Resp:  [12-48] 15  BP: ()/() 99/63  MAP:  [62 mmHg-95 mmHg] 66 mmHg  Arterial Line BP: ()/(56-87) 83/60  FiO2 (%):  [70 %-100 %] 100 %  SpO2:  [63 %-100 %] 96 %    Intake/Output Summary (Last 24 hours) at 2024 1144  Last data filed at 2024 1100  Gross per 24 hour   Intake 7106.28 ml   Output 4590 ml   Net 2516.28 ml      Vent Mode: CMV/AC  (Continuous Mandatory Ventilation/ Assist Control)  FiO2 (%): 100 %  Resp Rate (Set): 15 breaths/min  Tidal Volume (Set, mL): 500 mL  PEEP (cm H2O): 10 cmH2O  Resp: 15       Labs:  Recent Labs   Lab 24  0944 24  0746 24  0603 24  0404   PH 7.41 7.41 7.40 7.47*   PCO2 44 43 43 34*   PO2 69* 80 117* 68*   HCO3 28 27 26 25   O2PER 90 100 100 100      Recent Labs   Lab 24  0944 24  0403 24  1951 24  1807   WBC 19.4* 20.5* 21.6* 27.4*   HGB 10.2* 10.8* 15.1 18.7*     Creatinine   Date Value Ref Range Status   2024 1.21 (H) 0.67 - 1.17 mg/dL Final   2024 1.27 (H) 0.67 - 1.17 mg/dL Final   2024 1.34 (H) 0.67 - 1.17 mg/dL Final   2024 1.44 (H) 0.67 - 1.17 mg/dL Final   Blood Flow (Circuit) LPM: 4.16 LPM  Sweep LPM: 1 LPM  Sweep FiO2   %: 100 %  ACT  (seconds): 162 seconds  Arterial Blood Temp  (degrees C): 37 C  Pulse Oximetry  (SpO2%): 93 %  Arterial Pressure  mmH mmHg    ECMO Issues  including assessments and plan on DOS 2/24/2024:  Neuro: Sedated for mechanical ventilation and ECMO.  No acute distress.  NIRS stable b/l  RASS goal: -3  CV: Cardiogenic shock.  Hemodynamically stable on low dose epi, norepi, vaso  Pulm: Keep vent settings at rest settings as above.  FEN/Renal: Electrolytes stable w/ replacement protocols in place, Cr worsening, UOP stable  Heme: ACT goal: 180-200, Hemoglobin stable.  Minimal oozing around the ECMO cannulas.  ID: Receiving empiric antibiotics  Cannulae: Position is acceptable on exam and the available imaging.  Distal perfusion cannula is in place and patent.  Extremities are well-perfused.     I have personally reviewed the ECMO flows, oxygenation and CO2 clearance, anticoagulation, and cannula position.  I have also personally assessed the patient's systemic response with hemodynamics, oxygenation, ventilation, and bleeding.       The patient requires continued ECMO support and management in the ICU.  I have discussed patient care and treatment plan with the primary team.      Kavin Saleh MD, PhD  Interventional/Critical Care Cardiology  [phone]    February 24, 2024

## 2024-02-24 NOTE — PROCEDURES
[facility]    Central line    Date/Time: 2/23/2024 6:10 PM    Performed by: Abhijeet Ellsworth MD  Authorized by: Silvano Nova MD  Indications: vascular access      UNIVERSAL PROTOCOL   Site Marked: Yes  Prior Images Obtained and Reviewed:  NA  Required items: Required blood products, implants, devices and special equipment available    Patient identity confirmed:  Hospital-assigned identification number and arm band  NA - No sedation, light sedation, or local anesthesia  Confirmation Checklist:  Patient's identity using two indicators and procedure was appropriate and matched the consent or emergent situation  Time out: Immediately prior to the procedure a time out was called    Universal Protocol: the Joint Commission Universal Protocol was followed    Preparation: Patient was prepped and draped in usual sterile fashion      SEDATION    Patient Sedated: No      Preparation: skin prepped with 2% chlorhexidine  Skin prep agent dried: skin prep agent completely dried prior to procedure  Sterile barriers: all five maximum sterile barriers used - cap, mask, sterile gown, sterile gloves, and large sterile sheet  Hand hygiene: hand hygiene performed prior to central venous catheter insertion  Catheter type: triple lumen  Catheter size: 7.5 Fr  Ultrasound guidance: yes  Sterile ultrasound techniques: sterile gel and sterile probe covers were used  Number of attempts: 1  Successful placement: yes  Post-procedure: line sutured and dressing applied  Assessment: blood return through all ports      PROCEDURE    Length of time physician/provider present for 1:1 monitoring during sedation: 0      Abhijeet Ellsworth MD  Critical are fellow

## 2024-02-24 NOTE — SIGNIFICANT EVENT
SPIRITUAL HEALTH SERVICES Significant Event  [employer]INTING  [facility] ([place] 4a    Pt anointed by Father Yfn Ramsey   Pager [phone]

## 2024-02-24 NOTE — PROGRESS NOTES
[facility] Pulmonology Follow up    Silvano Motta  MRN: [id number]  : 1965    Date of Admission: 2024  Date of Service: 2024    Assessment:  Acute on chronic hypoxemic respiratory failure, unclear etiology  Shunt physiology, positive shunt study 24  Mild pulmonary hypertension  Severe ROSALIO on PAP at night  Severe TBI, seizure disorder on AEDs, T1DM    Mr. Motta has recorded hypoxia during hospitalizations in  and . Initially fairly asymptomatic and thought due to hypoventilation and untreated sleep apnea. Significant hypoxemia and new oxygen requirement since Oct 2023 with extensive workup including multiple CT Angio studies, NM Perfusion studies, RHC in Oct 2023 and again this hospitalization showing mild pulmonary hypertension (mean PA 25, PCWP 10). Imaging with stable pleural based HARMONY nodule and subpleural reticulation and thickening of interlobular septi present since . Repeated CT scans without embolus or AVM. Does have genetic sequencing in process. Thus far auto-immune workup negative (SSA, SSB, Scleroderma, Clara 1, RF, CCP, CRP, Aldolase, HP panels, ANCA, IgG) except for positive ROSAMARIA (speckled 1:160). In Oct also had negative workup (C3 and C4, anti-centromere, anti-Smith, anti-RNP, anti-SSA, anti-SSB, Scl-70, anti-cardiolipin, anti-CCP). Concern for shunt physiology with highly elevated A-a gradient, multiple TTEs and EDWIGE with positive late bubble, hypoxemia and ongoing dependence on high flow oxygen that seems out of proportion to degree of pulmonary hypertension and fibrotic changes. NM MAA shunt study  confirms shunt physiology. Based on bubble studies, presumably extracardiac shunt, potential diagnosis includes DMVPAVM which is very rare but would explain lack of macrovascular abnormalities. In discussions with radiology, and abnormal NM shunt study perfusion to upper lung fields, will pursue CTPE dual energy to better delineate vasculature and perfusion.  "Additionally, will obtain CT abdomen/pelvis for any extrathoracic abnormality that could be contributing to shunt.    Recommendations:  -- dual energy CT to better delineate pulmonary perfusion  -- awaiting results of genetic testing  -- no indication to transfer to [place] for pulmonary transplant    Subjective: sitting in bed today, in no distress. Oxygen has been good, plans to continue working oxygen needs down. No new concerns. Sister will be available between 12-2 for update today. Satting high 90s on 80% FiO2 while at rest.    Review of systems: focused ROS performed and noted as above.    Objective:  /78 (BP Location: Right arm)   Pulse 93   Temp 98.2  F (36.8  C) (Oral)   Resp 22   Ht 1.778 m (5' 10\")   Wt 108.2 kg (238 lb 8.6 oz)   SpO2 96%   BMI 34.23 kg/m      Gen: in no acute distress, lying in bed  HEENT: HFNC in place  Pulm: no increased work of breathing, CTAB  MSK: no gross deformities, no joint swelling  Integ: no rashes or lesions appreciated  Neuro: speech clear, alert and oriented  Psych: calm, appropriate    Data:  I have personally reviewed new data.    Vianey Mitchell,   Pulmonary fellow  Patient discussed and seen with Dr. Mackenzie.  "

## 2024-02-24 NOTE — H&P
Critical Care Cardiology: History and Physical  Silvano Motta MRN: [id number]  Age: 58 year old, : 1965  Date: 2024    History of Present Illness     Silvano Motta is a 58 year old male being admitted to the CICU on 2024 s/p peripheral VA-ECMO cannulation s/p cardiac arrest. The patient has a PMHx of chronic hypoxemic respiratory failure (7-8L O2 at baseline) of unclear etiology w/ positive shunt study w/ high A-a gradient (), severe ROSALIO, PH (suspected group 3), ?PAVMs, seizure disorder, TBI . Admitted  to OSH for AHRF and transferred to [facility]  for PH therapy managed.  Patient not a lung transplant candidate at [facility] or [facility].      Code blue called this afternoon 24 1630 in CT in setting of PEA arrest. CPR started immediately and patient oxygenation with BVM. Received 2mg epi and shocked x1 for VT. ROSC achieved within 5 mins of arrest. Intubated, started on amiodarone gtt, and transferred to  and experienced subsequent episode of PEA arrest and achieved ROS. CCL then activated for peripheral VA-ECMO cannulation.       Medications   I have reviewed this patient's current medications    No past medical history on file.    No family history on file.  Social History     Socioeconomic History    Marital status:      Spouse name: Not on file    Number of children: Not on file    Years of education: Not on file    Highest education level: Not on file   Occupational History    Not on file   Tobacco Use    Smoking status: Former     Types: Cigarettes    Smokeless tobacco: Never   Substance and Sexual Activity    Alcohol use: Yes     Alcohol/week: 2.0 - 4.0 standard drinks of alcohol     Types: 2 - 4 Standard drinks or equivalent per week    Drug use: Never    Sexual activity: Not on file   Other Topics Concern    Not on file   Social History Narrative    Not on file     Social Determinants of Health     Financial Resource Strain: Not on file   Food Insecurity: Not on  "file   Transportation Needs: Not on file   Physical Activity: Not on file   Stress: Not on file   Social Connections: Not on file   Interpersonal Safety: Not on file   Housing Stability: Not on file       Review of Systems     ROS as mentioned in HPI. Detailed HPI could not be obtained as patient intubated and sedated.        Physical Exam     /72   Pulse 103   Temp 98.2  F (36.8  C) (Oral)   Resp (!) 48   Ht 1.778 m (5' 10\")   Wt 108.2 kg (238 lb 8.6 oz)   SpO2 (!) 63%   BMI 34.23 kg/m      BMI= Body mass index is 34.23 kg/m .   GENERAL APPEARANCE: Intubated, sedated. NAD.  CARDIOVASCULAR: Regular rate and rhythm, normal S1/S2 no m/r/g. DP Pulses dopplerable.  RESP: Coarse bilaterally. Mechanical ventilation.    GASTRO: Soft, bowel sounds hypoactive but present.  GENITOURINARY: Nicole in place.  EXTREMITIES: Cool, +1 edema, DP pulses dopplered. VA ECMO cannulas and distal reperfusion catheter in right groin, ThermoGard and IABP in left groin.   NEURO: Sedated and intubated, pupils equal and reactive. Fentanyl and Versed for sedation.  INTEGUMENTARY: No rashes. Cannula/Line sites CDI  LINES/TUBES/DRAINS: (noted below) V-A ECMO Cannulas R groin, IABP and ThermoGard in L groin. R radial Arterial line. ETT. OG. Nicole Catheter.  EKG: ECG Rhythm: Sinus rhythm    Assessment and Plan     Neurology   # Concern for anoxic brain injury    # Seizure Disorder with h/o Todds paralysis / chronic headaches   - Intubated, sedated  - Warm 0.5 degrees C per hour until 37.0. Avoiding hyperthermia.  - Neuro-crit consulted and appreciate recommendations.   - vEEG   - Continue home keppra 1500mg BID, vimpat 100mg BID, Gabapentin 900 tid  - Continue home asa 81mg  - Palliative care on board   - CT head 2/23/2024: In process     Current sedation:  - Propofol  - Fent   - Continue sedation with a RASS goal -5  - Permissive hypercapnea and hypernatremia for neuroprotection     Cardiovascular  # PEA/VT Cardiac Arrest s/p peripheral " VA ECMO Cannulation 2/23/24  Arterial Cannula: 17 Fr. In the Left Femoral Artery  Venous Cannula: 29 Fr. In the Left Femoral Vein  Distal Perfusion Cannula: 7 Fr. In the Left Superficial Femoral Artery  ECMO :        Current Pressors/inotropes/antiarrythmic: NE, Epi, Vaso   - TTE: In process   - Continue ASA 81mg   - Hold lipitor for now given shock liver  - Telemetry monitoring  - Continue trending troponin  - Continue ECMO support       Pulmonary  # Acute on chronic hypoxemic respiratory failure requiring intubation  # Shunt physiology, positive shunt study 2/9/24  # Mild pulmonary hypertension  # Severe ROSALIO on PAP at night  # Probable aspiration pneumonia  Per pulmonology documentation: Hx of chronic hypoxemia thought due to hypoventilation and untreated sleep apnea since Oct 2023 with extensive workup including multiple CT Angio studies, NM Perfusion studies, RHC in Oct 2023 and again this hospitalization showing mild pulmonary hypertension (mean PA 25, PCWP 10). Imaging with stable pleural based HARMONY nodule and subpleural reticulation and thickening of interlobular septi present since 2022. Repeated CT scans without embolus or AVM. Does have genetic sequencing in process. Thus far auto-immune workup negative (SSA, SSB, Scleroderma, Clara 1, RF, CCP, CRP, Aldolase, HP panels, ANCA, IgG) except for positive ROSAMARIA (speckled 1:160). In Oct also had negative workup (C3 and C4, anti-centromere, anti-Smith, anti-RNP, anti-SSA, anti-SSB, Scl-70, anti-cardiolipin, anti-CCP). Concern for shunt physiology with highly elevated A-a gradient, multiple TTEs and EDWIGE with positive late bubble, hypoxemia and ongoing dependence on high flow oxygen that seems out of proportion to degree of pulmonary hypertension and fibrotic changes. NM MAA shunt study 2/9 confirms shunt physiology. Based on bubble studies, presumably extracardiac shunt, potential diagnosis includes DMVPAVM which is very rare but would explain lack of macrovascular  abnormalities. In discussions with radiology, and abnormal NM shunt study perfusion to upper lung fields, will pursue CTPE dual energy to better delineate vasculature and perfusion. Additionally, will obtain CT abdomen/pelvis for any extrathoracic abnormality that could be contributing to shunt. Not a transplant candidate per [facility] or [place].     Vent Settings:   Vent Mode: CMV/AC  (Continuous Mandatory Ventilation/ Assist Control)  FiO2 (%): 70 %  Resp Rate (Set): 15 breaths/min  Tidal Volume (Set, mL): 550 mL  PEEP (cm H2O): 8 cmH2O  Resp: (!) 48    - Start inhaled flolan - 20 mcg/ml  - Pulm on board, appreciate recs   - CXR/CT CAP: In process   - Wean vent as able  - Daily CXR  - Serial ABGs   - Consider scheduled duonebs if signs of lung dz, currently PRN    - Peridex  - FBG and diuresis as above          Gastrointestinal, Nutrition  # Concern for Shock liver 2/2 cardiac arrest  No known medical hx.   - CAP CT 2/23/2024 in process  - Monitor LFTs  - NPO now, Nutrition consult once warmed   - Bowel regimen in place  - GI Prophylaxis: PPI; Protonix 40 mg daily       Renal, Electrolytes  # Concern for Acute Renal Injury 2/2 cardiac arrest  # Lactic acidosis  - Avoid nephrotoxins, renally dose meds  - Monitor urine output  - FBG and diuresis as above      Infectious Disease  # Aspiration Pneumonia- in the setting of arrest.  CXR/CAP as above.   - MRSA Nares  - Continue aspiration coverage x 5D  - Monitor for signs of infection  - Avoid fevers    Cultures thus far:  - BC: In process  - Sputum culture: in process    Antibiotics  Ceftriaxone 2/23 -        Hematology  # No active Issues   - Receiving heparin for ECMO with ACT goal 180-200   - US LE w/ arterial duplex pending  - Transfusion parameters:   - 1u PRBC for hbg < 7.0   - 1u platelet for plt < 50   - 1u FFP for INR > 3   - 5u cryoprecipitate for fibrinogen <150     Endocrinology  # Type 1 DM (A1C 10% 2/8/24)   # Hypothyroidism   - Insulin gtt  - Hypoglycemia  "protocol   - Endocrine consulted   - Continue levothyroxine 150mcg       Clinically Significant Risk Factors           # Hypercalcemia: Highest iCa = 6.1 mg/dL in last 2 days, will monitor as appropriate               # Obesity: Estimated body mass index is 34.23 kg/m  as calculated from the following:    Height as of this encounter: 1.778 m (5' 10\").    Weight as of this encounter: 108.2 kg (238 lb 8.6 oz).      # Financial/Environmental Concerns: none;other (see comments) ([employer] paying for transportation with al other needs.)        Lines/Tubes/Drains:  LDAs (Last charted value/Number of Days):   CVC Triple Lumen Right Femoral (Active)   Site Assessment WDL 02/23/24 1821   Dressing Status clean;dry 02/23/24 1821   Dressing Intervention dressing changed 02/23/24 1821   Number of days: 0       ETT 8 mm (Active)   Number of days: 0       ETT Cuffed 8 mm (Active)   Secured at (cm) 25 cm 02/23/24 1655   Measured from Lips 02/23/24 1655   Position Center 02/23/24 1655   Secured by Commercial tube omer 02/23/24 1655   Bite Block None Present 02/23/24 1655   Safety Measures Manual resuscitator/mask/valve in room 02/23/24 1655   Number of days: 0       ICU  Feeding: NPO currently, will advance when able to tolerate  Analgesia: Fentanyl  Sedation: Versed   Thrombopx: Heparin gtt   Head of bed: reverse trend   Ulcers: PPI  Glucose: Insulin gtt if needed    Family will be updated by me    Patient seen and discussed with staff physician.    Jerzy Shepherd MD  Cardiology Fellow  Pager: [phone]        "
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
## 2024-02-24 NOTE — CODE/RAPID RESPONSE
RT responded to code with significant desaturation. CPR was initiated and Flolan was started as a continuous nebulization. ROSC was achieved and pt was transported to the cath lab.  RT will continue to follow.

## 2024-02-24 NOTE — PROCEDURES
[facility]    Arterial line placement    Date/Time: 2/23/2024 6:11 PM    Performed by: Abhijeet Ellsworth MD  Authorized by: Silvano Nova MD      UNIVERSAL PROTOCOL   Site Marked: Yes  Prior Images Obtained and Reviewed:  Yes  Required items: Required blood products, implants, devices and special equipment available    Patient identity confirmed:  Hospital-assigned identification number and anonymous protocol, patient vented/unresponsive  NA - No sedation, light sedation, or local anesthesia  Confirmation Checklist:  Patient's identity using two indicators and procedure was appropriate and matched the consent or emergent situation  Time out: Immediately prior to the procedure a time out was called    Universal Protocol: the Joint Commission Universal Protocol was followed    Preparation: Patient was prepped and draped in usual sterile fashion    Indication:  multiple ABGs hemodynamic monitoring  Location:  Right femoral      SEDATION    Patient Sedated: No      PROCEDURE DETAILS    Needle Gauge:  20  Seldinger technique: Seldinger technique used    Number of Attempts:  1  Post-procedure:  Line sutured      PROCEDURE    Length of time physician/provider present for 1:1 monitoring during sedation: 15      Abhijeet Ellsworth MD  Critical care fellow

## 2024-02-24 NOTE — PROGRESS NOTES
EEG CLINICAL NEUROPHYSIOLOGY PRELIMINARY REPORT    First 45 min of VEEG reviewed. Propofol 50 micrograms/hr, received propofol 50 micrograms/kg/min which appears to have been discontinued shortly prior to initiation of study. Extensive EMG artefact. About 15 min interpretable. 20uV diffuse monomorphic theta with intermittent discontinuities. No seizures when EEG interpretable.    Findings consistent with moderate to severe diffuse encephalopathy. Findings may be in part related to sedative drips employed. Thus far no seizures. Will continue VEEG. Full report to follow.    Arslan Hassan MD  Contact information for physicians covering Epilepsy and EEG is available on [facility].  Click search, enter neurology adult/[facility] in group name box, click on neurology adult/[facility], then click Staff Epilepsy and EEG.

## 2024-02-24 NOTE — PROGRESS NOTES
EEG CLINICAL NEUROPHYSIOLOGY PRELIMINARY REPORT    Video EEG through approximately 6 AM today reviewed.  Patient treated with fentanyl 50 to 150 mcg/h, propofol 50 to 65 mcg/kg/min.  When EEG interpretable, near continuous 10 to 30  V diffuse alpha rest developed in frontocentral regions bilaterally.  No periodic discharges or seizures.    Findings are abnormal.  1) extensive myogenic artifact obscures about 70% of the recording.  When EEG is interpretable, background activity is consistent with moderate to severe diffuse encephalopathy.  EEG is largely continuous.  Findings are reasonably consistent with what is recorded with sedative drips at the doses reported.  Extent of underlying cerebral dysfunction separate from anesthetic effects difficult to ascertain.    2) no electrographic seizures.    Detailed report in chart.    Arslan Hassan MD  Contact information for physicians covering Epilepsy and EEG is available on [facility].  Click search, enter neurology adult/[facility] in group name box, click on neurology adult/[facility], then click Staff Epilepsy and EEG.

## 2024-02-24 NOTE — PLAN OF CARE
Code Blue was called for Silvano Motta.  I evaluated the patient at the bedside for potential ECPR in collaboration with the on-call ECPR staff and standard guidelines.  This patient was eligible for ECPR and cath lab was activeated  The patient was discussed with Dr. Mohr and Dr Saleh.    Jose Carlos Kohli   Cardiology fellow    February 23, 2024

## 2024-02-24 NOTE — PHARMACY-VANCOMYCIN DOSING SERVICE
"Pharmacy Vancomycin Initial Note  Date of Service 2024  Patient's  1965  58 year old, male    Indication: Aspiration Pneumonia    Current estimated CrCl = Estimated Creatinine Clearance: 70.6 mL/min (A) (based on SCr of 1.44 mg/dL (H)).    Creatinine for last 3 days  2024:  4:46 AM Creatinine 0.89 mg/dL  2024:  4:43 AM Creatinine 0.89 mg/dL  2024:  5:39 AM Creatinine 0.91 mg/dL;  5:02 PM Creatinine 1.01 mg/dL;  6:07 PM Creatinine 1.44 mg/dL    Recent Vancomycin Level(s) for last 3 days  No results found for requested labs within last 3 days.      Vancomycin IV Administrations (past 72 hours)        No vancomycin orders with administrations in past 72 hours.                    Nephrotoxins and other renal medications (From now, onward)      Start     Dose/Rate Route Frequency Ordered Stop    24  vancomycin (VANCOCIN) 2,000 mg in sodium chloride 0.9 % 500 mL intermittent infusion         2,000 mg  over 120 Minutes Intravenous ONCE 24  vancomycin place omer - receiving intermittent dosing         1 each Intravenous SEE ADMIN INSTRUCTIONS 24  norepinephrine (LEVOPHED) 16 mg in  mL infusion MAX CONC CENTRAL LINE         0.01-0.6 mcg/kg/min × 108 kg (Dosing Weight)  1-60.8 mL/hr  Intravenous CONTINUOUS 24  vasopressin 1 unit/mL MAX Conc (PITRESSIN) infusion         0.5-4 Units/hr  0.5-4 mL/hr  Intravenous CONTINUOUS 24  piperacillin-tazobactam (ZOSYN) 3.375 g vial to attach to  mL bag        Note to Pharmacy: For [facility], [facility] and [facility]: For Zosyn-naive patients, use the \"Zosyn initial dose + extended infusion\" order panel.    3.375 g  over 30 Minutes Intravenous EVERY 6 HOURS 24 0930  furosemide (LASIX) injection 40 mg         40 mg  over 1-3 Minutes Intravenous 2 TIMES DAILY (Diuretics and Nitrates) 24 0915 "              Contrast Orders - past 72 hours (72h ago, onward)      Start     Dose/Rate Route Frequency Stop    02/23/24 1600  iopamidol (ISOVUE-370) solution 135 mL         135 mL Intravenous ONCE 02/23/24 1621                Plan:  Start vancomycin  2000 mg x1. Then dose by levels.  Vancomycin monitoring method: Trough (Method 2 = manual dose calculation)  Vancomycin therapeutic monitoring goal: 15-20 mg/L  Pharmacy will check vancomycin levels as appropriate in 1-3 Days.    Serum creatinine levels will be ordered daily for the first week of therapy and at least twice weekly for subsequent weeks.      Vero Leahy, PharmD, BCEMP, BCPS

## 2024-02-24 NOTE — CARE PLAN
Major Shift Events:     Patient arrived to CVICU post-arrest in CT. Upon arrival patient was following commands in all extremities but extremely hypoxic with SpO2 50-80% with a good pleth, pt became bradycardic and went into PEA arrest. CPR x 10 minutes until ROSC. Epi up to 0.6 and Levo 0.3 with MAPs in the 30-50s. Pao2 38 pre-CPR, 25 post-cpr. ECMO team was activated and patient was emergently transferred to Cath Lab for cannulation.

## 2024-02-24 NOTE — CONSULTS
Neurocritical Care Consultation    Reason for critical care admission: Cardiac Arrest  Admitting Team: CICU  Date of Service:  02/23/2024  Date of Admission:  2/4/2024  Hospital Day: 20    Assessment/Plan  Silvano Motta is a 58 year old male with PMH of chronic hypoxemic respiratory failure (7-8L O2 at baseline) of unclear etiology w/ positive shunt study w/ high A-a gradient (2/9), severe ROSALIO, PH (suspected group 3), ?PAVMs, seizure disorder, and TBI in 2020 who as originally admitted to the CV ICU on 02/04/24 for cardiac arrest s/p ECMO cannulation.  Code blue called in setting of PEA arrest in CT scanner afternoon of 02/23/24.  CPR was started immediately and ROSC was achieved within 5 minutes.  Neuro critical care consulted for assistance with neuro-prognostication.      24 hour events: In house PEA arrest with ROSC after 5 minutes of CPR, intubated, started on amio gtt and transfer to ICU.  Subsequent PEA arrest occurred with ROSC achieved again. Then cannulized with ECMO. Neuro exam and head imaging reassuring.    Neuro  #Post-arrest cerebral edema  #Hypoxic encephalopathy 2.2 cardiac arrest  Post arrest day: 0  Length of downtime: 0 min - CPR started immediately  -Witnessed arrest: Yes  -ROSC: Yes, after 5 minutes, rhythm: PEA arrest  -Goal normothermia   -Current temp: 98.2     #Seizure Disorder with h/o Todds paralysis / chronic headaches    -Continue home keppra 1500mg BID, vimpat 100mg BID, Gabapentin 900 tid   -Okay to transition to IV Keppra and Vimpat in acute setting - ratio is 1:1  -vEEG currently running    Analgesics & sedation  Prior sedation: Propofol 50  Current sedation: Propofol 50 and fentanyl 50    Exam findings   -Cough: Yes  -Gag: Yes  -Pupils: 2.48 mm on right and 3 mm on left, pupillometry shows NPIs above 3 bilaterally    Neuroimaging  -Day 1 CTH shows: No acute intracranial pathology, no loss of grey white differentiation, cerebral atrophy noted  -Consider repeating CTH on day 3  "    Neurophysiology  -start/continue vEEG with VA ECMO  -vEEG shows: severe diffuse encephalopathy, no seizures or epileptiform discharges thus far    Biomarkers  -Neuron-specific Enolase (NSE) results: Ordered and pending   -S 100B protein: Ordered and pending     Recommendations  -Avoid hypotonic solutions as they may worsen cerebral edema   -Avoid nitroprusside or nitroglycerin as they may also worsen cerbral edema  -Advise slow correction of hypernatremia if needed  -When safe to do so, please limit use of CNS acting medications such as benzodiazepines, opiates, anticholinergics, which will permit a more accurate neurological assessment  -NCC will continue to follow, please call [phone] with any questions    Clinically Significant Risk Factors           # Hypercalcemia: Highest Ca = 11.1 mg/dL in last 2 days, will monitor as appropriate       # Acute Kidney Injury, unspecified: based on a >150% or 0.3 mg/dL increase in last creatinine compared to past 90 day average, will monitor renal function         # Obesity: Estimated body mass index is 34.23 kg/m  as calculated from the following:    Height as of this encounter: 1.778 m (5' 10\").    Weight as of this encounter: 108.2 kg (238 lb 8.6 oz).      # Financial/Environmental Concerns: none;other (see comments) ([employer] paying for transportation with al other needs.)            The patient was discussed with neurocritical care fellow Dr. José Avila D.O.  Resident Physician of Neurology  [facility]      History of Present Illness:  Silvano Motta is a 58 year old male with PMH of chronic hypoxemic respiratory failure (7-8L O2 at baseline) of unclear etiology w/ positive shunt study w/ high A-a gradient (2/9), severe ROSALIO, PH (suspected group 3), ?PAVMs, seizure disorder, and TBI in 2020 who as originally admitted to the CV ICU on 02/04/24 for cardiac arrest s/p ECMO cannulation.    Patient has a longstanding history of " pulmonary issues.  He has been dealing with severe hypoxemia without a clear etiology despite extensive workup.  He has been found to have mild pulmonary hypertension with normal PFTs and only slightly decreased DLCO.  Ejection fraction has been normal on echocardiograms.  He has low NIFs of -40.   In short, none of his testing has revealed a clear cause of his severe hypoxemia.  He is not a candidate for lung transplant per [facility] and the [facility].  [facility] stated that there was no additional diagnostic workup they could offer.    This afternoon, code blue called in setting of PEA arrest in CT scanner.  CPR was started immediately and ROSC was achieved within 5 minutes.  Neuro critical care consulted for assistance with neuro-prognostication.  It appears that this is this patient's third cardiac arrest in the last 2 weeks.    Allergies   Allergen Reactions    Fluoxetine Other (See Comments)     PN: Made him feel more depressed.  Worsening of depression  PN: Made him feel more depressed.      Metformin      Other reaction(s): Seizures    Carbamazepine Other (See Comments)     PN: diffuse rash  PN: diffuse rash      Other Environmental Allergy Unknown     dut       Current Medications:   artificial tears   Both Eyes Q8H    aspirin  81 mg Oral Daily    atorvastatin  40 mg Oral Daily    DULoxetine  30 mg Oral Daily    fentaNYL  100 mcg Intravenous Once    furosemide  40 mg Intravenous BID    gabapentin  900 mg Oral TID    hydrocortisone sodium succinate PF  50 mg Intravenous Q8H    Lacosamide  100 mg Oral BID    levalbuterol  1.25 mg Nebulization 2 times daily    levETIRAcetam  1,500 mg Oral BID    levothyroxine  150 mcg Oral QAM AC    midazolam  2 mg Intravenous Once    midazolam  2 mg Intravenous Once    mirtazapine  30 mg Oral At Bedtime    pantoprazole  40 mg Intravenous Daily    piperacillin-tazobactam  3.375 g Intravenous Q6H    sodium chloride (PF)  3 mL Intracatheter Q8H       PRN  "Medications:  acetaminophen, acetaminophen, albuterol, alum & mag hydroxide-simethicone, calcium chloride, artificial tears, dextrose, glucose **OR** dextrose **OR** glucagon, fentaNYL, fluocinonide, fluticasone, heparin, hydrOXYzine HCl **OR** hydrOXYzine HCl, insulin aspart, ipratropium - albuterol 0.5 mg/2.5 mg/3 mL, lidocaine 4%, lidocaine (buffered or not buffered), magnesium sulfate, magnesium sulfate, - MEDICATION INSTRUCTIONS -, meperidine, midazolam, - MEDICATION INSTRUCTIONS -, - MEDICATION INSTRUCTIONS -, polyethylene glycol, potassium chloride, senna-docusate, sodium chloride (PF), sodium chloride (PF), sodium phosphate 10 mmol in sodium chloride 0.9 % 250 mL intermittent infusion, sodium phosphate, sodium phosphate 20 mmol in sodium chloride 0.9 % 250 mL intermittent infusion, sodium phosphate 25 mmol in sodium chloride 0.9 % 500 mL intermittent infusion    Infusions:   amiodarone      [START ON 2/24/2024] amiodarone      cisatracurium      dextrose      EPINEPHrine      epoprostenol (FLOLAN) 20 mcg/mL in glycine diluent inhalation solution 20 ng/kg/min (02/23/24 1954)    fentaNYL      heparin 2 unit/mL in 0.9% NaCl      HEParin      insulin regular      - MEDICATION INSTRUCTIONS -      midazolam      nitroPRUsside      - MEDICATION INSTRUCTIONS -      norepinephrine      - MEDICATION INSTRUCTIONS -      phenylephrine      vasopressin         Physical Examination:  Vitals: /72   Pulse 103   Temp 98.2  F (36.8  C) (Oral)   Resp (!) 48   Ht 1.778 m (5' 10\")   Wt 108.2 kg (238 lb 8.6 oz)   SpO2 (!) 63%   BMI 34.23 kg/m    General: Adult male patient, lying in bed, critically-ill  HEENT: Normocephalic, atraumatic, no icterus, oral cavity/oropharynx pink and moist  Cardiac: RRR  Pulm: CTAB, unlabored, expansion symmetric, no retractions or use of accessory muscles  Abdomen: Soft, non-distended  Extremities: Warm, no edema, well perfused  Skin: No rash or lesion  Psych: Opens eyes and becomes " aroused during cough reflex testing, otherwise calm, sedated and not interactive  Neuro: On 50 mcg/h of propofol and 50 mcg/h of fentanyl  Mental status: Lethargic, sedated and intubated.  Opens eyes and moves arms when testing for cough reflex.  Otherwise does not open eyes or follow commands.  Cranial nerves: PERRL, corneal reflexes intact bilaterally, conjugate gaze, VOR's intact, face symmetric, cough and gag reflexes intact  Motor: Normal bulk and tone.  Moves the bilateral upper extremities spontaneously after cough reflex assessment, bear hugger in place and did not notice lower extremity movement  Sensory: Does not localize or withdraw to noxious stimuli in all 4 extremities  Coordination: Could not assess  Gait: JOSSIE, deferred.    Labs and Imaging:    Lab Results   Component Value Date    PH 7.36 02/23/2024    PO2 69 (L) 02/23/2024    PCO2 38 02/23/2024    HCO3 22 02/23/2024    KELSEA -3.5 (L) 02/23/2024          Lab Results   Component Value Date     02/23/2024     02/23/2024    Lab Results   Component Value Date    CHLORIDE 105 02/23/2024    Lab Results   Component Value Date    BUN 16.8 02/23/2024      Lab Results   Component Value Date    POTASSIUM 3.7 02/23/2024    POTASSIUM 3.9 02/23/2024    Lab Results   Component Value Date    CO2 17 02/23/2024    Lab Results   Component Value Date    CR 1.34 02/23/2024        Lab Results   Component Value Date    NTBNPI <36 02/04/2024     Lab Results   Component Value Date    WBC 21.6 (H) 02/23/2024    HGB 15.1 02/23/2024    HCT 44.0 02/23/2024    MCV 97 02/23/2024     02/23/2024     Lab Results   Component Value Date    SED 4 02/23/2024     Lab Results   Component Value Date    AST  02/23/2024      Comment:      Unsatisfactory specimen - hemolyzed    Reference intervals for this test were updated on 6/12/2023 to more accurately reflect our healthy population. There may be differences in the flagging of prior results with similar values performed  with this method. Interpretation of those prior results can be made in the context of the updated reference intervals.    ALT 73 (H) 02/23/2024    ALKPHOS 111 02/23/2024    BILITOTAL 0.4 02/23/2024     Lab Results   Component Value Date    INR 2.40 (H) 02/23/2024     Lab Results   Component Value Date    TSH 5.64 (H) 02/04/2024       All relevant imaging and laboratory values personally reviewed.

## 2024-02-24 NOTE — PROGRESS NOTES
Cardiology ICU Progress Note    Brief HPI:  Silvano Motta is a 58 year old male being admitted to the CICU on 2/4/2024 s/p peripheral VA-ECMO cannulation s/p cardiac arrest. 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 Admitted 1/25 to OSH for AHRF and transferred to [facility] 2/4 for PH therapy managed.  Patient not a lung transplant candidate at [facility] or [facility].         Subjective and Interval:  Code blue called this afternoon 2/23/24 1630 in CT in setting of PEA arrest. CPR started immediately and patient oxygenation with BVM. Received 2mg epi and shocked x1 for VT. ROSC achieved within 5 mins of arrest. Intubated, started on amiodarone gtt, and transferred to  and experienced subsequent episode of PEA arrest and achieved ROS. CCL then activated for peripheral VA-ECMO cannulation.     Assessment and plan by system:   Today's changes:  Wean O2 on ECMO as tolerated  No diuresis due to chugging  Pull RFA line  Review previous pulmonary AUGUSTE  Continue AUGUSTE for extracardiac shunts  Stop gabapentin  Lighten RASS to -3  Wean norepi, then epi, then vaso       Neurology   # Concern for anoxic brain injury    # Hx TBI 2020  # Hx Sz disorder-Todds paralysis  CT head 2/23: no intracranial pathology  - Intubated, sedated. Avoiding hyperthermia  - Neuro-crit consulted and appreciate recommendations.   - Palliative care consulted and appreciate assistance  -vEEG No sz activity, moderate to severe encephalopathy    Current sedation:  RASS (Davidson Agitation-Sedation Scale): 1-->restless  Dose (mcg/hr) Fentanyl: 150 mcg/hr and Dose (mcg/kg/min) Propofol: 50 mcg/kg/min    Plan:  - Continue PTA Keppra, VIMPAT, and gabapentin  - Continue home ASA  - Continue sedation with a RASS goal -2 to 3  - Spontaneous awakening trial as tolerated  - permissive  hypercapnea and hypernatremia for neuroprotection     Cardiovascular  # PEA/VT Cardiac Arrest s/p peripheral VA ECMO Cannulation 24   # Cardiogenic shock requiring VA ECMO  Peripheral V-A ECMO inserted via LCFA and LCFV 17 Fr arterial cannula, 29 Fr venous annula, DPC 7 Fr LSFA  Angiogram: RHC, Normal biventricular filling pressures.   Mild pre-capillary pulmonary hypertension  Low-normal cardiac output and index.  TTE:  VA ECMO at 4.1 LPM. Difficult to assess LV function but appears to be  ~30%. The right ventricle is normal size.  Global right ventricular function is mildly reduced.  No pericardial effusion is present.    ECG Rhythm: Sinus rhythm;Sinus pause    ECMO:  Mode: V-A   Pump Type: Cardiohelp  RPM's: 3500  Blood Flow (Circuit) LPM: 4.02 LPM  Sweep LPM: 1 LPM  Sweep FiO2   %: 80 %  Venous Pressure  mmHg: -40 mmHg  Arterial Pressure  mmH mmHg  Internal Pressure mmH mmHg  Pressure Change mmH mmHg    PP 33 points    Current Pressors/inotropes/antiarrythmic:  Dose (mcg/kg/min) Norepinephrine: 0.04 mcg/kg/min, Dose (units/hr) Vasopressin: 2 Units/hr, and Dose (mcg/kg/min) Epinephrine: 0.04 mcg/kg/min    Hemodynamics:  Art Line  Arterial Line BP: 96/69  Arterial Line MAP (mmHg): 77 mmHg  Arterial Line Location: Right radial  Art Line Wave Form: Appropriate wave forms      Plan:  - Continue ASA 81mg   - Hold lipitor for now given shock liver  - Telemetry monitoring  - Continue trending troponin  - Continue ECMO support   - Continue ECMO support  - 1:1 Continue IABP  - FBG dry, chugging, Diuresis unable to due to chugging     Pulmonary  # Acute on chronic hypoxemic respiratory failure requiring intubation  # Shunt physiology, positive shunt study 24  # Mild pulmonary hypertension  # Severe ROSALIO on PAP at night  # Probable aspiration pneumonia  Per pulmonology documentation: Hx of chronic hypoxemia thought due to hypoventilation and untreated sleep apnea since Oct 2023 with extensive workup  including multiple CT Angio studies, NM Perfusion studies, RHC in Oct 2023 and again this hospitalization showing mild pulmonary hypertension (mean PA 25, PCWP 10). Imaging with stable pleural based HARMONY nodule and subpleural reticulation and thickening of interlobular septi present since 2022. Repeated CT scans without embolus or AVM. Does have genetic sequencing in process. Thus far auto-immune workup negative (SSA, SSB, Scleroderma, Clara 1, RF, CCP, CRP, Aldolase, HP panels, ANCA, IgG) except for positive ROSAMARIA (speckled 1:160). In Oct also had negative workup (C3 and C4, anti-centromere, anti-Smith, anti-RNP, anti-SSA, anti-SSB, Scl-70, anti-cardiolipin, anti-CCP). Concern for shunt physiology with highly elevated A-a gradient, multiple TTEs and EDWIGE with positive late bubble, hypoxemia and ongoing dependence on high flow oxygen that seems out of proportion to degree of pulmonary hypertension and fibrotic changes. NM MAA shunt study 2/9 confirms shunt physiology. Based on bubble studies, presumably extracardiac shunt, potential diagnosis includes DMVPAVM which is very rare but would explain lack of macrovascular abnormalities. In discussions with radiology, and abnormal NM shunt study perfusion to upper lung fields, will pursue CTPE dual energy to better delineate vasculature and perfusion. Additionally, will obtain CT abdomen/pelvis for any extrathoracic abnormality that could be contributing to shunt. Not a transplant candidate per [facility] or [facility].   CAP CT  Bilateral opacity, aspiration  CXR: R middle and bilateral lower lobe opacities. Better lung volumes. ETT and venous ECMO cannula in good positions    Vent Settings:   Vent Mode: CMV/AC  (Continuous Mandatory Ventilation/ Assist Control)  FiO2 (%): 100 %  Resp Rate (Set): 15 breaths/min  Tidal Volume (Set, mL): 500 mL  PEEP (cm H2O): 10 cmH2O  Resp: 17      ABG:   Recent Labs   Lab 02/24/24  1421 02/24/24  1204 02/24/24  0944   PH 7.47* 7.44 7.41   PCO2 36 37 44    PO2 71* 79* 69*   HCO3 26 25 28   O2PER 100 100 90       Plan:  - Pulm on board, appreciate recs   - Wean vent as able  - Daily CXR  - Serial ABGs   - Consider scheduled duonebs if signs of lung dz, currently PRN    - Peridex  - FBG and diuresis as above        Gastrointestinal, Nutrition  # Shock liver 2/2 cardiac arrest  # Hypoalbuminemia   # At risk for protein malnutrition   No known medical hx.   CAP CT  Anterior mediastinal hematoma post arrest   Diet: NPO in immediate post arrest setting  BM:  liquid    Recent Labs   Lab 24  0944 24  0403 24   AST 89* 97*  --    ALT 48 50 73*   BILITOTAL 0.4 0.7 0.4   ALBUMIN 3.8 3.9 4.0   PROTTOTAL 4.9* 4.9* 5.5*   ALKPHOS 37* 41 111       Plan:  - Monitor LFTs  - Bowel regimen in place  - GI Prophylaxis: PPI; Protonix 40 mg daily  - Nutrition consulted, appreciate recommendations      Renal, Electrolytes  # Acute Renal Injury   # Lactic acidosis  Baseline CR 1.0      Intake/Output Summary (Last 24 hours) at 2024 0743  Last data filed at 2024 0700  Gross per 24 hour   Intake 7018.88 ml   Output 4090 ml   Net 2928.88 ml       Recent Labs   Lab 24  0944 24  0403 24  1751 24  1702    143 140   < > 137   POTASSIUM 3.6 3.7 3.7   < > 5.0   CHLORIDE 106 104 105   < > 102   CO2 25 23 17*   < > 21*   BUN 13.6 16.0 16.8   < > 14.6   CR 1.21* 1.27* 1.34*   < > 1.01   PHOS  --  3.6  --   --  3.1   MAG 2.4* 1.5* 2.0  --  2.1    < > = values in this interval not displayed.       Plan:  - Avoid nephrotoxins, renally dose meds  - Monitor urine output  - FBG and diuresis as above    Infectious Disease  # Aspiration Pneumonia  # Leukocytosis  Temp (24hrs), Av.6  F (36.4  C), Min:96.3  F (35.7  C), Max:98.2  F (36.8  C)      Cultures thus far:   Blood NGTD   Sputum NGTD   Urine NGTD   MRSA Positive   COVID negative  Antibiotics     Anti-infectives (From now, onward)      Start     Dose/Rate Route Frequency  Ordered Stop    02/24/24 1400  cefTRIAXone (ROCEPHIN) 2 g vial to attach to  ml bag for ADULTS or NS 50 ml bag for PEDS         2 g  over 30 Minutes Intravenous EVERY 24 HOURS 02/24/24 1153 02/28/24 1359             Recent Labs   Lab 02/24/24  0944 02/24/24  0403 02/23/24 1951   WBC 19.4* 20.5* 21.6*                    Plan:  - Vancomycin x 5D for MRSA nares   - Continue Zosyn x 5D for aspiration coverage  - Monitor for signs of infection  - Avoid fevers     Hematology  # Anemia of critical illness  Hgb decreaseing. Oral bleeding, CT CAP anterior mediastinal hematoma  Fibrinogen  US LE w/ Arterial Duplex with no evidence of acute thrombus    Receiving heparin for ECMO with ACT goal 160-180  Dose (units/hr) HEParin: 0 Units/hr  ACT  (seconds): 158 seconds    Recent Labs   Lab 02/24/24  0944 02/24/24  0403 02/23/24 1951   HGB 10.2* 10.8* 15.1   HCT 29.2* 30.7* 44.0    152 209     Recent Labs   Lab 02/24/24  0944 02/24/24  0403 02/23/24 2057   INR 1.74* 1.86* 2.40*   PTT 41* 69* >240*       Plan:  - DVT PPX: Heparin with ACT goal as above  - DAPT ASA/ticagrelor for VIRGILIO  - Transfusion parameters:   - 1u PRBC for hbg < 7.0   - 1u platelet for plt < 50   - 1u FFP for INR > 3   - 5u cryoprecipitate for fibrinogen <150     Endocrinology  # Hyperglycemia   # A1C   Recent Labs   Lab 02/23/24 1951   A1C 10.0*       Recent Labs   Lab 02/24/24  1420 02/24/24  1207 02/24/24  0944 02/24/24  0754   GLC 94 111* 171*  180* 174*       Dose (units/hr) Insulin: 0 Units/hr    Plan  - insulin gtt as needed  - Endocrine consulted   - Continue levothyroxine 150mcg    Integumentary:  - No skin issues    Lines/Tubes/Drains:  PIV left lower fore arm 2/10  CVC TL RFV 2/23  RR arterial line 2/23  ECMO L femoral vein 29 FR 2/23   ECMO L femoral artery 17 FR 2/23  Distal perfusion catheter LSFA 2/23  Nicole 2/23  OG 2/24  Rectal tube with balloon 2/23      Peripheral IV 02/10/24 Anterior;Left Lower forearm (Active)   Site  Assessment WDL 02/24/24 1200   Line Status Infusing 02/24/24 1200   Dressing Transparent 02/24/24 1200   Dressing Status clean;dry;intact 02/24/24 1200   Line Intervention Flushed 02/23/24 2000   Phlebitis Scale 0-->no symptoms 02/24/24 1200   Infiltration? no 02/24/24 1200   Number of days: 14       Arterial Line 02/23/24 Radial (Active)   Site Assessment WDL 02/24/24 1200   Line Status Pulsatile blood flow 02/24/24 1200   Arterine Line Cap Change Due 02/27/24 02/23/24 2000   Art Line Waveform Appropriate;Square wave test performed 02/24/24 1200   Art Line Interventions Leveled;Connections checked and tightened;Flushed per protocol 02/24/24 1200   Color/Movement/Sensation Capillary refill less than 3 sec;Cool fingers/toes;Pale fingers/toes 02/24/24 1200   Line Necessity Yes, meets criteria 02/24/24 1200   Dressing Type Transparent 02/24/24 1200   Dressing Status Clean, dry, intact 02/24/24 1200   Dressing Intervention Dressing changed/new dressing 02/23/24 1851   Dressing Change Due 03/01/24 02/24/24 1200   Number of days: 1       CVC Triple Lumen Right Femoral (Active)   Site Assessment WDL 02/24/24 1200   Dressing Transparent;Chlorhexidine disk 02/24/24 1200   Dressing Status intact;dry;old drainage 02/24/24 0800   Dressing Intervention dressing changed 02/23/24 1821   Dressing Change Due 03/01/24 02/23/24 2000   Line Necessity Yes, meets criteria 02/24/24 1200   Blue - Status infusing 02/24/24 1200   Blue - Cap Change Due 02/27/24 02/24/24 0800   Red - Status infusing 02/24/24 1200   Red - Cap Change Due 02/27/24 02/24/24 0800   White - Status infusing 02/24/24 1200   White - Cap Change Due 02/27/24 02/24/24 0800   Phlebitis Scale 0-->no symptoms 02/24/24 1200   Infiltration? no 02/24/24 1200   CVC Comment TLC 02/24/24 0800   Number of days: 1       ECMO Cannula Venous Left femoral vein (Active)   Site Assessment WDL;Sutured;Secure 02/24/24 1400   Dressing Type Silverlon;Ioban 02/24/24 1400   Dressing Status  dry;intact;old drainage 02/24/24 1400   Site Intervention No intervention needed 02/24/24 1400   Number of days: 1       ECMO Cannula Arterial Left femoral artery (Active)   Site Assessment WDL;Sutured;Secure 02/24/24 1400   Dressing Type Silverlon;Ioban 02/24/24 1400   Dressing Status dry;intact;old drainage 02/24/24 1400   Site Intervention No intervention needed 02/24/24 1400   Number of days: 1       Distal Perfusion Catheter Left femoral artery (Active)   Site Assessment WDL;Sutured;Secure 02/24/24 1400   Dressing Type Ioban 02/24/24 1400   Dressing Status dry;intact;old drainage 02/24/24 1400   Site Intervention No intervention needed 02/24/24 1400   Number of days: 1       ETT 8 mm (Active)   Secured at (cm) 28 cm 02/24/24 1213   Measured from Lips 02/24/24 1213   Position Right 02/24/24 1213   Secured by Commercial tube omer 02/24/24 1213   Bite Block Present 02/24/24 1213   Site Appearance Tongue injury 02/24/24 1213   Tube Care Site care done 02/24/24 1200   Safety Measures Manual resuscitator/mask/valve in room 02/24/24 1213   Number of days: 1       ETT Cuffed 8 mm (Active)   Secured at (cm) 25 cm 02/23/24 1655   Measured from Lips 02/23/24 1655   Position Center 02/23/24 1655   Secured by Commercial tube omer 02/23/24 1655   Bite Block None Present 02/23/24 1655   Safety Measures Manual resuscitator/mask/valve in room 02/23/24 1655   Number of days: 1       NG/OG/NJ Tube Orogastric (Active)   Site Description WDL 02/24/24 1200   Status Clamped 02/24/24 1200   Drainage Appearance Bile;Brown 02/24/24 0800   Placement Confirmation [place] unchanged;X-ray 02/24/24 1200   [place] (cm marking) at nare/mouth 75 cm 02/24/24 1200   Intake (ml) 80 ml 02/24/24 0800   Number of days: 0       Rectal Tube (Active)   Balloon fill volume  45 cc 02/24/24 1200   Stool Leakage None 02/24/24 1200   Rectal Tube Container Volume 750 ml 02/24/24 1200   Rectal Tube Output 0 ml 02/24/24 1200   Number of days: 1      "  Urethral Catheter 02/23/24 Temperature probe 16 fr (Active)   Tube Description Positional 02/24/24 0800   Catheter Care Done;Catheter wipes 02/24/24 1200   Collection Container Standard;Patent 02/24/24 1200   Securement Method Securing device (Describe) 02/24/24 1200   Rationale for Continued Use ICU only: hourly urine output needed for patient care 02/24/24 1200   Urine Output 65 mL 02/24/24 1400   Number of days: 1          ICU  Feeding: hold for 48 hours post arrest  Analgesia:  Fentanyl  Sedation: propofol   Thrombopx: Heparin gtt when ACT trends down  Head of bed: reverse trend   Ulcers: PPI  Glucose: Insulin gtt    SBT not ready  Bowel liquid stool with rectal tube  IO positive, unable to diurese today  De-escalate discontinue vancomycin, zosyn to ceftriaxone       Family will be updated by me    Patient seen, discussed, and plan developed with staff physician, Dr Delfino Barnes DNP APRN [facility]  Cardiology ICU  Pager [phone]    Objective:  Most recent vital signs:  BP 91/57   Pulse 72   Temp 98.6  F (37  C)   Resp 17   Ht 1.778 m (5' 10\")   Wt 116.9 kg (257 lb 11.5 oz)   SpO2 94%   BMI 36.98 kg/m    Temp:  [96.3  F (35.7  C)-98.8  F (37.1  C)] 98.6  F (37  C)  Pulse:  [] 72  Resp:  [12-48] 17  BP: ()/() 91/57  MAP:  [62 mmHg-95 mmHg] 77 mmHg  Arterial Line BP: ()/(56-87) 96/69  FiO2 (%):  [70 %-100 %] 100 %  SpO2:  [63 %-100 %] 94 %      Physical exam:  General: In bed, in NAD  HEENT: PERRL, no scleral icterus or injection  CARDIAC: RRR, no m/r/g appreciated. Peripheral pulses dopplered  RESP: Mechanical ventilation; CTAB, no wheezes, rhonchi or crackles appreciated.  GI: soft, BS hypoactive  : Nicole  EXTREMITIES: 3+ LE edema, pulses 2+. Femoral access site oozing, dressing intact  SKIN: No acute lesions appreciated  NEURO: Intubated and sedated    Imaging/procedure results:         "

## 2024-02-24 NOTE — PROGRESS NOTES
[facility]    ECLS Shift Summary:     ECMO Equipment:  Console Serial Number: [id number]  Circuit Lot Number: [id number]  Oxygenator Lot Number: [id number]    Circuit Assessment: Free of fibrin, clot, and air    Arterial ECMO Cannula: 17 Fr in the Left Femoral Artery  Venous ECMO Cannula: 29 Fr in the Left Femoral Vein  ECMO Cannula Venous Left femoral vein-Site Assessment: Bleeding, Sutured, Secure  ECMO Cannula Arterial Left femoral artery-Site Assessment: Bleeding, Sutured, Secure  Distal Perfusion Catheter Left femoral artery-Site Assessment: Bleeding, Secure, Sutured  ECMO Cannula Venous Left femoral vein-Site Intervention: No intervention needed  ECMO Cannula Arterial Left femoral artery-Site Intervention: No intervention needed  Distal Perfusion Catheter Left femoral artery-Site Intervention: No intervention needed    Patient remains on V-A ECMO, all equipment is functioning and alarms are appropriately set. RPM's: 3750 with Blood Flow (Circuit) LPM  Av.2 LPM  Min: 3.22 LPM  Max: 4.46 LPM L/min. Sweep is at 1 LPM and 100 %. Extremities are warm to touch and perfused.     Significant Shift Events:   Frequent ECMO chugging. Fluid given: 3.5 liters of LR, 1 liter of 5% albumin. Able to increase ECMO flows to about 4.4 after fluid administration.  Sweep decreased. ECMO FiO2 increased to 100%.  ACTs within goal range of 180-200 without heparin administration.  Patient temperature kept at 36 degrees per Dr. Saleh.    Vent settings:  Vent Mode: CMV/AC  (Continuous Mandatory Ventilation/ Assist Control)  FiO2 (%): 100 %  Resp Rate (Set): 15 breaths/min  Tidal Volume (Set, mL): 500 mL  PEEP (cm H2O): 10 cmH2O  Resp: 15      Anticoagulation:     Most recent: ACT  (seconds): 185 seconds    Urine output is adequate.  Blood loss was a moderate amount from bloody secretions and oozing at the cannulation site. No blood product given overnight; fluid given: 3.5 liters of LR  and 1 liter of 5% albumin.     Intake/Output Summary (Last 24 hours) at 2/24/2024 0613  Last data filed at 2/24/2024 0600  Gross per 24 hour   Intake 6904.58 ml   Output 4090 ml   Net 2814.58 ml       Labs:  Recent Labs   Lab 02/24/24  0404 02/24/24  0403 02/24/24  0213 02/24/24  0020 02/23/24  2206   PH 7.47*  --  7.45 7.35 7.36   PCO2 34*  --  35 31* 38   PO2 68*  --  70* 69* 69*   HCO3 25  --  24 17* 22   O2PER 100 100  100 100 100 100       Lab Results   Component Value Date    HGB 10.8 (L) 02/24/2024    PHGB 80 (H) 02/24/2024     02/24/2024    FIBR 134 (L) 02/24/2024    INR 1.86 (H) 02/24/2024    PTT 69 (H) 02/24/2024    DD 2.76 (H) 02/24/2024       Plan is to continue VA ECMO support and adjust settings as needed.    Chance Glover, RT  ECMO Specialist  2/24/2024 6:34 AM

## 2024-02-24 NOTE — PROGRESS NOTES
[facility]    ECLS Shift Summary:  5282-9084     ECMO Equipment:  Console Serial Number: [id number]  Circuit Lot Number: [id number]  Oxygenator Lot Number: [id number]    Circuit Assessment: Free of fibrin, clot, and air    Arterial ECMO Cannula: 17 Fr in the Left Femoral Artery  Venous ECMO Cannula: 29 Fr in the Left Femoral Vein  ECMO Cannula Venous Left femoral vein-Site Assessment: WDL, Sutured, Secure  ECMO Cannula Arterial Left femoral artery-Site Assessment: WDL, Sutured, Secure  Distal Perfusion Catheter Left femoral artery-Site Assessment: WDL, Sutured, Secure  ECMO Cannula Venous Left femoral vein-Site Intervention: No intervention needed  ECMO Cannula Arterial Left femoral artery-Site Intervention: No intervention needed  Distal Perfusion Catheter Left femoral artery-Site Intervention: No intervention needed    Patient remains on V-A ECMO, all equipment is functioning and alarms are appropriately set. RPM's: 3500 with Blood Flow (Circuit) LPM  Av.2 LPM  Min: 4.02 LPM  Max: 4.46 LPM L/min. Sweep is at 1 LPM and 80 %. Extremities are warm and well perfused.     Significant Shift Events: Chugging this am alleviated with 500 LR and lowering flows to 4 lpm    Vent settings:  Vent Mode: CMV/AC  (Continuous Mandatory Ventilation/ Assist Control)  FiO2 (%): 100 %  Resp Rate (Set): 15 breaths/min  Tidal Volume (Set, mL): 500 mL  PEEP (cm H2O): 10 cmH2O  Resp: 15      Anticoagulation:  Dose (units/hr) HEParin: 0 Units/hr  Rate (mL/hr) HEParin: 0 mL/hr  Concentration HEParin: 100 Units/mL        Most recent: ACT  (seconds): 158 seconds    Urine output is per RN charting.  Blood loss was minimal. Product given included none.     Intake/Output Summary (Last 24 hours) at 2024 1421  Last data filed at 2024 1400  Gross per 24 hour   Intake 6958.88 ml   Output 3990 ml   Net 2968.88 ml       Labs:  Recent Labs   Lab 24  1204 24  0944 24  0746  02/24/24  0603   PH 7.44 7.41 7.41 7.40   PCO2 37 44 43 43   PO2 79* 69* 80 117*   HCO3 25 28 27 26   O2PER 100 90 100 100       Lab Results   Component Value Date    HGB 10.2 (L) 02/24/2024    PHGB 80 (H) 02/24/2024     02/24/2024    FIBR 140 (L) 02/24/2024    INR 1.74 (H) 02/24/2024    PTT 41 (H) 02/24/2024    DD 2.76 (H) 02/24/2024    ANTCH 46 (L) 02/24/2024       Plan is to remain stable on VA ECMO.    Min Mendoza, RT  ECMO Specialist  2/24/2024 2:21 PM

## 2024-02-24 NOTE — CARE PLAN
Shift Summary  Neuro: sedated. Follows commands, KNOWLES, nods appropriately when sedation is decreased. Afebrile.   CV: VA ECMO: flows around 4.4 LPM, sweep 3, FIO2 80. Chugging in the AM 1L LR given, no recurrence. On low dose pressors.   Pulm: CMV 15/500/10/100. Bloody oral and ETT secretions  GI: OGT clamped. Large amounts of watery stool  : Nicole, adequate UOP  Skin: scattered bruises    One chugging/suction event this shift; 1L LR given, did not reoccur. Bleeding and clots from ETT and oral suctioning.    For detailed assessments and vitals, see flowsheets    Plan:  Possible lung imagining tomorrow

## 2024-02-24 NOTE — PROGRESS NOTES
ECMO Attending Progress Note  2024    Silvano Motta is a 58 year old male who was cannulated for ECMO 2024 due to refractory PEA arrest    Cannulation Site:  17 Fr in the L femoral artery  29 Fr in the L femoral vein    Interval events: Cardiac with ROSC then transferred to the ICU.  Recurrent hypoxic PEA arrest with weak pulses and narrow pulsatility suggestive of cardiogenic shock.  Transferred to the cath lab for [facility] ECMO cannulation    Pulsatilty (IABP paused if applicable):10 mmHG     Physical Exam:  Temp:  [97.2  F (36.2  C)-98.2  F (36.8  C)] 98.2  F (36.8  C)  Pulse:  [] 103  Resp:  [15-48] 48  BP: ()/() 138/72  MAP:  [89 mmHg] 89 mmHg  Arterial Line BP: (101)/(79) 101/79  FiO2 (%):  [30 %-80 %] 70 %  SpO2:  [63 %-100 %] 63 %    Intake/Output Summary (Last 24 hours) at 2024  Last data filed at 2024 1900  Gross per 24 hour   Intake 890 ml   Output 2595 ml   Net -1705 ml    Vent Mode: CMV/AC  (Continuous Mandatory Ventilation/ Assist Control)  FiO2 (%): 70 %  Resp Rate (Set): 15 breaths/min  Tidal Volume (Set, mL): 550 mL  PEEP (cm H2O): 8 cmH2O  Resp: (!) 48       Labs:  Recent Labs   Lab 24   PH 7.21* 7.09* 7.35   PCO2 48* 90* 51*   PO2 82 25* 38*   HCO3 19* 27 28   O2PER 100 100 100      Recent Labs   Lab 24  1751 24  1702 24  1648 24  0539 24  0524   WBC 27.4*  --  5.2  --  6.1 6.4   HGB 18.7* 18.7* 21.1* 20.4* 13.5 13.9     Creatinine   Date Value Ref Range Status   2024 1.44 (H) 0.67 - 1.17 mg/dL Final   2024 1.01 0.67 - 1.17 mg/dL Final   2024 0.91 0.67 - 1.17 mg/dL Final   2024 0.89 0.67 - 1.17 mg/dL Final       Blood Flow (Circuit) LPM: 3.22 LPM  Sweep LPM: 4 LPM  Sweep FiO2   %: 60 %  Arterial Blood Temp  (degrees C): 36 C (keep cardioquip at 36 per Dr. Saleh)  Arterial Pressure  mmH mmHg      ECMO Issues including assessments and plan on DOS  2/23/2024:  Neuro: Sedated for mechanical ventilation and ECMO.  No acute distress.  NIRS stable b/l  RASS goal: -3  CV: Cardiogenic shock.  Hemodynamically stable on moderate epi and norepi  Pulm: Keep vent settings at rest settings as above.  FEN/Renal: Electrolytes stable w/ replacement protocols in place, Cr worsening, UOP stable  Heme: ACT goal: 180-200, Hemoglobin stable .  Minimal oozing around the ECMO cannulas.  ID: Receiving empiric antibiotics  Cannulae: Position is acceptable on exam and the available imaging.  Distal perfusion cannula is in place and patent.  Extremities are well-perfused.     I have personally reviewed the ECMO flows, oxygenation and CO2 clearance, anticoagulation, and cannula position.  I have also personally assessed the patient's systemic response with hemodynamics, oxygenation, ventilation, and bleeding.       The patient requires continued ECMO support and management in the ICU.  I have discussed patient care and treatment plan with the primary team.      Kavin Saleh MD, PhD  Interventional/Critical Care Cardiology  [phone]    February 23, 2024

## 2024-02-24 NOTE — PROGRESS NOTES
Neurocritical Care Progress Note    Reason for critical care admission: Cardiac Arrest  Admitting Team: CICU  Date of Service:  02/24/2024  Date of Admission:  2/4/2024  Hospital Day: 21    Assessment/Plan  Silvano Motta is a 58 year old male with PMH of chronic hypoxemic respiratory failure (7-8L O2 at baseline) of unclear etiology w/ positive shunt study w/ high A-a gradient (2/9), severe ROSALIO, PH (suspected group 3), ?PAVMs, seizure disorder, and TBI in 2020 who as originally admitted to the CV ICU on 02/04/24 for cardiac arrest s/p ECMO cannulation.    Code blue called in setting of PEA arrest in CT scanner afternoon of 02/23/24.  CPR was started immediately and ROSC was achieved within 5 minutes.  Neuro critical care consulted for assistance with neuro-prognostication. In house PEA arrest with ROSC after 5 minutes of CPR, intubated, started on amio gtt and transfer to ICU.  Subsequent PEA arrest occurred with ROSC achieved again. Then cannulized with ECMO. Neuro exam and head imaging reassuring.    24 hour events:   When sedation turned down, patient has been following simple commands, nodding appropriately.   Under sedation per post-cannulation protocol.    Neuro  #Post-arrest cerebral edema  #Hypoxic encephalopathy 2.2 cardiac arrest  Post arrest day: 1  Length of downtime: 0 min - CPR started immediately  -Witnessed arrest: Yes  -ROSC: Yes, after 5 minutes, rhythm: PEA arrest  -Goal normothermia   -Current temp: 98.2     #Seizure Disorder with h/o Todds paralysis / chronic headaches    -Continue home keppra 1500mg BID, vimpat 100mg BID, Gabapentin 900 tid   -Okay to transition to IV Keppra and Vimpat in acute setting - ratio is 1:1    Analgesics & sedation  Prior sedation: Propofol 50 / Versed bolus (on 2/23)  Current sedation: Propofol 50 and fentanyl 150    Exam findings   -Cough: No (present on lower sedation)  -Gag: No (present on lower sedation)  -Pupils: symmetric and reactive, pupillometry shows NPIs  "above 3 bilaterally    Neuroimaging  -Day 1 CTH shows: No acute intracranial pathology, no loss of grey white differentiation, cerebral atrophy noted  -Consider repeating CTH on day 3     Neurophysiology  -Continue vEEG  -vEEG prelim report 2/24 (Day#1 - 2/23): \" extensive myogenic artifact obscures about 70% of the recording. When EEG is interpretable, background activity is consistent with moderate to severe diffuse encephalopathy. EEG is continuous. No electrographic seizures.\"    Biomarkers  -Neuron-specific Enolase (NSE) results: Ordered and pending   -S 100B protein: Ordered and pending     Recommendations  -Avoid hypotonic solutions as they may worsen cerebral edema   -Avoid nitroprusside or nitroglycerin as they may also worsen cerbral edema  -Advise slow correction of hypernatremia if needed  -When safe to do so, please limit use of CNS acting medications such as benzodiazepines, opiates, anticholinergics, which will permit a more accurate neurological assessment    -Neurologic prognosis seems to be favorable based on reassuring clinical exam when sedation is on lower dose. NCC will sign off and follow up vEEG peripherally, please call [phone] with any questions    Clinically Significant Risk Factors          # Hypocalcemia: Lowest Ca = 8.1 mg/dL in last 2 days, will monitor and replace as appropriate  # Hypercalcemia: Highest Ca = 11.1 mg/dL in last 2 days, will monitor as appropriate  # Hypomagnesemia: Lowest Mg = 1.5 mg/dL in last 2 days, will replace as needed    # Coagulation Defect: INR = 1.74 (Ref range: 0.85 - 1.15) and/or PTT = 41 Seconds (Ref range: 22 - 38 Seconds), will monitor for bleeding             # Obesity: Estimated body mass index is 36.98 kg/m  as calculated from the following:    Height as of this encounter: 1.778 m (5' 10\").    Weight as of this encounter: 116.9 kg (257 lb 11.5 oz).        # Financial/Environmental Concerns: none;other (see comments) ([employer] paying for transportation " with al other needs.)            The patient was seen and discussed with Neurocritical Care Attending, Dr. Sophia Mccullough.    José Verdugo MD on 2/24/2024 at 1:19 PM  Neurocritical Care  *[phone]    History of Present Illness:  Silvano Motta is a 58 year old male with PMH of chronic hypoxemic respiratory failure (7-8L O2 at baseline) of unclear etiology w/ positive shunt study w/ high A-a gradient (2/9), severe ROSALIO, PH (suspected group 3), ?PAVMs, seizure disorder, and TBI in 2020 who as originally admitted to the CV ICU on 02/04/24 for cardiac arrest s/p ECMO cannulation.    Patient has a longstanding history of pulmonary issues.  He has been dealing with severe hypoxemia without a clear etiology despite extensive workup.  He has been found to have mild pulmonary hypertension with normal PFTs and only slightly decreased DLCO.  Ejection fraction has been normal on echocardiograms.  He has low NIFs of -40.   In short, none of his testing has revealed a clear cause of his severe hypoxemia.  He is not a candidate for lung transplant per [facility] and the [place].  [place] stated that there was no additional diagnostic workup they could offer.    This afternoon, code blue called in setting of PEA arrest in CT scanner.  CPR was started immediately and ROSC was achieved within 5 minutes.  Neurocritical care consulted for assistance with neuro-prognostication.  It appears that this is this patient's third cardiac arrest in the last 2 weeks.    Allergies   Allergen Reactions    Fluoxetine Other (See Comments)     PN: Made him feel more depressed.  Worsening of depression  PN: Made him feel more depressed.      Metformin      Other reaction(s): Seizures    Carbamazepine Other (See Comments)     PN: diffuse rash  PN: diffuse rash      Other Environmental Allergy Unknown     dut       Current Medications:   artificial tears   Both Eyes Q8H    aspirin  81 mg Oral or Feeding Tube Daily    [Held by provider]  atorvastatin  40 mg Oral or Feeding Tube Daily    cefTRIAXone  2 g Intravenous Q24H    [Held by provider] DULoxetine  30 mg Oral Daily    [Held by provider] furosemide  40 mg Intravenous BID    gabapentin  900 mg Oral or Feeding Tube TID    hydrocortisone sodium succinate PF  50 mg Intravenous Q8H    lacosamide (VIMPAT) 100 mg in sodium chloride 0.9 % 120 mL intermittent infusion  100 mg Intravenous BID    levalbuterol  1.25 mg Nebulization 2 times daily    levETIRAcetam  1,500 mg Intravenous Q12H    levothyroxine  112.5 mcg Intravenous Daily    [Held by provider] mirtazapine  30 mg Oral or Feeding Tube At Bedtime    pantoprazole  40 mg Intravenous Daily    potassium chloride  20 mEq Intravenous Once    sodium chloride (PF)  3 mL Intracatheter Q8H       PRN Medications:  acetaminophen, acetaminophen, albuterol, alum & mag hydroxide-simethicone, calcium chloride, dextrose, glucose **OR** dextrose **OR** glucagon, fentaNYL, fluocinonide, fluticasone, hydrOXYzine HCl **OR** hydrOXYzine HCl, insulin aspart, ipratropium - albuterol 0.5 mg/2.5 mg/3 mL, lidocaine 4%, lidocaine (buffered or not buffered), propofol **AND** propofol **AND** CK total **AND** Triglycerides **AND** - MEDICATION INSTRUCTIONS - **AND** Notify Physician, - MEDICATION INSTRUCTIONS -, naloxone **OR** naloxone **OR** naloxone **OR** naloxone, polyethylene glycol, senna-docusate, sodium chloride (PF), sodium chloride (PF)    Infusions:   dextrose      EPINEPHrine 0.05 mcg/kg/min (02/24/24 1130)    epoprostenol 20 ng/kg/min (02/24/24 1224)    fentaNYL 150 mcg/hr (02/24/24 1100)    heparin 2 unit/mL in 0.9% NaCl 3 mL/hr (02/24/24 1100)    HEParin Stopped (02/24/24 0913)    insulin regular 9 Units/hr (02/24/24 1100)    propofol 30 mcg/kg/min (02/24/24 1130)    And    - MEDICATION INSTRUCTIONS -      - MEDICATION INSTRUCTIONS -      midazolam      norepinephrine 0.04 mcg/kg/min (02/24/24 1100)    vasopressin 2 Units/hr (02/24/24 1211)       Physical  "Examination:  Vitals: BP 99/63   Pulse 73   Temp 98.8  F (37.1  C)   Resp 15   Ht 1.778 m (5' 10\")   Wt 116.9 kg (257 lb 11.5 oz)   SpO2 96%   BMI 36.98 kg/m    General: Adult male patient, lying in bed, critically-ill    Neuro: On 50 mcg/h of propofol and 150 mcg/h of fentanyl  Mental status: Sedated and intubated.  Does not opens eyes / follow commands.  Cranial nerves: PERRL, cough and gag reflexes absent  Motor: Normal bulk and tone. Does not move to noxious.  Sensory: Does not localize or withdraw to noxious stimuli in all 4 extremities  Coordination: Could not assess  Gait: JOSSIE, deferred.    Obs - On RN report and compatible with yesterday exam, patient was following commands and nodding to verbal, cough/gag present, moving BUE, minutes before examination on lower dose of sedation.    Labs and Imaging:    Lab Results   Component Value Date    PH 7.44 02/24/2024    PO2 79 (L) 02/24/2024    PCO2 37 02/24/2024    HCO3 25 02/24/2024    KELSEA 0.9 02/24/2024          Lab Results   Component Value Date     02/23/2024     02/23/2024    Lab Results   Component Value Date    CHLORIDE 105 02/23/2024    Lab Results   Component Value Date    BUN 16.8 02/23/2024      Lab Results   Component Value Date    POTASSIUM 3.7 02/23/2024    POTASSIUM 3.9 02/23/2024    Lab Results   Component Value Date    CO2 17 02/23/2024    Lab Results   Component Value Date    CR 1.34 02/23/2024        Lab Results   Component Value Date    NTBNPI <36 02/04/2024     Lab Results   Component Value Date    WBC 19.4 (H) 02/24/2024    HGB 10.2 (L) 02/24/2024    HCT 29.2 (L) 02/24/2024    MCV 95 02/24/2024     02/24/2024     Lab Results   Component Value Date    SED 6 02/24/2024     Lab Results   Component Value Date    AST 89 (H) 02/24/2024    ALT 48 02/24/2024    ALKPHOS 37 (L) 02/24/2024    BILITOTAL 0.4 02/24/2024     Lab Results   Component Value Date    INR 1.74 (H) 02/24/2024     Lab Results   Component Value Date    TSH " 5.64 (H) 02/04/2024       All relevant imaging and laboratory values personally reviewed.

## 2024-02-25 NOTE — CONSULTS
Otolaryngology Consult Note  February 25, 2024      CC: Oral bleeding    HPI: Silvano Motta is a 58 year old male with a past medical history of chronic hypoxemic respiratory failure of unknown etiology, severe ROSALIO, pulmonary hypertension, seizure disorder, TBI who was admitted for acute hypoxic respiratory failure and had a PEA arrest in the CT scanner on February 23 with ROSC achieved within 5 minutes; ENT is consulted regarding oral bleeding that started last night.  He is currently on ECMO and they have reduced his anticoagulation goal in light of bleeding.    No past medical history on file.    Past Surgical History:   Procedure Laterality Date    CV RIGHT HEART CATH MEASUREMENTS RECORDED N/A 2/7/2024    Procedure: Right Heart Catheterization;  Surgeon: Venkatesh Kaufman MD;  Location:  HEART CARDIAC CATH LAB    CV RIGHT HEART CATH PULMONARY VASODILATOR STUDY N/A 2/7/2024    Procedure: Right Heart Cath Pulmonary Vasodilator Study;  Surgeon: Venkatesh Kaufman MD;  Location:  HEART CARDIAC CATH LAB       Current Outpatient Medications   Medication Sig Dispense Refill    Continuous Blood Gluc Sensor (FREESTYLE SHAISTA 3 SENSOR) MISC 1 each every 14 days Wear shaista 3 sensor for 14 days to monitor blood glucose in patient with type 1 DM 6 each 0          Allergies   Allergen Reactions    Fluoxetine Other (See Comments)     PN: Made him feel more depressed.  Worsening of depression  PN: Made him feel more depressed.      Metformin      Other reaction(s): Seizures    Carbamazepine Other (See Comments)     PN: diffuse rash  PN: diffuse rash      Other Environmental Allergy Unknown     dut       Social History     Socioeconomic History    Marital status:      Spouse name: Not on file    Number of children: Not on file    Years of education: Not on file    Highest education level: Not on file   Occupational History    Not on file   Tobacco Use    Smoking status: Former     Types: Cigarettes    Smokeless  "tobacco: Never   Substance and Sexual Activity    Alcohol use: Yes     Alcohol/week: 2.0 - 4.0 standard drinks of alcohol     Types: 2 - 4 Standard drinks or equivalent per week    Drug use: Never    Sexual activity: Not on file   Other Topics Concern    Not on file   Social History Narrative    Not on file     Social Determinants of Health     Financial Resource Strain: Not on file   Food Insecurity: Not on file   Transportation Needs: Not on file   Physical Activity: Not on file   Stress: Not on file   Social Connections: Not on file   Interpersonal Safety: Not on file   Housing Stability: Not on file       No family history on file.    ROS: 12 point review of systems is negative unless noted in HPI.    PHYSICAL EXAM:  /65   Pulse 67   Temp 98.4  F (36.9  C)   Resp 14   Ht 1.778 m (5' 10\")   Wt 116.9 kg (257 lb 11.5 oz)   SpO2 99%   BMI 36.98 kg/m    General: Sedated intubated  HEAD: EEG leads present  Face: symmetrical  Eyes: Pupils constricted  Ears: External ears normal  Nose: no anterior drainage  Mouth: Kerlix packing in place, ET in place, OG in place  Neck: no LAD, trachea midline  Respiratory: Ventilated  Skin: no rashes or skin lesions of the face/neck  Psych: pleasant affect    Oral packing: Due to oral bleeding, packing was indicated.  The packing in place was removed and the mouth was examined with headlight and tongue depressor.  Exam was limited by minimal mouth opening.  The left side of the tongue was badly bruised.  Clot was evacuated from the oral cavity.  The oropharynx could not be visualized due to tongue swelling and limited mouth open.  Afrin soaked Kerlix was packed around the tongue and oral cavity and left with a long tail out of the mouth.    ROUTINE IP LABS (Last four results)  BMP  Recent Labs   Lab 02/25/24  0954 02/25/24  0755 02/25/24  0604 02/25/24  0402 02/25/24  0359 02/24/24  2149 02/24/24  2147 02/24/24  1646 02/24/24  1641     --   --   --  144  --  143  --  " 143   POTASSIUM 4.2  --   --   --  4.3  --  4.3  --  4.2   CHLORIDE 110*  --   --   --  109*  --  109*  --  107   DORINA 7.8*  --   --   --  7.9*  --  7.9*  --  8.0*   CO2 26  --   --   --  26  --  26  --  25   BUN 11.2  --   --   --  11.3  --  11.5  --  12.1   CR 0.93  --   --   --  1.02  --  1.01  --  1.11   * 115* 121* 144* 154*   < > 178*  187*   < > 188*    < > = values in this interval not displayed.     CBC  Recent Labs   Lab 02/25/24  0954 02/25/24  0359 02/24/24 2147 02/24/24  1641   WBC 11.6* 14.0* 11.4* 12.6*   RBC 2.43* 2.62* 2.70* 2.80*   HGB 8.2* 8.5* 8.9* 9.3*   HCT 23.5* 24.9* 25.4* 26.4*   MCV 97 95 94 94   MCH 33.7* 32.4 33.0 33.2*   MCHC 34.9 34.1 35.0 35.2   RDW 12.6 12.7 12.5 12.4   PLT 95* 107* 103* 113*     INR  Recent Labs   Lab 02/25/24  0954 02/25/24  0359 02/24/24 2147 02/24/24  1641   INR 1.31* 1.31* 1.46* 1.59*         Assessment and Plan  Silvano Motta is a 58 year old male with a past medical history of chronic hypoxemic respiratory failure of unknown etiology, severe ROSALIO, pulmonary hypertension, seizure disorder, TBI who was admitted for acute hypoxic respiratory failure and has had multiple PEA arrest and is status post ECMO cannulation; ENT is consulted regarding oral bleeding that started last night.  On exam he has bruising of the tongue and likely superficial lacerations from biting.  Exam is limited by limited ability to open his mouth.  The oral cavity was tightly packed with Afrin soaked Kerlix.      ENT will remove packing in 3 to 5 days  Recommend molar bite block  Antistaphylococcal coverage when packing is in place  Cardale application of Afrin to the packing 3 times daily  Please ensure that there is adequate sedation with packing in place  If bleeding persists around this packing, relaxation or significantly deeper sedation will be necessary    Cameron Johnson MD  Otolaryngology-Head & Neck Surgery, PGY2  Please page ENT with questions by dialing [phone] and  entering job code [phone] when prompted.

## 2024-02-25 NOTE — PROGRESS NOTES
CLINICAL NUTRITION SERVICES - BRIEF NOTE     Nutrition Prescription    RECOMMENDATIONS FOR MDs/PROVIDERS TO ORDER:  Primary team ANDRIA confirmed ok to use OGT and ok for TF rate advancement Q8hrs   Monitoring pressor needs/requirements; Propofol rate/trends     Recommendations already ordered by Registered Dietitian (RD):  Initiate enteral feeds via OGT as below:   Use dosing weight 83.7 kg (adj BW)  EN access: OGT (no additional enteral access at this time)   Formula: Vital HP (or equivalent)   Goal Regimen: Vital High Protein at goal of  70ml/hr  (1680ml/day) + 1 pkt Prosource TF20 daily to provide: 1760 kcals, 166 g PRO, 1404 ml free H20, 186 g CHO, and 0 g fiber daily.   - Initiate at 10 ml/hr and advance by 10 ml q8hr pending pt's tolerance.  - Do not advance TF rate unless Mg++ >1.5, K+ is >3, and phos >1.9  - Recommend 30 ml q4hr fluid flushes for tube patency. Additional fluids and/or adjustments per MD.    - Order multivitamin/minerals to help ensure micronutrient needs being met with suspected hypermetabolic demands and potential interruptions to TF infusions.   - While gastric enteral access: HOB > 30 degrees or Reverse Trendelenburg >10-25 degrees.                Future/Additional Recommendations:  Continue to monitor nutrition related findings per protocol    For last full RD assessment, see note dated 2/20/24    NEW FINDINGS   - Tx from IMC to ICU following PEA arrest 2/23/24. Pt cannulated on [facility]-ECMO.     - Meds:   Vit D3 125 mcg daily  Pressors: Epinephrine (rate decreasing, currently 0.03 mcg/kg/min); Vasopressin (2 units/hr)  Propofol; current rate 32.4 mL/hr (855 Kcals/day from lipid carrier)  Insulin drip     - Labs:   Vit D 13 (L)   K+/Mg++/Phos WNL  BG trends within acceptable range (A1C 10% on 2/23/24)   No recent TG checks     - GI:   BM x1 daily recently (1150 mL per rectal tube yesterday however, so monitor)  OGT (abd XR 2/23) Enteric tube tip projects over the distal stomach at the  pylorus.    Est nutrition needs: (modified with pt now in ICU)  Dosing Weight: 83.7 kg (Adjusted BW)   Estimated Energy Needs: 9104-3077 kcals/day (20 - 25 kcals/kg)  Justification: Obesity/vented  Estimated Protein Needs: 125-165+ grams protein/day (1.5-2+ grams of pro/kg)  Justification: Increased needs with critical care/ECMO  Estimated Fluid Needs: (1 mL/kcal)     INTERVENTIONS  Implementation  Collaboration with other providers - Primary team ANDRIA and bedside RN  Enteral Nutrition - Initiate TFs via OGT (confirmed with primary team) and plan for slow rate advancement as tolerated to goal   Feeding tube flush - Initiate w/ start of TFs     Monitoring/Evaluation  Will continue to monitor and evaluate per protocol.    Barber Nguyễn, MS, RDN, LD, CNSC         Weekend/Holiday RD pager [phone] or weekend clinical dietitian ([place])

## 2024-02-25 NOTE — PROGRESS NOTES
EEG CLINICAL NEUROPHYSIOLOGY PRELIMINARY REPORT    Video EEG through approximately 6 AM today reviewed.  As best as can be determined from electronic medical record, patient treated with fentanyl 150 mcg/h and propofol 30 to 50 mcg/kg/min throughout.  Diffuse 10 to 20  V alpha, occasional somewhat higher amplitude polymorphic delta.  Infrequent discontinuities occupying estimated 5% of ongoing recording.  No seizures.    Findings continue Abnormal.  1) background activity consistent with a moderate to severe diffuse encephalopathy.  EEG is largely continuous but is unreactive.  Extent of underlying cerebral dysfunction separate from anesthetic effects difficult to ascertain.  2) no electrographic seizures.    Detailed report in chart.    Arslan Hassan MD  Contact information for physicians covering Epilepsy and EEG is available on [facility].  Click search, enter neurology adult/[facility] in group name box, click on neurology adult/[facility], then click Staff Epilepsy and EEG.

## 2024-02-25 NOTE — PLAN OF CARE
Major Shift Events:    Neuro: sedated, follows commands, nods appropriately when sedation lightened.  CV: VA ECMO: flows around 4.5, sweep 1, FIO2 100. No product or fluid given overnight. On low dose pressors.  Pulm: CMV 15/500/10/100. Copious amounts of bloody secretions with clots from mouth.   GI: OGT clamped. Watery stool via rectal tube. Insulin gtt.   : patterson, adequate UOP  Skin: scattered bruises.     Plan: Maybe ENT consult for bleeding?  For vital signs and complete assessments, please see documentation flowsheets.
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
## 2024-02-25 NOTE — PHARMACY-VANCOMYCIN DOSING SERVICE
Pharmacy Vancomycin Initial Note  Date of Service 2024  Patient's  1965  58 year old, male    Indication: Healthcare-Associated Pneumonia    Current estimated CrCl = Estimated Creatinine Clearance: 113.7 mL/min (based on SCr of 0.93 mg/dL).    Creatinine for last 3 days  2024:  5:39 AM Creatinine 0.91 mg/dL;  5:02 PM Creatinine 1.01 mg/dL;  6:07 PM Creatinine 1.44 mg/dL;  7:51 PM Creatinine 1.34 mg/dL  2024:  4:03 AM Creatinine 1.27 mg/dL;  9:44 AM Creatinine 1.21 mg/dL;  4:41 PM Creatinine 1.11 mg/dL;  9:47 PM Creatinine 1.01 mg/dL  2024:  3:59 AM Creatinine 1.02 mg/dL;  9:54 AM Creatinine 0.93 mg/dL    Recent Vancomycin Level(s) for last 3 days  No results found for requested labs within last 3 days.      Vancomycin IV Administrations (past 72 hours)                     vancomycin (VANCOCIN) 2,000 mg in sodium chloride 0.9 % 500 mL intermittent infusion (mg) 2,000 mg New Bag 24 2218                    Nephrotoxins and other renal medications (From now, onward)      Start     Dose/Rate Route Frequency Ordered Stop    24 1430  vancomycin (VANCOCIN) 1,500 mg in 0.9% NaCl 250 mL intermittent infusion         1,500 mg  over 90 Minutes Intravenous EVERY 12 HOURS 24 1416      24 2000  vasopressin 1 unit/mL MAX Conc (PITRESSIN) infusion         0.5-4 Units/hr  0.5-4 mL/hr  Intravenous CONTINUOUS 24 1944              Contrast Orders - past 72 hours (72h ago, onward)      Start     Dose/Rate Route Frequency Stop    24 1600  iopamidol (ISOVUE-370) solution 135 mL         135 mL Intravenous ONCE 24 1621            Insight[employer] Prediction of Planned Initial Vancomycin Regimen  Loading dose: N/A  Regimen: 1500 mg IV every 12 hours.  Start time: 10:18 on 2024  Exposure target: AUC24 (range)400-600 mg/L.hr   AUC24,ss: 558 mg/L.hr  Probability of AUC24 > 400: 82 %  Ctrough,ss: 17.9 mg/L  Probability of Ctrough,ss > 20: 40 %  Probability of  nephrotoxicity (Lodise STEPHANI 2009): 14 %        Plan:  Start vancomycin  1500 mg IV q12h.   Vancomycin monitoring method: AUC  Vancomycin therapeutic monitoring goal: 400-600 mg*h/L  Pharmacy will check vancomycin levels as appropriate in 1-3 Days.    Serum creatinine levels will be ordered daily for the first week of therapy and at least twice weekly for subsequent weeks.      Vero Leahy, PharmD, BCEMP, BCPS
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
## 2024-02-25 NOTE — PROGRESS NOTES
[facility]    ECLS Shift Summary:     ECMO Equipment:  Console Serial Number: [id number]  Circuit Lot Number: [id number]  Oxygenator Lot Number: [id number]    Circuit Assessment: Free of fibrin, clot, and air    Arterial ECMO Cannula: 17 Fr in the Left Femoral Artery  Venous ECMO Cannula: 29 Fr in the Left Femoral Vein  ECMO Cannula Venous Left femoral vein-Site Assessment: WDL, Sutured, Secure  ECMO Cannula Arterial Left femoral artery-Site Assessment: WDL, Sutured, Secure  Distal Perfusion Catheter Left femoral artery-Site Assessment: WDL, Sutured, Secure  ECMO Cannula Venous Left femoral vein-Site Intervention: No intervention needed  ECMO Cannula Arterial Left femoral artery-Site Intervention: No intervention needed  Distal Perfusion Catheter Left femoral artery-Site Intervention: No intervention needed    Patient remains on V-A ECMO, all equipment is functioning and alarms are appropriately set. RPM's: 3700 with Blood Flow (Circuit) LPM  Av.4 LPM  Min: 4.11 LPM  Max: 4.6 LPM L/min. Sweep is at 1 LPM and 100 %. Extremities are warm to touch and perfused.     Significant Shift Events:   Large amount of bleeding from the mouth    Vent settings:  Vent Mode: CMV/AC  (Continuous Mandatory Ventilation/ Assist Control)  FiO2 (%): 100 %  Resp Rate (Set): 15 breaths/min  Tidal Volume (Set, mL): 500 mL  PEEP (cm H2O): 10 cmH2O  Resp: 15      Anticoagulation:  Dose (units/hr) HEParin: 600 Units/hr  Rate (mL/hr) HEParin: 6 mL/hr  Concentration HEParin: 100 Units/mL        Most recent: ACT  (seconds): 170 seconds    Urine output is adequate.  Blood loss was a moderate amount from bleeding from the mouth. No blood product or fluid given overnight.     Intake/Output Summary (Last 24 hours) at 2024 0617  Last data filed at 2024 0600  Gross per 24 hour   Intake 2039.37 ml   Output 1755 ml   Net 284.37 ml       Labs:  Recent Labs   Lab 24  0604 24  0400  02/25/24  0359 02/25/24  0152 02/25/24  0001   PH 7.47* 7.45  --  7.40 7.41   PCO2 39 42  --  47* 46*   PO2 70* 90  --  76* 75*   HCO3 29* 29*  --  29* 29*   O2PER 100 100 100  100 80 100       Lab Results   Component Value Date    HGB 8.5 (L) 02/25/2024    PHGB 80 (H) 02/24/2024     (L) 02/25/2024    FIBR 187 02/25/2024    INR 1.31 (H) 02/25/2024    PTT 59 (H) 02/25/2024    DD 1.63 (H) 02/25/2024    ANTCH 46 (L) 02/24/2024       Plan is to continue VA ECMO support and adjust settings as needed.    Chance Glover, RT  ECMO Specialist  2/25/2024 6:46 AM

## 2024-02-25 NOTE — PROGRESS NOTES
ECMO Attending Progress Note  2024    Silvano Motta is a 58 year old male who was cannulated for ECMO 2024 due to refractory PEA arrest    Cannulation Site:  17 Fr in the L femoral artery  29 Fr in the L femoral vein    Interval events: Stable hemodynamically, still with high oxygen requirement    Pulsatilty (IABP paused if applicable):40 mmHG     Physical Exam:  Temp:  [98.4  F (36.9  C)-98.8  F (37.1  C)] 98.4  F (36.9  C)  Pulse:  [54-77] 67  Resp:  [11-25] 14  BP: ()/(57-65) 108/65  MAP:  [62 mmHg-249 mmHg] 76 mmHg  Arterial Line BP: ()/(54-78) 96/69  FiO2 (%):  [80 %-100 %] 100 %  SpO2:  [91 %-100 %] 99 %    Intake/Output Summary (Last 24 hours) at 2024 1158  Last data filed at 2024 1100  Gross per 24 hour   Intake 1932.82 ml   Output 1425 ml   Net 507.82 ml        Vent Mode: CMV/AC  (Continuous Mandatory Ventilation/ Assist Control)  FiO2 (%): 100 %  Resp Rate (Set): 15 breaths/min  Tidal Volume (Set, mL): 500 mL  PEEP (cm H2O): 10 cmH2O  Resp: 14       Labs:  Recent Labs   Lab 24  0954 24  0751 24  0604 24  0400   PH 7.49* 7.48* 7.47* 7.45   PCO2 38 39 39 42   PO2 92 65* 70* 90   HCO3 29* 29* 29* 29*   O2PER 100 100 100 100      Recent Labs   Lab 24  0954 24  0359 24  2147 24  1641   WBC 11.6* 14.0* 11.4* 12.6*   HGB 8.2* 8.5* 8.9* 9.3*     Creatinine   Date Value Ref Range Status   2024 0.93 0.67 - 1.17 mg/dL Final   2024 1.02 0.67 - 1.17 mg/dL Final   2024 1.01 0.67 - 1.17 mg/dL Final   2024 1.11 0.67 - 1.17 mg/dL Final   Blood Flow (Circuit) LPM: 4.28 LPM  Sweep LPM: 1 LPM  Sweep FiO2   %: 100 %  ACT  (seconds): 170 seconds  Arterial Blood Temp  (degrees C): 36.9 C  Pulse Oximetry  (SpO2%): 98 %  Arterial Pressure  mmH mmHg    ECMO Issues including assessments and plan on DOS 2024:  Neuro: Sedated for mechanical ventilation and ECMO.  No acute distress.  NIRS stable b/l  RASS goal: -3  CV:  Cardiogenic shock.  Hemodynamically stable on low dose epi, vaso  Pulm: Keep vent settings at rest settings as above.  FEN/Renal: Electrolytes stable w/ replacement protocols in place, Cr worsening, UOP decreasing  Heme: ACT goal: holding heparin, Hemoglobin stable.  Minimal oozing around the ECMO cannulas.  ID: Receiving empiric antibiotics  Cannulae: Position is acceptable on exam and the available imaging.  Distal perfusion cannula is in place and patent.  Extremities are well-perfused.     I have personally reviewed the ECMO flows, oxygenation and CO2 clearance, anticoagulation, and cannula position.  I have also personally assessed the patient's systemic response with hemodynamics, oxygenation, ventilation, and bleeding.       The patient requires continued ECMO support and management in the ICU.  I have discussed patient care and treatment plan with the primary team.      Kavin Saleh MD, PhD  Interventional/Critical Care Cardiology  [phone]    February 25, 2024

## 2024-02-25 NOTE — PROGRESS NOTES
[facility]    ECLS Shift Summary: [id number]     ECMO Equipment:  Console Serial Number: [id number]  Circuit Lot Number: [id number]  Oxygenator Lot Number: [id number]    Circuit Assessment: Fibrin  Fibrin Location: Fibrin starting at post oxy and connectors    Arterial ECMO Cannula: 14 Fr in the Left Femoral Artery  Venous ECMO Cannula: 29 Fr in the Left Femoral Vein  ECMO Cannula Venous Left femoral vein-Site Assessment: WDL, Sutured, Secure  ECMO Cannula Arterial Left femoral artery-Site Assessment: WDL, Sutured, Secure  Distal Perfusion Catheter Left femoral artery-Site Assessment: WDL, Sutured, Secure  ECMO Cannula Venous Left femoral vein-Site Intervention: No intervention needed  ECMO Cannula Arterial Left femoral artery-Site Intervention: No intervention needed  Distal Perfusion Catheter Left femoral artery-Site Intervention: No intervention needed    Patient remains on V-A ECMO, all equipment is functioning and alarms are appropriately set. RPM's: 3700 with Blood Flow (Circuit) LPM  Av.3 LPM  Min: 4.19 LPM  Max: 4.49 LPM L/min. Sweep is at 1 LPM and 100 %. Extremities are warm to the touch and perfused..     Significant Shift Events:   - Large amounts of bleeding from mouth, ETT, nares.   - CT Scan  - Held heparin until tomorrow (2024), unless in need due to significant  changes.      Vent settings:  Vent Mode: CMV/AC  (Continuous Mandatory Ventilation/ Assist Control)  FiO2 (%): 100 %  Resp Rate (Set): 15 breaths/min  Tidal Volume (Set, mL): 500 mL  PEEP (cm H2O): 12 cmH2O  Resp: 15      Anticoagulation:  Dose (units/hr) HEParin: 0 Units/hr  Rate (mL/hr) HEParin: 0 mL/hr  Concentration HEParin: 100 Units/mL        Most recent: ACT  (seconds): 147 seconds    Urine output is  .  Blood loss was moderate amount from mouth, ett, and nares.No blood Product given overnight.      Intake/Output Summary (Last 24 hours) at 2024 9568  Last data filed at 2024  1200  Gross per 24 hour   Intake 1733.97 ml   Output 1465 ml   Net 268.97 ml       Labs:  Recent Labs   Lab 02/25/24  1102 02/25/24  0954 02/25/24  0751 02/25/24  0604   PH 7.49* 7.49* 7.48* 7.47*   PCO2 33* 38 39 39   PO2 109* 92 65* 70*   HCO3 25 29* 29* 29*   O2PER 100 100 100 100       Lab Results   Component Value Date    HGB 8.2 (L) 02/25/2024    PHGB 80 (H) 02/24/2024    PLT 95 (L) 02/25/2024    FIBR 187 02/25/2024    INR 1.31 (H) 02/25/2024    PTT 47 (H) 02/25/2024    DD 1.63 (H) 02/25/2024    ANTCH 51 (L) 02/25/2024       Plan is to continue VA ECMO Support and adjust settings as needed.    Jacob Escalona, RT  ECMO Specialist  2/25/2024 2:38 PM

## 2024-02-26 NOTE — CONSULTS
[facility]  WOC Nurse Inpatient Assessment     Consulted for: ECMO pressure injury prevention     Patient History (according to provider note(s):      Silvano Motta is a 58 year old male being admitted to the CICU on 2/4/2024 s/p peripheral VA-ECMO cannulation s/p cardiac arrest. 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 Admitted 1/25 to OSH for AHRF and transferred to [facility] 2/4 for PH therapy managed.  Patient not a lung transplant candidate at [facility] or [facility].       Assessment:      ECMO cannula location: Left groin ECMO cannula  Positioning tolerance: Fair  Date of ECMO cannulation: 02/23/2024  Presence of ischemia: No  Location of ischemia: n/a  Pressure Injury Present::No, does have significant oral and nasal bleeding currently   Pressure Injury Prevention Interventions In Place:  Optifoam Dressing under ECMO Cannula, Z flow Positioner under head, TAPs Wedge Positioners in use, Heel off-loading boots, and Mepilex Sacral Dressing        Pressure Injury Prevention Interventions Added:  None       Treatment Plan:     ECMO pressure injury prevention plan:      Reposition patient every 1-2 hours using TAP Wedges  Position head on Z flow positioner, mold indentation at areas of pressure points.   Pad ECMO groin and chest cannula under rigid connectors with Optifoam or Soft cloth  Heel off-loading Boots at all times  Sacral Mepilex for Prevention, change every 5 days and prn  Low Air loss mattress     Orders: Written    RECOMMEND PRIMARY TEAM ORDER: None, at this time  Education provided: plan of care  Discussed plan of care with: Nurse  WOC nurse follow-up plan: weekly  Notify WOC if wound(s) deteriorate.  Nursing to notify the Provider(s) and re-consult the WOC Nurse if new skin  concern.    DATA:     Current support surface: Standard  Low air loss (MICHAELLE pump, Isolibrium, Pulsate, skin guard, etc)  Containment of urine/stool: Indwelling catheter  BMI: Body mass index is 36.98 kg/m .   Active diet order: Orders Placed This Encounter      NPO for Medical/Clinical Reasons Except for: No Exceptions     Output: I/O last 3 completed shifts:  In: 2946.44 [I.V.:2306.44; NG/GT:170]  Out: 3170 [Urine:2820; Stool:350]     Labs:   Recent Labs   Lab 02/26/24  1006 02/23/24 2057 02/23/24 1951   ALBUMIN 3.4*   < > 4.0   HGB 7.1*   < > 15.1   INR 1.12   < > 2.62*   WBC 6.8   < > 21.6*   A1C  --   --  10.0*    < > = values in this interval not displayed.     Pressure injury risk assessment:   Sensory Perception: 2-->very limited  Moisture: 3-->occasionally moist  Activity: 1-->bedfast  Mobility: 2-->very limited  Nutrition: 2-->probably inadequate  Friction and Shear: 2-->potential problem  Seth Score: 12    Pager no longer is use, please contact through [employer]   [employer] group: [facility] Nurse [place]   Dept. Office Number: [phone]

## 2024-02-26 NOTE — CARE PLAN
"Shift Summary  Neuro: sedated. Follows commands, KNOWLES, nods appropriately when sedation is decreased. Afebrile.   CV: VA ECMO: flows around 4.4 LPM, sweep 3, FIO2 80. Chugging in the AM 1L LR given, no recurrence. On low dose pressors.   Pulm: CMV 15/500/12/100. Bloody oral, nasal and ETT secretions. ENT packed with Afrin on left side. Blood started flowing out of right side; nurse packed; told my CICU to stop changing and \"let it drain out\". CICU notified that RN and RT are pulling large blood clots out of ETT and losing ability to pass suction through ETT. Advised by team to \"let them know if the ET tube occludes and then they will bronch\".   GI: OGT @ 75. TF started. Large amounts of watery stool  : Nicole exchanged after significant sediment build up occluded flow. UOP adequate overall.   Skin: scattered bruises; oral mucosa filled with bloody clots     Bleeding and clots from ETT, nasal and oral suctioning. ENT consulted today and packed left side of mouth. Blood clots starting to occlude ETT. CICU team aware. High-resolution CT scan done today; may need to be re-done proned tomorrow if imaging is insufficient     For detailed assessments and vitals, see flowsheets     Plan:  Cath lab with Dr. Saleh on Tuesday.         "

## 2024-02-26 NOTE — PROGRESS NOTES
"Otolaryngology Progress Note  February 26, 2024    SUBJECTIVE: No acute events overnight. Nursing reports intermittent oozing from the sides of the mouth; additionally, suction of the ET tube has consistently remained blood-tinged. Bedside nurse reports bleeding from the nares bilaterally, which she packed anteriorly with gauze.     OBJECTIVE:   /65   Pulse 89   Temp 98.8  F (37.1  C)   Resp 13   Ht 1.778 m (5' 10\")   Wt 116.9 kg (257 lb 11.5 oz)   SpO2 98%   BMI 36.98 kg/m     General: Sedated and intubated, does not appear in acute distress.    HEENT: Oral Kerlix packing in place with significant strikethrough, remains dry along the ends. ET tube in place, suction tubing with blood-tinged secretions. Rolled-up gauze placed in bilateral anterior nares, not saturated.    Pulmonary: mechanically ventilated     Intake/Output Summary (Last 24 hours) at 2/26/2024 0701  Last data filed at 2/26/2024 0600  Gross per 24 hour   Intake 2940.44 ml   Output 3170 ml   Net -229.56 ml     LABS:  ROUTINE IP LABS (Last four results)  BMP  Recent Labs   Lab 02/26/24  0609 02/26/24  0404 02/26/24  0358 02/26/24  0205 02/25/24  2150 02/25/24  2113 02/25/24  1632 02/25/24  1628 02/25/24  1155 02/25/24  0954   NA  --   --  148*  --   --  144  --  143  --  144   POTASSIUM  --   --  4.0  --   --  4.0  --  4.4  --  4.2   CHLORIDE  --   --  113*  --   --  109*  --  110*  --  110*   DORINA  --   --  8.5*  --   --  7.9*  --  7.7*  --  7.8*   CO2  --   --  27  --   --  25  --  25  --  26   BUN  --   --  11.6  --   --  12.7  --  12.2  --  11.2   CR  --   --  0.88  --   --  0.91  --  0.90  --  0.93   * 165* 180* 166*   < > 201*   < > 153*   < > 133*    < > = values in this interval not displayed.     CBC  Recent Labs   Lab 02/26/24  0358 02/25/24  2113 02/25/24  1628 02/25/24  0954   WBC 8.3 8.1 7.3 11.6*   RBC 2.30* 2.52* 2.20* 2.43*   HGB 7.7* 8.3* 7.2* 8.2*   HCT 22.2* 23.9* 21.2* 23.5*   MCV 97 95 96 97   MCH 33.5* 32.9 32.7 " "33.7*   MCHC 34.7 34.7 34.0 34.9   RDW 13.4 13.4 12.9 12.6   PLT 67* 73* 73* 95*     INR  Recent Labs   Lab 02/26/24  0358 02/25/24  2113 02/25/24  1628 02/25/24  0954   INR 1.13 1.17* 1.33* 1.31*       ASSESSMENT & PLAN: Silvano Motta is a 58 year old male with a past medical history of of chronic hypoxemic respiratory failure of unknown etiology, severe ROSALIO, pulmonary hypertension, seizure disorder, TBI who was admitted for acute hypoxic respiratory failure and has had multiple PEA arrest and is status post ECMO cannulation; ENT is following for oral bleeding, which was tightly packed with Afrin soaked Kerlix.    - Will return to add dissolvable nasal packing in the setting of increased nasal bleeding  - Oral packing to remain in place; ENT will remove 3-5 days after placement  - Antistaphylococcal coverage when packing is in place  - Afrin to packing 3 times daily  - Remainder of cares per primary team    -- Patient and above plan to be discussed with Dr. Lillian Rodriguez MD  ENT Resident, PGY-1      Clinically Significant Risk Factors         # Hypernatremia: Highest Na = 148 mmol/L in last 2 days, will monitor as appropriate  # Hypocalcemia: Lowest Ca = 7.7 mg/dL in last 2 days, will monitor and replace as appropriate     # Hypoalbuminemia: Lowest albumin = 3.3 g/dL at 2/25/2024  4:28 PM, will monitor as appropriate   # Thrombocytopenia: Lowest platelets = 67 in last 2 days, will monitor for bleeding          # Obesity: Estimated body mass index is 36.98 kg/m  as calculated from the following:    Height as of this encounter: 1.778 m (5' 10\").    Weight as of this encounter: 116.9 kg (257 lb 11.5 oz).      # Financial/Environmental Concerns: none;other (see comments) ([employer] paying for transportation with al other needs.)         "

## 2024-02-26 NOTE — PROGRESS NOTES
Cardiology ICU Progress Note    Brief HPI:  Silvano Motta is a 58 year old male being admitted to the CICU on 2/4/2024 s/p peripheral VA-ECMO cannulation s/p cardiac arrest. 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 Admitted 1/25 to OSH for AHRF and transferred to [facility] 2/4 for PH therapy managed.  Patient not a lung transplant candidate at [facility] or [facility].      Subjective and Interval:  -continued agitation/restlessness, bleeding from mouth/nose    Assessment and plan by system:   Today's changes:  + PO BID zyprexa, RASS goal -1 to -2  +EDWIGE today, aim for turndown study at the end  + land flows no lower than 3L   + peridex  + increase FWF  + metolazone and lasix for FBG of - 1L  + wean solucortef  + ACT goal 140-160     Neurology   # Concern for anoxic brain injury    # Hx TBI 2020  # Hx Sz disorder-Todds paralysis  CT head 2/23: no intracranial pathology  - Intubated, sedated. Avoiding hyperthermia  - Neuro-crit consulted and appreciate recommendations.   - Palliative care consulted and appreciate assistance  -vEEG No sz activity, moderate to severe encephalopathy    Current sedation:  RASS (Davidson Agitation-Sedation Scale): -3-->moderate sedation  Dose (mcg/hr) Fentanyl: 150 mcg/hr and Dose (mcg/kg/min) Propofol: 50 mcg/kg/min    Plan:  - Continue PTA Keppra, VIMPAT, and gabapentin  - Continue home ASA  - Continue sedation with a RASS goal -1 to -2  - Spontaneous awakening trial as tolerated  - permissive hypercapnea and hypernatremia for neuroprotection     Cardiovascular  # PEA/VT Cardiac Arrest s/p peripheral VA ECMO Cannulation 2/23/24   # Cardiogenic shock requiring VA ECMO  Peripheral V-A ECMO inserted via LCFA and LCFV 17 Fr arterial cannula, 29 Fr venous annula, DPC 7 Fr LSFA  Angiogram: RHC, Normal  biventricular filling pressures.   Mild pre-capillary pulmonary hypertension  Low-normal cardiac output and index.  TTE:  VA ECMO at 4.1 LPM. Difficult to assess LV function but appears to be  ~30%. The right ventricle is normal size.  Global right ventricular function is mildly reduced.  No pericardial effusion is present.    ECG Rhythm: Sinus rhythm    ECMO:  Mode: V-A   Pump Type: Cardiohelp  RPM's: 3300  Blood Flow (Circuit) LPM: 3.62 LPM  Sweep LPM: 0.5 LPM  Sweep FiO2   %: 100 %  Venous Pressure  mmHg: -20 mmHg  Arterial Pressure  mmH mmHg  Internal Pressure mmH mmHg  Pressure Change mmHg: 15 mmHg  PP 40 points  Current Pressors/inotropes/antiarrythmic:  Dose (mcg/kg/min) Norepinephrine: 0.04 mcg/kg/min, Dose (units/hr) Vasopressin: 2 Units/hr, and Dose (mcg/kg/min) Epinephrine: 0.04 mcg/kg/min    Hemodynamics:  Art Line  Arterial Line BP: 96/56  Arterial Line MAP (mmHg): 67 mmHg  Arterial Line Location: Right radial  Art Line Wave Form: Appropriate wave forms    Plan:  - Continue ASA 81mg   - Hold lipitor for now given shock liver  - Telemetry monitoring  - Continue trending troponin  - Continue ECMO support   - FBG -1L     Pulmonary  # Acute on chronic hypoxemic respiratory failure requiring intubation  # Shunt physiology, positive shunt study 24  # Mild pulmonary hypertension  # Severe ROSALIO on PAP at night  # Probable aspiration pneumonia  Per pulmonology documentation: Hx of chronic hypoxemia thought due to hypoventilation and untreated sleep apnea since Oct 2023 with extensive workup including multiple CT Angio studies, NM Perfusion studies, RHC in Oct 2023 and again this hospitalization showing mild pulmonary hypertension (mean PA 25, PCWP 10). Imaging with stable pleural based HARMONY nodule and subpleural reticulation and thickening of interlobular septi present since . Repeated CT scans without embolus or AVM. Does have genetic sequencing in process. Thus far auto-immune workup negative  (SSA, SSB, Scleroderma, Clara 1, RF, CCP, CRP, Aldolase, HP panels, ANCA, IgG) except for positive ROSAMARIA (speckled 1:160). In Oct also had negative workup (C3 and C4, anti-centromere, anti-Smith, anti-RNP, anti-SSA, anti-SSB, Scl-70, anti-cardiolipin, anti-CCP). Concern for shunt physiology with highly elevated A-a gradient, multiple TTEs and EDWIGE with positive late bubble, hypoxemia and ongoing dependence on high flow oxygen that seems out of proportion to degree of pulmonary hypertension and fibrotic changes. NM MAA shunt study 2/9 confirms shunt physiology. Based on bubble studies, presumably extracardiac shunt, potential diagnosis includes DMVPAVM which is very rare but would explain lack of macrovascular abnormalities. In discussions with radiology, and abnormal NM shunt study perfusion to upper lung fields, will pursue CTPE dual energy to better delineate vasculature and perfusion. Additionally, will obtain CT abdomen/pelvis for any extrathoracic abnormality that could be contributing to shunt. Not a transplant candidate per [facility] or [place].   CAP CT  Bilateral opacity, aspiration  CXR: R middle and bilateral lower lobe opacities. Better lung volumes. ETT and venous ECMO cannula in good positions    Vent Settings:   Vent Mode: CMV/AC  (Continuous Mandatory Ventilation/ Assist Control)  FiO2 (%): 70 %  Resp Rate (Set): 15 breaths/min  Tidal Volume (Set, mL): 500 mL  PEEP (cm H2O): 12 cmH2O  Resp: 13    ABG:   Recent Labs   Lab 02/26/24  0607 02/26/24  0358 02/26/24  0357 02/26/24  0202   PH 7.45  --  7.47* 7.47*   PCO2 43  --  41 41   PO2 83  --  85 89   HCO3 30*  --  30* 30*   O2PER 70 70  100 70 70     Plan:  - Pulm on board, appreciate recs   - Wean vent as able  - Daily CXR  - Serial ABGs   - Consider scheduled duonebs if signs of lung dz, currently PRN    - Peridex  - FBG and diuresis as above        Gastrointestinal, Nutrition  # Shock liver 2/2 cardiac arrest  # Hypoalbuminemia   # At risk for protein  malnutrition   No known medical hx.   CAP CT  Anterior mediastinal hematoma post arrest   Diet: Nutrition consult for feeding today  BM:  liquid    Recent Labs   Lab 24  0358 24  1628   AST 57* 61* 58*   ALT 31 33 34   BILITOTAL 0.3 0.4 0.2   ALBUMIN 3.4* 3.5 3.3*   PROTTOTAL 4.8* 4.7* 4.4*   ALKPHOS 41 40 35*       Plan:  - Monitor LFTs  - Bowel regimen in place-PRN  - GI Prophylaxis: PPI; Protonix 40 mg daily  - Nutrition consulted, appreciate recommendations      Renal, Electrolytes  # Acute Renal Injury   # Lactic acidosis  Baseline CR 0.90    Intake/Output Summary (Last 24 hours) at 2024 0837  Last data filed at 2024 0800  Gross per 24 hour   Intake 1944.42 ml   Output 1615 ml   Net 329.42 ml     Recent Labs   Lab 24  0358 24  1628 24  0954 24  0359 24  0944 24  0403   * 144 143   < > 144   < > 143   POTASSIUM 4.0 4.0 4.4   < > 4.3   < > 3.7   CHLORIDE 113* 109* 110*   < > 109*   < > 104   CO2 27 25 25   < > 26   < > 23   BUN 11.6 12.7 12.2   < > 11.3   < > 16.0   CR 0.88 0.91 0.90   < > 1.02   < > 1.27*   PHOS 2.7  --   --   --  3.1  --  3.6   MAG 2.0 2.0 2.2   < > 2.2   < > 1.5*    < > = values in this interval not displayed.     Plan:  - Avoid nephrotoxins, renally dose meds  - Monitor urine output  - FBG and diuresis as above    Infectious Disease  # Aspiration Pneumonia  # Leukocytosis  Temp (24hrs), Av.5  F (36.9  C), Min:97.9  F (36.6  C), Max:98.8  F (37.1  C)     Cultures thus far:   Blood NGTD   Sputum 3+ GPC    Urine NGTD   MRSA Positive   COVID negative  Antibiotics     Anti-infectives (From now, onward)      Start     Dose/Rate Route Frequency Ordered Stop    24 1430  vancomycin (VANCOCIN) 1,500 mg in 0.9% NaCl 250 mL intermittent infusion         1,500 mg  over 90 Minutes Intravenous EVERY 12 HOURS 24 1416      24 1400  cefTRIAXone (ROCEPHIN) 2 g vial to attach to  ml bag  for ADULTS or NS 50 ml bag for PEDS         2 g  over 30 Minutes Intravenous EVERY 24 HOURS 02/24/24 1153 02/28/24 1359           Recent Labs   Lab 02/26/24  0358 02/25/24 2113 02/25/24  1628   WBC 8.3 8.1 7.3                    Plan:  - Continue Ceftriaxone x 5D for aspiration coverage  - Monitor for signs of infection  - Avoid fevers     Hematology  # Anemia of critical illness  Hgb decreaseing. Oral bleeding, CT CAP anterior mediastinal hematoma  US LE w/ Arterial Duplex with no evidence of acute thrombus    Receiving heparin for ECMO with ACT goal 140-160  Dose (units/hr) HEParin: 0 Units/hr  ACT  (seconds): 139 seconds    Recent Labs   Lab 02/26/24  0358 02/25/24 2113 02/25/24  1628   HGB 7.7* 8.3* 7.2*   HCT 22.2* 23.9* 21.2*   PLT 67* 73* 73*     Recent Labs   Lab 02/26/24  0358 02/25/24 2113 02/25/24  1628   INR 1.13 1.17* 1.33*   PTT 30 31 35       Plan:  - DVT PPX: Heparin with ACT goal as above  - Transfusion parameters:   - 1u PRBC for hbg < 7.0   - 1u platelet for plt < 50   - 1u FFP for INR > 3   - 5u cryoprecipitate for fibrinogen <150     Endocrinology  # Hyperglycemia   # A1C   Recent Labs   Lab 02/23/24  1951   A1C 10.0*       Recent Labs   Lab 02/26/24  0609 02/26/24  0404 02/26/24  0358 02/26/24  0205 02/26/24  0057   * 165* 180* 166* 122*     Dose (units/hr) Insulin: 5 Units/hr    Plan  - insulin gtt as needed  - Endocrine consulted   - Continue levothyroxine 150mcg    Integumentary:  - No skin issues    Lines/Tubes/Drains:  PIV left lower fore arm 2/10  CVC TL RFV 2/23  RR arterial line 2/23  ECMO L femoral vein 29 FR 2/23   ECMO L femoral artery 17 FR 2/23  Distal perfusion catheter LSFA 2/23  Nicole 2/23  OG 2/24  Rectal tube with balloon 2/23     ICU  Feeding: TF working towards goal  Analgesia: Fentanyl  Sedation: propofol and precedex  Thrombopx: Heparin infusion when ACT trends down  Head of bed: reverse trend   Ulcers: PPI  Glucose: Insulin infusion  SBT: not ready  B: liquid  "stool with rectal tube  I: still needs urinary catheter for accurate I/Os  De-escalate: continue abx    Family will be updated by team    Patient seen, discussed, and plan developed with staff physician, Dr Sb Deng, APRN, CNP  [facility]  Critical Care Cardiology  Text Page     Objective:  Most recent vital signs:  /65   Pulse 89   Temp 98.8  F (37.1  C)   Resp 13   Ht 1.778 m (5' 10\")   Wt 116.9 kg (257 lb 11.5 oz)   SpO2 98%   BMI 36.98 kg/m    Temp:  [97.9  F (36.6  C)-98.8  F (37.1  C)] 98.8  F (37.1  C)  Pulse:  [] 89  Resp:  [0-27] 13  MAP:  [63 mmHg-190 mmHg] 67 mmHg  Arterial Line BP: ()/(54-83) 96/56  FiO2 (%):  [70 %-100 %] 70 %  SpO2:  [85 %-100 %] 98 %      Physical exam:  General: In bed, in NAD  HEENT: PERRL, no scleral icterus or injection  CARDIAC: RRR, no m/r/g appreciated. Peripheral pulses dopplered  RESP: Mechanical ventilation; L lung diminished with exp wheezing  GI: soft, rounded, BS hypoactive  : Nicole, good UO  EXTREMITIES: 3+ LE edema, pulses 2+. Femoral access site oozing decreased. Dressing changed last night   SKIN: No acute lesions appreciated  NEURO: Intubated and sedated, sedation holiday as tolerates. Opens eyes, nods to questions when lightened    All imaging/results reviewed by me.         "

## 2024-02-26 NOTE — PROGRESS NOTES
[facility]    ECLS Shift Summary:     ECMO Equipment:  Console Serial Number: [id number]  Circuit Lot Number: [id number]  Oxygenator Lot Number: [id number]    Circuit Assessment: Fibrin  Fibrin Location: At connectors    Arterial ECMO Cannula: 17 Fr in the Left Femoral Artery  Venous ECMO Cannula: 29 Fr in the Left Femoral Vein  ECMO Cannula Venous Left femoral vein-Site Assessment: WDL, Sutured, Secure  ECMO Cannula Arterial Left femoral artery-Site Assessment: WDL, Sutured, Secure  Distal Perfusion Catheter Left femoral artery-Site Assessment: WDL, Sutured, Secure  ECMO Cannula Venous Left femoral vein-Site Intervention: No intervention needed  ECMO Cannula Arterial Left femoral artery-Site Intervention: No intervention needed  Distal Perfusion Catheter Left femoral artery-Site Intervention: No intervention needed    Patient remains on V-A ECMO, all equipment is functioning and alarms are appropriately set. RPM's: 3625 with Blood Flow (Circuit) LPM  Av.1 LPM  Min: 3.91 LPM  Max: 4.5 LPM L/min. Sweep is at 0.5 LPM and 100 %. Extremities are warm and well perfused.     Significant Shift Events: Heparin was started and titrated to maintain an ACT goal of 140-160. Increased flow to help maintain MAP's >65.  ECMO bandage cleaned and changed at 1600    Vent settings:  Vent Mode: CMV/AC  (Continuous Mandatory Ventilation/ Assist Control)  FiO2 (%): 70 %  Resp Rate (Set): 15 breaths/min  Tidal Volume (Set, mL): 500 mL  PEEP (cm H2O): 12 cmH2O  Resp: 13      Anticoagulation:  Dose (units/hr) HEParin: 600 Units/hr  Rate (mL/hr) HEParin: 6 mL/hr  Concentration HEParin: 100 Units/mL        Most recent: ACT  (seconds): 143 seconds    Urine output is Yellow / Straw Colored.  Blood loss was minimum. Product given included none.     Intake/Output Summary (Last 24 hours) at 2024 1736  Last data filed at 2024 1700  Gross per 24 hour   Intake 4300.36 ml   Output 4797 ml   Net  -496.64 ml       Labs:  Recent Labs   Lab 02/26/24  1533 02/26/24  1532 02/26/24  1402 02/26/24  1212 02/26/24  1006   PH  --  7.44 7.44 7.48* 7.43   PCO2  --  45 46* 41 46*   PO2  --  114* 94 109* 71*   HCO3  --  30* 31* 30* 30*   O2PER 70  100 70 70 70 70       Lab Results   Component Value Date    HGB 7.3 (L) 02/26/2024    PHGB 30 (H) 02/26/2024    PLT 77 (L) 02/26/2024    FIBR 223 02/26/2024    INR 1.10 02/26/2024    PTT 34 02/26/2024    DD 2.22 (H) 02/26/2024    ANTCH 83 (L) 02/26/2024       Plan is continue VA ECMO support and adjust settings as needed. Tenative plan to go to cath lab on Tuesday for a shunt flow study     RT Phillip  ECMO Specialist  2/26/2024 5:36 PM

## 2024-02-26 NOTE — PHARMACY-CONSULT NOTE
Pharmacy Tube Feeding Consult    Medication reviewed for administration by feeding tube and for potential food/drug interactions.    Recommendation: No changes are needed at this time.     Pharmacy will continue to follow as new medications are ordered.    Fide Lin, AudraD

## 2024-02-26 NOTE — PROGRESS NOTES
CLINICAL NUTRITION SERVICES - REASSESSMENT NOTE     Nutrition Prescription    RECOMMENDATIONS FOR MDs/PROVIDERS:  Daily fluid adjustments per MD    Malnutrition Status:    Patient does not meet two of the established criteria necessary for diagnosing malnutrition    Recommendations already ordered by Registered Dietitian (RD):  -Transition to new TF formula due to formula shortage: GOAL TF - TwoCal HN @ 35 mL/hr (840 mL/day) + 3 pkt Prosource TF20 to provide 1920 kcals (23 kcals/kg/day), 131 g PRO (1.6 g/kg/day or 77% estimated protein needs), 588 mL H2O, 184 g CHO and 4 g Fiber daily.    --Not meeting full protein needs at this time per CICU request   -Continue daily MVI     Future/Additional Recommendations:  -Continue to monitor TF tolerance/adequacy  -Continue to monitor labs, stool output, weight trends   -Monitor propofol and adjust TFs as needed  -Once ok per CICU team to meet full protein needs, GOAL TF: TwoCal HN @ 35 mL/hr (840 mL/day) + 5 pkt Prosource TF20 to provide 2080 kcals (25 kcals/kg/day), 171 g PRO (2 g/kg/day), 588 mL H2O, 184 g CHO and 4 g Fiber daily.     EVALUATION OF THE PROGRESS TOWARD GOALS   Diet: NPO  Nutrition Support: TFs started yesterday. Currently running at 35 ml/hr.      NEW FINDINGS   Overall: Pt intubated and cannulated for VA ECMO on 2/23.     GI: 325 ml stool out of rectal tube so far today.     Skin: Intact    Labs: Reviewed - hypernatremia    Medications: Reviewed - propofol @ 38.9 ml/hr (1027 kcal/d)    Weights: No weight loss over the past week.  02/24/24 0545 116.9 kg (257 lb 11.5 oz)   02/17/24 0400 108.2 kg (238 lb 8.6 oz)     UPDATED NUTRITION NEEDS  Estimated Protein Needs: 170-210 grams protein/day (2 - 2.5 grams of pro/kg)     MALNUTRITION  % Intake: Decreased intake does not meet criteria  % Weight Loss: None noted  Subcutaneous Fat Loss: None observed  Muscle Loss: None observed  Fluid Accumulation/Edema: Mild  Malnutrition Diagnosis: Patient does not meet two of  the established criteria necessary for diagnosing malnutrition    Previous Goals   Pt able to verbalize daily sodium recommendations   Evaluation: Unable to evaluate    Previous Nutrition Diagnosis  Food- and nutrition-related knowledge deficit related to low sodium diet as evidenced by pt report   Evaluation: No longer applicable, nutrition diagnosis changed below    CURRENT NUTRITION DIAGNOSIS  Inadequate oral intake related to intubation as evidenced by NPO status and need for EN to meet nutrition needs at this time.       INTERVENTIONS  Implementation  Enteral Nutrition - adjusted as above   Multivitamin/mineral supplement therapy    Goals  Total avg nutritional intake to meet a minimum of 20 kcal/kg and 2 g PRO/kg daily (per dosing wt 84 kg).    Monitoring/Evaluation  Progress toward goals will be monitored and evaluated per protocol.    Neva Bar, MS, RD, LD  Contact via [facility] (CVICU) RD pager: [phone]  Weekend/Holiday RD pager: [phone]
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
## 2024-02-26 NOTE — PROGRESS NOTES
[facility]    ECLS Shift Summary:     ECMO Equipment:  Console Serial Number: [id number]  Circuit Lot Number: [id number]  Oxygenator Lot Number: [id number]    Circuit Assessment: Fibrin  Fibrin Location: connectors in the arterial limb    Arterial ECMO Cannula: 17 Fr in the Left Femoral Artery  Venous ECMO Cannula: 29 Fr in the Left Femoral Vein  ECMO Cannula Venous Left femoral vein-Site Assessment: WDL, Sutured, Secure  ECMO Cannula Arterial Left femoral artery-Site Assessment: WDL, Sutured, Secure, Other (Comment) (small amount of oozing from arterial cannula site)  Distal Perfusion Catheter Left femoral artery-Site Assessment: WDL, Sutured, Secure  ECMO Cannula Venous Left femoral vein-Site Intervention: (S) Dressing changed/new dressing  ECMO Cannula Arterial Left femoral artery-Site Intervention: (S) Dressing changed/new dressing  Distal Perfusion Catheter Left femoral artery-Site Intervention: (S) Dressing changed/new dressing    Patient remains on V-A ECMO, all equipment is functioning and alarms are appropriately set. RPM's: 3300 with Blood Flow (Circuit) LPM  Av.2 LPM  Min: 3.62 LPM  Max: 4.46 LPM L/min. Sweep is at 0.5 LPM and 100 %. Extremities are warm to touch and perfused.     Significant Shift Events:   Decreased ECMO flows due to hypertension.  ECMO dressing changed. There is a small to moderate amount of oozing at the arterial cannula site.   Ventilator FiO2 decreased to 70%.    Vent settings:  Vent Mode: CMV/AC  (Continuous Mandatory Ventilation/ Assist Control)  FiO2 (%): 70 %  Resp Rate (Set): 15 breaths/min  Tidal Volume (Set, mL): 500 mL  PEEP (cm H2O): 12 cmH2O  Resp: 13      Anticoagulation:  Dose (units/hr) HEParin: 0 Units/hr  Rate (mL/hr) HEParin: 0 mL/hr  Concentration HEParin: 100 Units/mL        Most recent: ACT  (seconds): 139 seconds    Urine output is adequate.  Blood loss was a large amount from bleeding from the mouth and oozing at  the cannulation site. No blood product or fluid given overnight.     Intake/Output Summary (Last 24 hours) at 2/26/2024 0618  Last data filed at 2/26/2024 0500  Gross per 24 hour   Intake 2854.54 ml   Output 3060 ml   Net -205.46 ml       Labs:  Recent Labs   Lab 02/26/24  0358 02/26/24  0357 02/26/24  0202 02/26/24  0001 02/25/24  2220   PH  --  7.47* 7.47* 7.52* 7.52*   PCO2  --  41 41 35 33*   PO2  --  85 89 116* 237*   HCO3  --  30* 30* 29* 27   O2PER 70  100 70 70 80 100       Lab Results   Component Value Date    HGB 7.7 (L) 02/26/2024    PHGB 30 (H) 02/26/2024    PLT 67 (L) 02/26/2024    FIBR 220 02/26/2024    INR 1.13 02/26/2024    PTT 30 02/26/2024    DD 1.75 (H) 02/26/2024    ANTCH 51 (L) 02/25/2024       Plan is to continue VA ECMO support and adjust settings as needed. Patient may have a prone CT done today. Tentative plan to go to cath lab on Tuesday for shunt flow study.    Chanec Glover RT  ECMO Specialist  2/26/2024 6:18 AM

## 2024-02-26 NOTE — CONSULTS
Palliative Care Consultation Note  [facility]      Patient: Silvano Motta  Date of Admission:  2/4/2024    Requesting Clinician / Team: ICU  Reason for consult: Goals of care       Recommendations & Counseling     GOALS OF CARE:   Plan of care - restorative measures without limits  Silvano and sister Odalys are aware that he is not a lung transplant candidate  Case was discussed with [facility], he is not a transplant candidate by their standards either  Care conference held today 2/17 with Silvano in his room with medicine, lung transplant, palliative, and RN CC. Sister Odalys was present via conference tablet.  Prior to his cardiac arrests: Silvano has noted a number of times that he would not want to be kept alive on a ventilator for a prolonged period of time. While on the phone with his sister, Odalys, Silvano spoke about the long medical course that their father had and his ventilator dependence.   Silvano is okay for a time limited trial of ventilator support but not if there is not a meaningful/reasonable chance at weaning off.     ADVANCE CARE PLANNING:  DECISION MAKING:  No health care directive on file. Per  informed consent policy, next of kin should be involved if patient becomes unable.   In depth discussion 2/16 with patient and his sister Odalys by phone. Silvano is clear that he wants Odalys to be his healthcare agent and Odalys confirms she is willing to play this role and agrees to be named healthcare agent on his healthcare directive. Silvano also says he would want to have his sister Antonieta be his alternate healthcare agent. He specifically says he does not want his daughter or mother involved in making healthcare decisions and does not want their contact information in the chart as he does not want them burdened by calls from the hospital.   Appreciate unit  help with HCD completion  There is no POLST form on file, defer to patient  and/or next of kin for decisions   Code status: Full Code     MEDICAL MANAGEMENT:   We are not actively managing symptoms at this time.     PSYCHOSOCIAL/SPIRITUAL SUPPORT:  Intubated and sedated         Herber Mcdaniels MD  Securely message with [facility] (more info)  Text page via [facility] Paging/Directory       Assessment      Silvano Motta is a 58 year old male with a past medical history of DM1 who presented to the hospital for evaluation and work up of acute hypoxic respiratory failure.      Etiology of his respiratory failure has not been found. He has undergone a battery of tests and work ups, all of which have been inconclusive.     Regardless, he has been deemed not a transplant candidate at [facility] or at [facility]. After this was shared with Silvano, he continued to attempt to wean his own oxygen needs with no success.     On 2/23/2024, Silvano suffered a cardiac arrest while in CT and on BiPAP support. ROSC was achieved quickly but he subsequently suffered refractory PEA requiring VA ECMO cannulation.     He now remains intubated, sedated, and on VA ECMO in the ICU but with a preserved neuro exam.     Palliative Care Summary:   Discussed with Odalys today over the phone.     She is hopeful that Silvano will improve. She is hoping that there is further work up that can be done to help remedy his respiratory status.     She was told that the plan at this time is for a tentative care conference with all of the teams later this week to discuss the next steps.     Odalys did reiterate Silvano's wishes to not be kept alive on a ventilator for a prolonged period of time if there was no chance of meaningful wean and recovery.      Prognosis, Goals, & Planning:    Functional Status just prior to this current hospitalization:  Before hospitalization, Janes lived on his own and took care of all of his own ADLs    Prognosis, Goals, and/or Advance Care Planning:  Full code. However, Silvano would not want to remain on a  ventilator for a prolonged period of time if there was no chance of meaningful recovery    Code Status was addressed today:   No, but has been discussed with Silvano and his Sister Odalys extensively this hospital stay. Will reach out again to Odalys given the medical changes Silvano has faced     Patient's decision making preferences: independently         Patient has decision-making capacity today for complex decisions: No            Coping, Meaning, & Spirituality:   Intubated and sedated - Historically, Silvano has noted that he is not very Christianity  He has not been coping well with the news about his pulmonary function and has been in denial with the belief that he can self wean his oxygen    Social:   Living situation:lives alone  Important relationships/caregivers:His sister, Odalys. He does NOT want his daughter or mother contacted  Occupation: On disability since 2020 [employer]  Areas of fulfillment/catie: art, music, playing bass, [place]    Medications:  I have reviewed this patient's medication profile and medications from this hospitalization. Notable medications     ROS:  Comprehensive ROS is reviewed and is negative except as here & per HPI:     Physical Exam   Vital Signs with Ranges  Temp:  [97.9  F (36.6  C)-98.8  F (37.1  C)] 98.6  F (37  C)  Pulse:  [] 91  Resp:  [0-27] 10  BP: (112)/(64) 112/64  MAP:  [62 mmHg-190 mmHg] 72 mmHg  Arterial Line BP: ()/(51-83) 99/62  FiO2 (%):  [70 %-100 %] 70 %  SpO2:  [85 %-100 %] 98 %  257 lbs 11.48 oz    PHYSICAL EXAM:  Middle aged man, intubated, sedated and on ecmo. Bloody packing in his mouth. Intermittently moving all 4 extremities    Data reviewed:  Recent Labs   Lab 02/26/24  1536 02/26/24  1406 02/26/24  1213 02/26/24  1010 02/26/24  1006 02/26/24  0404 02/26/24  0358 02/25/24  2150 02/25/24  2113   WBC  --   --   --   --  6.8  --  8.3  --  8.1   HGB  --   --   --   --  7.1*  --  7.7*  --  8.3*   MCV  --   --   --   --  97  --   97  --  95   PLT  --   --   --   --  59*  --  67*  --  73*   INR  --   --   --   --  1.12  --  1.13  --  1.17*   NA  --   --   --   --  148*  --  148*  --  144   POTASSIUM  --   --   --   --  4.0  --  4.0  --  4.0   CHLORIDE  --   --   --   --  113*  --  113*  --  109*   CO2  --   --   --   --  29  --  27  --  25   BUN  --   --   --   --  11.8  --  11.6  --  12.7   CR  --   --   --   --  0.91  --  0.88  --  0.91   ANIONGAP  --   --   --   --  6*  --  8  --  10   DORINA  --   --   --   --  8.4*  --  8.5*  --  7.9*   * 178* 180*   < > 169*   < > 180*   < > 201*   ALBUMIN  --   --   --   --  3.4*  --  3.4*  --  3.5   PROTTOTAL  --   --   --   --  4.6*  --  4.8*  --  4.7*   BILITOTAL  --   --   --   --  0.3  --  0.3  --  0.4   ALKPHOS  --   --   --   --  39*  --  41  --  40   ALT  --   --   --   --  29  --  31  --  33   AST  --   --   --   --  47*  --  57*  --  61*    < > = values in this interval not displayed.

## 2024-02-26 NOTE — PLAN OF CARE
Major Shift Events:    Neuro: at beginning of shift, pt increasing agitated and restless, c/o pain in throat/chest, attempting to sit up, moving legs. Notified provider multiple times. Pt requiring max levels of sedation for several hours, but able to wean as shift progressed. Now sedated with propofol, precedex and fentanyl. Opens eyes to stimulation, does not follow commands.   CV: VA ECMO, flows 3.6, sweep 0.5, FIO2 100. Flows decreased dt HTN. Provider aware. 10 mg PO hydralazine given x1. Provider okay with MAPs in the 90s.   Pulm: CMV 15/500/12/70. Able to wean FIO2 overnight. Large amount of blood secretions suctioned from ETT overnight.   GI: OGT @ 75, TF advanced per order.   : patterson with adequate UOP.   Skin: scattered bruises. Oral mucosa filled with bloody clots, packing in place, not replaced per team.     Plan: potential repeat CT if imaging is insufficient.   For vital signs and complete assessments, please see documentation flowsheets.

## 2024-02-26 NOTE — PLAN OF CARE
Goal Outcome Evaluation:      Plan of Care Reviewed With: patient    Overall Patient Progress: improvingOverall Patient Progress: improving    Outcome Evaluation: When sedation light, pt follows commands and KNOWLES. ECMO flow to 4.5 0.5Sweep and FiO2 100.    Major Shift Events:  Increased ECMO flow to 4.5 for low sbp. Vent remains on 70% and FiO2 on circuit at 100%. ENT packed bilateral nares with surgicel, did not change L side mouth packing. TF at goal of 35cc/hr with 60cc per 4 hours FWF. Heparin restarted and running at 600 units/hr. ECMO dressing changed as well as both central line dressings. Gave 40mg IV Lasix x1 and 5mg PO metolazone for diuresis. New ACT goal of 140-160 and do not flow lower than 3 LPM on ECMO per Dr. Basurto. Epi gtt restarted after diuresis and currently running at 0.04. Mds are aware.   Plan: EDWIGE, shunt run and turn down tomorrow 2/27/24.  For vital signs and complete assessments, please see documentation flowsheets.

## 2024-02-27 NOTE — PHARMACY-VANCOMYCIN DOSING SERVICE
Pharmacy Vancomycin Note  Date of Service 2024  Patient's  1965   58 year old, male    Indication: Healthcare-Associated Pneumonia  Day of Therapy: 3  Current vancomycin regimen: 1500 mg IV q12h  Current vancomycin monitoring method: AUC  Current vancomycin therapeutic monitoring goal: 15-20 mg/L    [employer] Prediction of Current Vancomycin Regimen    Regimen: 1500 mg IV every 12 hours.  Start time: 02:09 on 2024  Exposure target: AUC24 (range)400-600 mg/L.hr   AUC24,ss: 490 mg/L.hr  Probability of AUC24 > 400: 92 %  Ctrough,ss: 15 mg/L  Probability of Ctrough,ss > 20: 12 %  Probability of nephrotoxicity (Lodise STEPHANI ): 10 %    Current estimated CrCl = Estimated Creatinine Clearance: 123.3 mL/min (based on SCr of 0.86 mg/dL).    Creatinine for last 3 days  2024:  4:41 PM Creatinine 1.11 mg/dL;  9:47 PM Creatinine 1.01 mg/dL  2024:  3:59 AM Creatinine 1.02 mg/dL;  9:54 AM Creatinine 0.93 mg/dL;  4:28 PM Creatinine 0.90 mg/dL;  9:13 PM Creatinine 0.91 mg/dL  2024:  3:58 AM Creatinine 0.88 mg/dL; 10:06 AM Creatinine 0.91 mg/dL;  3:33 PM Creatinine 0.93 mg/dL;  9:59 PM Creatinine 0.87 mg/dL  2024:  4:10 AM Creatinine 0.85 mg/dL; 10:09 AM Creatinine 0.86 mg/dL    Recent Vancomycin Levels (past 3 days)  2024: 10:09 AM Vancomycin 16.2 ug/mL    Vancomycin IV Administrations (past 72 hours)                     vancomycin (VANCOCIN) 1,500 mg in 0.9% NaCl 250 mL intermittent infusion (mg) 1,500 mg New Bag 24 1409     1,500 mg New Bag  0134     1,500 mg New Bag 24 1355     1,500 mg New Bag  0213     1,500 mg New Bag 24 1531                    Nephrotoxins and other renal medications (From now, onward)      Start     Dose/Rate Route Frequency Ordered Stop    24 1430  vancomycin (VANCOCIN) 1,500 mg in 0.9% NaCl 250 mL intermittent infusion         1,500 mg  over 90 Minutes Intravenous EVERY 12 HOURS 24 1416      24  vasopressin 1  unit/mL MAX Conc (PITRESSIN) infusion         0.5-4 Units/hr  0.5-4 mL/hr  Intravenous CONTINUOUS 02/23/24 1944                 Contrast Orders - past 72 hours (72h ago, onward)      None            Interpretation of levels and current regimen:  Vancomycin level is reflective of -600    Has serum creatinine changed greater than 50% in last 72 hours: No    Urine output:  good urine output    Renal Function: Stable    Plan:  Continue Current Dose  Vancomycin monitoring method: AUC  Vancomycin therapeutic monitoring goal: 400-600 mg*h/L  Pharmacy will check vancomycin levels as appropriate in 1-3 Days.  Serum creatinine levels will be ordered daily for the first week of therapy and at least twice weekly for subsequent weeks.    Lynn Lopez, PharmD

## 2024-02-27 NOTE — PROGRESS NOTES
ECMO Attending Progress Note  2024    Silvano Motta is a 58 year old male who was cannulated for ECMO 2024 due to refractory PEA arrest    Cannulation Site:  17 Fr in the L femoral artery  29 Fr in the L femoral vein    Interval events: continues to get agitated and restless, occasional bleeding from the mouth and nose    Physical Exam:  Temp:  [98.6  F (37  C)-98.8  F (37.1  C)] 98.6  F (37  C)  Pulse:  [] 90  Resp:  [0-16] 15  MAP:  [55 mmHg-100 mmHg] 68 mmHg  Arterial Line BP: ()/(39-81) 109/56  FiO2 (%):  [70 %-85 %] 85 %  SpO2:  [82 %-100 %] 98 %    Intake/Output Summary (Last 24 hours) at 2024 1158  Last data filed at 2024 1100  Gross per 24 hour   Intake 1932.82 ml   Output 1425 ml   Net 507.82 ml        Vent Mode: PCV Plus assist  (Pressure Control Ventilation/ Assist Control)  FiO2 (%): 85 %  Resp Rate (Set): 15 breaths/min  Tidal Volume (Set, mL): 500 mL  PEEP (cm H2O): 12 cmH2O  Pressure Support (cm H2O): 30 cmH2O  Resp: 15       Labs:  Recent Labs   Lab 24  1539 24  1354 24  1200 24  1008   PH 7.55* 7.54* 7.51* 7.47*   PCO2 38 39 40 43   PO2 105 82 72* 52*   HCO3 33* 33* 31* 31*   O2PER 100  80  80 80 80 80      Recent Labs   Lab 24  1750 24  1747 24  1539 24  1009 24  0410 24  2159   WBC  --   --  9.2 5.2 5.1 5.3   HGB 8.4* 8.4* 7.7* 7.6* 7.3* 7.2*     Creatinine   Date Value Ref Range Status   2024 1.00 0.67 - 1.17 mg/dL Final   2024 0.86 0.67 - 1.17 mg/dL Final   2024 0.85 0.67 - 1.17 mg/dL Final   2024 0.87 0.67 - 1.17 mg/dL Final   Blood Flow (Circuit) LPM: 4.28 LPM  Sweep LPM: 1 LPM  Sweep FiO2   %: 100 %  ACT  (seconds): 170 seconds  Arterial Blood Temp  (degrees C): 36.9 C  Pulse Oximetry  (SpO2%): 98 %  Arterial Pressure  mmH mmHg    ECMO Issues including assessments and plan on DOS 2024:  Neuro: Sedated for mechanical ventilation and ECMO.  No acute distress.  NIRS  stable b/l  RASS goal: -1 to -2 (wakes up wildly)  CV: Cardiogenic shock.  Hemodynamically stable on low dose pressors   Pulm: Keep vent settings at rest settings as above.  FEN/Renal: Electrolytes stable w/ replacement protocols in place, Cr improving, UOP decreasing  Heme: ACT goal: 140-160 (nasal bleeding, stable), Hemoglobin stable.  Minimal oozing around the ECMO cannulas.  ID: Receiving empiric antibiotics  Cannulae: Position is acceptable on exam and the available imaging.  Distal perfusion cannula is in place and patent.  Extremities are well-perfused.     I have personally reviewed the ECMO flows, oxygenation and CO2 clearance, anticoagulation, and cannula position.  I have also personally assessed the patient's systemic response with hemodynamics, oxygenation, ventilation, and bleeding.       The patient requires continued ECMO support and management in the ICU.  I have discussed patient care and treatment plan with the primary team.      Miguel Angel Basurto MD  Critical Care Cardiology    February 27, 2024

## 2024-02-27 NOTE — PROGRESS NOTES
Neuro: sedated. Follows commands, KNOWLES, nods appropriately when sedation is decreased. Afebrile. Versed restarted for restlessness. Pupils equal and reactive. ENT consulted.    CV: VA ECMO: flows around 3.5 LPM, sweep 0.5, FIO2 100. NSR 80s-90s per Dr. Basurto flows no lower than 3LPM ACT goal 140-160    Pulm: CMV 15/500/12/80. Bloody oral and ETT secretions    GI: OGT with TF stopped at 0000. Hypo/faint BS with scant stool output.    : Nicole, adequate UOP    Skin: scattered bruises    Drips: propofol 75, fent 200, insulin 4, Heparin restarted at 600 units/hr for ACT goal 140-160    Labs: K replaced and phos replaced    Lines: L femoral ECMO, R fem CVC TL, L PIV x2, R rad art line

## 2024-02-27 NOTE — PROGRESS NOTES
Cardiology ICU Progress Note    Brief HPI:  Silvano Motta is a 58 year old male being admitted to the CICU on 2/4/2024 s/p peripheral VA-ECMO cannulation s/p cardiac arrest. 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 Admitted 1/25 to OSH for AHRF and transferred to [facility] 2/4 for PH therapy managed.  Patient not a lung transplant candidate at [facility] or [facility].      Subjective and Interval:  -continued agitation/restlessness, bleeding from mouth/nose    Assessment and plan by system:   Today's changes:  + PO TID zyprexa, RASS goal -1 to -2  +TTE turndown (did not do well)  + shunt run in CCL today  + increase FWF  + metolazone and lasix for FBG of - 1L  + solucortef off  + HIT screen   + wean sedation     Neurology   # Concern for anoxic brain injury    # Hx TBI 2020  # Hx Sz disorder-Todds paralysis  CT head 2/23: no intracranial pathology  - Intubated, sedated. Avoiding hyperthermia  - Neuro-crit consulted and appreciate recommendations.   - Palliative care consulted and appreciate assistance  -vEEG No sz activity, moderate to severe encephalopathy    Current sedation:  RASS (Davidson Agitation-Sedation Scale): -2-->light sedation  Dose (mcg/hr) Fentanyl: 150 mcg/hr and Dose (mcg/kg/min) Propofol: 50 mcg/kg/min    Plan:  - Continue PTA Keppra, VIMPAT, and gabapentin  - Continue home ASA  - Continue sedation with a RASS goal -1 to -2  - Spontaneous awakening trial as tolerated  - permissive hypercapnea and hypernatremia for neuroprotection     Cardiovascular  # PEA/VT Cardiac Arrest s/p peripheral VA ECMO Cannulation 2/23/24   # Cardiogenic shock requiring VA ECMO  Peripheral V-A ECMO inserted via LCFA and LCFV 17 Fr arterial cannula, 29 Fr venous annula, DPC 7 Fr LSFA  Angiogram: RHC, Normal biventricular filling pressures.    Mild pre-capillary pulmonary hypertension  Low-normal cardiac output and index.  TTE:  VA ECMO at 4.1 LPM. Difficult to assess LV function but appears to be  ~30%. The right ventricle is normal size.  Global right ventricular function is mildly reduced.  No pericardial effusion is present.    ECG Rhythm: Normal sinus rhythm    ECMO:  Mode: V-A   Pump Type: Cardiohelp  RPM's: 3635  Blood Flow (Circuit) LPM: 3.59 LPM  Sweep LPM: (S) 0.5 LPM  Sweep FiO2   %: 100 %  Venous Pressure  mmHg: -16 mmHg  Arterial Pressure  mmH mmHg  Internal Pressure mmH mmHg  Pressure Change mmHg: 15 mmHg  PP 40 points  Current Pressors/inotropes/antiarrythmic:  Dose (mcg/kg/min) Norepinephrine: 0.04 mcg/kg/min, Dose (units/hr) Vasopressin: 2 Units/hr, and Dose (mcg/kg/min) Epinephrine: 0.04 mcg/kg/min    Hemodynamics:  Art Line  Arterial Line BP: 139/78  Arterial Line MAP (mmHg): 96 mmHg  Arterial Line Location: Right radial  Art Line Wave Form: Appropriate wave forms    Plan:  - Continue ASA 81mg   - Hold lipitor for now given shock liver  - Telemetry monitoring  - Continue trending troponin  - Continue ECMO support   - FBG -1L     Pulmonary  # Acute on chronic hypoxemic respiratory failure requiring intubation  # Shunt physiology, positive shunt study 24  # Mild pulmonary hypertension  # Severe ROSALIO on PAP at night  # Probable aspiration pneumonia  Per pulmonology documentation: Hx of chronic hypoxemia thought due to hypoventilation and untreated sleep apnea since Oct 2023 with extensive workup including multiple CT Angio studies, NM Perfusion studies, RHC in Oct 2023 and again this hospitalization showing mild pulmonary hypertension (mean PA 25, PCWP 10). Imaging with stable pleural based HARMONY nodule and subpleural reticulation and thickening of interlobular septi present since . Repeated CT scans without embolus or AVM. Does have genetic sequencing in process. Thus far auto-immune workup negative (SSA, SSB,  Scleroderma, Clara 1, RF, CCP, CRP, Aldolase, HP panels, ANCA, IgG) except for positive ROSAMARIA (speckled 1:160). In Oct also had negative workup (C3 and C4, anti-centromere, anti-Smith, anti-RNP, anti-SSA, anti-SSB, Scl-70, anti-cardiolipin, anti-CCP). Concern for shunt physiology with highly elevated A-a gradient, multiple TTEs and EDWIGE with positive late bubble, hypoxemia and ongoing dependence on high flow oxygen that seems out of proportion to degree of pulmonary hypertension and fibrotic changes. NM MAA shunt study 2/9 confirms shunt physiology. Based on bubble studies, presumably extracardiac shunt, potential diagnosis includes DMVPAVM which is very rare but would explain lack of macrovascular abnormalities. In discussions with radiology, and abnormal NM shunt study perfusion to upper lung fields, will pursue CTPE dual energy to better delineate vasculature and perfusion. Additionally, will obtain CT abdomen/pelvis for any extrathoracic abnormality that could be contributing to shunt. Not a transplant candidate per [facility] or [place].   CAP CT  Bilateral opacity, aspiration  CXR: R middle and bilateral lower lobe opacities. Better lung volumes. ETT and venous ECMO cannula in good positions    Vent Settings:   Vent Mode: CMV/AC  (Continuous Mandatory Ventilation/ Assist Control)  FiO2 (%): 80 %  Resp Rate (Set): 15 breaths/min  Tidal Volume (Set, mL): 500 mL  PEEP (cm H2O): 12 cmH2O  Resp: 15    ABG:   Recent Labs   Lab 02/27/24  0556 02/27/24  0411 02/27/24  0410 02/27/24  0139   PH 7.56* 7.48*  --  7.47*   PCO2 37 43  --  44   PO2 55* 68*  --  71*   HCO3 33* 32*  --  31*   O2PER 70 70 70  100 70     Plan:  - Pulm on board, appreciate recs   - Wean vent as able  - Daily CXR  - Serial ABGs   - Consider scheduled duonebs if signs of lung dz, currently PRN    - Peridex  - FBG and diuresis as above        Gastrointestinal, Nutrition  # Shock liver 2/2 cardiac arrest  # Hypoalbuminemia   # At risk for protein malnutrition   No  known medical hx.   CAP CT  Anterior mediastinal hematoma post arrest   Diet: Nutrition consult for feeding today  BM:  liquid    Recent Labs   Lab 24  0410 24  2159 24  1533   AST 45 46* 47*   ALT 26 27 30   BILITOTAL 0.2 0.2 0.2   ALBUMIN 3.3* 3.3* 3.5   PROTTOTAL 4.6* 4.7* 4.9*   ALKPHOS 44 44 43     Plan:  - Monitor LFTs  - Bowel regimen in place-PRN  - GI Prophylaxis: PPI; Protonix 40 mg daily  - Nutrition consulted, appreciate recommendations      Renal, Electrolytes  # Acute Renal Injury   # Lactic acidosis  Baseline CR 0.90    Intake/Output Summary (Last 24 hours) at 2024 0837  Last data filed at 2024 0800  Gross per 24 hour   Intake 1944.42 ml   Output 1615 ml   Net 329.42 ml     Recent Labs   Lab 24  0410 249 24  1533 24  1006 24  0358 24  0954 24  0359   * 149* 148*   < > 148*   < > 144   POTASSIUM 3.1* 3.5  3.5 3.3*   < > 4.0   < > 4.3   CHLORIDE 112* 111* 111*   < > 113*   < > 109*   CO2 30* 29 28   < > 27   < > 26   BUN 15.8 14.3 15.0   < > 11.6   < > 11.3   CR 0.85 0.87 0.93   < > 0.88   < > 1.02   PHOS 2.0*  --   --   --  2.7  --  3.1   MAG 2.1 2.2 1.9   < > 2.0   < > 2.2    < > = values in this interval not displayed.     Plan:  - Avoid nephrotoxins, renally dose meds  - Monitor urine output  - FBG and diuresis as above    Infectious Disease  # Aspiration Pneumonia  # Leukocytosis  Temp (24hrs), Av.5  F (36.9  C), Min:97.9  F (36.6  C), Max:98.8  F (37.1  C)     Cultures thus far:   Blood NGTD   Sputum 3+ GPC    Urine NGTD   MRSA Positive   COVID negative  Antibiotics     Anti-infectives (From now, onward)      Start     Dose/Rate Route Frequency Ordered Stop    24 1430  vancomycin (VANCOCIN) 1,500 mg in 0.9% NaCl 250 mL intermittent infusion         1,500 mg  over 90 Minutes Intravenous EVERY 12 HOURS 24 1416      24 1400  cefTRIAXone (ROCEPHIN) 2 g vial to attach to  ml bag for ADULTS  or NS 50 ml bag for PEDS         2 g  over 30 Minutes Intravenous EVERY 24 HOURS 02/24/24 1153 02/28/24 1359           Recent Labs   Lab 02/27/24  0410 02/26/24  2159 02/26/24  1533   WBC 5.1 5.3 12.1*                    Plan:  - Continue Ceftriaxone x 5D for aspiration coverage  - Monitor for signs of infection  - Avoid fevers     Hematology  # Anemia of critical illness  Hgb decreaseing. Oral bleeding, CT CAP anterior mediastinal hematoma  US LE w/ Arterial Duplex with no evidence of acute thrombus    Receiving heparin for ECMO with ACT goal 140-160  Dose (units/hr) HEParin: 600 Units/hr  ACT  (seconds): 147 seconds    Recent Labs   Lab 02/27/24  0410 02/26/24  2159 02/26/24  1533   HGB 7.3* 7.2* 7.3*   HCT 21.0* 21.3* 21.8*   PLT 54* 55* 77*     Recent Labs   Lab 02/27/24  0410 02/26/24 2159 02/26/24  1533   INR 1.03 1.06 1.10   PTT 34 35 34       Plan:  - DVT PPX: Heparin with ACT goal as above  - Transfusion parameters:   - 1u PRBC for hbg < 7.0   - 1u platelet for plt < 50   - 1u FFP for INR > 3   - 5u cryoprecipitate for fibrinogen <150     Endocrinology  # Hyperglycemia   # A1C   Recent Labs   Lab 02/23/24  1951   A1C 10.0*       Recent Labs   Lab 02/27/24  0704 02/27/24  0559 02/27/24  0423 02/27/24  0410 02/27/24  0140   GLC 97 105* 145* 150* 210*     Dose (units/hr) Insulin: 1 Units/hr    Plan  - insulin gtt as needed  - Endocrine consulted   - Continue levothyroxine 150mcg    Integumentary:  - No skin issues    Lines/Tubes/Drains:  PIV left lower fore arm 2/10  CVC TL RFV 2/23  RR arterial line 2/23  ECMO L femoral vein 29 FR 2/23   ECMO L femoral artery 17 FR 2/23  Distal perfusion catheter LSFA 2/23  Nicole 2/23  OG 2/24  Rectal tube with balloon 2/23     ICU  Feeding: TF working towards goal  Analgesia: Fentanyl  Sedation: propofol and precedex  Thrombopx: Heparin infusion when ACT trends down  Head of bed: reverse trend   Ulcers: PPI  Glucose: Insulin infusion  SBT: not ready  B: liquid stool with  "rectal tube  I: still needs urinary catheter for accurate I/Os  De-escalate: continue abx    Family will be updated by team    Patient seen, discussed, and plan developed with staff physician, Dr Sb Deng, APRN, CNP  [facility]  Critical Care Cardiology  Text Page     Objective:  Most recent vital signs:  /64   Pulse 75   Temp 98.6  F (37  C)   Resp 15   Ht 1.778 m (5' 10\")   Wt 117.8 kg (259 lb 11.2 oz)   SpO2 96%   BMI 37.26 kg/m    Temp:  [98.6  F (37  C)-98.8  F (37.1  C)] 98.6  F (37  C)  Pulse:  [] 75  Resp:  [0-20] 15  BP: (112)/(64) 112/64  MAP:  [62 mmHg-99 mmHg] 96 mmHg  Arterial Line BP: ()/(51-81) 139/78  FiO2 (%):  [70 %-80 %] 80 %  SpO2:  [90 %-100 %] 96 %      Physical exam:  General: In bed, in NAD  HEENT: PERRL, no scleral icterus or injection  CARDIAC: RRR, no m/r/g appreciated. Peripheral pulses dopplered  RESP: Mechanical ventilation; L lung diminished with exp wheezing  GI: soft, rounded, BS hypoactive  : Nicole, good UO  EXTREMITIES: 3+ LE edema, pulses 2+. Femoral access site oozing decreased. Dressing changed last night   SKIN: No acute lesions appreciated  NEURO: Intubated and sedated, sedation holiday as tolerates. Opens eyes, nods to questions when lightened    All imaging/results reviewed by me.         "

## 2024-02-27 NOTE — PROGRESS NOTES
[facility]    ECLS Shift Summary:     ECMO Equipment:  Console Serial Number: [id number]  Circuit Lot Number: [id number]  Oxygenator Lot Number: [id number]    Circuit Assessment: Fibrin  Fibrin Location: connectors    Arterial ECMO Cannula: 17 Fr in the Left Femoral Artery  Venous ECMO Cannula: 29 Fr in the Left Femoral Vein  ECMO Cannula Venous Left femoral vein-Site Assessment: WDL, Sutured, Secure  ECMO Cannula Arterial Left femoral artery-Site Assessment: WDL, Sutured, Secure  Distal Perfusion Catheter Left femoral artery-Site Assessment: WDL, Sutured, Secure  ECMO Cannula Venous Left femoral vein-Site Intervention: No intervention needed  ECMO Cannula Arterial Left femoral artery-Site Intervention: No intervention needed  Distal Perfusion Catheter Left femoral artery-Site Intervention: No intervention needed    Patient remains on V-A ECMO, all equipment is functioning and alarms are appropriately set. RPM's: 3635 with Blood Flow (Circuit) LPM  Avg: 3.8 LPM  Min: 3.47 LPM  Max: 4.44 LPM L/min. Sweep is at 1 LPM and 100 %. Extremities are warm.     Significant Shift Events: none    Vent settings:  Vent Mode: CMV/AC  (Continuous Mandatory Ventilation/ Assist Control)  FiO2 (%): 70 %  Resp Rate (Set): 15 breaths/min  Tidal Volume (Set, mL): 500 mL  PEEP (cm H2O): 12 cmH2O  Resp: 15      Anticoagulation:  Dose (units/hr) HEParin: 600 Units/hr  Rate (mL/hr) HEParin: 6 mL/hr  Concentration HEParin: 100 Units/mL        Most recent: ACT  (seconds): 147 seconds    Urine output is Yellow / Straw Colored.  Blood loss was minimal. Product given included 1 unit PRBC.     Intake/Output Summary (Last 24 hours) at 2/27/2024 0534  Last data filed at 2/27/2024 0500  Gross per 24 hour   Intake 4865.92 ml   Output 4762 ml   Net 103.92 ml       Labs:  Recent Labs   Lab 02/27/24  0411 02/27/24  0410 02/27/24  0139 02/26/24  2330 02/26/24  2159   PH 7.48*  --  7.47* 7.45 7.48*   PCO2 43  --   44 45 43   PO2 68*  --  71* 73* 77*   HCO3 32*  --  31* 31* 32*   O2PER 70 70  100 70 70 70       Lab Results   Component Value Date    HGB 7.3 (L) 02/27/2024    PHGB 30 (H) 02/26/2024    PLT 54 (L) 02/27/2024    FIBR 227 02/27/2024    INR 1.03 02/27/2024    PTT 34 02/27/2024    DD 0.79 (H) 02/27/2024    ANTCH 83 (L) 02/26/2024       Plan is to go to cath lab for shunt flow study.    Megan E. Dressler, RN  ECMO Specialist  2/27/2024 5:34 AM

## 2024-02-27 NOTE — PROCEDURES
ECMO [place] STUDY  February 27, 2024    Inotropes/Pressors:  No pressors     Vent Mode: CMV/AC  (Continuous Mandatory Ventilation/ Assist Control)  FiO2 (%): 80 %  Resp Rate (Set): 15 breaths/min  Tidal Volume (Set, mL): 500 mL  PEEP (cm H2O): 12 cmH2O  Resp: 15     Flow  BP   HR  O2 Sat   SVO2    3.5 111/53 (68) mmHg 93 bpm  94%  83%  2.5 100/46 (63) mmHg 97 bpm  92%  70.2%   1.5 88/33 (55) mmHg 99 bpm  89%  73.6%      ABG at lowest Flow: 7.44/47/54/32/89  Lactic acid: 1.3        Summary:  --Hypotensive AND hypoxic with decreasing flows    Plan:  --The patient is not ready for decannulation. In need of more hemodynamic optimization (less propofol) for next turndown.  --Echo is pending    VASQUEZ Farah, CNP  [facility]  Cardiology ICU Service   Text Page

## 2024-02-27 NOTE — PLAN OF CARE
Goal Outcome Evaluation:    Neuro: sedated. Follows commands, KNOWLES, nods appropriately. Afebrile. Pupils equal and reactive.  CV: VA ECMO: Flow 3.5 LPM, sweep 0.5, FIO2 100. NSR 80s-100s. Failed bedside turndown to 1LPM, Epi restarted.  ACT goal 140-160  Pulm: PCV+ 15/30/12/70%. Bloody oral and ETT secretions.  GI: OGT with TF stopped at 0000. Hypo/faint BS with large stool output.  : Nicole, adequate UOP. Lasix given X1 with good response.  Skin: scattered bruises, blanchable redness to sacrum/coccyx  Drips: propofol 55, fent 200, insulin 1, Heparin stopped in favor of Bivalirudin, PTT goal 44-88  Labs: K replaced  Lines: L femoral ECMO, R fem CVC TL, L PIV x2, R rad art line      Plan of Care Reviewed With: patient, sibling, parent    Overall Patient Progress: no changeOverall Patient Progress: no change    Outcome Evaluation: Able to wean sedation. To cath lab for shunt-run this afternoon.

## 2024-02-27 NOTE — PROGRESS NOTES
ECMO Attending Progress Note  2024    Silvano Motta is a 58 year old male who was cannulated for ECMO 2024 due to refractory PEA arrest    Cannulation Site:  17 Fr in the L femoral artery  29 Fr in the L femoral vein    Interval events: Stable hemodynamically, still with high oxygen requirement    Pulsatilty (IABP paused if applicable):44 mmHG     Physical Exam:  Temp:  [98.4  F (36.9  C)-98.8  F (37.1  C)] 98.6  F (37  C)  Pulse:  [] 98  Resp:  [0-27] 10  BP: (112)/(64) 112/64  MAP:  [62 mmHg-190 mmHg] 77 mmHg  Arterial Line BP: ()/(51-83) 107/64  FiO2 (%):  [70 %-100 %] 70 %  SpO2:  [85 %-100 %] 98 %    Intake/Output Summary (Last 24 hours) at 2024 1158  Last data filed at 2024 1100  Gross per 24 hour   Intake 1932.82 ml   Output 1425 ml   Net 507.82 ml        Vent Mode: CMV/AC  (Continuous Mandatory Ventilation/ Assist Control)  FiO2 (%): 70 %  Resp Rate (Set): 15 breaths/min  Tidal Volume (Set, mL): 500 mL  PEEP (cm H2O): 12 cmH2O  Resp: 10       Labs:  Recent Labs   Lab 24  1803 24  1533 24  1532 24  1402 24  1212   PH 7.43  --  7.44 7.44 7.48*   PCO2 47*  --  45 46* 41   PO2 83  --  114* 94 109*   HCO3 31*  --  30* 31* 30*   O2PER 70 70  100 70 70 70      Recent Labs   Lab 24  1533 24  1006 24  0358 24  2113   WBC 12.1* 6.8 8.3 8.1   HGB 7.3* 7.1* 7.7* 8.3*     Creatinine   Date Value Ref Range Status   2024 0.93 0.67 - 1.17 mg/dL Final   2024 0.91 0.67 - 1.17 mg/dL Final   2024 0.88 0.67 - 1.17 mg/dL Final   2024 0.91 0.67 - 1.17 mg/dL Final   Blood Flow (Circuit) LPM: 4.28 LPM  Sweep LPM: 1 LPM  Sweep FiO2   %: 100 %  ACT  (seconds): 170 seconds  Arterial Blood Temp  (degrees C): 36.9 C  Pulse Oximetry  (SpO2%): 98 %  Arterial Pressure  mmH mmHg    ECMO Issues including assessments and plan on DOS 2024:  Neuro: Sedated for mechanical ventilation and ECMO.  No acute distress.  NIRS stable  b/l  RASS goal: -1 to -2 (wakes up wildly)  CV: Cardiogenic shock.  Hemodynamically stable on low dose epi, vaso  Pulm: Keep vent settings at rest settings as above.  FEN/Renal: Electrolytes stable w/ replacement protocols in place, Cr worsening, UOP decreasing  Heme: ACT goal: 140-160 (nasal bleeding), Hemoglobin stable.  Minimal oozing around the ECMO cannulas.  ID: Receiving empiric antibiotics  Cannulae: Position is acceptable on exam and the available imaging.  Distal perfusion cannula is in place and patent.  Extremities are well-perfused.     I have personally reviewed the ECMO flows, oxygenation and CO2 clearance, anticoagulation, and cannula position.  I have also personally assessed the patient's systemic response with hemodynamics, oxygenation, ventilation, and bleeding.       The patient requires continued ECMO support and management in the ICU.  I have discussed patient care and treatment plan with the primary team.      Miguel Angel Basurto MD  Critical Care Cardiology    February 26, 2024
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
## 2024-02-27 NOTE — PROGRESS NOTES
[facility]    ECLS Shift Summary:     ECMO Equipment:  Console Serial Number: [id number]  Circuit Lot Number: [id number]  Oxygenator Lot Number: [id number]    Circuit Assessment: Fibrin  Fibrin Location: connectors    Arterial ECMO Cannula: 17 Fr in the Right Femoral Artery  Venous ECMO Cannula: 29 Fr in the Right Femoral Vein  DPC: 8 Fr in the RFSA  ECMO Cannula Venous Left femoral vein-Site Assessment: WDL, Sutured, Secure  ECMO Cannula Arterial Left femoral artery-Site Assessment: WDL, Sutured, Secure  Distal Perfusion Catheter Left femoral artery-Site Assessment: WDL, Sutured, Secure  ECMO Cannula Venous Left femoral vein-Site Intervention: No intervention needed  ECMO Cannula Arterial Left femoral artery-Site Intervention: No intervention needed  Distal Perfusion Catheter Left femoral artery-Site Intervention: No intervention needed    Patient remains on V-A ECMO, all equipment is functioning and alarms are appropriately set. RPM's: 3100 with Blood Flow (Circuit) LPM  Avg: 3.6 LPM  Min: 3.53 LPM  Max: 3.62 LPM L/min. Sweep is at 0.5 LPM and 100 %. Extremities are warm.     Significant Shift Events: ECMO turndown with ECHO at bedside (see provider note for details). Patient taken to CCL for procedure (shunt study?). Heparin stopped and anticoagulant changed to Bivaliruden, HIT panel will be sent.     Vent settings:  Vent Mode: PCV Plus assist  (Pressure Control Ventilation/ Assist Control)  FiO2 (%): 70 %  Resp Rate (Set): 15 breaths/min  Tidal Volume (Set, mL): 500 mL  PEEP (cm H2O): 12 cmH2O  Pressure Support (cm H2O): 30 cmH2O  Resp: 15      Anticoagulation:    Dose (mg/kg/hr) Bivalirudin: 0.15 mg/kg/hr  Rate (mL/hr) Bivalirudin: 16.2 mL/hr  Concentration Bivalirudin: 1 mg/mL  Most recent: ACT  (seconds): 147 seconds    Urine output is clear, Light / Pale.  Blood loss was to remain o. Product given included to [facility] ECMO sup[.     Intake/Output Summary (Last  24 hours) at 2/27/2024 1639  Last data filed at 2/27/2024 1600  Gross per 24 hour   Intake 5220.23 ml   Output 6105 ml   Net -884.77 ml       Labs:  Recent Labs   Lab 02/27/24  1539 02/27/24  1354 02/27/24  1200 02/27/24  1008   PH 7.55* 7.54* 7.51* 7.47*   PCO2 38 39 40 43   PO2 105 82 72* 52*   HCO3 33* 33* 31* 31*   O2PER 100  80  80 80 80 80       Lab Results   Component Value Date    HGB 7.7 (L) 02/27/2024    PHGB 70 (H) 02/27/2024    PLT 65 (L) 02/27/2024    FIBR 246 02/27/2024    INR 1.47 (H) 02/27/2024    PTT 51 (H) 02/27/2024    DD 1.12 (H) 02/27/2024    ANTCH 92 02/27/2024       Plan is to remain on [facility] support at this time.    Bhumika Mejía, RT  ECMO Specialist  2/27/2024 4:39 PM

## 2024-02-27 NOTE — CONSULTS
SPIRITUAL HEALTH SERVICES Consult Note  [facility] 4A - CVICU    I consulted with pt's bedside nurse for update re pt's status, prognosis; had in-depth visit with pt on 4C earlier during this admission. I followed-up with a phone call to pt's sister Odalys and pt's mother. Mother shared that she was grateful that pt (who was raised Religion) was recently anointed. Mother also grateful that chaplains have been visiting pt. I assured sister and mom that  support always available for pt and family, but I will be out until Friday.    Ba Lewis) Brian Og M.Div., [facility]  Staff   Pager [phone]      * [facility] remains available 24/7 for emergent requests/referrals, either by having the switchboard page the on-call  or by entering an ASAP/STAT consult in Epic (this will also page the on-call ). Routine Epic consults receive an initial response within 24 hours.*

## 2024-02-28 NOTE — PROGRESS NOTES
ECMO Attending Progress Note  2/28/2024    Silvano Motta is a 58 year old male who was cannulated for ECMO 2/23/2024 due to refractory PEA arrest    Cannulation Site:  17 Fr in the L femoral artery  29 Fr in the L femoral vein    Interval events: continues to get agitated and restless, occasional bleeding from the mouth and nose    Physical Exam:  Temp:  [98.1  F (36.7  C)-98.8  F (37.1  C)] 98.8  F (37.1  C)  Pulse:  [] 116  Resp:  [15-21] 15  MAP:  [64 mmHg-86 mmHg] 84 mmHg  Arterial Line BP: ()/(47-68) 152/65  FiO2 (%):  [50 %-85 %] 50 %  SpO2:  [91 %-100 %] 99 %    Intake/Output Summary (Last 24 hours) at 2/25/2024 1158  Last data filed at 2/25/2024 1100  Gross per 24 hour   Intake 1932.82 ml   Output 1425 ml   Net 507.82 ml        Vent Mode: PCV Plus assist  (Pressure Control Ventilation/ Assist Control)  FiO2 (%): 50 %  Resp Rate (Set): 15 breaths/min  Tidal Volume (Set, mL): 500 mL  PEEP (cm H2O): 12 cmH2O  Pressure Support (cm H2O): 30 cmH2O  Inspiratory Pressure (cm H2O) (Drager Nena): 30  Resp: 15       Labs:  Recent Labs   Lab 02/28/24  1544 02/28/24  1543 02/28/24  1339 02/28/24  1159 02/28/24  1151 02/28/24  1130 02/28/24  0950   PH  --  7.43 7.45  --  7.42  --  7.44   PCO2  --  47* 48*  --  49*  --  46*   PO2  --  72* 75*  --  60*  --  70*   HCO3  --  31* 33*  --  32*  --  32*   O2PER 90  50 50 50 50  50 50   < > 50    < > = values in this interval not displayed.      Recent Labs   Lab 02/28/24  1544 02/28/24  0950 02/28/24  0354 02/27/24  2151   WBC 6.7 6.4 8.3 10.3   HGB 7.0* 7.4* 7.1* 7.4*     Creatinine   Date Value Ref Range Status   02/28/2024 0.91 0.67 - 1.17 mg/dL Final   02/28/2024 0.96 0.67 - 1.17 mg/dL Final   02/28/2024 0.98 0.67 - 1.17 mg/dL Final   02/27/2024 1.01 0.67 - 1.17 mg/dL Final   Blood Flow (Circuit) LPM: 4.28 LPM  Sweep LPM: 1 LPM  Sweep FiO2   %: 100 %  ACT  (seconds): 170 seconds  Arterial Blood Temp  (degrees C): 36.9 C  Pulse Oximetry  (SpO2%): 98 %  Arterial  Pressure  mmH mmHg    ECMO Issues including assessments and plan on DOS 2024:  Neuro: Sedated for mechanical ventilation and ECMO.  No acute distress.  NIRS stable b/l  RASS goal: -1 to -2 (wakes up wildly)  CV: Cardiogenic shock.  Hemodynamically stable on low dose pressors   Pulm: Keep vent settings at rest settings as above.  FEN/Renal: Electrolytes stable w/ replacement protocols in place, Cr stable/normal, UOP same  Heme: ACT goal: 140-160 (nasal bleeding, stable), Hemoglobin stable.  Minimal oozing around the ECMO cannulas.  ID: Receiving empiric antibiotics  Cannulae: Position is acceptable on exam and the available imaging.  Distal perfusion cannula is in place and patent.  Extremities are well-perfused.     I have personally reviewed the ECMO flows, oxygenation and CO2 clearance, anticoagulation, and cannula position.  I have also personally assessed the patient's systemic response with hemodynamics, oxygenation, ventilation, and bleeding.       The patient requires continued ECMO support and management in the ICU.  I have discussed patient care and treatment plan with the primary team.      Miguel Angel Basurto MD  Critical Care Cardiology    2024
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
## 2024-02-28 NOTE — PROGRESS NOTES
Neuro: sedated. Follows commands, KNOWLES, nods appropriately. Afebrile. Pupils equal and reactive.    CV: VA ECMO: Flow 3.5 LPM, sweep 0.5, FIO2 90. NSR 80s-100s. Failed bedside turndown to 1LPM,   ACT goal 140-160. MAP >65    Pulm: PCV+ 15/30/12/60%. Bloody oral and ETT secretions.    GI: OGT with TF at goal 35 100 q 4 hr fwf. Hypo/faint BS with Rectal tube.    : Nicole, adequate UOP.     Skin: scattered bruises, blanchable redness to sacrum/coccyx    Drips: propofol 50, fent 200, insulin 3, Versed 4, Epi 0.04. Heparin stopped in favor of Bivalirudin, PTT goal 44-88    Labs: K replaced    Lines: L femoral ECMO, R fem CVC TL, L PIV x2, R rad art line RIJ 3x

## 2024-02-28 NOTE — PLAN OF CARE
Goal Outcome Evaluation:    Neuro: Sedated, RASS -1. Follows commands, KNOWLES, answers y/n questions. Afebrile. Pupils equal and reactive.  CV: VA ECMO: Flow 3.5 LPM, sweep 0.5, FIO2 90. Bivalirudin 0.15, PTT within goal (44-88). NSR 90s-110s. MAP >65  Pulm: PCV+ 15/30/12/50%. Bloody oral and ETT secretions improving, no s/s of active bleeding.  GI: OGT with TF at goal 35, 100mL Q4H FWF. Hypo/faint BS. Rectal tube with productive stool.  : Nicole, adequate UOP. Diamox given x1 this shift.  Skin: scattered bruises to abdomen and upper arms, blanchable redness to sacrum/coccyx  Drips: Propofol 50, Fent 100, Insulin 4, Versed 4, Epi off. Bivalirudin 0.15, PTT goal 44-88  Labs: K replaced  Lines: L femoral ECMO, R femoral and R internal jugular CVC TL, L PIV x2, R rad art line      Plan of Care Reviewed With: patient, sibling, parent    Overall Patient Progress: no changeOverall Patient Progress: no change    Outcome Evaluation: Sedation weaned to RASS -1/-2. FiO2 weaned via ventilator.

## 2024-02-28 NOTE — PROGRESS NOTES
[facility]    ECLS Shift Summary:     ECMO Equipment:  Console Serial Number: [id number]  Circuit Lot Number: [id number]  Oxygenator Lot Number: [id number]    Circuit Assessment: Fibrin  Fibrin Location: connectors    Arterial ECMO Cannula: 17 Fr in the Left Femoral Artery  Venous ECMO Cannula: 29 Fr in the Left Femoral Vein  ECMO Cannula Venous Left femoral vein-Site Assessment: Bleeding, Sutured, Secure  ECMO Cannula Arterial Left femoral artery-Site Assessment: Bleeding, Sutured, Secure  Distal Perfusion Catheter Left femoral artery-Site Assessment: Bleeding, Sutured, Secure  ECMO Cannula Venous Left femoral vein-Site Intervention: No intervention needed  ECMO Cannula Arterial Left femoral artery-Site Intervention: No intervention needed  Distal Perfusion Catheter Left femoral artery-Site Intervention: No intervention needed    Patient remains on V-A ECMO, all equipment is functioning and alarms are appropriately set. RPM's: 3400 with Blood Flow (Circuit) LPM  Av LPM  Min: 3.69 LPM  Max: 4.16 LPM L/min. Sweep is at 0.5 LPM and 90 %. Extremities are warm to touch and perfused.     Significant Shift Events: none    Vent settings:  Vent Mode: PCV Plus assist  (Pressure Control Ventilation/ Assist Control)  FiO2 (%): 60 %  Resp Rate (Set): 15 breaths/min  Tidal Volume (Set, mL): 500 mL  PEEP (cm H2O): 12 cmH2O  Pressure Support (cm H2O): 30 cmH2O  Inspiratory Pressure (cm H2O) (Drager Nena): 30  Resp: 15      Anticoagulation:  Dose (units/hr) HEParin: 0 Units/hr  Rate (mL/hr) HEParin: 0 mL/hr  Concentration HEParin: 100 Units/mL     Dose (mg/kg/hr) Bivalirudin: 0.15 mg/kg/hr  Rate (mL/hr) Bivalirudin: 16.2 mL/hr  Concentration Bivalirudin: 1 mg/mL  Most recent: ACT  (seconds): 147 seconds    Urine output is clear, Light / Pale.  Blood loss was a small to moderate amount from oozing at the cannulation site. No blood product given overnight.     Intake/Output Summary  (Last 24 hours) at 2/28/2024 0615  Last data filed at 2/28/2024 0600  Gross per 24 hour   Intake 4680.16 ml   Output 5270 ml   Net -589.84 ml       Labs:  Recent Labs   Lab 02/28/24  0556 02/28/24  0355 02/28/24  0354 02/28/24  0213 02/27/24  2322   PH 7.50* 7.51*  --  7.55* 7.51*   PCO2 42 41  --  36 40   PO2 107* 100  --  105 122*   HCO3 32* 32*  --  32* 32*   O2PER 60 60 60  90 60 70       Lab Results   Component Value Date    HGB 7.1 (L) 02/28/2024    PHGB 50 (H) 02/28/2024    PLT 60 (L) 02/28/2024    FIBR 264 02/28/2024    INR 1.40 (H) 02/28/2024    PTT 49 (H) 02/28/2024    DD 1.21 (H) 02/28/2024    ANTCH 92 02/27/2024       Plan is to continue VA ECMO support and adjust settings as needed.    Chance Glover, RT  ECMO Specialist  2/28/2024 6:15 AM
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
## 2024-02-29 NOTE — PROGRESS NOTES
"Otolaryngology Progress Note  February 29, 2024    SUBJECTIVE: No acute events overnight. Per nursing, old clot in the back of his throat yesterday as well as some dissolvable packing that came out from his nose. No bleeding overnight.     OBJECTIVE:   /64   Pulse 96   Temp 98.4  F (36.9  C)   Resp 15   Ht 1.778 m (5' 10\")   Wt 115.8 kg (255 lb 4.7 oz)   SpO2 95%   BMI 36.63 kg/m     General: Sedated and intubated, does not appear in acute distress.    HEENT: ET tube and bite block in place. Oral cavity free of gauze packing. Oral cavity hemostatic, no active bleeding or clots noted.     Pulmonary: mechanically ventilated     LABS:  ROUTINE IP LABS (Last four results)  BMP  Recent Labs   Lab 02/29/24  0647 02/29/24  0557 02/29/24  0504 02/29/24  0343 02/29/24 0341 02/28/24  2303 02/28/24  2205 02/28/24  1548 02/28/24  1544 02/28/24  0954 02/28/24  0950   NA  --   --   --   --  147*  --  148*  --  147*  --  146*   POTASSIUM  --   --   --   --  3.7  --  3.4  --  3.6  --  4.0   CHLORIDE  --   --   --   --  113*  --  112*  --  111*  --  110*   DORINA  --   --   --   --  8.4*  --  8.4*  --  8.3*  --  8.2*   CO2  --   --   --   --  27  --  28  --  29 --  29   BUN  --   --   --   --  23.5*  --  23.3*  --  22.5*  --  22.6*   CR  --   --   --   --  0.91  --  0.93  --  0.91  --  0.96   * 109* 103* 118* 122*   < > 158*  172*   < > 168*   < > 223*    < > = values in this interval not displayed.     CBC  Recent Labs   Lab 02/29/24  0341 02/28/24 2205 02/28/24  1544 02/28/24  0950   WBC 7.7 7.3 6.7 6.4   RBC 2.37* 2.41* 2.14* 2.20*   HGB 7.8* 7.8* 7.0* 7.4*   HCT 23.3* 23.4* 21.4* 22.0*   MCV 98 97 100 100   MCH 32.9 32.4 32.7 33.6*   MCHC 33.5 33.3 32.7 33.6   RDW 15.8* 15.9* 14.6 15.0   PLT 57* 59* 54* 56*     INR  Recent Labs   Lab 02/29/24  0341 02/28/24  2205 02/28/24  1544 02/28/24  0950   INR 1.08 1.14 1.39* 1.41*       ASSESSMENT & PLAN: Silvano Motta is a 58 year old male with a past medical history " "of of chronic hypoxemic respiratory failure of unknown etiology, severe ROSALIO, pulmonary hypertension, seizure disorder, TBI who was admitted for acute hypoxic respiratory failure and has had multiple PEA arrest and is status post ECMO cannulation; ENT consulted for oral bleeding, which has since resolved with oral packing.      - Nasal packing is dissolvable and does not need to be removed  - Recommend continued use of bite block to prevent further trauma to tongue   - Remainder of cares per primary team  - Please contact ENT if there are further concerns for recurrent bleeding     -- Patient and above plan to be discussed with Dr. Lillian Madison MD  ENT Resident, PGY-2      Clinically Significant Risk Factors        # Hypokalemia: Lowest K = 3.2 mmol/L in last 2 days, will replace as needed  # Hypernatremia: Highest Na = 149 mmol/L in last 2 days, will monitor as appropriate  # Hypocalcemia: Lowest Ca = 8 mg/dL in last 2 days, will monitor and replace as appropriate     # Hypoalbuminemia: Lowest albumin = 3.2 g/dL at 2/29/2024  3:41 AM, will monitor as appropriate   # Thrombocytopenia: Lowest platelets = 50 in last 2 days, will monitor for bleeding    # Acute heart failure with reduced ejection fraction: last echo with EF <40% and receiving IV diuretics       # Obesity: Estimated body mass index is 36.63 kg/m  as calculated from the following:    Height as of this encounter: 1.778 m (5' 10\").    Weight as of this encounter: 115.8 kg (255 lb 4.7 oz).        # Financial/Environmental Concerns: none;other (see comments) ([employer] paying for transportation with al other needs.)         "

## 2024-02-29 NOTE — PROGRESS NOTES
[facility]    ECLS Discontinuation Note:     ECLS was discontinued 2/28/2024 at 2210 per comfort measures.    Antonieta Chapman RT  ECMO Specialist  2/28/2024 10:12 PM
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
## 2024-02-29 NOTE — PROGRESS NOTES
Cardiology ICU Progress Note    Brief HPI:  Silvano Motta is a 58 year old male being admitted to the CICU on 2/4/2024 s/p peripheral VA-ECMO cannulation s/p cardiac arrest. 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 Admitted 1/25 to OSH for AHRF and transferred to [facility] 2/4 for PH therapy managed.  Patient not a lung transplant candidate at [facility] or [facility].      Subjective and Interval:  - no acute events overnight    Assessment and plan by system:   Today's changes:  + sedation changes; versed off, increase oxy, decrease fentanyl   + resume TF, no plan for CCL today  + continue diuresis today (metolazone lasix)     Neurology   # Concern for anoxic brain injury    # Hx TBI 2020  # Hx Sz disorder-Todds paralysis  CT head 2/23: no intracranial pathology  - Intubated, sedated. Avoiding hyperthermia    Current sedation:  RASS (Davidson Agitation-Sedation Scale): -3-->moderate sedation  Dose (mcg/hr) Fentanyl: 150 mcg/hr and Dose (mcg/kg/min) Propofol: 50 mcg/kg/min    Plan:  - Continue PTA Keppra, VIMPAT, and gabapentin  - Continue home ASA  - Continue sedation with a RASS goal -1 to -2  - Spontaneous awakening trial as tolerated  - permissive hypercapnea and hypernatremia for neuroprotection     Cardiovascular  # PEA/VT Cardiac Arrest s/p peripheral VA ECMO Cannulation 2/23/24   # Cardiogenic shock requiring VA ECMO  Peripheral V-A ECMO inserted via LCFA and LCFV 17 Fr arterial cannula, 29 Fr venous annula, DPC 7 Fr LSFA  Angiogram: RHC, Normal biventricular filling pressures.   Mild pre-capillary pulmonary hypertension  Low-normal cardiac output and index.  TTE:  VA ECMO at 4.1 LPM. Difficult to assess LV function but appears to be  ~30%. The right ventricle is normal size.  Global right ventricular function is mildly  reduced.  No pericardial effusion is present.    ECG Rhythm: Sinus rhythm    ECMO:  Mode: V-A   Pump Type: Cardiohelp  RPM's: 3400  Blood Flow (Circuit) LPM: 4.04 LPM  Sweep LPM: 0.5 LPM  Sweep FiO2   %: 90 %  Venous Pressure  mmHg: -24 mmHg  Arterial Pressure  mmH mmHg  Internal Pressure mmH mmHg  Pressure Change mmH mmHg  PP 40 points  Current Pressors/inotropes/antiarrythmic:  Dose (mcg/kg/min) Norepinephrine: 0.04 mcg/kg/min, Dose (units/hr) Vasopressin: 2 Units/hr, and Dose (mcg/kg/min) Epinephrine: 0.04 mcg/kg/min    Hemodynamics:  Art Line  Arterial Line BP: 105/58  Arterial Line MAP (mmHg): 69 mmHg  Arterial Line Location: Right radial  Art Line Wave Form: Appropriate wave forms    Plan:  - Continue ASA 81mg   - Hold lipitor for now given shock liver  - Telemetry monitoring  - Continue trending troponin  - Continue ECMO support   - FBG -1L     Pulmonary  # Acute on chronic hypoxemic respiratory failure requiring intubation  # Shunt physiology, positive shunt study 24  # Mild pulmonary hypertension  # Severe ROSALIO on PAP at night  # Probable aspiration pneumonia  Per pulmonology documentation: Hx of chronic hypoxemia thought due to hypoventilation and untreated sleep apnea since Oct 2023 with extensive workup including multiple CT Angio studies, NM Perfusion studies, RHC in Oct 2023 and again this hospitalization showing mild pulmonary hypertension (mean PA 25, PCWP 10). Imaging with stable pleural based HARMONY nodule and subpleural reticulation and thickening of interlobular septi present since . Repeated CT scans without embolus or AVM. Does have genetic sequencing in process. Thus far auto-immune workup negative (SSA, SSB, Scleroderma, Clara 1, RF, CCP, CRP, Aldolase, HP panels, ANCA, IgG) except for positive ROSAMARIA (speckled 1:160). In Oct also had negative workup (C3 and C4, anti-centromere, anti-Smith, anti-RNP, anti-SSA, anti-SSB, Scl-70, anti-cardiolipin, anti-CCP). Concern for shunt  physiology with highly elevated A-a gradient, multiple TTEs and EDWIGE with positive late bubble, hypoxemia and ongoing dependence on high flow oxygen that seems out of proportion to degree of pulmonary hypertension and fibrotic changes. NM MAA shunt study 2/9 confirms shunt physiology. Based on bubble studies, presumably extracardiac shunt, potential diagnosis includes DMVPAVM which is very rare but would explain lack of macrovascular abnormalities. In discussions with radiology, and abnormal NM shunt study perfusion to upper lung fields, will pursue CTPE dual energy to better delineate vasculature and perfusion. Additionally, will obtain CT abdomen/pelvis for any extrathoracic abnormality that could be contributing to shunt. Not a transplant candidate per [facility] or [place].   CAP CT  Bilateral opacity, aspiration  CXR: R middle and bilateral lower lobe opacities. Better lung volumes. ETT and venous ECMO cannula in good positions    Vent Settings:   Vent Mode: PCV Plus assist  (Pressure Control Ventilation/ Assist Control)  FiO2 (%): 50 %  Resp Rate (Set): 15 breaths/min  Tidal Volume (Set, mL): 500 mL  PEEP (cm H2O): 12 cmH2O  Inspiratory Pressure (cm H2O) (Drager Nena): 30  Resp: 15    ABG:   Recent Labs   Lab 02/29/24  0618 02/29/24  0344 02/29/24  0341 02/29/24  0212   PH 7.44 7.46*  --  7.43   PCO2 42 40  --  43   PO2 79* 85  --  80   HCO3 28 28  --  28   O2PER 50 50 90  50 50     Plan:  - Pulm on board, appreciate recs   - Wean vent as able  - Daily CXR  - Serial ABGs   - Consider scheduled duonebs if signs of lung dz, currently PRN    - Peridex  - FBG and diuresis as above        Gastrointestinal, Nutrition  # Shock liver 2/2 cardiac arrest  # Hypoalbuminemia   # At risk for protein malnutrition   No known medical hx.   CAP CT  Anterior mediastinal hematoma post arrest   Diet: Nutrition consult for feeding today  BM: 2/24 liquid    Recent Labs   Lab 02/29/24  0341 02/28/24  2205 02/28/24  1544   AST 51* 53* 46*    ALT 34 33 30   BILITOTAL 0.2 0.2 0.2   ALBUMIN 3.2* 3.2* 3.2*   PROTTOTAL 4.8* 4.8* 4.8*   ALKPHOS 59 67 56     Plan:  - Monitor LFTs  - Bowel regimen in place-PRN  - GI Prophylaxis: PPI; Protonix 40 mg daily  - Nutrition consulted, appreciate recommendations      Renal, Electrolytes  # Acute Renal Injury   # Lactic acidosis  Baseline CR 0.90    Intake/Output Summary (Last 24 hours) at 2024 0837  Last data filed at 2024 0800  Gross per 24 hour   Intake 1944.42 ml   Output 1615 ml   Net 329.42 ml     Recent Labs   Lab 24  0341 24  2205 24  1544 24  0950 24  0354 24  2151   * 148* 147*   < > 149* 148*   POTASSIUM 3.7 3.4 3.6   < > 3.2* 3.6  3.6   CHLORIDE 113* 112* 111*   < > 112* 109*   CO2 27 28 29   < > 29 30*   BUN 23.5* 23.3* 22.5*   < > 22.9* 23.3*   CR 0.91 0.93 0.91   < > 0.98 1.01   PHOS 2.9  --   --   --  2.8 2.8   MAG 2.3 2.2 2.3   < > 2.3 2.3    < > = values in this interval not displayed.     Plan:  - Avoid nephrotoxins, renally dose meds  - Monitor urine output  - FBG and diuresis as above    Infectious Disease  # Aspiration Pneumonia  # Leukocytosis  Temp (24hrs), Av.5  F (36.9  C), Min:97.9  F (36.6  C), Max:98.8  F (37.1  C)     Cultures thus far:   Blood NGTD   Sputum 3+ GPC    Urine NGTD   MRSA Positive   COVID negative  Antibiotics     Anti-infectives (From now, onward)      Start     Dose/Rate Route Frequency Ordered Stop    24 1430  vancomycin (VANCOCIN) 1,500 mg in 0.9% NaCl 250 mL intermittent infusion         1,500 mg  over 90 Minutes Intravenous EVERY 12 HOURS 24 1416 24 2359           Recent Labs   Lab 24  0341 24  2205 24  1544   WBC 7.7 7.3 6.7                    Plan:  - Continue Ceftriaxone x 5D for aspiration coverage  - Monitor for signs of infection  - Avoid fevers     Hematology  #Acute Blood Loss Anemia on Anemia of Critical Illness   Hgb decreaseing. Oral bleeding, CT CAP anterior  "mediastinal hematoma  US LE w/ Arterial Duplex with no evidence of acute thrombus    Receiving heparin for ECMO with ACT goal 140-160  Dose (units/hr) HEParin: 1100 Units/hr  ACT  (seconds): 162 seconds    Recent Labs   Lab 02/29/24  0341 02/28/24 2205 02/28/24  1544   HGB 7.8* 7.8* 7.0*   HCT 23.3* 23.4* 21.4*   PLT 57* 59* 54*     Recent Labs   Lab 02/29/24  0341 02/28/24 2205 02/28/24  1544   INR 1.08 1.14 1.39*   PTT 47* 42* 51*       Plan:  - DVT PPX: Heparin with ACT goal as above  - Transfusion parameters:   - 1u PRBC for hbg < 7.0   - 1u platelet for plt < 50   - 1u FFP for INR > 3   - 5u cryoprecipitate for fibrinogen <150     Endocrinology  # Hyperglycemia   # A1C   Recent Labs   Lab 02/23/24  1951   A1C 10.0*       Recent Labs   Lab 02/29/24  0647 02/29/24  0557 02/29/24  0504 02/29/24  0343 02/29/24  0341   * 109* 103* 118* 122*     Dose (units/hr) Insulin: 2 Units/hr    Plan  - insulin gtt as needed  - Endocrine consulted   - Continue levothyroxine 150mcg    Integumentary:  - No skin issues    Lines/Tubes/Drains:  PIV left lower fore arm 2/10  CVC TL RFV 2/23  RR arterial line 2/23  ECMO L femoral vein 29 FR 2/23   ECMO L femoral artery 17 FR 2/23  Distal perfusion catheter LSFA 2/23  Nicole 2/23  OG 2/24  Rectal tube with balloon 2/23     ICU  Feeding: TF working towards goal  Analgesia: Fentanyl  Sedation: propofol and precedex  Thrombopx: Heparin infusion when ACT trends down  Head of bed: reverse trend   Ulcers: PPI  Glucose: Insulin infusion  SBT: not ready  B: liquid stool with rectal tube  I: still needs urinary catheter for accurate I/Os  De-escalate: continue abx    Family will be updated by team    Patient seen, discussed, and plan developed with staff physician, Dr Sb Deng, APRN, CNP  [facility]  Critical Care Cardiology  Text Page     Objective:  Most recent vital signs:  /64   Pulse 96   Temp 98.4  F (36.9  C)   Resp 15   Ht 1.778 m (5' 10\")   " Wt 115.8 kg (255 lb 4.7 oz)   SpO2 95%   BMI 36.63 kg/m    Temp:  [98.4  F (36.9  C)-98.8  F (37.1  C)] 98.4  F (36.9  C)  Pulse:  [] 96  Resp:  [15] 15  MAP:  [65 mmHg-93 mmHg] 69 mmHg  Arterial Line BP: ()/(49-75) 105/58  FiO2 (%):  [50 %-60 %] 50 %  SpO2:  [91 %-100 %] 95 %      Physical exam:  General: In bed, in NAD  HEENT: PERRL, no scleral icterus or injection  CARDIAC: RRR, no m/r/g appreciated. Peripheral pulses dopplered  RESP: Mechanical ventilation; L lung diminished with exp wheezing  GI: soft, rounded, BS hypoactive  : Nicole, good UO  EXTREMITIES: 3+ LE edema, pulses 2+. Femoral access site oozing decreased. Dressing changed last night   SKIN: No acute lesions appreciated  NEURO: Intubated and sedated, sedation holiday as tolerates. Opens eyes, nods to questions when lightened    All imaging/results reviewed by me.

## 2024-02-29 NOTE — PROGRESS NOTES
[facility]    ECLS Shift Summary:     ECMO Equipment:  Console Serial Number: [id number]  Circuit Lot Number: [id number]  Oxygenator Lot Number: [id number]    Circuit Assessment: Fibrin  Fibrin Location: connectors    Arterial ECMO Cannula: 17 Fr in the Left Femoral Artery  Venous ECMO Cannula: 29 Fr in the Left Femoral Vein  ECMO Cannula Venous Left femoral vein-Site Assessment: Sutured, Secure  ECMO Cannula Arterial Left femoral artery-Site Assessment: Sutured, Secure  Distal Perfusion Catheter Left femoral artery-Site Assessment: Sutured, Secure  ECMO Cannula Venous Left femoral vein-Site Intervention: No intervention needed  ECMO Cannula Arterial Left femoral artery-Site Intervention: No intervention needed  Distal Perfusion Catheter Left femoral artery-Site Intervention: No intervention needed    Patient remains on V-A ECMO, all equipment is functioning and alarms are appropriately set. RPM's: 3400 with Blood Flow (Circuit) LPM  Av LPM  Min: 4 LPM  Max: 4.06 LPM L/min. Sweep is at 0.5 LPM and 90 %. Extremities are warm.     Significant Shift Events: None    Vent settings:  Vent Mode: PCV Plus assist  (Pressure Control Ventilation/ Assist Control)  FiO2 (%): 50 %  Resp Rate (Set): 15 breaths/min  Tidal Volume (Set, mL): 500 mL  PEEP (cm H2O): 12 cmH2O  Inspiratory Pressure (cm H2O) (Drager Nena): 30  Resp: 15      Anticoagulation:  Dose (units/hr) HEParin: 1100 Units/hr  Rate (mL/hr) HEParin: 11 mL/hr  Concentration HEParin: 100 Units/mL     Dose (mg/kg/hr) Bivalirudin: 0 mg/kg/hr  Rate (mL/hr) Bivalirudin: 0 mL/hr  Concentration Bivalirudin: 1 mg/mL  Most recent: ACT  (seconds): 162 seconds    Urine output is clear, Light / Pale.  Blood loss was minimal. Product given included 1 PRBC.     Intake/Output Summary (Last 24 hours) at 2024 0641  Last data filed at 2024 0600  Gross per 24 hour   Intake 4094.03 ml   Output 4520 ml   Net -425.97 ml        Labs:  Recent Labs   Lab 02/29/24  0618 02/29/24  0344 02/29/24  0341 02/29/24  0212 02/29/24  0018   PH 7.44 7.46*  --  7.43 7.41   PCO2 42 40  --  43 46*   PO2 79* 85  --  80 80   HCO3 28 28  --  28 29*   O2PER 50 50 90  50 50 50       Lab Results   Component Value Date    HGB 7.8 (L) 02/29/2024    PHGB 90 (H) 02/29/2024    PLT 57 (L) 02/29/2024    FIBR 327 02/29/2024    INR 1.08 02/29/2024    PTT 47 (H) 02/29/2024    DD 1.58 (H) 02/29/2024    ANTCH 99 02/28/2024       Plan is continue [facility] ECMO support.    Antonieta Chapman, RT  ECMO Specialist  2/29/2024 6:41 AM

## 2024-02-29 NOTE — PLAN OF CARE
Goal Outcome Evaluation:    Neuro: Sedated/drowsy, RASS -1. Follows commands, KNOWLES, answers y/n questions. Afebrile. Pupils equal and reactive.  CV: VA ECMO: Flow 4.0 LPM, sweep 0.5, FIO2 90. Heprin 1100, ACT goal 160-180. NSR 90s-120s. MAP>65 with no pressors.  Pulm: PCV+ 15/30/12/50%. Bloody oral and ETT secretions improving, no s/s of active bleeding.  GI: OGT with TF @ goal, 100mL Q4H FWF, Hypo/faint BS. Rectal tube with productive stool.  : Nicole, adequate UOP. Lasix given x1 this shift.  Skin: scattered bruises to abdomen and upper arms, blanchable redness to sacrum/coccyx  Drips: Propofol 50, Fent 100, Insulin 2-4.  Labs: K replaced  Lines: L femoral ECMO, R femoral and R internal jugular CVC TL, L PIV x2, R rad art line      Plan of Care Reviewed With: patient, sibling, parent, child    Overall Patient Progress: no changeOverall Patient Progress: no change    Outcome Evaluation: Weaned off Versed. Ketamine PRN Bolus ordered.

## 2024-02-29 NOTE — PROGRESS NOTES
[facility]    ECLS Shift Summary:     ECMO Equipment:  Console Serial Number: [id number]  Circuit Lot Number: [id number]  Oxygenator Lot Number: [id number]    Circuit Assessment: Fibrin  Fibrin Location: Connectors    Arterial ECMO Cannula: 17 Fr in the Right Femoral Artery  Venous ECMO Cannula: 29 Fr in the Right Femoral Vein  ECMO Cannula Venous Left femoral vein-Site Assessment: Bleeding, Sutured, Secure  ECMO Cannula Arterial Left femoral artery-Site Assessment: Bleeding, Sutured, Secure  Distal Perfusion Catheter Left femoral artery-Site Assessment: Bleeding, Sutured, Secure  ECMO Cannula Venous Left femoral vein-Site Intervention: No intervention needed  ECMO Cannula Arterial Left femoral artery-Site Intervention: No intervention needed  Distal Perfusion Catheter Left femoral artery-Site Intervention: No intervention needed    Patient remains on V-A ECMO, all equipment is functioning and alarms are appropriately set. RPM's: 3400 with Blood Flow (Circuit) LPM  Av.1 LPM  Min: 4.01 LPM  Max: 4.1 LPM L/min. Sweep is at 0.5 LPM and 90 %. Extremities are warm.     Significant Shift Events: None    Vent settings:  Vent Mode: PCV Plus assist  (Pressure Control Ventilation/ Assist Control)  FiO2 (%): 50 %  Resp Rate (Set): 15 breaths/min  Tidal Volume (Set, mL): 500 mL  PEEP (cm H2O): 12 cmH2O  Pressure Support (cm H2O): 30 cmH2O  Inspiratory Pressure (cm H2O) (Drager Nena): 30  Resp: 15      Anticoagulation:  Dose (units/hr) HEParin: 700 Units/hr  Rate (mL/hr) HEParin: 7 mL/hr  Concentration HEParin: 100 Units/mL     Dose (mg/kg/hr) Bivalirudin: 0 mg/kg/hr  Rate (mL/hr) Bivalirudin: 0 mL/hr  Concentration Bivalirudin: 1 mg/mL  Most recent: ACT  (seconds): 147 seconds    Urine output is clear, Light / Pale.  Blood loss was to minimal. Product given included none.     Intake/Output Summary (Last 24 hours) at 2024 1848  Last data filed at 2024 1817  Gross per 24  hour   Intake 4438.76 ml   Output 3935 ml   Net 503.76 ml       Labs:  Recent Labs   Lab 02/28/24  1752 02/28/24  1544 02/28/24  1543 02/28/24  1339 02/28/24  1159 02/28/24  1151   PH 7.46*  --  7.43 7.45  --  7.42   PCO2 43  --  47* 48*  --  49*   PO2 86  --  72* 75*  --  60*   HCO3 30*  --  31* 33*  --  32*   O2PER 50 90  50 50 50   < > 50    < > = values in this interval not displayed.       Lab Results   Component Value Date    HGB 7.0 (L) 02/28/2024    PHGB 50 (H) 02/28/2024    PLT 54 (L) 02/28/2024    FIBR 299 02/28/2024    INR 1.39 (H) 02/28/2024    PTT 51 (H) 02/28/2024    DD 1.18 (H) 02/28/2024    ANTCH 99 02/28/2024       Plan is to remain on [facility] ECMO support.    Bhumika Mejía, RT  ECMO Specialist  2/28/2024 6:48 PM

## 2024-02-29 NOTE — PROGRESS NOTES
Brief Diabetes Note:    Update from full progress note filed earlier.     BG trends reviewed with the NPH 60 unit(s) on board from this AM.     IV insulin needs continue to be avg of 3 unit(s) or 36 additional unit(s)   Will decrease some for safety and add to the 60 unit(s) given this AM for a total of NPH 85 unit(s) to be administered at 2000 hrs and IV insulin to be stopped 1 hour later.      NPH instructed to be given in 2 separate injections in 2 separate locations for best absorption.    Communicated with RN. Verbalized understanding.     Updated orders to reflect q4h BG to start at 0000  Novolog very high resistance correction to start at 0000 q4h     Will trend for NPH increase - anticipating 85 unit(s) and will adjust as indicated by trends.     PRN D10% order in place if TF stopped or interrupted to prevent hypoglycemia.      VASQUEZ Engle CNP, BC-[facility]  Inpatient Diabetes Management Service  Pager - [phone]  Available on [employer]     To contact Endocrine Diabetes service:   From 7AM-5PM: page inpatient diabetes provider that is following the patient that day (see filed or incomplete progress notes/consult notes).  If uncertain of provider assignment: page job code [phone].  For questions or updates from 5PM-7AM: page the diabetes job code for on call fellow: [phone]    Please notify inpatient diabetes service if changes are planned to steroids, nutrition, or if procedures are planned requiring prolonged NPO status.Diabetes Management Team job code: [phone]

## 2024-02-29 NOTE — PLAN OF CARE
ICU End of Shift Summary. See flowsheets for vital signs and detailed assessment.    Changes this shift: No significant events this shift. Patient following commands and opening eyes to speech. NSR, HR . Patient tolerating vent settings. Minimal oral suction provided to prevent rebleeding. Nicole and rectal tube remains in place and appropriate.      Plan: Continue plan of care.       Goal Outcome Evaluation:      Plan of Care Reviewed With: patient, sibling, parent    Overall Patient Progress: no changeOverall Patient Progress: no change    Outcome Evaluation: TF stopped at 0000 for procedure

## 2024-03-01 NOTE — PROGRESS NOTES
[facility]    ECLS Shift Summary:     ECMO Equipment:  Console Serial Number: [id number]  Circuit Lot Number: [id number]  Oxygenator Lot Number: [id number]    Circuit Assessment: Fibrin  Fibrin Location: connectors    Arterial ECMO Cannula: 17 Fr in the Left Femoral Artery  Venous ECMO Cannula: 29 Fr in the Left Femoral Vein  ECMO Cannula Venous Left femoral vein-Site Assessment: Sutured, Secure  ECMO Cannula Arterial Left femoral artery-Site Assessment: Sutured, Secure  Distal Perfusion Catheter Left femoral artery-Site Assessment: Sutured, Secure  ECMO Cannula Venous Left femoral vein-Site Intervention: No intervention needed  ECMO Cannula Arterial Left femoral artery-Site Intervention: No intervention needed  Distal Perfusion Catheter Left femoral artery-Site Intervention: No intervention needed    Patient remains on V-A ECMO, all equipment is functioning and alarms are appropriately set. RPM's: 3400 with Blood Flow (Circuit) LPM  Avg: 3.7 LPM  Min: 3.66 LPM  Max: 3.84 LPM L/min. Sweep is at 0.5 LPM and 90 %. Extremities are warm.     Significant Shift Events: None    Vent settings:  Vent Mode: PCV Plus assist  (Pressure Control Ventilation/ Assist Control)  FiO2 (%): 50 %  Resp Rate (Set): 15 breaths/min  PEEP (cm H2O): 12 cmH2O  Pressure Support (cm H2O): 18 cmH2O  Inspiratory Pressure (cm H2O) (Drager Nena): 30  Resp: 18      Anticoagulation:  Dose (units/hr) HEParin: 1400 Units/hr  Rate (mL/hr) HEParin: 14 mL/hr  Concentration HEParin: 100 Units/mL     Dose (mg/kg/hr) Bivalirudin: 0 mg/kg/hr  Rate (mL/hr) Bivalirudin: 0 mL/hr  Concentration Bivalirudin: 1 mg/mL  Most recent: ACT  (seconds): 166 seconds    Urine output is clear, Light / Pale.  Blood loss was minimal. Product given included none.     Intake/Output Summary (Last 24 hours) at 3/1/2024 0616  Last data filed at 3/1/2024 0600  Gross per 24 hour   Intake 3383.02 ml   Output 5870 ml   Net -2486.98 ml        Labs:  Recent Labs   Lab 03/01/24  0413 03/01/24  0412 03/01/24  0204 02/29/24  2357 02/29/24  2149   PH  --  7.44 7.45 7.42 7.43   PCO2  --  45 43 46* 44   PO2  --  82 70* 81 65*   HCO3  --  30* 30* 30* 29*   O2PER 50  90 50 50 50 50       Lab Results   Component Value Date    HGB 7.9 (L) 03/01/2024    PHGB 40 (H) 03/01/2024    PLT 67 (L) 03/01/2024    FIBR 422 03/01/2024    INR 0.98 03/01/2024    PTT 53 (H) 03/01/2024    DD 2.25 (H) 03/01/2024    ANTCH 103 02/29/2024       Plan is continue [facility] ECMO, potential turn down today.    Antonieta Chapman, RT  ECMO Specialist  3/1/2024 6:16 AM

## 2024-03-01 NOTE — PROGRESS NOTES
[facility] [place]    ECLS Shift Summary:     ECMO Equipment:  Console Serial Number: [id number]  Circuit Lot Number: [id number]  Oxygenator Lot Number: [id number]    Circuit Assessment: Fibrin  Fibrin Location: connectors    Arterial ECMO Cannula: 17 Fr in the Left Femoral Artery  Venous ECMO Cannula: 29 Fr in the Left Femoral Vein  ECMO Cannula Venous Left femoral vein-Site Assessment: Sutured, Secure  ECMO Cannula Arterial Left femoral artery-Site Assessment: Sutured, Secure  Distal Perfusion Catheter Left femoral artery-Site Assessment: Sutured, Secure  ECMO Cannula Venous Left femoral vein-Site Intervention: No intervention needed  ECMO Cannula Arterial Left femoral artery-Site Intervention: No intervention needed  Distal Perfusion Catheter Left femoral artery-Site Intervention: No intervention needed    Patient remains on V-A ECMO, all equipment is functioning and alarms are appropriately set. RPM's: 3400 with Blood Flow (Circuit) LPM  Avg: 3.8 LPM  Min: 3.73 LPM  Max: 4.03 LPM L/min. Sweep is at 0.5 LPM and 90 %. Extremities are warm and well perfused.     Significant Shift Events: None    Vent settings:  Vent Mode: PCV Plus assist  (Pressure Control Ventilation/ Assist Control)  FiO2 (%): 50 %  Resp Rate (Set): 15 breaths/min  Tidal Volume (Set, mL): 500 mL  PEEP (cm H2O): 12 cmH2O  Pressure Support (cm H2O): 18 cmH2O  Inspiratory Pressure (cm H2O) (Drager Nena): 30  Resp: 15      Anticoagulation:  Dose (units/hr) HEParin: 1100 Units/hr  Rate (mL/hr) HEParin: 11 mL/hr  Concentration HEParin: 100 Units/mL    Urine output is clear, Light / Pale.  Blood loss was minimal oozing. Product given included none.     Intake/Output Summary (Last 24 hours) at 2/29/2024 1812  Last data filed at 2/29/2024 1800  Gross per 24 hour   Intake 3790.71 ml   Output 5630 ml   Net -1839.29 ml       Labs:  Recent Labs   Lab 02/29/24  1612 02/29/24  1611 02/29/24  1345 02/29/24  1158  02/29/24  0958   PH  --  7.44 7.43 7.41 7.42   PCO2  --  44 44 45 44   PO2  --  84 75* 75* 72*   HCO3  --  29* 29* 29* 28   O2PER 90  50 50 50 50 50       Lab Results   Component Value Date    HGB 8.6 (L) 02/29/2024    PHGB 90 (H) 02/29/2024    PLT 65 (L) 02/29/2024    FIBR 406 02/29/2024    INR 1.04 02/29/2024    PTT 48 (H) 02/29/2024    DD 2.13 (H) 02/29/2024    ANTCH 103 02/29/2024       Plan is to continue [facility] ECMO, potential turndown tomorrow.    Brianna Holt, RT  ECMO Specialist  2/29/2024 6:12 PM

## 2024-03-01 NOTE — PROGRESS NOTES
Cardiology ICU Progress Note    Brief HPI:  Silvano Motta is a 58 year old male being admitted to the CICU on 2/4/2024 s/p peripheral VA-ECMO cannulation s/p cardiac arrest. 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 Admitted 1/25 to OSH for AHRF and transferred to [facility] 2/4 for PH therapy managed.  Patient not a lung transplant candidate at [facility] or [facility].      Subjective and Interval:  - no acute events overnight    Assessment and plan by system:   Today's changes:  + increase oxy to 10 q 4  + decrease flows after next gas comes back  + plan for trip to [facility]  + diurese again   + increase ACT to 160 - 180     Neurology   # Concern for anoxic brain injury    # Hx TBI 2020  # Hx Sz disorder-Todds paralysis  CT head 2/23: no intracranial pathology  - Intubated, sedated. Avoiding hyperthermia    Current sedation:  RASS (Davidson Agitation-Sedation Scale): -2-->light sedation  Dose (mcg/hr) Fentanyl: 150 mcg/hr and Dose (mcg/kg/min) Propofol: 50 mcg/kg/min    Plan:  - Continue PTA Keppra, VIMPAT, and gabapentin  - Continue home ASA  - Continue sedation with a RASS goal -1 to -2  - Spontaneous awakening trial as tolerated  - permissive hypercapnea and hypernatremia for neuroprotection  + ketamine PRN, come off propofol   + oxycodone scheduled, wean fentanyl off     Cardiovascular  # PEA/VT Cardiac Arrest s/p peripheral VA ECMO Cannulation 2/23/24   # Cardiogenic shock requiring VA ECMO  Peripheral V-A ECMO inserted via LCFA and LCFV 17 Fr arterial cannula, 29 Fr venous annula, DPC 7 Fr LSFA  Angiogram: RHC, Normal biventricular filling pressures.   Mild pre-capillary pulmonary hypertension  Low-normal cardiac output and index.  TTE:  VA ECMO at 4.1 LPM. Difficult to assess LV function but appears to be  ~30%. The right ventricle  is normal size.  Global right ventricular function is mildly reduced.  No pericardial effusion is present.    ECG Rhythm: Sinus rhythm    ECMO:  Mode: V-A   Pump Type: Cardiohelp  RPM's: 3400  Blood Flow (Circuit) LPM: 3.77 LPM  Sweep LPM: 0.5 LPM  Sweep FiO2   %: 90 %  Venous Pressure  mmHg: -25 mmHg  Arterial Pressure  mmH mmHg  Internal Pressure mmH mmHg  Pressure Change mmH mmHg  PP 40 points  Current Pressors/inotropes/antiarrythmic:  Dose (mcg/kg/min) Norepinephrine: 0.04 mcg/kg/min, Dose (units/hr) Vasopressin: 2 Units/hr, and Dose (mcg/kg/min) Epinephrine: 0.04 mcg/kg/min    Hemodynamics:  Art Line  Arterial Line BP: 117/63  Arterial Line MAP (mmHg): 76 mmHg  Arterial Line Location: Right radial  Art Line Wave Form: Appropriate wave forms    Plan:  - Continue ASA 81mg   - Hold lipitor for now given shock liver  - Telemetry monitoring  - Continue trending troponin  - Continue ECMO support   - FBG -1L     Pulmonary  # Acute on chronic hypoxemic respiratory failure requiring intubation  # Shunt physiology, positive shunt study 24  # Mild pulmonary hypertension  # Severe ROSALIO on PAP at night  # Probable aspiration pneumonia  Per pulmonology documentation: Hx of chronic hypoxemia thought due to hypoventilation and untreated sleep apnea since Oct 2023 with extensive workup including multiple CT Angio studies, NM Perfusion studies, RHC in Oct 2023 and again this hospitalization showing mild pulmonary hypertension (mean PA 25, PCWP 10). Imaging with stable pleural based HARMONY nodule and subpleural reticulation and thickening of interlobular septi present since . Repeated CT scans without embolus or AVM. Does have genetic sequencing in process. Thus far auto-immune workup negative (SSA, SSB, Scleroderma, Clara 1, RF, CCP, CRP, Aldolase, HP panels, ANCA, IgG) except for positive ROSAMARIA (speckled 1:160). In Oct also had negative workup (C3 and C4, anti-centromere, anti-Smith, anti-RNP, anti-SSA,  anti-SSB, Scl-70, anti-cardiolipin, anti-CCP). Concern for shunt physiology with highly elevated A-a gradient, multiple TTEs and EDWIGE with positive late bubble, hypoxemia and ongoing dependence on high flow oxygen that seems out of proportion to degree of pulmonary hypertension and fibrotic changes. NM MAA shunt study 2/9 confirms shunt physiology. Based on bubble studies, presumably extracardiac shunt, potential diagnosis includes DMVPAVM which is very rare but would explain lack of macrovascular abnormalities. In discussions with radiology, and abnormal NM shunt study perfusion to upper lung fields, will pursue CTPE dual energy to better delineate vasculature and perfusion. Additionally, will obtain CT abdomen/pelvis for any extrathoracic abnormality that could be contributing to shunt. Not a transplant candidate per [facility] or [place].   CAP CT  Bilateral opacity, aspiration  CXR: R middle and bilateral lower lobe opacities. Better lung volumes. ETT and venous ECMO cannula in good positions    Vent Settings:   Vent Mode: PCV Plus assist  (Pressure Control Ventilation/ Assist Control)  FiO2 (%): 50 %  Resp Rate (Set): 15 breaths/min  PEEP (cm H2O): 12 cmH2O  Pressure Support (cm H2O): 18 cmH2O  Inspiratory Pressure (cm H2O) (Drager Nena): 30  Resp: 18    ABG:   Recent Labs   Lab 03/01/24  0801 03/01/24  0559 03/01/24  0413 03/01/24  0412   PH 7.49* 7.47*  --  7.44   PCO2 40 41  --  45   PO2 82 79*  --  82   HCO3 30* 30*  --  30*   O2PER 50 50 50  90 50     Plan:  - Pulm on board, appreciate recs   - Wean vent as able  - Daily CXR  - Serial ABGs   - Consider scheduled duonebs if signs of lung dz, currently PRN    - Peridex  - FBG and diuresis as above        Gastrointestinal, Nutrition  # Shock liver 2/2 cardiac arrest  # Hypoalbuminemia   # At risk for protein malnutrition   No known medical hx.   CAP CT  Anterior mediastinal hematoma post arrest   Diet: Nutrition consult for feeding today  BM: 2/24 liquid    Recent  Labs   Lab 24  0413 248 24  1612   AST 61* 63* 58*   ALT 45 46 43   BILITOTAL 0.3 0.3 0.3   ALBUMIN 3.5 3.6 3.6   PROTTOTAL 5.4* 5.6* 5.5*   ALKPHOS 74 76 68     Plan:  - Monitor LFTs  - Bowel regimen in place-PRN  - GI Prophylaxis: PPI; Protonix 40 mg daily  - Nutrition consulted, appreciate recommendations      Renal, Electrolytes  # Acute Renal Injury   # Lactic acidosis  Baseline CR 0.90    Intake/Output Summary (Last 24 hours) at 2024 0837  Last data filed at 2024 0800  Gross per 24 hour   Intake 1944.42 ml   Output 1615 ml   Net 329.42 ml     Recent Labs   Lab 24  0413 248 24  1612 24  0959 24  0341 24  0950 24  0354    144 144   < > 147*   < > 149*   POTASSIUM 3.6 3.7 3.6   < > 3.7   < > 3.2*   CHLORIDE 106 108* 108*   < > 113*   < > 112*   CO2 28 25 26   < > 27   < > 29   BUN 24.4* 26.3* 25.6*   < > 23.5*   < > 22.9*   CR 0.86 0.95 0.94   < > 0.91   < > 0.98   PHOS 3.8  --   --   --  2.9  --  2.8   MAG 2.2 2.4* 2.0   < > 2.3   < > 2.3    < > = values in this interval not displayed.     Plan:  - Avoid nephrotoxins, renally dose meds  - Monitor urine output  - FBG and diuresis as above    Infectious Disease  # Aspiration Pneumonia  # Leukocytosis  Temp (24hrs), Av.5  F (36.9  C), Min:97.9  F (36.6  C), Max:98.8  F (37.1  C)     Cultures thus far:   Blood NGTD   Sputum 3+ GPC    Urine NGTD   MRSA Positive   COVID negative  Antibiotics     Anti-infectives (From now, onward)      Start     Dose/Rate Route Frequency Ordered Stop    24 1430  vancomycin (VANCOCIN) 1,500 mg in 0.9% NaCl 250 mL intermittent infusion         1,500 mg  over 90 Minutes Intravenous EVERY 12 HOURS 24 1416 24 2359           Recent Labs   Lab 24  0413 24  2148 24  1612   WBC 7.9 9.0 9.0                    Plan:  - Continue abx  - Monitor for signs of infection  - Avoid fevers     Hematology  #Acute Blood Loss Anemia  on Anemia of Critical Illness   Hgb decreaseing. Oral bleeding, CT CAP anterior mediastinal hematoma  US LE w/ Arterial Duplex with no evidence of acute thrombus    Receiving heparin for ECMO with ACT goal 1160-180  Dose (units/hr) HEParin: 1400 Units/hr  ACT  (seconds): 166 seconds    Recent Labs   Lab 03/01/24  0413 02/29/24  2148 02/29/24  1612   HGB 7.9* 8.4* 8.6*   HCT 23.8* 25.2* 25.2*   PLT 67* 69* 65*     Recent Labs   Lab 03/01/24  0413 02/29/24  2148 02/29/24  1612   INR 0.98 1.02 1.04   PTT 53* 47* 48*       Plan:  - DVT PPX: Heparin with ACT goal as above  - Transfusion parameters:   - 1u PRBC for hbg < 7.0   - 1u platelet for plt < 50   - 1u FFP for INR > 3   - 5u cryoprecipitate for fibrinogen <150     Endocrinology  # Hyperglycemia   # A1C   Recent Labs   Lab 02/23/24  1951   A1C 10.0*       Recent Labs   Lab 03/01/24  0801 03/01/24  0659 03/01/24  0558 03/01/24  0502 03/01/24  0413   * 131* 115* 116* 100*     Dose (units/hr) Insulin: 4 Units/hr    Plan  - insulin gtt as needed  - Endocrine consulted   - Continue levothyroxine 150mcg    Integumentary:  - No skin issues    Lines/Tubes/Drains:  PIV left lower fore arm 2/10  CVC TL RFV 2/23  RR arterial line 2/23  ECMO L femoral vein 29 FR 2/23   ECMO L femoral artery 17 FR 2/23  Distal perfusion catheter LSFA 2/23  Nicole 2/23  OG 2/24  Rectal tube with balloon 2/23     ICU  Feeding: TF working towards goal  Analgesia: Fentanyl  Sedation: propofol and precedex  Thrombopx: Heparin infusion when ACT trends down  Head of bed: reverse trend   Ulcers: PPI  Glucose: Insulin infusion  SBT: not ready  B: liquid stool with rectal tube  I: still needs urinary catheter for accurate I/Os  De-escalate: continue abx    Family will be updated by team    Patient seen, discussed, and plan developed with staff physician, Dr Sb Deng, APRN, CNP  [facility]  Critical Care Cardiology  Text Page     Objective:  Most recent vital signs:  BP  "112/64   Pulse 103   Temp 98.6  F (37  C)   Resp 18   Ht 1.778 m (5' 10\")   Wt 114 kg (251 lb 5.2 oz)   SpO2 99%   BMI 36.06 kg/m    Temp:  [98.4  F (36.9  C)-98.8  F (37.1  C)] 98.6  F (37  C)  Pulse:  [] 103  Resp:  [15-18] 18  MAP:  [67 mmHg-255 mmHg] 76 mmHg  Arterial Line BP: (101-153)/(54-77) 117/63  FiO2 (%):  [50 %] 50 %  SpO2:  [90 %-100 %] 99 %      Physical exam:  General: In bed, in NAD  HEENT: PERRL, no scleral icterus or injection  CARDIAC: RRR, no m/r/g appreciated. Peripheral pulses dopplered  RESP: Mechanical ventilation; L lung diminished with exp wheezing  GI: soft, rounded, BS hypoactive  : Nicole, good UO  EXTREMITIES: 3+ LE edema, pulses 2+. Femoral access site oozing decreased. Dressing changed last night   SKIN: No acute lesions appreciated  NEURO: Intubated and sedated, sedation holiday as tolerates. Opens eyes, nods to questions when lightened    All imaging/results reviewed by me.         "

## 2024-03-01 NOTE — PROGRESS NOTES
ECMO Attending Progress Note  2024    Silvano Motta is a 58 year old male who was cannulated for ECMO 2024 due to refractory PEA arrest    Cannulation Site:  17 Fr in the L femoral artery  29 Fr in the L femoral vein    Interval events: naeo ketamine for agitation,  remains hypoxic    Physical Exam:  Temp:  [98.4  F (36.9  C)-98.8  F (37.1  C)] 98.6  F (37  C)  Pulse:  [] 109  Resp:  [15-17] 15  MAP:  [65 mmHg-255 mmHg] 77 mmHg  Arterial Line BP: ()/(53-76) 125/62  FiO2 (%):  [50 %] 50 %  SpO2:  [90 %-100 %] 93 %    Intake/Output Summary (Last 24 hours) at 2024 1158  Last data filed at 2024 1100  Gross per 24 hour   Intake 1932.82 ml   Output 1425 ml   Net 507.82 ml        Vent Mode: PCV Plus assist  (Pressure Control Ventilation/ Assist Control)  FiO2 (%): 50 %  Resp Rate (Set): 15 breaths/min  Tidal Volume (Set, mL): 500 mL  PEEP (cm H2O): 12 cmH2O  Pressure Support (cm H2O): 18 cmH2O  Inspiratory Pressure (cm H2O) (Drager Nena): 30  Resp: 15       Labs:  Recent Labs   Lab 24  1800 24  1612 24  1611 24  1345 24  1158   PH 7.45  --  7.44 7.43 7.41   PCO2 43  --  44 44 45   PO2 76*  --  84 75* 75*   HCO3 29*  --  29* 29* 29*   O2PER 50 90  50 50 50 50      Recent Labs   Lab 24  1612 24  0959 24  0341 24  2205   WBC 9.0 7.7 7.7 7.3   HGB 8.6* 7.9* 7.8* 7.8*     Creatinine   Date Value Ref Range Status   2024 0.94 0.67 - 1.17 mg/dL Final   2024 0.89 0.67 - 1.17 mg/dL Final   2024 0.91 0.67 - 1.17 mg/dL Final   2024 0.93 0.67 - 1.17 mg/dL Final   Blood Flow (Circuit) LPM: 4.28 LPM  Sweep LPM: 1 LPM  Sweep FiO2   %: 100 %  ACT  (seconds): 170 seconds  Arterial Blood Temp  (degrees C): 36.9 C  Pulse Oximetry  (SpO2%): 98 %  Arterial Pressure  mmH mmHg    ECMO Issues including assessments and plan on DOS 2024:  Neuro: Sedated for mechanical ventilation and ECMO.  No acute distress.  NIRS stable b/l   RASS goal: -1 to -2  CV: Cardiogenic shock.  Hemodynamically stable on low dose pressors   Pulm: Keep vent settings at rest settings as above.  FEN/Renal: Electrolytes stable w/ replacement protocols in place, Cr stable/normal, UOP same  Heme: ACT goal: 140-160 (nasal bleeding, stable), Hemoglobin stable.  Minimal oozing around the ECMO cannulas.  ID: Receiving empiric antibiotics  Cannulae: Position is acceptable on exam and the available imaging.  Distal perfusion cannula is in place and patent.  Extremities are well-perfused.     I have personally reviewed the ECMO flows, oxygenation and CO2 clearance, anticoagulation, and cannula position.  I have also personally assessed the patient's systemic response with hemodynamics, oxygenation, ventilation, and bleeding.       The patient requires continued ECMO support and management in the ICU.  I have discussed patient care and treatment plan with the primary team.      Miguel Angel Basurto MD  Critical Care Cardiology    February 29, 2024

## 2024-03-01 NOTE — PROGRESS NOTES
Care Management Follow Up    Length of Stay (days): 26    Expected Discharge Date:       Concerns to be Addressed: care coordination/care conferences     Patient plan of care discussed at interdisciplinary rounds: Yes    Anticipated Discharge Disposition:  (TBD pending care conference 2/17)     Anticipated Discharge Services:  (TBD pending care conference 2/17)  Anticipated Discharge DME:  (TBD pending care conference 2/17)    Patient/family educated on Medicare website which has current facility and service quality ratings:    Education Provided on the Discharge Plan:    Patient/Family in Agreement with the Plan: other (see comments)    Referrals Placed by CM/ROBERT:    Private pay costs discussed: Not applicable    Additional Information:  SW received message from bedside nurse that family had questions about any programs/services to help with medical bills. SW reached out to patient's sister over the phone and left voicemail. SW left information in patient's room and asked bedside to pass along to family as bedside said family was coming to visit later today.    CATY Call, EVELYNSW  4A & 4E ICU   Pager: [phone]  Phone: [phone]         CATY Caballero

## 2024-03-01 NOTE — PLAN OF CARE
ICU End of Shift Summary. See flowsheets for vital signs and detailed assessment.    Changes this shift: No significant events this shift. Patient opening eyes spontaneously, following commands and answering yes/no questions.  Remains SR -120's. Tolerating vent settings. Gentle oral cares provided with no new bleeding. TF off @ 0100 for procedure. Nicole and rectal tube in place with adequate op. PRN ketamine provided x1 for agitation and restlessness with effective results.     soft restraints restraints continued 3/1/2024    Clinical Justification: Pulling lines, pulling tubes, and pulling equipment  Less Restrictive Alternative: 1:1 patient care, Repositioning, Re-evaluate equipment, Pain management, De-escalation, Reorientation  New orders placed Yes  Length of Order: 1 Day      Plan: Continue plan of care      Goal Outcome Evaluation:      Plan of Care Reviewed With: patient, child    Overall Patient Progress: no changeOverall Patient Progress: no change    Outcome Evaluation: Tolerating sedation and vent settings.

## 2024-03-02 NOTE — PROGRESS NOTES
Cardiology ICU Progress Note    Brief HPI:  Silvano Motta is a 58 year old male being admitted to the CICU on 2/4/2024 s/p peripheral VA-ECMO cannulation s/p cardiac arrest. 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 Admitted 1/25 to OSH for AHRF and transferred to [facility] 2/4 for PH therapy managed.  Patient not a lung transplant candidate at [facility] or [facility].      Subjective and Interval:  - no acute events overnight    Assessment and plan by system:   Today's changes:  - start midazolam infusion for CCL run  - CCL for pulmonary angiogram     Neurology   # Concern for anoxic brain injury    # Hx TBI 2020  # Hx Sz disorder-Todds paralysis  CT head 2/23: no intracranial pathology  - Intubated, sedated. Avoiding hyperthermia    Current sedation:  RASS (Davidson Agitation-Sedation Scale): -2-->light sedation  Dose (mcg/hr) Fentanyl: 150 mcg/hr and Dose (mcg/kg/min) Propofol: 50 mcg/kg/min    Plan:  - Continue PTA Keppra, VIMPAT, and gabapentin  - Continue home ASA  - Continue sedation with a RASS goal -1 to -2  - Spontaneous awakening trial as tolerated  - permissive hypercapnea and hypernatremia for neuroprotection  - oxycodone scheduled, wean fentanyl off     Cardiovascular  # PEA/VT Cardiac Arrest s/p peripheral VA ECMO Cannulation 2/23/24   # Cardiogenic shock requiring VA ECMO  Peripheral V-A ECMO inserted via LCFA and LCFV 17 Fr arterial cannula, 29 Fr venous annula, DPC 7 Fr LSFA  Angiogram: RHC, Normal biventricular filling pressures.   Mild pre-capillary pulmonary hypertension  Low-normal cardiac output and index.  TTE:  VA ECMO at 4.1 LPM. Difficult to assess LV function but appears to be  ~30%. The right ventricle is normal size.  Global right ventricular function is mildly reduced.  No pericardial effusion is  present.    ECG Rhythm: Sinus tachycardia    ECMO:  Mode: V-A   Pump Type: Cardiohelp  RPM's: 3500  Blood Flow (Circuit) LPM: 4.03 LPM  Sweep LPM: 0.5 LPM  Sweep FiO2   %: 80 %  Venous Pressure  mmHg: -30 mmHg  Arterial Pressure  mmH mmHg  Internal Pressure mmH mmHg  Pressure Change mmH mmHg  PP 40 points  Current Pressors/inotropes/antiarrythmic:  Dose (mcg/kg/min) Norepinephrine: 0.04 mcg/kg/min, Dose (units/hr) Vasopressin: 2 Units/hr, and Dose (mcg/kg/min) Epinephrine: 0.04 mcg/kg/min    Hemodynamics:  Art Line  Arterial Line BP: 90/61  Arterial Line MAP (mmHg): 69 mmHg  Arterial Line Location: Right radial  Art Line Wave Form: Appropriate wave forms    Plan:  - Continue ASA 81mg   - Hold lipitor for now given shock liver  - Telemetry monitoring  - Continue trending troponin  - Continue ECMO support   - FBG -1L     Pulmonary  # Acute on chronic hypoxemic respiratory failure requiring intubation  # Shunt physiology, positive shunt study 24  # Mild pulmonary hypertension  # Severe ROSALIO on PAP at night  # Probable aspiration pneumonia  Per pulmonology documentation: Hx of chronic hypoxemia thought due to hypoventilation and untreated sleep apnea since Oct 2023 with extensive workup including multiple CT Angio studies, NM Perfusion studies, RHC in Oct 2023 and again this hospitalization showing mild pulmonary hypertension (mean PA 25, PCWP 10). Imaging with stable pleural based HARMONY nodule and subpleural reticulation and thickening of interlobular septi present since . Repeated CT scans without embolus or AVM. Does have genetic sequencing in process. Thus far auto-immune workup negative (SSA, SSB, Scleroderma, Clara 1, RF, CCP, CRP, Aldolase, HP panels, ANCA, IgG) except for positive ROSAMARIA (speckled 1:160). In Oct also had negative workup (C3 and C4, anti-centromere, anti-Smith, anti-RNP, anti-SSA, anti-SSB, Scl-70, anti-cardiolipin, anti-CCP). Concern for shunt physiology with highly elevated A-a  gradient, multiple TTEs and EDWIGE with positive late bubble, hypoxemia and ongoing dependence on high flow oxygen that seems out of proportion to degree of pulmonary hypertension and fibrotic changes. NM MAA shunt study 2/9 confirms shunt physiology. Based on bubble studies, presumably extracardiac shunt, potential diagnosis includes DMVPAVM which is very rare but would explain lack of macrovascular abnormalities. In discussions with radiology, and abnormal NM shunt study perfusion to upper lung fields, will pursue CTPE dual energy to better delineate vasculature and perfusion. Additionally, will obtain CT abdomen/pelvis for any extrathoracic abnormality that could be contributing to shunt. Not a transplant candidate per [facility] or [place].   CXR:   1. Relatively unchanged support devices of endotracheal tube tip  projects about 6 cm above the kianna, may consider advancement by 1 to  2 cm followed by chest x-ray.  2. Similar perihilar/basilar mixed opacities.  3. Slightly increased small pleural effusions.     Vent Settings:   Vent Mode: PCV Plus assist  (Pressure Control Ventilation/ Assist Control)  FiO2 (%): (S) 70 %  Resp Rate (Set): 15 breaths/min  PEEP (cm H2O): 10 cmH2O  Inspiratory Pressure (cm H2O) (Drager Nena): 30  Resp: 18    ABG:   Recent Labs   Lab 03/02/24  0418 03/02/24  0416 03/02/24  0153 03/02/24  0000   PH 7.48*  --  7.48* 7.47*   PCO2 45  --  44 45   PO2 66*  --  74* 74*   HCO3 33*  --  33* 32*   O2PER 50 80 50 50     Plan:  - Pulm on board, appreciate recs   - Wean vent as able  - Daily CXR  - Serial ABGs   - Consider scheduled duonebs if signs of lung dz, currently PRN    - Peridex  - FBG and diuresis as above  - pulmonary angiogram        Gastrointestinal, Nutrition  # Shock liver 2/2 cardiac arrest  # Hypoalbuminemia   # At risk for protein malnutrition   No known medical hx.   CAP CT  Anterior mediastinal hematoma post arrest   Diet: Nutrition consult for feeding today  BM: 2/24 liquid    Recent  Labs   Lab 24  1546   * 126* 106*   * 108* 88*   BILITOTAL 0.4 0.4 0.4   ALBUMIN 3.6 3.7 3.9   PROTTOTAL 5.5* 5.7* 6.1*   ALKPHOS 87 83 84     Plan:  - Monitor LFTs  - Bowel regimen in place-PRN  - GI Prophylaxis: PPI; Protonix 40 mg daily  - Nutrition consulted, appreciate recommendations      Renal, Electrolytes  # Acute Renal Injury   # Lactic acidosis  Baseline CR 0.90    Intake/Output Summary (Last 24 hours) at 2024 0837  Last data filed at 2024 0800  Gross per 24 hour   Intake 1944.42 ml   Output 1615 ml   Net 329.42 ml     Recent Labs   Lab 24  1546 24  1003 24  0413 24  0959 24  0341    142 142   < > 143   < > 147*   POTASSIUM 3.7 3.5  3.5 3.4   < > 3.6   < > 3.7   CHLORIDE 105 103 103   < > 106   < > 113*   CO2 30* 29 29   < > 28   < > 27   BUN 27.6* 28.2* 26.5*   < > 24.4*   < > 23.5*   CR 0.89 0.96 0.97   < > 0.86   < > 0.91   PHOS 3.8  --   --   --  3.8  --  2.9   MAG 2.2 2.2 1.9   < > 2.2   < > 2.3    < > = values in this interval not displayed.     Plan:  - Avoid nephrotoxins, renally dose meds  - Monitor urine output  - FBG and diuresis as above    Infectious Disease  # Aspiration Pneumonia  # Leukocytosis  Temp (24hrs), Av.5  F (36.9  C), Min:97.9  F (36.6  C), Max:98.8  F (37.1  C)     Cultures thus far:   Blood NGTD   Sputum 3+ GPC    Urine NGTD   MRSA Positive   COVID negative  Antibiotics     Anti-infectives (From now, onward)      None           Recent Labs   Lab 24  1546   WBC 7.0 8.3 9.0                    Plan:  - Continue abx  - Monitor for signs of infection  - Avoid fevers     Hematology  #Acute Blood Loss Anemia on Anemia of Critical Illness   Hgb decreaseing. Oral bleeding, CT CAP anterior mediastinal hematoma  US LE w/ Arterial Duplex with no evidence of acute thrombus    Receiving heparin for ECMO with ACT goal  "1160-180  Dose (units/hr) HEParin: 1700 Units/hr  ACT  (seconds): 186 seconds    Recent Labs   Lab 03/02/24  0416 03/01/24  2200 03/01/24  1546   HGB 7.7* 7.8* 8.5*   HCT 22.0* 22.9* 25.0*   PLT 68* 70* 81*     Recent Labs   Lab 03/02/24  0416 03/01/24  2200 03/01/24  1546   INR 1.11 1.07 1.07   * 99* 81*       Plan:  - DVT PPX: Heparin with ACT goal as above  - Transfusion parameters:   - 1u PRBC for hbg < 7.0   - 1u platelet for plt < 50   - 1u FFP for INR > 3   - 5u cryoprecipitate for fibrinogen <150     Endocrinology  # Hyperglycemia   # A1C   No lab results found in last 7 days.    Recent Labs   Lab 03/02/24  0504 03/02/24  0416 03/02/24  0414 03/02/24  0240 03/02/24  0001   * 184* 161* 115* 209*     Dose (units/hr) Insulin: 3 Units/hr    Plan  - insulin gtt as needed  - Endocrine consulted   - Continue levothyroxine 150mcg    Integumentary:  - No skin issues    Lines/Tubes/Drains:  PIV left lower fore arm 2/10  CVC TL RFV 2/23  RR arterial line 2/23  ECMO L femoral vein 29 FR 2/23   ECMO L femoral artery 17 FR 2/23  Distal perfusion catheter LSFA 2/23  Nicole 2/23  OG 2/24  Rectal tube with balloon 2/23     ICU  Feeding: TF working towards goal  Analgesia: Fentanyl  Sedation: propofol and precedex  Thrombopx: Heparin infusion when ACT trends down  Head of bed: reverse trend   Ulcers: PPI  Glucose: Insulin infusion  SBT: not ready  B: liquid stool with rectal tube  I: still needs urinary catheter for accurate I/Os  De-escalate: continue abx    Family will be updated by team    Patient seen, discussed, and plan developed with staff physician, Dr Sb Deng, APRN, CNP  [facility]  Critical Care Cardiology  Text Page     Objective:  Most recent vital signs:  /64   Pulse 104   Temp 98.6  F (37  C)   Resp 18   Ht 1.778 m (5' 10\")   Wt 114 kg (251 lb 5.2 oz)   SpO2 95%   BMI 36.06 kg/m    Temp:  [98.6  F (37  C)-98.8  F (37.1  C)] 98.6  F (37  C)  Pulse:  [102-130] " 104  Resp:  [15-18] 18  MAP:  [64 mmHg-97 mmHg] 69 mmHg  Arterial Line BP: ()/(50-80) 90/61  FiO2 (%):  [40 %-70 %] 70 %  SpO2:  [94 %-100 %] 95 %    Physical exam:  General: In bed, in NAD  HEENT: PERRL, no scleral icterus or injection  CARDIAC: RRR, no m/r/g appreciated. Peripheral pulses dopplered  RESP: Mechanical ventilation; L lung diminished with exp wheezing  GI: soft, rounded, BS hypoactive  : Nicole, good UO  EXTREMITIES: 3+ LE edema, pulses 2+. Femoral access site oozing decreased. Dressing changed last night   SKIN: No acute lesions appreciated  NEURO: Intubated and sedated, sedation holiday as tolerates. Opens eyes, nods to questions when lightened    All imaging/results reviewed by me.

## 2024-03-02 NOTE — PROGRESS NOTES
Major Shift Events:  pt remains sedated on prop, versed, and fentanyl gtt. Pt did followed commands this am and afternoon. ST -120's. MAP goal >65. VA ECMO, pt went to cath lab for pulm angiogram, pt was on levo and vaso gtt, off once arrived to the floor. PC plus settings, 100% FiO2, bloody oral secretions. TF held most of the day for cath lab. OG in place. Nicole in place with adequate UO. Rectal tube in place, clots coming out of rectum. Potassium replaced. 1 unit of PRBC given in cath lab.  Plan: continue to monitor and notify MD with changes.  For vital signs and complete assessments, please see documentation flowsheets.

## 2024-03-02 NOTE — PROGRESS NOTES
ECMO Attending Progress Note  3/2/2024    Silvano Motta is a 58 year old male who was cannulated for ECMO 2024 due to refractory PEA arrest    Cannulation Site:  17 Fr in the L femoral artery  29 Fr in the L femoral vein    Interval events:NAEO    Pulsatility: 48 mmHg no iabp    Physical Exam:  Temp:  [98.6  F (37  C)-98.8  F (37.1  C)] 98.6  F (37  C)  Pulse:  [102-130] 106  Resp:  [15-18] 18  MAP:  [64 mmHg-97 mmHg] 74 mmHg  Arterial Line BP: ()/(50-80) 100/64  FiO2 (%):  [40 %-70 %] 70 %  SpO2:  [94 %-100 %] 99 %    Intake/Output Summary (Last 24 hours) at 2024 1158  Last data filed at 2024 1100  Gross per 24 hour   Intake 1932.82 ml   Output 1425 ml   Net 507.82 ml        Vent Mode: PCV Plus assist  (Pressure Control Ventilation/ Assist Control)  FiO2 (%): (S) 70 %  Resp Rate (Set): 15 breaths/min  PEEP (cm H2O): 10 cmH2O  Inspiratory Pressure (cm H2O) (Drager Nena): 30  Resp: 18       Labs:  Recent Labs   Lab 24  0551 24  0418 24  0416 24  0153 24  0000   PH 7.53* 7.48*  --  7.48* 7.47*   PCO2 40 45  --  44 45   PO2 114* 66*  --  74* 74*   HCO3 33* 33*  --  33* 32*   O2PER 70  70 50 50  80 50 50      Recent Labs   Lab 24  0416 24  2200 24  1546 24  1003   WBC 7.0 8.3 9.0 7.9   HGB 7.7* 7.8* 8.5* 8.1*     Creatinine   Date Value Ref Range Status   2024 0.89 0.67 - 1.17 mg/dL Final   2024 0.96 0.67 - 1.17 mg/dL Final   2024 0.97 0.67 - 1.17 mg/dL Final   2024 0.85 0.67 - 1.17 mg/dL Final   Blood Flow (Circuit) LPM: 4.28 LPM  Sweep LPM: 1 LPM  Sweep FiO2   %: 100 %  ACT  (seconds): 170 seconds  Arterial Blood Temp  (degrees C): 36.9 C  Pulse Oximetry  (SpO2%): 98 %  Arterial Pressure  mmH mmHg    ECMO Issues including assessments and plan on DOS 3/2/2024:  Neuro: Sedated for mechanical ventilation and ECMO.  No acute distress.  NIRS stable b/l  RASS goal: -1 to -2 (wakes up wildly)  CV: Cardiogenic shock.   Hemodynamically stable on low dose pressors 48 mmHg pulsatilty no iabp   Pulm: Keep vent settings at rest settings as above.  FEN/Renal: Electrolytes stable w/ replacement protocols in place, Cr stable/normal, UOP same  Heme: ACT goal: 180-200Hemoglobin stable at 7.7.  Minimal oozing around the ECMO cannulas.  ID: Receiving empiric antibiotics  Cannulae: Position is acceptable on exam and the available imaging.  Distal perfusion cannula is in place and patent.  Extremities are well-perfused.     I have personally reviewed the ECMO flows, oxygenation and CO2 clearance, anticoagulation, and cannula position.  I have also personally assessed the patient's systemic response with hemodynamics, oxygenation, ventilation, and bleeding.       The patient requires continued ECMO support and management in the ICU.  I have discussed patient care and treatment plan with the primary team.      Miguel Angel Basurto MD  Critical Care Cardiology    March 2, 2024

## 2024-03-02 NOTE — PLAN OF CARE
Major Shift Events: pt remains sedated on prop and fentanyl gtt, PRN ketamine and scheduled oxycodone given for pain, restless, agitated on and off. VA ECMO. ST -120's, MAP >65, PC plus settings, peep decreased to 10.OG clamped, NPO all day for cath lab. Rectal tube in place. Nicole in place, IV lasix 80 mg given. Heparin gtt  Plan: continue to monitor and report off to the oncoming nurse.  For vital signs and complete assessments, please see documentation flowsheets.

## 2024-03-02 NOTE — PROGRESS NOTES
[facility]    ECLS Shift Summary:     ECMO Equipment:  Console Serial Number: [id number]  Circuit Lot Number: [id number]  Oxygenator Lot Number: [id number]    Circuit Assessment: Fibrin  Fibrin Location: connectors    Arterial ECMO Cannula: 17 Fr in the Left Femoral Artery  Venous ECMO Cannula: 29 Fr in the Left Femoral Vein  ECMO Cannula Venous Left femoral vein-Site Assessment: Sutured, Secure  ECMO Cannula Arterial Left femoral artery-Site Assessment: Sutured, Secure  Distal Perfusion Catheter Left femoral artery-Site Assessment: Sutured, Secure  ECMO Cannula Venous Left femoral vein-Site Intervention: No intervention needed  ECMO Cannula Arterial Left femoral artery-Site Intervention: No intervention needed  Distal Perfusion Catheter Left femoral artery-Site Intervention: No intervention needed    Patient remains on V-A ECMO, all equipment is functioning and alarms are appropriately set. RPM's: 3500 with Blood Flow (Circuit) LPM  Av LPM  Min: 3.9 LPM  Max: 4.03 LPM L/min. Sweep is at 0.5 LPM and 80 %. Extremities are warm.     Significant Shift Events:   No acute changes during shift. Patient remains supported on VA ecmo. Plan to continue to wean FIO2 accordingly and go down for a cath lab study to assess lung oxygenation.     Vent settings:  Vent Mode: PCV Plus assist  (Pressure Control Ventilation/ Assist Control)  FiO2 (%): 50 %  Resp Rate (Set): 15 breaths/min  PEEP (cm H2O): 10 cmH2O  Pressure Support (cm H2O): 18 cmH2O  Inspiratory Pressure (cm H2O) (Drager Nena): 30  Resp: 15      Anticoagulation:  Dose (units/hr) HEParin: 1700 Units/hr  Rate (mL/hr) HEParin: 17 mL/hr  Concentration HEParin: 100 Units/mL     Dose (mg/kg/hr) Bivalirudin: 0 mg/kg/hr  Rate (mL/hr) Bivalirudin: 0 mL/hr  Concentration Bivalirudin: 1 mg/mL  Most recent: ACT  (seconds): 186 seconds    Urine output is Yellow / Straw Colored.  Blood loss was 0. Product given included 0.      Intake/Output Summary (Last 24 hours) at 3/1/2024 2142  Last data filed at 3/1/2024 2100  Gross per 24 hour   Intake 3299.8 ml   Output 5815 ml   Net -2515.2 ml       Labs:  Recent Labs   Lab 03/01/24  1955 03/01/24  1802 03/01/24  1550 03/01/24  1546 03/01/24  1400   PH 7.47* 7.47* 7.47*  --  7.46*   PCO2 43 43 43  --  45   PO2 103 84 107*  --  112*   HCO3 31* 32* 32*  --  32*   O2PER 50 50 50 80  50 50       Lab Results   Component Value Date    HGB 8.5 (L) 03/01/2024    PHGB 40 (H) 03/01/2024    PLT 81 (L) 03/01/2024    FIBR 460 03/01/2024    INR 1.07 03/01/2024    PTT 81 (H) 03/01/2024    DD 2.71 (H) 03/01/2024    ANTCH 99 03/01/2024       Plan is to continue to support patient on VA ECMO.    Aaron Hagen RN  ECMO Specialist  3/1/2024 9:42 PM

## 2024-03-02 NOTE — PROGRESS NOTES
ECMO Attending Progress Note  3/1/2024    Silvano Motta is a 58 year old male who was cannulated for ECMO 2024 due to refractory PEA arrest    Cannulation Site:  17 Fr in the L femoral artery  29 Fr in the L femoral vein    Interval events: continues to get agitated and restless, occasional bleeding from the mouth and nose    Pulsatility: 62mmHg no iabp    Physical Exam:  Temp:  [98.4  F (36.9  C)-98.8  F (37.1  C)] 98.8  F (37.1  C)  Pulse:  [102-129] 111  Resp:  [15-18] 16  MAP:  [64 mmHg-97 mmHg] 73 mmHg  Arterial Line BP: (101-157)/(50-80) 114/61  FiO2 (%):  [50 %] 50 %  SpO2:  [93 %-100 %] 100 %    Intake/Output Summary (Last 24 hours) at 2024 1158  Last data filed at 2024 1100  Gross per 24 hour   Intake 1932.82 ml   Output 1425 ml   Net 507.82 ml        Vent Mode: PCV Plus assist  (Pressure Control Ventilation/ Assist Control)  FiO2 (%): 50 %  Resp Rate (Set): 15 breaths/min  PEEP (cm H2O): 10 cmH2O  Pressure Support (cm H2O): 18 cmH2O  Inspiratory Pressure (cm H2O) (Drager Nena): 30  Resp: 16       Labs:  Recent Labs   Lab 24  1550 24  1546 24  1400 24  1202 24  1003   PH 7.47*  --  7.46* 7.47* 7.49*   PCO2 43  --  45 42 39   PO2 107*  --  112* 78* 95   HCO3 32*  --  32* 31* 30*   O2PER 50 80  50 50 50 50      Recent Labs   Lab 24  1546 24  1003 24  0413 24  2148   WBC 9.0 7.9 7.9 9.0   HGB 8.5* 8.1* 7.9* 8.4*     Creatinine   Date Value Ref Range Status   2024 0.97 0.67 - 1.17 mg/dL Final   2024 0.85 0.67 - 1.17 mg/dL Final   2024 0.86 0.67 - 1.17 mg/dL Final   2024 0.95 0.67 - 1.17 mg/dL Final   Blood Flow (Circuit) LPM: 4.28 LPM  Sweep LPM: 1 LPM  Sweep FiO2   %: 100 %  ACT  (seconds): 170 seconds  Arterial Blood Temp  (degrees C): 36.9 C  Pulse Oximetry  (SpO2%): 98 %  Arterial Pressure  mmH mmHg    ECMO Issues including assessments and plan on DOS 3/1/2024:  Neuro: Sedated for mechanical ventilation and  ECMO.  No acute distress.  NIRS stable b/l  RASS goal: -1 to -2 (wakes up wildly)  CV: Cardiogenic shock.  Hemodynamically stable on low dose pressors 62 mmHg pulsatilty no iabp   Pulm: Keep vent settings at rest settings as above.  FEN/Renal: Electrolytes stable w/ replacement protocols in place, Cr stable/normal, UOP same  Heme: ACT goal: 160-180 (nasal bleeding, stable), Hemoglobin stable.  Minimal oozing around the ECMO cannulas.  ID: Receiving empiric antibiotics  Cannulae: Position is acceptable on exam and the available imaging.  Distal perfusion cannula is in place and patent.  Extremities are well-perfused.     I have personally reviewed the ECMO flows, oxygenation and CO2 clearance, anticoagulation, and cannula position.  I have also personally assessed the patient's systemic response with hemodynamics, oxygenation, ventilation, and bleeding.       The patient requires continued ECMO support and management in the ICU.  I have discussed patient care and treatment plan with the primary team.      Miguel Angel Basurto MD  Critical Care Cardiology    March 1, 2024

## 2024-03-02 NOTE — PLAN OF CARE
Major Shift Events:  no acute events overnight. MAEx4, follows commands appropriately. -130's, MAP 70-90's. ECMO flowing at 4 lpm, 80% fio2. PCV plus on vent 30/10, taking 600-1200 tv's, fio2 70%. Uop  ml/hr. Sedated on fent, prop, prn ketamine.   Plan: continue with plan of care. Plan for cath lab today.  For vital signs and complete assessments, please see documentation flowsheets.

## 2024-03-03 NOTE — PROGRESS NOTES
[facility]    ECLS Shift Summary:     ECMO Equipment:  Console Serial Number: [id number]  Circuit Lot Number: [id number]  Oxygenator Lot Number: [id number]    Circuit Assessment: Fibrin  Fibrin Location: connectors    Arterial ECMO Cannula: 17 Fr in the Left Femoral Artery  Venous ECMO Cannula: 29 Fr in the Left Femoral Vein          Patient remains on V-A ECMO, all equipment is functioning and alarms are appropriately set. RPM's: 3670 with Blood Flow (Circuit) LPM  Av.2 LPM  Min: 4.15 LPM  Max: 4.16 LPM L/min. Sweep is at 2 LPM and 100 %. Extremities are perfused.     Significant Shift Events: none    Vent settings:  Vent Mode: PCV Plus assist  (Pressure Control Ventilation/ Assist Control)  FiO2 (%): 65 %  Resp Rate (Set): 15 breaths/min  PEEP (cm H2O): 10 cmH2O  Inspiratory Pressure (cm H2O) (Drager Nena): 30  Resp: 18      Anticoagulation:  Dose (units/hr) HEParin: 1600 Units/hr  Rate (mL/hr) HEParin: 16 mL/hr  Concentration HEParin: 100 Units/mL     Dose (mg/kg/hr) Bivalirudin: 0 mg/kg/hr  Rate (mL/hr) Bivalirudin: 0 mL/hr  Concentration Bivalirudin: 1 mg/mL  Most recent: ACT  (seconds): 158 seconds    Urine output is  .  Blood loss was minimal. Product given included one unit of PRBC's.     Intake/Output Summary (Last 24 hours) at 3/3/2024 1746  Last data filed at 3/3/2024 1700  Gross per 24 hour   Intake 3692.35 ml   Output 3315 ml   Net 377.35 ml       Labs:  Recent Labs   Lab 24  1622 24  1353 24  1205 24  1000   PH 7.45 7.46* 7.47* 7.43   PCO2 46* 45 44 47*   PO2 90 98 58* 129*   HCO3 32* 32* 32* 31*   O2PER 80  100  80 80 60 90       Lab Results   Component Value Date    HGB 7.2 (L) 2024    PHGB 110 (H) 2024    PLT 73 (L) 2024    FIBR 360 2024    INR 1.20 (H) 2024    PTT 67 (H) 2024    DD 9.12 (H) 2024    ANTCH 82 (L) 2024       Plan is continue support.    Herber Berman, RT  ECMO  Specialist  3/3/2024 5:46 PM

## 2024-03-03 NOTE — PLAN OF CARE
Major Shift Events: no acute events overnight. Sedated and intubated on fentanyl, versed, propofol. Awakens spontaneously and to voice, maex4, follows commands. Becomes restless when awoken. -130's MAP 70-90, ECMO flowing 4.2 lpm, 2 sweep 100% fio2. Heparin restarted. Vent PC 30/10 rate 15 100% fio2. Got one PRBC at shift change. Insulin at 5. Bloody oral secretions. Uop  ml/hr.   Plan: continue with plan of care, monitor cardiac, respiratory, neuro status. Wean sedation as able.  For vital signs and complete assessments, please see documentation flowsheets.

## 2024-03-03 NOTE — PROGRESS NOTES
ECMO Attending Progress Note  3/3/2024    Silvano Motta is a 58 year old male who was cannulated for ECMO 2024 due to refractory PEA arrest    Cannulation Site:  17 Fr in the L femoral artery  29 Fr in the L femoral vein    Interval events:NAEO    Pulsatility: 60 mmHg no iabp    Physical Exam:  Temp:  [98.4  F (36.9  C)-99  F (37.2  C)] 98.8  F (37.1  C)  Pulse:  [109-127] 127  Resp:  [15-25] 20  MAP:  [66 mmHg-104 mmHg] 89 mmHg  Arterial Line BP: ()/(54-85) 137/72  FiO2 (%):  [70 %-100 %] 100 %  SpO2:  [92 %-100 %] 100 %    Intake/Output Summary (Last 24 hours) at 2024 1158  Last data filed at 2024 1100  Gross per 24 hour   Intake 1932.82 ml   Output 1425 ml   Net 507.82 ml        Vent Mode: PCV Plus assist  (Pressure Control Ventilation/ Assist Control)  FiO2 (%): 100 %  Resp Rate (Set): 15 breaths/min  PEEP (cm H2O): 10 cmH2O  Inspiratory Pressure (cm H2O) (Drager Nena): 30  Resp: 20       Labs:  Recent Labs   Lab 24  0559 24  0414 24  0409 24  0203 24  2352   PH 7.46* 7.43  --  7.47* 7.51*   PCO2 44 46*  --  42 38   PO2 121* 137*  --  74* 97   HCO3 31* 31*  --  31* 31*   O2PER 100 100 100  100 100 100      Recent Labs   Lab 24  0409 24  2204 24  1634 24  1549 24  1408 24  1015   WBC 8.3 8.4 10.3  --   --  7.5   HGB 7.9* 8.5* 7.7* 8.2*   < > 7.3*    < > = values in this interval not displayed.     Creatinine   Date Value Ref Range Status   2024 0.89 0.67 - 1.17 mg/dL Final   2024 0.84 0.67 - 1.17 mg/dL Final   2024 0.83 0.67 - 1.17 mg/dL Final   2024 0.91 0.67 - 1.17 mg/dL Final   Blood Flow (Circuit) LPM: 4.28 LPM  Sweep LPM: 1 LPM  Sweep FiO2   %: 100 %  ACT  (seconds): 170 seconds  Arterial Blood Temp  (degrees C): 36.9 C  Pulse Oximetry  (SpO2%): 98 %  Arterial Pressure  mmH mmHg    ECMO Issues including assessments and plan on DOS 3/3/2024:  Neuro: Sedated for mechanical ventilation and  ECMO.  No acute distress.  NIRS stable b/l  RASS goal: -1 to -2 (wakes up wildly)  CV: Cardiogenic shock.  Hemodynamically stable on low dose pressors 60 mmHg pulsatilty no iabp   Pulm: Keep vent settings at rest settings as above.  FEN/Renal: Electrolytes stable w/ replacement protocols in place, Cr stable/normal, UOP same  Heme: ACT goal: 180-200 Hemoglobin stable at 7.9 after 1 prbc overnight.  Minimal oozing around the ECMO cannulas.  ID: Receiving empiric antibiotics  Cannulae: Position is acceptable on exam and the available imaging.  Distal perfusion cannula is in place and patent.  Extremities are well-perfused.     I have personally reviewed the ECMO flows, oxygenation and CO2 clearance, anticoagulation, and cannula position.  I have also personally assessed the patient's systemic response with hemodynamics, oxygenation, ventilation, and bleeding.       The patient requires continued ECMO support and management in the ICU.  I have discussed patient care and treatment plan with the primary team.      Miguel Angel Basurto MD  Critical Care Cardiology    March 3, 2024

## 2024-03-03 NOTE — PROGRESS NOTES
Cardiology ICU Progress Note    Brief HPI:  Silvano Motta is a 58 year old male being admitted to the CICU on 2/4/2024 s/p peripheral VA-ECMO cannulation s/p cardiac arrest. 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 Admitted 1/25 to OSH for AHRF and transferred to [facility] 2/4 for PH therapy managed.  Patient not a lung transplant candidate at [facility] or [facility].      Subjective and Interval:  - CCL yesterday, 1 unit(s) PRBC overnight, increasing sedation needs.     Assessment and plan by system:   Today's changes:  - decrease sedation  - remove venous sheath  - bedside turndown today (without echo)  - decrease vent support  - sputum sample   - add 3% nebs  - recruitment maneuvers  - diurese with lasix      Neurology   # Concern for anoxic brain injury    # Hx TBI 2020  # Hx Sz disorder-Todds paralysis  CT head 2/23: no intracranial pathology  - Intubated, sedated. Avoiding hyperthermia    Current sedation:  RASS (Davidson Agitation-Sedation Scale): -2-->light sedation  Dose (mcg/hr) Fentanyl: 150 mcg/hr and Dose (mcg/kg/min) Propofol: 50 mcg/kg/min    Plan:  - Continue PTA Keppra, VIMPAT, and gabapentin  - Continue home ASA  - Continue sedation with a RASS goal -1 to -2  - Spontaneous awakening trial as tolerated  - permissive hypercapnea and hypernatremia for neuroprotection  - oxycodone scheduled, wean fentanyl off     Cardiovascular  # PEA/VT Cardiac Arrest s/p peripheral VA ECMO Cannulation 2/23/24   # Cardiogenic shock requiring VA ECMO  Peripheral V-A ECMO inserted via LCFA and LCFV 17 Fr arterial cannula, 29 Fr venous annula, DPC 7 Fr LSFA  Angiogram: RHC, Normal biventricular filling pressures.   Mild pre-capillary pulmonary hypertension  Low-normal cardiac output and index.  TTE:  VA ECMO at 4.1 LPM. Difficult to assess  LV function but appears to be  ~30%. The right ventricle is normal size.  Global right ventricular function is mildly reduced.  No pericardial effusion is present.    ECG Rhythm: Sinus tachycardia    ECMO:  Mode: V-A   Pump Type: Cardiohelp  RPM's: 3670  Blood Flow (Circuit) LPM: 4.27 LPM  Sweep LPM: 2 LPM  Sweep FiO2   %: 100 %  Venous Pressure  mmHg: -37 mmHg  Arterial Pressure  mmH mmHg  Internal Pressure mmH mmHg  Pressure Change mmH mmHg  PP 40 points  Current Pressors/inotropes/antiarrythmic:  Dose (mcg/kg/min) Norepinephrine: 0.04 mcg/kg/min, Dose (units/hr) Vasopressin: 2 Units/hr, and Dose (mcg/kg/min) Epinephrine: 0.04 mcg/kg/min    Hemodynamics:  Art Line  Arterial Line BP: 137/72  Arterial Line MAP (mmHg): 89 mmHg  Arterial Line Location: Right radial  Art Line Wave Form: Appropriate wave forms    Plan:  - Continue ASA 81mg   - Hold lipitor for now given shock liver  - Telemetry monitoring  - Continue trending troponin  - Continue ECMO support   - FBG -1L     Pulmonary  # Acute on chronic hypoxemic respiratory failure requiring intubation  # Shunt physiology, positive shunt study 24  # Mild pulmonary hypertension  # Severe ROSALIO on PAP at night  # Probable aspiration pneumonia  Per pulmonology documentation: Hx of chronic hypoxemia thought due to hypoventilation and untreated sleep apnea since Oct 2023 with extensive workup including multiple CT Angio studies, NM Perfusion studies, RHC in Oct 2023 and again this hospitalization showing mild pulmonary hypertension (mean PA 25, PCWP 10). Imaging with stable pleural based HARMONY nodule and subpleural reticulation and thickening of interlobular septi present since . Repeated CT scans without embolus or AVM. Does have genetic sequencing in process. Thus far auto-immune workup negative (SSA, SSB, Scleroderma, Clara 1, RF, CCP, CRP, Aldolase, HP panels, ANCA, IgG) except for positive ROSAMARIA (speckled 1:160). In Oct also had negative workup (C3 and  C4, anti-centromere, anti-Smith, anti-RNP, anti-SSA, anti-SSB, Scl-70, anti-cardiolipin, anti-CCP). Concern for shunt physiology with highly elevated A-a gradient, multiple TTEs and EDWIGE with positive late bubble, hypoxemia and ongoing dependence on high flow oxygen that seems out of proportion to degree of pulmonary hypertension and fibrotic changes. NM MAA shunt study 2/9 confirms shunt physiology. Based on bubble studies, presumably extracardiac shunt, potential diagnosis includes DMVPAVM which is very rare but would explain lack of macrovascular abnormalities. In discussions with radiology, and abnormal NM shunt study perfusion to upper lung fields, will pursue CTPE dual energy to better delineate vasculature and perfusion. Additionally, will obtain CT abdomen/pelvis for any extrathoracic abnormality that could be contributing to shunt. Not a transplant candidate per [facility] or [place].   CXR:   1. Relatively unchanged support devices of endotracheal tube tip  projects about 6 cm above the kianna, may consider advancement by 1 to  2 cm followed by chest x-ray.  2. Similar perihilar/basilar mixed opacities.  3. Slightly increased small pleural effusions.     Vent Settings:   Vent Mode: PCV Plus assist  (Pressure Control Ventilation/ Assist Control)  FiO2 (%): 100 %  Resp Rate (Set): 15 breaths/min  PEEP (cm H2O): 10 cmH2O  Inspiratory Pressure (cm H2O) (Drager Nena): 30  Resp: 20    ABG:   Recent Labs   Lab 03/03/24  0559 03/03/24  0414 03/03/24  0409 03/03/24  0203   PH 7.46* 7.43  --  7.47*   PCO2 44 46*  --  42   PO2 121* 137*  --  74*   HCO3 31* 31*  --  31*   O2PER 100 100 100  100 100     Plan:  - Pulm on board, appreciate recs   - Wean vent as able  - Daily CXR  - Serial ABGs   - Consider scheduled duonebs if signs of lung dz, currently PRN    - Peridex  - FBG and diuresis as above  - pulmonary angiogram        Gastrointestinal, Nutrition  # Shock liver 2/2 cardiac arrest  # Hypoalbuminemia   # At risk for  protein malnutrition   No known medical hx.   CAP CT  Anterior mediastinal hematoma post arrest   Diet: Nutrition consult for feeding today  BM:  liquid    Recent Labs   Lab 24  0409 244 24  1634   AST 75* 84* 82*   ALT 90* 96* 94*   BILITOTAL 0.4 0.4 0.5   ALBUMIN 3.2* 3.3* 3.2*   PROTTOTAL 5.0* 5.0* 5.0*   ALKPHOS 81 79 79     Plan:  - Monitor LFTs  - Bowel regimen in place-PRN  - GI Prophylaxis: PPI; Protonix 40 mg daily  - Nutrition consulted, appreciate recommendations      Renal, Electrolytes  # Acute Renal Injury   # Lactic acidosis  Baseline CR 0.90    Intake/Output Summary (Last 24 hours) at 2024 0837  Last data filed at 2024 0800  Gross per 24 hour   Intake 1944.42 ml   Output 1615 ml   Net 329.42 ml     Recent Labs   Lab 24  0409 24  2204 24  1634 24  1015 24  0416 24  1003 24  0413    144 145   < > 143   < > 143   POTASSIUM 4.0 4.0 3.8   < > 3.7   < > 3.6   CHLORIDE 108* 108* 109*   < > 105   < > 106   CO2 28 28 29   < > 30*   < > 28   BUN 29.3* 27.4* 27.9*   < > 27.6*   < > 24.4*   CR 0.89 0.84 0.83   < > 0.89   < > 0.86   PHOS 2.9  --   --   --  3.8  --  3.8   MAG 2.5* 2.4* 1.9   < > 2.2   < > 2.2    < > = values in this interval not displayed.     Plan:  - Avoid nephrotoxins, renally dose meds  - Monitor urine output  - FBG and diuresis as above    Infectious Disease  # Aspiration Pneumonia  # Leukocytosis  Temp (24hrs), Av.5  F (36.9  C), Min:97.9  F (36.6  C), Max:98.8  F (37.1  C)     Cultures thus far:   Blood NGTD   Sputum 3+ GPC    Urine NGTD   MRSA Positive   COVID negative  Antibiotics     Anti-infectives (From now, onward)      None           Recent Labs   Lab 24  0409 24  2204 24  1634   WBC 8.3 8.4 10.3                    Plan:  - Continue abx  - Monitor for signs of infection  - Avoid fevers     Hematology  #Acute Blood Loss Anemia on Anemia of Critical Illness   Hgb decreaseing. Oral  bleeding, CT CAP anterior mediastinal hematoma  US LE w/ Arterial Duplex with no evidence of acute thrombus    Receiving heparin for ECMO with ACT goal 1160-180  Dose (units/hr) HEParin: 1000 Units/hr  ACT  (seconds): (S) 124 seconds    Recent Labs   Lab 03/03/24  0409 03/02/24  2204 03/02/24  1634   HGB 7.9* 8.5* 7.7*   HCT 23.8* 25.1* 23.1*   PLT 79* 77* 90*     Recent Labs   Lab 03/03/24  0409 03/02/24  2204 03/02/24  1634   INR 1.04 1.08 1.17*   PTT 36 36 216*       Plan:  - DVT PPX: Heparin with ACT goal as above  - Transfusion parameters:   - 1u PRBC for hbg < 7.0   - 1u platelet for plt < 50   - 1u FFP for INR > 3   - 5u cryoprecipitate for fibrinogen <150     Endocrinology  # Hyperglycemia   # A1C   No lab results found in last 7 days.    Recent Labs   Lab 03/03/24  0559 03/03/24  0413 03/03/24  0409 03/03/24  0203 03/02/24  2352   * 208* 230* 218* 232*     Dose (units/hr) Insulin: 5 Units/hr    Plan  - insulin gtt as needed  - Endocrine consulted   - Continue levothyroxine 150mcg    Integumentary:  - No skin issues    Lines/Tubes/Drains:  PIV left lower fore arm 2/10  CVC TL RFV 2/23  RR arterial line 2/23  ECMO L femoral vein 29 FR 2/23   ECMO L femoral artery 17 FR 2/23  Distal perfusion catheter LSFA 2/23  Nicole 2/23  OG 2/24  Rectal tube with balloon 2/23     ICU  Feeding: TF working towards goal  Analgesia: Fentanyl  Sedation: propofol and precedex  Thrombopx: Heparin infusion when ACT trends down  Head of bed: reverse trend   Ulcers: PPI  Glucose: Insulin infusion  SBT: not ready  B: liquid stool with rectal tube  I: still needs urinary catheter for accurate I/Os  De-escalate: continue abx    Family will be updated by team    Patient seen, discussed, and plan developed with staff physician, Dr Sb Deng, APRN, CNP  [facility]  Critical Care Cardiology  Text Page     Objective:  Most recent vital signs:  /64   Pulse (!) 127   Temp 98.8  F (37.1  C)   Resp 20    "Ht 1.778 m (5' 10\")   Wt 114 kg (251 lb 5.2 oz)   SpO2 100%   BMI 36.06 kg/m    Temp:  [98.4  F (36.9  C)-99  F (37.2  C)] 98.8  F (37.1  C)  Pulse:  [109-127] 127  Resp:  [15-25] 20  MAP:  [66 mmHg-104 mmHg] 89 mmHg  Arterial Line BP: ()/(54-85) 137/72  FiO2 (%):  [70 %-100 %] 100 %  SpO2:  [92 %-100 %] 100 %    Physical exam:  General: In bed, in NAD  HEENT: PERRL, no scleral icterus or injection  CARDIAC: RRR, no m/r/g appreciated. Peripheral pulses dopplered  RESP: Mechanical ventilation; L lung diminished with exp wheezing  GI: soft, rounded, BS hypoactive  : Nicole, good UO  EXTREMITIES: 3+ LE edema, pulses 2+. Femoral access site oozing decreased. Dressing changed last night   SKIN: No acute lesions appreciated  NEURO: Intubated and sedated, sedation holiday as tolerates. Opens eyes, nods to questions when lightened    All imaging/results reviewed by me.         "

## 2024-03-04 NOTE — PROGRESS NOTES
CLINICAL NUTRITION SERVICES - REASSESSMENT NOTE     Nutrition Prescription    RECOMMENDATIONS FOR MDs/PROVIDERS:  Daily fluid adjustments per MD    Malnutrition Status:    Patient does not meet two of the established criteria necessary for diagnosing malnutrition    Recommendations already ordered by Registered Dietitian (RD):  -Decreased goal rate given propofol average over the past week and increased protein provisions. NEW GOAL TF: TwoCal HN @ 30 mL/hr (720 mL/day) to provide 1840 kcals (22 kcal/kg/day), 160 g PRO (1.9 g/kg/day), 504 mL H2O, 158 g CHO and 4 g Fiber daily.  -Changed from liquid MVI to tablet MVI     Future/Additional Recommendations:  -Continue to monitor TF tolerance/adequacy  -Continue to monitor labs, stool output, weight trends      EVALUATION OF THE PROGRESS TOWARD GOALS   Diet: NPO  Nutrition Support: TFs currently running at goal rate. Pt seems to be tolerating well.     2/25-26: Vital High Protein at goal of  70ml/hr  (1680ml/day) + 1 pkt Prosource TF20 daily to provide: 1760 kcals (21 Kcal/kg + Prop), 166 g PRO (2 gm/kg), 1404 ml free H20, 186 g CHO, and 0 g fiber daily.     2/26-current: TwoCal HN @ 35 mL/hr (840 mL/day) + 3 pkt Prosource TF20 to provide 1920 kcals (23 kcals/kg/day), 131 g PRO (1.6 g/kg/day or 77% estimated protein needs), 588 mL H2O, 184 g CHO and 4 g Fiber daily.     Intake: Pt received an average of 487 ml TF/d x 7 days + an average of 3 pkt Prosource TF20 daily + an average of 778 ml propofol daily. This provided an average of 2070 kcal/d (25 kcal/kg) and 101 g protein/d (1.2 g/kg). This met 100% estimated energy needs and 59% estimated protein needs.      NEW FINDINGS   Overall: Pt remains intubated, on VA ECMO. Ongoing discussion with family regarding plan of care.     GI: Average of 664 ml loose stool per day out of rectal tube over the past week. No stool output yesterday. 100 ml out so far today. Not on anti-diarrheals or bowel regimen.     Skin:  Intact    Labs: Reviewed - hyperglycemia, elevated BUN    Medications: Reviewed - propofol @ 25.9 ml/hr (684 kcal/d)    Weights:  Pt remains above admit weight. No weight loss over the past week.   03/04/24 0400 114.4 kg (252 lb 3.3 oz)   03/01/24 0000 114 kg (251 lb 5.2 oz)   02/29/24 0600 115.8 kg (255 lb 4.7 oz)   02/28/24 0000 113.2 kg (249 lb 9 oz)   02/27/24 0000 117.8 kg (259 lb 11.2 oz)   02/24/24 0545 116.9 kg (257 lb 11.5 oz)   02/17/24 0400 108.2 kg (238 lb 8.6 oz)   02/11/24 2200 108 kg (238 lb 1.6 oz)   02/07/24 2345 108.5 kg (239 lb 3.2 oz)   02/04/24 1945 111.8 kg (246 lb 6.4 oz)     MALNUTRITION  % Intake: Decreased intake does not meet criteria  % Weight Loss: None noted  Subcutaneous Fat Loss: None observed  Muscle Loss: None observed  Fluid Accumulation/Edema: Mild  Malnutrition Diagnosis: Patient does not meet two of the established criteria necessary for diagnosing malnutrition    Previous Goals   Total avg nutritional intake to meet a minimum of 20 kcal/kg and 2 g PRO/kg daily (per dosing wt 84 kg)   Evaluation: energy goal met, protein goal not met    Previous Nutrition Diagnosis  Inadequate oral intake related to intubation as evidenced by NPO status and need for EN to meet nutrition needs at this time.   Evaluation: No change    CURRENT NUTRITION DIAGNOSIS  Inadequate oral intake related to intubation as evidenced by NPO status and need for EN to meet nutrition needs at this time.      INTERVENTIONS  Implementation  Enteral Nutrition - adjust as above   Multivitamin/mineral supplement therapy    Goals  Total avg nutritional intake to meet a minimum of 20 kcal/kg and 2 g PRO/kg daily (per dosing wt 84 kg)     Monitoring/Evaluation  Progress toward goals will be monitored and evaluated per protocol.    Neva Bar, MS, RD, LD  Contact via [facility] RD pager: [phone]  Weekend/Holiday RD pager: [phone]

## 2024-03-04 NOTE — PLAN OF CARE
Major Shift Events:  no acute events overnight. Titrated vent fio2 back up to 80% d/t low po2 on ABGs. Awakens spontaneously, follows commands. -130's MAP . Agitated when awake. Vent PCV 30/10 80% rate 15, requiring frequent ETT suction. ECMO flowing at 4, sweep 2 fio2 100%. Good uop.  Plan: continue plan of care, attempt to titrate vent fio2 as tolerated.  For vital signs and complete assessments, please see documentation flowsheets.

## 2024-03-04 NOTE — PLAN OF CARE
Major Shift Events: pt remains sedated on fentanyl, versed, and prop gtt. Follows commands when sedation paused. VA ECMO. ST -130's. MAP >65. 1 unit of PRBC transfusing. PC plus setting FiO2 down to 65 %. Insulin gtt. OG in place, TF infusing at goal. Bloody oral secretions. K+ replaced. IV lasix given, patterson in place. Rectal tube in place.    Plan: continue to monitor and notify MD with changes  For vital signs and complete assessments, please see documentation flowsheets.

## 2024-03-04 NOTE — CONSULTS
"SPIRITUAL HEALTH SERVICES - Consult Note  [facility] ([place] 4A - CVICU  Referral Source/Reason for Visit: response to consult order request    Summary and Recommendations -  Pt's sister Odalys had asked  to contact her - I was able to connect in person this morning with pt's mother and other sister. They said \"Odalys just wants to make sure chaplains are coming by to support Silvano\".  Pt's bedside nurse informed me there may be a family conference tomorrow - I will attempt to attend the conference if that does happen. Pt's mother expressed being stressed \"by the decisions we may need to make\" - sister tried to reassure mother that the medical staff will give them guidance (regarding goals of care).  Mother talked about pt's spiritual background (Shinto), and that he \"connects more to the [employer] Sh[employer] now...\" We talked about how these two traditions have a lot in common.     Plan: will continue to follow while pt on unit, offering spiritual support to pt and family.     Ba Og M.Div. (Bill), [facility]  Staff   Pager [phone]      [facility] available 24/7 for emergent requests/referrals, either by paging the on-call  or by entering an ASAP/STAT consult in [employer], which will also page the on-call .    Assessment    Patient/Family Understanding of Illness and Goals of Care - Family aware of how sick pt is, remain cautiously hopeful.     Distress and Loss - Not Discussed     Strengths, Coping, and Resources - we talked about pt's love of music, art, and literature. Family shared with me that the artwork on the wall was painted by pt's daughter.     Meaning, Beliefs, and Spirituality - Family welcomed prayer at bedside.     "

## 2024-03-04 NOTE — PROGRESS NOTES
Cardiology ICU Progress Note    Brief HPI:  Silvano Motta is a 58 year old male being admitted to the CICU on 2/4/2024 s/p peripheral VA-ECMO cannulation s/p cardiac arrest. 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 Admitted 1/25 to OSH for AHRF and transferred to [facility] 2/4 for PH therapy managed.  Patient not a lung transplant candidate at [facility] or [facility].      Subjective and Interval:  - ongoing discussions with family yesterday regarding plan, no acute events overnight.     Assessment and plan by system:   Today's changes:  - add ketamine infusion  - turndown study   - diurese with lasix to net goal of 500 to 1L negative   - send procal   - resume vanc/zosyn   - increase glargine  - remove rectal tube      Neurology   # Concern for anoxic brain injury    # Hx TBI 2020  # Hx Sz disorder-Todds paralysis  CT head 2/23: no intracranial pathology  - Intubated, sedated. Avoiding hyperthermia    Current sedation:  RASS (Davidson Agitation-Sedation Scale): -2-->light sedation  Dose (mcg/hr) Fentanyl: 150 mcg/hr and Dose (mcg/kg/min) Propofol: 50 mcg/kg/min    Plan:  - Continue PTA Keppra, VIMPAT, and gabapentin  - Continue home ASA  - Continue sedation with a RASS goal -1 to -2  - Spontaneous awakening trial as tolerated  - permissive hypercapnea and hypernatremia for neuroprotection  - oxycodone scheduled, wean fentanyl off     Cardiovascular  # PEA/VT Cardiac Arrest s/p peripheral VA ECMO Cannulation 2/23/24   # Cardiogenic shock requiring VA ECMO  Peripheral V-A ECMO inserted via LCFA and LCFV 17 Fr arterial cannula, 29 Fr venous annula, DPC 7 Fr LSFA  Angiogram: RHC, Normal biventricular filling pressures.   Mild pre-capillary pulmonary hypertension  Low-normal cardiac output and index.  TTE:  VA ECMO at 4.1 LPM. Difficult to  assess LV function but appears to be  ~30%. The right ventricle is normal size.  Global right ventricular function is mildly reduced.  No pericardial effusion is present.    ECG Rhythm: Sinus tachycardia    ECMO:  Mode: V-A   Pump Type: Cardiohelp  RPM's: 3670  Blood Flow (Circuit) LPM: 4.04 LPM  Sweep LPM: 2 LPM  Sweep FiO2   %: 100 %  Venous Pressure  mmH mmHg  Arterial Pressure  mmH mmHg  Internal Pressure mmH mmHg  Pressure Change mmH mmHg  PP 40 points  Current Pressors/inotropes/antiarrythmic:  Dose (mcg/kg/min) Norepinephrine: 0.04 mcg/kg/min, Dose (units/hr) Vasopressin: 2 Units/hr, and Dose (mcg/kg/min) Epinephrine: 0.04 mcg/kg/min    Hemodynamics:  Art Line  Arterial Line BP: 143/67  Arterial Line MAP (mmHg): 88 mmHg  Arterial Line Location: Right radial  Art Line Wave Form: Appropriate wave forms    Plan:  - Continue ASA 81mg   - Hold lipitor for now given shock liver  - Telemetry monitoring  - Continue trending troponin  - Continue ECMO support   -  to -1L     Pulmonary  # Acute on chronic hypoxemic respiratory failure requiring intubation  # Shunt physiology, positive shunt study 24  # Mild pulmonary hypertension  # Severe ROSALIO on PAP at night  # Probable aspiration pneumonia  Per pulmonology documentation: Hx of chronic hypoxemia thought due to hypoventilation and untreated sleep apnea since Oct 2023 with extensive workup including multiple CT Angio studies, NM Perfusion studies, RHC in Oct 2023 and again this hospitalization showing mild pulmonary hypertension (mean PA 25, PCWP 10). Imaging with stable pleural based HARMONY nodule and subpleural reticulation and thickening of interlobular septi present since . Repeated CT scans without embolus or AVM. Does have genetic sequencing in process. Thus far auto-immune workup negative (SSA, SSB, Scleroderma, Clara 1, RF, CCP, CRP, Aldolase, HP panels, ANCA, IgG) except for positive ROSAMARIA (speckled 1:160). In Oct also had negative  workup (C3 and C4, anti-centromere, anti-Smith, anti-RNP, anti-SSA, anti-SSB, Scl-70, anti-cardiolipin, anti-CCP). Concern for shunt physiology with highly elevated A-a gradient, multiple TTEs and EDWIGE with positive late bubble, hypoxemia and ongoing dependence on high flow oxygen that seems out of proportion to degree of pulmonary hypertension and fibrotic changes. NM MAA shunt study 2/9 confirms shunt physiology. Based on bubble studies, presumably extracardiac shunt, potential diagnosis includes DMVPAVM which is very rare but would explain lack of macrovascular abnormalities. In discussions with radiology, and abnormal NM shunt study perfusion to upper lung fields, will pursue CTPE dual energy to better delineate vasculature and perfusion. Additionally, will obtain CT abdomen/pelvis for any extrathoracic abnormality that could be contributing to shunt. Not a transplant candidate per [facility] or [facility].   CXR:   1. Relatively unchanged support devices of endotracheal tube tip  projects about 6 cm above the kianna, may consider advancement by 1 to  2 cm followed by chest x-ray.  2. Similar perihilar/basilar mixed opacities.  3. Slightly increased small pleural effusions.     Vent Settings:   Vent Mode: PCV Plus assist  (Pressure Control Ventilation/ Assist Control)  FiO2 (%): 80 %  Resp Rate (Set): 15 breaths/min  Tidal Volume (Set, mL): 30 mL  PEEP (cm H2O): 10 cmH2O  Inspiratory Pressure (cm H2O) (Drager Nena): 30  Resp: 21    ABG:   Recent Labs   Lab 03/04/24  0601 03/04/24  0325 03/04/24  0201   PH 7.46* 7.47* 7.47*   PCO2 45 44 43   PO2 88 65* 84   HCO3 32* 32* 31*   O2PER 80 80  80  100 80     Plan:  - Pulm on board, appreciate recs   - Wean vent as able  - Daily CXR  - Serial ABGs   - Consider scheduled duonebs if signs of lung dz, currently PRN    - Peridex  - FBG and diuresis as above  - pulmonary angiogram        Gastrointestinal, Nutrition  # Shock liver 2/2 cardiac arrest  # Hypoalbuminemia   # At risk for  protein malnutrition   No known medical hx.   CAP CT  Anterior mediastinal hematoma post arrest   Diet: Nutrition consult for feeding today  BM:  liquid    Recent Labs   Lab 24  0325 243 24  1622   AST 71* 72* 72*   ALT 83* 81* 86*   BILITOTAL 0.4 0.5 0.3   ALBUMIN 3.3* 3.3* 3.3*   PROTTOTAL 5.2* 5.1* 5.2*   ALKPHOS 102 93 90     Plan:  - Monitor LFTs  - Bowel regimen in place-PRN  - GI Prophylaxis: PPI; Protonix 40 mg daily  - Nutrition consulted, appreciate recommendations      Renal, Electrolytes  # Acute Renal Injury   # Lactic acidosis  Baseline CR 0.90    Intake/Output Summary (Last 24 hours) at 2024 0837  Last data filed at 2024 0800  Gross per 24 hour   Intake 1944.42 ml   Output 1615 ml   Net 329.42 ml     Recent Labs   Lab 24  0325 24  2213 24  1622 24  0959 24  0409 24  1015 24  0416   * 144 145   < > 144   < > 143   POTASSIUM 3.7 3.9 3.6   < > 4.0   < > 3.7   CHLORIDE 109* 107 108*   < > 108*   < > 105   CO2 29 29 30*   < > 28   < > 30*   BUN 35.1* 34.6* 32.1*   < > 29.3*   < > 27.6*   CR 0.87 0.93 0.92   < > 0.89   < > 0.89   PHOS 2.7  --   --   --  2.9  --  3.8   MAG 2.2 2.1 2.2   < > 2.5*   < > 2.2    < > = values in this interval not displayed.     Plan:  - Avoid nephrotoxins, renally dose meds  - Monitor urine output  - FBG and diuresis as above    Infectious Disease  # Aspiration Pneumonia  # Leukocytosis  Temp (24hrs), Av.5  F (36.9  C), Min:97.9  F (36.6  C), Max:98.8  F (37.1  C)     Cultures thus far:   Blood NGTD   Sputum 3+ GPC    Urine NGTD   MRSA Positive   COVID negative  Antibiotics     Anti-infectives (From now, onward)      None           Recent Labs   Lab 24  0325 24  2213 24  1622   WBC 12.2* 9.6 8.3                    Plan:  - Continue abx  - Monitor for signs of infection  - Avoid fevers     Hematology  #Acute Blood Loss Anemia on Anemia of Critical Illness   Hgb decreaseing.  Oral bleeding, CT CAP anterior mediastinal hematoma  US LE w/ Arterial Duplex with no evidence of acute thrombus    Receiving heparin for ECMO with ACT goal 1160-180  Dose (units/hr) HEParin: 1800 Units/hr  ACT  (seconds): 189 seconds    Recent Labs   Lab 03/04/24 0325 03/03/24 2213 03/03/24  1622   HGB 7.4* 7.4* 7.2*   HCT 22.2* 22.1* 21.2*   PLT 80* 75* 73*     Recent Labs   Lab 03/04/24 0325 03/03/24 2213 03/03/24  1622   INR 1.03 1.05 1.20*   * 104* 67*       Plan:  - DVT PPX: Heparin with ACT goal as above  - Transfusion parameters:   - 1u PRBC for hbg < 7.0   - 1u platelet for plt < 50   - 1u FFP for INR > 3   - 5u cryoprecipitate for fibrinogen <150     Endocrinology  # Hyperglycemia   # A1C   No lab results found in last 7 days.    Recent Labs   Lab 03/04/24 0325 03/04/24  0001 03/03/24 2213 03/03/24  2002   * 231* 227*  248* 171*     Dose (units/hr) Insulin: 5 Units/hr    Plan  - insulin gtt as needed  - Endocrine consulted   - Continue levothyroxine 150mcg    Integumentary:  - No skin issues    Lines/Tubes/Drains:  PIV left lower fore arm 2/10  CVC TL RFV 2/23  RR arterial line 2/23  ECMO L femoral vein 29 FR 2/23   ECMO L femoral artery 17 FR 2/23  Distal perfusion catheter LSFA 2/23  Nicole 2/23  OG 2/24  Rectal tube with balloon 2/23     ICU  Feeding: TF working towards goal  Analgesia: Fentanyl  Sedation: propofol and precedex  Thrombopx: Heparin infusion when ACT trends down  Head of bed: reverse trend   Ulcers: PPI  Glucose: Insulin infusion  SBT: not ready  B: liquid stool with rectal tube  I: still needs urinary catheter for accurate I/Os  De-escalate: continue abx    Family will be updated by team    Patient seen, discussed, and plan developed with staff physician, Dr Ana Deng, APRN, CNP  [facility]  Critical Care Cardiology  Text Page     Objective:  Most recent vital signs:  /64   Pulse (!) 127   Temp 98.8  F (37.1  C)   Resp 21    "Ht 1.778 m (5' 10\")   Wt 114.4 kg (252 lb 3.3 oz)   SpO2 97%   BMI 36.19 kg/m    Temp:  [98.6  F (37  C)-99.1  F (37.3  C)] 98.8  F (37.1  C)  Pulse:  [117-136] 127  Resp:  [15-28] 21  MAP:  [71 mmHg-294 mmHg] 88 mmHg  Arterial Line BP: (104-183)/(56-90) 143/67  FiO2 (%):  [60 %-80 %] 80 %  SpO2:  [87 %-100 %] 97 %    Physical exam:  General: In bed, in NAD  HEENT: PERRL, no scleral icterus or injection  CARDIAC: RRR, no m/r/g appreciated. Peripheral pulses dopplered  RESP: Mechanical ventilation; L lung diminished with exp wheezing  GI: soft, rounded, BS hypoactive  : Nicole, good UO  EXTREMITIES: 3+ LE edema, pulses 2+. Femoral access site oozing decreased. Dressing changed last night   SKIN: No acute lesions appreciated  NEURO: Intubated and sedated, sedation holiday as tolerates. Opens eyes, nods to questions when lightened    All imaging/results reviewed by me.         "

## 2024-03-04 NOTE — PROCEDURES
ECMO [place] STUDY  March 4, 2024    Inotropes/Pressors:  None    Vent Mode: PCV Plus assist  (Pressure Control Ventilation/ Assist Control)  FiO2 (%): 90 %  Resp Rate (Set): 15 breaths/min  Tidal Volume (Set, mL): 30 mL  PEEP (cm H2O): 10 cmH2O  Inspiratory Pressure (cm H2O) (Drager Nena): 30  Resp: 18     Flow  BP  HR O2 Sat   MVO2    4.5 L 107/54 (70)  123  97%  83%  3.5 L 105/53 (68)  123  96%   80.7%  2.5 L 104/50 (67)  124  94%   77.7%  1.5 L 107/54 (63)  124  90%   71.4%    ABG at lowest Flow: 7.47/45/54/33/88         Summary:  --hemodynamically stable on no inotropy or vasopressor support, however became hypoxic with decent ventilator settings     Plan:  --The patient is not ready for decannulation.    Josh Deng, APRN, CNP  [facility]  Cardiology ICU Service   Text Page

## 2024-03-04 NOTE — PROGRESS NOTES
[facility]    ECLS Shift Summary:     ECMO Equipment:  Console Serial Number: [id number]  Circuit Lot Number: [id number]  Oxygenator Lot Number: [id number]    Circuit Assessment: Fibrin  Fibrin Location: Connectors    Arterial ECMO Cannula: 17 Fr in the Left Femoral Artery  Venous ECMO Cannula: 29 Fr in the Right Femoral Vein  ECMO Cannula Venous Left femoral vein-Site Assessment: Sutured, Secure  ECMO Cannula Arterial Left femoral artery-Site Assessment: Sutured, Secure  Distal Perfusion Catheter Left femoral artery-Site Assessment: Sutured, Secure  ECMO Cannula Venous Left femoral vein-Site Intervention: No intervention needed  ECMO Cannula Arterial Left femoral artery-Site Intervention: No intervention needed  Distal Perfusion Catheter Left femoral artery-Site Intervention: No intervention needed    Patient remains on V-A ECMO, all equipment is functioning and alarms are appropriately set. RPM's: 3670 with Blood Flow (Circuit) LPM  Av LPM  Min: 3.94 LPM  Max: 4.12 LPM L/min. Sweep is at 2 LPM and 100 %. Extremities are cool to touch.     Significant Shift Events: none    Vent settings:  Vent Mode: PCV Plus assist  (Pressure Control Ventilation/ Assist Control)  FiO2 (%): 80 %  Resp Rate (Set): 15 breaths/min  Tidal Volume (Set, mL): 30 mL  PEEP (cm H2O): 10 cmH2O  Inspiratory Pressure (cm H2O) (Drager Nena): 30  Resp: 28      Anticoagulation:  Dose (units/hr) HEParin: 1800 Units/hr  Rate (mL/hr) HEParin: 18 mL/hr  Concentration HEParin: 100 Units/mL     Dose (mg/kg/hr) Bivalirudin: 0 mg/kg/hr  Rate (mL/hr) Bivalirudin: 0 mL/hr  Concentration Bivalirudin: 1 mg/mL  Most recent: ACT  (seconds): 189 seconds    Urine output is  .  Blood loss was minimal. Product given included none.     Intake/Output Summary (Last 24 hours) at 3/4/2024 0526  Last data filed at 3/4/2024 0500  Gross per 24 hour   Intake 3676.48 ml   Output 3460 ml   Net 216.48 ml       Labs:  Recent Labs    Lab 03/04/24  0325 03/04/24  0201 03/04/24  0002 03/03/24  2214   PH 7.47* 7.47* 7.46* 7.45   PCO2 44 43 45 46*   PO2 65* 84 69* 59*   HCO3 32* 31* 32* 32*   O2PER 80  80  100 80 80 80       Lab Results   Component Value Date    HGB 7.4 (L) 03/04/2024    PHGB 70 (H) 03/04/2024    PLT 80 (L) 03/04/2024    FIBR 376 03/04/2024    INR 1.03 03/04/2024     (H) 03/04/2024    DD 8.97 (H) 03/04/2024    ANTCH 82 (L) 03/03/2024       Plan is to contine with VA ECMO.    Carol Weston, RT  ECMO Specialist  3/4/2024 5:26 AM

## 2024-03-04 NOTE — PHARMACY-VANCOMYCIN DOSING SERVICE
"Pharmacy Vancomycin Initial Note  Date of Service 2024  Patient's  1965  58 year old, male    Indication: Aspiration Pneumonia    Current estimated CrCl = Estimated Creatinine Clearance: 120.2 mL/min (based on SCr of 0.87 mg/dL).    Creatinine for last 3 days  3/1/2024:  3:46 PM Creatinine 0.97 mg/dL; 10:00 PM Creatinine 0.96 mg/dL  3/2/2024:  4:16 AM Creatinine 0.89 mg/dL; 10:15 AM Creatinine 0.91 mg/dL;  4:34 PM Creatinine 0.83 mg/dL; 10:04 PM Creatinine 0.84 mg/dL  3/3/2024:  4:09 AM Creatinine 0.89 mg/dL;  9:59 AM Creatinine 0.90 mg/dL;  4:22 PM Creatinine 0.92 mg/dL; 10:13 PM Creatinine 0.93 mg/dL  3/4/2024:  3:25 AM Creatinine 0.87 mg/dL    Recent Vancomycin Level(s) for last 3 days  No results found for requested labs within last 3 days.      Vancomycin IV Administrations (past 72 hours)                     vancomycin (VANCOCIN) 1,500 mg in 0.9% NaCl 250 mL intermittent infusion (mg) 1,500 mg New Bag 24 1420                    Nephrotoxins and other renal medications (From now, onward)      Start     Dose/Rate Route Frequency Ordered Stop    24 2230  vancomycin (VANCOCIN) 1,500 mg in 0.9% NaCl 250 mL intermittent infusion        See Hyperspace for full Linked Orders Report.    1,500 mg  over 90 Minutes Intravenous EVERY 12 HOURS 24 1012      24 1030  piperacillin-tazobactam (ZOSYN) 3.375 g vial to attach to  mL bag        Note to Pharmacy: For [facility], [facility] and [facility]: For Zosyn-naive patients, use the \"Zosyn initial dose + extended infusion\" order panel.    3.375 g  over 30 Minutes Intravenous EVERY 8 HOURS 24 1007      24 1030  vancomycin (VANCOCIN) 2,000 mg in sodium chloride 0.9 % 250 mL intermittent infusion        See Hyperspace for full Linked Orders Report.    2,000 mg (central catheter)  over 90 Minutes Intravenous ONCE 24 1012              Contrast Orders - past 72 hours (72h ago, onward)      Start     Dose/Rate Route Frequency Stop    " 02/27/24 1920  iopamidol (ISOVUE-370) solution  Status:  Discontinued           ONCE PRN 03/02/24 1634            Insight[employer] Prediction of Planned Initial Vancomycin Regimen  Regimen: 1500 mg IV every 12 hours.  Start time: 02:20 on 03/02/2024  Exposure target: AUC24 (range)400-600 mg/L.hr   AUC24,ss: 477 mg/L.hr  Probability of AUC24 > 400: 90 %  Ctrough,ss: 14.4 mg/L  Probability of Ctrough,ss > 20: 7 %  Probability of nephrotoxicity (Lodise STEPHANI 2009): 10 %          Plan:  Start vancomycin  2000 mg IV x 1 followed by 1500 mg IV q12h.   Vancomycin monitoring method: AUC  Vancomycin therapeutic monitoring goal: 400-600 mg*h/L  Pharmacy will check vancomycin levels as appropriate in 1-3 Days.    Serum creatinine levels will be ordered daily for the first week of therapy and at least twice weekly for subsequent weeks.        Bela Pagan, PharmD, BCPS

## 2024-03-04 NOTE — PROGRESS NOTES
Care Management Follow Up    Length of Stay (days): 29    Expected Discharge Date:       Concerns to be Addressed: care coordination/care conferences     Patient plan of care discussed at interdisciplinary rounds: Yes    Anticipated Discharge Disposition:  (TBD pending care conference 3/4)     Anticipated Discharge Services:  (TBD pending care conference 3/4)  Anticipated Discharge DME:  (TBD pending care conference 3/4)    Patient/family educated on Medicare website which has current facility and service quality ratings:    Education Provided on the Discharge Plan:  n/a   Patient/Family in Agreement with the Plan: Ok with CC    Referrals Placed by CM/SW:  n/a   Private pay costs discussed: Not applicable    Additional Information:  RNCC notified CICU Teams would like to have a care conference Tuesday, March 5th at 1400. They are requesting Pt's daughter, Zoie be in attendance. She can be reached at [phone].    RNCC met with the Pt's daughter at the bedside. She is able to attend tomorrow. RNCC left a VM with Pt's sister, Odalys with the care conference date, time and location. RNCC can also provide call in information.    RNCC will continue to follow and provide support as needed.     Vera Rosenberg RN  RNCC Float   [phone]

## 2024-03-04 NOTE — PROGRESS NOTES
ECMO Attending Progress Note  3/4/2024    Silvano Motta is a 58 year old male who was cannulated for ECMO 2024 due to refractory PEA arrest     Cannulation Site:  17 Fr in the L femoral artery  29 Fr in the L femoral vein    Interval events: up on vent again     Physical Exam:  Temp:  [98.6  F (37  C)-99.1  F (37.3  C)] 98.6  F (37  C)  Pulse:  [117-136] 131  Resp:  [15-28] 22  MAP:  [71 mmHg-294 mmHg] 86 mmHg  Arterial Line BP: (104-183)/(56-90) 145/66  FiO2 (%):  [60 %-80 %] 80 %  SpO2:  [87 %-100 %] 91 %    Intake/Output Summary (Last 24 hours) at 3/4/2024 0910  Last data filed at 3/4/2024 0900  Gross per 24 hour   Intake 3666.12 ml   Output 3520 ml   Net 146.12 ml    Vent Mode: PCV Plus assist  (Pressure Control Ventilation/ Assist Control)  FiO2 (%): 80 %  Resp Rate (Set): 15 breaths/min  Tidal Volume (Set, mL): 30 mL  PEEP (cm H2O): 10 cmH2O  Inspiratory Pressure (cm H2O) (Drager Nena): 30  Resp: 22       Labs:  Recent Labs   Lab 24  0756 24  0601 24  0325 24  0201   PH 7.49* 7.46* 7.47* 7.47*   PCO2 41 45 44 43   PO2 65* 88 65* 84   HCO3 32* 32* 32* 31*   O2PER 80 80 80  80  100 80      Recent Labs   Lab 24  0325 24  2213 24  1622 24  0959   WBC 12.2* 9.6 8.3 8.7  8.7   HGB 7.4* 7.4* 7.2* 8.0*  8.0*     Creatinine   Date Value Ref Range Status   2024 0.87 0.67 - 1.17 mg/dL Final   2024 0.93 0.67 - 1.17 mg/dL Final   2024 0.92 0.67 - 1.17 mg/dL Final   2024 0.90 0.67 - 1.17 mg/dL Final       Blood Flow (Circuit) LPM: 4.14 LPM  Sweep LPM: 2 LPM  Sweep FiO2   %: 100 %  ACT  (seconds): 201 seconds  Arterial Blood Temp  (degrees C): 37.2 C  Pulse Oximetry  (SpO2%): 97 %  Arterial Pressure  mmH mmHg      ECMO Issues including assessments and plan on DOS 3/4/2024:  Neuro: Sedated for mechanical ventilation and ECMO.  No acute distress.  NIRS stable b/l  RASS goal: -1 to -2 trial ketamine to come off versed  CV: Cardiogenic shock.   Hemodynamically stable off pressors 30 mmHg pulsatilty no iabp trial turn down   Pulm: PCV  FEN/Renal: Electrolytes stable w/ replacement protocols in place, Cr stable/normal, UOP- stable- diuresis   Heme: ACT goal: 180-200 Hemoglobin stable.  Minimal oozing around the ECMO cannulas.  ID:finished ctx and vanco MRSA in sputum- start zosyn given worsening wbc & sputum   Cannulae: Position is acceptable on exam and the available imaging.  Distal perfusion cannula is in place and patent.  Extremities are well-perfused.     I have personally reviewed the ECMO flows, oxygenation and CO2 clearance, anticoagulation, and cannula position.  I have also personally assessed the patient's systemic response with hemodynamics, oxygenation, ventilation, and bleeding.       The patient requires continued ECMO support and management in the ICU.     Liseth Jenkins MD  Cardiology critical care  Pager

## 2024-03-04 NOTE — PLAN OF CARE
Goal Outcome Evaluation:      Plan of Care Reviewed With: other (see comments) (unable to discuss with pt at this time)    Overall Patient Progress: no changeOverall Patient Progress: no change    Outcome Evaluation: See RD note 3/4

## 2024-03-05 NOTE — PROGRESS NOTES
[facility]    ECLS Shift Summary:     ECMO Equipment:  Console Serial Number: [id number]  Circuit Lot Number: [id number]  Oxygenator Lot Number: [id number]    Circuit Assessment: Fibrin  Fibrin Location: connectors    Arterial ECMO Cannula: 17 Fr in the Left Femoral Artery  Venous ECMO Cannula: 29 Fr in the Left Femoral Vein  ECMO Cannula Venous Left femoral vein-Site Assessment: Sutured, Secure (Simultaneous filing. User may not have seen previous data.)  ECMO Cannula Arterial Left femoral artery-Site Assessment: Secure, Sutured (Simultaneous filing. User may not have seen previous data.)  Distal Perfusion Catheter Left femoral artery-Site Assessment: Sutured, Secure (Simultaneous filing. User may not have seen previous data.)  ECMO Cannula Venous Left femoral vein-Site Intervention: No intervention needed (Simultaneous filing. User may not have seen previous data.)  ECMO Cannula Arterial Left femoral artery-Site Intervention: No intervention needed (Simultaneous filing. User may not have seen previous data.)  Distal Perfusion Catheter Left femoral artery-Site Intervention: No intervention needed (Simultaneous filing. User may not have seen previous data.)    Patient remains on V-A ECMO, all equipment is functioning and alarms are appropriately set. RPM's: 3670 with Blood Flow (Circuit) LPM  Av.2 LPM  Min: 4.14 LPM  Max: 4.26 LPM L/min. Sweep is at 2 LPM and 100 %. Extremities are warm.     Significant Shift Events:   Turn down study completed, down to 1.5 L flow. Patient ABG post turndown indicates poor oxygenation. Continue to monitor. Back up to flows.     Vent settings:  Vent Mode: PCV Plus assist  (Pressure Control Ventilation/ Assist Control)  FiO2 (%): 90 %  Resp Rate (Set): 15 breaths/min  Tidal Volume (Set, mL): 30 mL  PEEP (cm H2O): 10 cmH2O  Inspiratory Pressure (cm H2O) (Drager Nena): 30  Resp: 22      Anticoagulation:  Dose (units/hr) HEParin: 1750  Units/hr  Rate (mL/hr) HEParin: 17.5 mL/hr  Concentration HEParin: 100 Units/mL     Dose (mg/kg/hr) Bivalirudin: 0 mg/kg/hr  Rate (mL/hr) Bivalirudin: 0 mL/hr  Concentration Bivalirudin: 1 mg/mL  Most recent: ACT  (seconds): 201 seconds    Urine output is Light / Pale.  Blood loss was minor from rectal bleeding. Product given included 1.     Intake/Output Summary (Last 24 hours) at 3/4/2024 1836  Last data filed at 3/4/2024 1800  Gross per 24 hour   Intake 4337.75 ml   Output 3910 ml   Net 427.75 ml       Labs:  Recent Labs   Lab 03/04/24  1757 03/04/24  1558 03/04/24  1557 03/04/24  1349 03/04/24  1157   PH 7.46* 7.48*  --  7.47* 7.47*   PCO2 45 43  --  45 44   PO2 75* 76*  --  54* 71*   HCO3 32* 32*  --  33* 32*   O2PER 80 90 100  90 90 90       Lab Results   Component Value Date    HGB 7.1 (L) 03/04/2024    PHGB 70 (H) 03/04/2024    PLT 78 (L) 03/04/2024    FIBR 369 03/04/2024    INR 1.09 03/04/2024     (HH) 03/04/2024    DD 2.45 (H) 03/04/2024    ANTCH 100 03/04/2024       Plan is to continue to support patient on VA ECMO and oxygenation needs.     Aaron Hagen RN  ECMO Specialist  3/4/2024 6:36 PM

## 2024-03-05 NOTE — PROGRESS NOTES
[facility]    ECLS Shift Summary:     ECMO Equipment:  Console Serial Number: [id number]  Circuit Lot Number: [id number]  Oxygenator Lot Number: [id number]    Circuit Assessment: Fibrin  Fibrin Location: connectors    Arterial ECMO Cannula: 17 Fr in the Left Femoral Artery  Venous ECMO Cannula: 29 Fr in the Left Femoral Vein  ECMO Cannula Venous Left femoral vein-Site Assessment: Sutured, Secure  ECMO Cannula Arterial Left femoral artery-Site Assessment: Sutured, Secure  Distal Perfusion Catheter Left femoral artery-Site Assessment: WDL  ECMO Cannula Venous Left femoral vein-Site Intervention: No intervention needed  ECMO Cannula Arterial Left femoral artery-Site Intervention: No intervention needed  Distal Perfusion Catheter Left femoral artery-Site Intervention: No intervention needed    Patient remains on V-A ECMO, all equipment is functioning and alarms are appropriately set. RPM's: 3670 with Blood Flow (Circuit) LPM  Av.2 LPM  Min: 4.12 LPM  Max: 4.25 LPM L/min. Sweep is at 2 LPM and 100 %. Extremities are warm.     Significant Shift Events: PRBC x3 this shift, increased PEEP for low PaO2/sats.     Vent settings:  Vent Mode: PCV Plus assist  (Pressure Control Ventilation/ Assist Control)  FiO2 (%): (S) 100 %  Resp Rate (Set): 15 breaths/min  PEEP (cm H2O): 14 cmH2O  Inspiratory Pressure (cm H2O) (Drager Nena): 30  Resp: 18      Anticoagulation:  Dose (units/hr) HEParin: 1500 Units/hr  Rate (mL/hr) HEParin: 15 mL/hr  Concentration HEParin: 100 Units/mL     Dose (mg/kg/hr) Bivalirudin: 0 mg/kg/hr  Rate (mL/hr) Bivalirudin: 0 mL/hr  Concentration Bivalirudin: 1 mg/mL  Most recent: ACT  (seconds): 189 seconds    Urine output is Light / Pale.  Blood loss was none. Product given included PRBC x3.     Intake/Output Summary (Last 24 hours) at 3/5/2024 0626  Last data filed at 3/5/2024 0600  Gross per 24 hour   Intake 4821.38 ml   Output 4835 ml   Net -13.62 ml        Labs:  Recent Labs   Lab 03/05/24  0604 03/05/24  0412 03/05/24  0410 03/05/24  0148 03/04/24  2354   PH 7.48* 7.45  --  7.47* 7.48*   PCO2 44 45  --  44 43   PO2 59* 73*  --  61* 68*   HCO3 32* 32*  --  32* 32*   O2PER 70 70 100  70 80 80       Lab Results   Component Value Date    HGB 8.0 (L) 03/05/2024    PHGB 70 (H) 03/05/2024    PLT 79 (L) 03/05/2024    FIBR 369 03/04/2024    INR 1.05 03/05/2024     (H) 03/05/2024    DD 2.45 (H) 03/04/2024    ANTCH 100 03/04/2024       Plan is to continue VA ECMO support.    Blanca Johns RN  ECMO Specialist  3/5/2024 6:26 AM

## 2024-03-05 NOTE — PLAN OF CARE
Major Shift Events:  given 2 prbc overnight for low hgb. Increased PEEP to 14 d/t low p02. Given 80mg lasix IV. Otherwise, sedated, following commands when awoken. -130, MAP 70-90. Ecmo flowing at 4.1, sweep 2 fio2 100%. Vent pc/ac 30/14, 70%, thick red sputum suctioned. Good UOP. No BM this shift.   Plan: continue with plan of care. Care conference planned for today.   For vital signs and complete assessments, please see documentation flowsheets.

## 2024-03-05 NOTE — PLAN OF CARE
Major Shift Events: pt remains sedated on prop, fentanyl, and versed gtt. Ketamine gtt started and stopped, unable to wean sedation. ST -130's. MAP >65, VA ecmo, ecmo turned out today; see MD notes. PC plus settings, Fio2 80-90%, thick secretions, frequent suctioning. OG in place, TF @ 30 ml/hr. Nicole in place, 80 mg IV lasix given. Rectal tube removed, bloody clots from rectum. Lantus increased, insulin gtt.  Plan: continue to monitor and notify MD with changes.  For vital signs and complete assessments, please see documentation flowsheets.

## 2024-03-05 NOTE — CONSULTS
SPIRITUAL HEALTH SERVICES Consult Note  [facility] ([place]) 4A - CVICU    I attended care conference per family request, provided prayer support after conference. Many family present, very supportive of each other and patient. Will continue to follow, visiting again on Friday.     Ba Og M.Div. (Bill), [facility]  Staff   Pager [phone]      * [facility] remains available 24/7 for emergent requests/referrals, either by having the switchboard page the on-call  or by entering an ASAP/STAT consult in Epic (this will also page the on-call ). Routine Epic consults receive an initial response within 24 hours.*

## 2024-03-05 NOTE — PROGRESS NOTES
Cardiology ICU Progress Note    Brief HPI:  Silvano Motta is a 58 year old male being admitted to the CICU on 2/4/2024 s/p peripheral VA-ECMO cannulation s/p cardiac arrest. 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 Admitted 1/25 to OSH for AHRF and transferred to [facility] 2/4 for PH therapy managed.  Patient not a lung transplant candidate at [facility] or [facility].      Subjective and Interval:  - Continues to decompensate  - Required multiple transfusions overnight    Assessment and plan by system:   Today's changes:  - Care conference this afternoon>> plan to continue supportive care through this Friday, will have another meeting at that date to evaluate clinical progress  - -180  - Protonix BID  - Metol and Lasix 80 TID  - Resume scheduled bowel reg  - Increase Lantus to 60 daily     Neurology   # Concern for anoxic brain injury    # Hx TBI 2020  # Hx Sz disorder-Todds paralysis  CT head 2/23: no intracranial pathology  - Intubated, sedated. Avoiding hyperthermia    Current sedation:  RASS (Davidson Agitation-Sedation Scale): -2-->light sedation  Dose (mcg/hr) Fentanyl: 150 mcg/hr and Dose (mcg/kg/min) Propofol: 50 mcg/kg/min    Plan:  - Continue PTA Keppra, VIMPAT, and gabapentin  - Continue home ASA  - Continue sedation with a RASS goal -1 to -2  - Spontaneous awakening trial as tolerated  - permissive hypercapnea and hypernatremia for neuroprotection  - oxycodone scheduled, wean fentanyl off     Cardiovascular  # PEA/VT Cardiac Arrest s/p peripheral VA ECMO Cannulation 2/23/24   # Cardiogenic shock requiring VA ECMO  Peripheral V-A ECMO inserted via LCFA and LCFV 17 Fr arterial cannula, 29 Fr venous annula, DPC 7 Fr LSFA  Angiogram: RHC, Normal biventricular filling pressures.   Mild pre-capillary pulmonary  hypertension  Low-normal cardiac output and index.  TTE:  VA ECMO at 4.1 LPM. Difficult to assess LV function but appears to be  ~30%. The right ventricle is normal size.  Global right ventricular function is mildly reduced.  No pericardial effusion is present.    ECG Rhythm: Sinus tachycardia    ECMO:  Mode: V-A   Pump Type: Cardiohelp  RPM's: 3670  Blood Flow (Circuit) LPM: 4.36 LPM  Sweep LPM: 2 LPM  Sweep FiO2   %: 100 %  Venous Pressure  mmHg: -32 mmHg  Arterial Pressure  mmH mmHg  Internal Pressure mmH mmHg  Pressure Change mmH mmHg    Current Pressors/inotropes/antiarrythmic:  Dose (mcg/kg/min) Norepinephrine: 0.04 mcg/kg/min, Dose (units/hr) Vasopressin: 2 Units/hr, and Dose (mcg/kg/min) Epinephrine: 0.04 mcg/kg/min    Hemodynamics:  Art Line  Arterial Line BP: 129/62  Arterial Line MAP (mmHg): 80 mmHg  Arterial Line Location: Right radial  Art Line Wave Form: Appropriate wave forms    Plan:  - Continue ASA 81mg   - Hold lipitor for now given shock liver  - Telemetry monitoring  - Continue trending troponin  - Continue ECMO support   - Lasix 80 TID, 5 metolazone  -  to -1L     Pulmonary  # Acute on chronic hypoxemic respiratory failure requiring intubation  # Shunt physiology, positive shunt study 24  # Mild pulmonary hypertension  # Severe ROSAILO on PAP at night  # Probable aspiration pneumonia  Per pulmonology documentation: Hx of chronic hypoxemia thought due to hypoventilation and untreated sleep apnea since Oct 2023 with extensive workup including multiple CT Angio studies, NM Perfusion studies, RHC in Oct 2023 and again this hospitalization showing mild pulmonary hypertension (mean PA 25, PCWP 10). Imaging with stable pleural based HARMONY nodule and subpleural reticulation and thickening of interlobular septi present since . Repeated CT scans without embolus or AVM. Does have genetic sequencing in process. Thus far auto-immune workup negative (SSA, SSB, Scleroderma, Clara 1, RF,  CCP, CRP, Aldolase, HP panels, ANCA, IgG) except for positive ROSAMARIA (speckled 1:160). In Oct also had negative workup (C3 and C4, anti-centromere, anti-Smith, anti-RNP, anti-SSA, anti-SSB, Scl-70, anti-cardiolipin, anti-CCP). Concern for shunt physiology with highly elevated A-a gradient, multiple TTEs and EDWIGE with positive late bubble, hypoxemia and ongoing dependence on high flow oxygen that seems out of proportion to degree of pulmonary hypertension and fibrotic changes. NM MAA shunt study 2/9 confirms shunt physiology. Based on bubble studies, presumably extracardiac shunt, potential diagnosis includes DMVPAVM which is very rare but would explain lack of macrovascular abnormalities. In discussions with radiology, and abnormal NM shunt study perfusion to upper lung fields, will pursue CTPE dual energy to better delineate vasculature and perfusion. Additionally, will obtain CT abdomen/pelvis for any extrathoracic abnormality that could be contributing to shunt. Not a transplant candidate per [facility] or [facility].   CXR:   1. Relatively unchanged support devices of endotracheal tube tip  projects about 6 cm above the kianna, may consider advancement by 1 to  2 cm followed by chest x-ray.  2. Similar perihilar/basilar mixed opacities.  3. Slightly increased small pleural effusions.     Vent Settings:   Vent Mode: PCV Plus assist  (Pressure Control Ventilation/ Assist Control)  FiO2 (%): 80 %  Resp Rate (Set): 15 breaths/min  PEEP (cm H2O): 14 cmH2O  Inspiratory Pressure (cm H2O) (Drager Nnea): 30  Resp: 15    ABG:   Recent Labs   Lab 03/05/24  0742 03/05/24  0604 03/05/24  0412   PH 7.46* 7.48* 7.45   PCO2 44 44 45   PO2 102 59* 73*   HCO3 31* 32* 32*   O2PER 80 70 70     Plan:  - Pulm on board, appreciate recs   - Wean vent as able  - Daily CXR  - Serial ABGs   - Consider scheduled duonebs if signs of lung dz, currently PRN    - Peridex  - FBG and diuresis as above        Gastrointestinal, Nutrition  # Shock liver 2/2  cardiac arrest  # Hypoalbuminemia   # At risk for protein malnutrition   No known medical hx.   CAP CT  Anterior mediastinal hematoma post arrest   Diet: Nutrition consult for feeding today  BM:  liquid    Recent Labs   Lab 24  1557   * 121* 101*   * 113* 96*   BILITOTAL 0.5 0.5 0.4   ALBUMIN 3.1* 3.3* 3.1*   PROTTOTAL 5.1* 5.2* 4.9*   ALKPHOS 121 117 108     Plan:  - Monitor LFTs  - Bowel regimen in place-PRN  - GI Prophylaxis: PPI; Protonix 40 mg daily  - Nutrition consulted, appreciate recommendations      Renal, Electrolytes  # Acute Renal Injury   # Lactic acidosis  Baseline CR 0.90  I/O last 3 completed shifts:  In: 4821.38 [I.V.:2328.38; NG/GT:1185]  Out: 4835 [Urine:4835]      Recent Labs   Lab 24  1557 24  1019 24  0325 24  0959 24  0409   * 149* 148*   < > 146*   < > 144   POTASSIUM 3.5 3.7 3.9   < > 3.7   < > 4.0   CHLORIDE 110* 109* 110*   < > 109*   < > 108*   CO2 30* 30* 30*   < > 29   < > 28   BUN 48.1* 42.1* 39.9*   < > 35.1*   < > 29.3*   CR 1.03 1.03 0.97   < > 0.87   < > 0.89   PHOS 3.0  --   --   --  2.7  --  2.9   MAG 2.1 2.1 2.1   < > 2.2   < > 2.5*    < > = values in this interval not displayed.     Plan:  - Avoid nephrotoxins, renally dose meds  - Monitor urine output  - FBG and diuresis as above    Infectious Disease  # Aspiration Pneumonia  # Leukocytosis  Recent Labs   Lab 24  1557   WBC 12.7* 13.7* 13.2*     Temp (24hrs), Av.7  F (37.1  C), Min:98.6  F (37  C), Max:99  F (37.2  C)    Cultures thus far:   Blood NGTD   Sputum 3+ GPC    Urine NGTD   MRSA Positive   COVID negative  Antibiotics     Anti-infectives (From now, onward)      Start     Dose/Rate Route Frequency Ordered Stop    24 2230  vancomycin (VANCOCIN) 1,500 mg in 0.9% NaCl 250 mL intermittent infusion        See Hyperspace for full Linked Orders Report.    1,500  "mg  over 90 Minutes Intravenous EVERY 12 HOURS 03/04/24 1012      03/04/24 1030  piperacillin-tazobactam (ZOSYN) 4.5 g vial to attach to  mL bag        Note to Pharmacy: For [facility], [facility] and [facility]: For Zosyn-naive patients, use the \"Zosyn initial dose + extended infusion\" order panel.    4.5 g  over 30 Minutes Intravenous EVERY 6 HOURS 03/04/24 1007            Plan:  - Continue abx  - Monitor for signs of infection  - Avoid fevers     Hematology  #Acute Blood Loss Anemia on Anemia of Critical Illness   # Thrombocytopenia  Hgb decreaseing. Oral bleeding, CT CAP anterior mediastinal hematoma  US LE w/ Arterial Duplex with no evidence of acute thrombus    Receiving heparin for ECMO with ACT goal 160-180  Dose (units/hr) HEParin: 1500 Units/hr  ACT  (seconds): 193 seconds    Recent Labs   Lab 03/05/24  0411 03/04/24 2206 03/04/24  1557   HGB 8.0* 7.2* 7.1*   HCT 23.5* 21.4* 21.2*   PLT 79* 79* 78*     Recent Labs   Lab 03/05/24  0411 03/04/24 2206 03/04/24  1557   INR 1.05 1.05 1.09   * 137* 141*       Plan:  - DVT PPX: Heparin with ACT goal as above  - PPI BID  - Transfusion parameters:   - 1u PRBC for hbg < 7.0   - 1u platelet for plt < 50   - 1u FFP for INR > 3   - 5u cryoprecipitate for fibrinogen <150     Endocrinology  # Hyperglycemia   # A1C 10.0    Recent Labs   Lab 03/05/24  0411 03/04/24  2354 03/04/24  2305 03/04/24  2206   * 148* 139* 94  97     Dose (units/hr) Insulin: 5 Units/hr    Plan  - insulin gtt as needed  - Endocrine consulted   - Continue levothyroxine 150mcg    Integumentary:  - No skin issues    Lines/Tubes/Drains:  PIV left lower fore arm 2/10  CVC TL RFV 2/23  RR arterial line 2/23  ECMO L femoral vein 29 FR 2/23   ECMO L femoral artery 17 FR 2/23  Distal perfusion catheter LSFA 2/23  Nicole 2/23  OG 2/24  Rectal tube with balloon 2/23     ICU  Feeding: TF working towards goal  Analgesia: Fentanyl  Sedation: propofol and midazolam  Thrombopx: Heparin infusion when ACT trends " "down  Head of bed: reverse trend   Ulcers: PPI  Glucose: Insulin infusion  SBT: not ready  B: liquid stool with rectal tube  I: still needs urinary catheter for accurate I/Os  De-escalate: continue abx    Family will be updated by team    Patient seen, discussed, and plan developed with staff physician, VASQUEZ Ho CNP  [facility]  ICU Cardiology-CICU Service  Send message or 10 digit call back number Securely via Health [employer] Direct with the Vocera Web Console (learn more here)    Objective:  Most recent vital signs:  /64   Pulse (!) 123   Temp 98.8  F (37.1  C) (Bladder)   Resp 15   Ht 1.778 m (5' 10\")   Wt 114.4 kg (252 lb 3.3 oz)   SpO2 96%   BMI 36.19 kg/m    Temp:  [98.6  F (37  C)-99  F (37.2  C)] 98.8  F (37.1  C)  Pulse:  [117-136] 123  Resp:  [15-22] 15  MAP:  [61 mmHg-104 mmHg] 80 mmHg  Arterial Line BP: ()/(45-79) 129/62  FiO2 (%):  [70 %-100 %] 80 %  SpO2:  [88 %-100 %] 96 %    Physical exam:  General: In bed, in NAD  HEENT: PERRL, no scleral icterus or injection  CARDIAC: RRR, no m/r/g appreciated. Peripheral pulses dopplered  RESP: Mechanical ventilation; L lung diminished with exp wheezing  GI: soft, rounded, BS hypoactive  : Nicole, good UO  EXTREMITIES: 3+ LE edema, pulses 2+. Femoral access site oozing decreased. Dressing changed last night   SKIN: No acute lesions appreciated  NEURO: Intubated and sedated, sedation holiday as tolerates. Opens eyes, nods to questions when lightened    All imaging/results reviewed by me.         " negative

## 2024-03-06 NOTE — PROGRESS NOTES
[facility]    ECLS Shift Summary:     ECMO Equipment:  Console Serial Number: [id number]  Circuit Lot Number: [id number]  Oxygenator Lot Number: [id number]    Circuit Assessment: Fibrin  Fibrin Location: Connectors    Arterial ECMO Cannula: 17 Fr in the Left Femoral Artery  Venous ECMO Cannula: 29 Fr in the Left Femoral Vein  ECMO Cannula Venous Left femoral vein-Site Assessment: Sutured, Secure  ECMO Cannula Arterial Left femoral artery-Site Assessment: Sutured, Secure  Distal Perfusion Catheter Left femoral artery-Site Assessment: WDL  ECMO Cannula Venous Left femoral vein-Site Intervention: No intervention needed  ECMO Cannula Arterial Left femoral artery-Site Intervention: No intervention needed  Distal Perfusion Catheter Left femoral artery-Site Intervention: No intervention needed    Patient remains on V-A ECMO, all equipment is functioning and alarms are appropriately set. RPM's: 3200 with Blood Flow (Circuit) LPM  Avg: 3.7 LPM  Min: 3.43 LPM  Max: 4.36 LPM L/min. Sweep is at 2 LPM and 100 %. Extremities are cool but perfused.     Significant Shift Events:   Flows dropped to 3.5 to assess oxygenation need. Tolerating well.     Vent settings:  Vent Mode: PCV PLUS  (Pressure Control Ventilation Plus (added secondary Mode)  FiO2 (%): 80 %  Resp Rate (Set): 15 breaths/min  PEEP (cm H2O): 14 cmH2O  Inspiratory Pressure (cm H2O) (Drager Nena): 30  Resp: 22      Anticoagulation:  Dose (units/hr) HEParin: 1200 Units/hr  Rate (mL/hr) HEParin: 12 mL/hr  Concentration HEParin: 100 Units/mL     Dose (mg/kg/hr) Bivalirudin: 0 mg/kg/hr  Rate (mL/hr) Bivalirudin: 0 mL/hr  Concentration Bivalirudin: 1 mg/mL  Most recent: ACT  (seconds): 178 seconds    Urine output is Light / Pale.  Blood loss was minimal, possible bleed for GI. Product given included 0.     Intake/Output Summary (Last 24 hours) at 3/5/2024 1823  Last data filed at 3/5/2024 1800  Gross per 24 hour   Intake 4578.06  ml   Output 5740 ml   Net -1161.94 ml       Labs:  Recent Labs   Lab 03/05/24  1753 03/05/24  1534 03/05/24  1532 03/05/24  1404 03/05/24  1215   PH 7.47*  --  7.47* 7.47* 7.45   PCO2 45  --  46* 44 45   PO2 92  --  121* 130* 81   HCO3 32*  --  33* 32* 31*   O2PER 80 100  100 100 80 80       Lab Results   Component Value Date    HGB 7.6 (L) 03/05/2024    PHGB 70 (H) 03/05/2024    PLT 79 (L) 03/05/2024    FIBR 369 03/04/2024    INR 1.06 03/05/2024    PTT 74 (H) 03/05/2024    DD 2.45 (H) 03/04/2024    ANTCH 89 03/05/2024       Plan is continue to support patient on VA ECMO.    Aaron Hagen RN  ECMO Specialist  3/5/2024 6:23 PM

## 2024-03-06 NOTE — PROGRESS NOTES
[facility]  WOC Nurse Inpatient Assessment     Consulted for: ECMO pressure injury prevention     Patient History (according to provider note(s):      Silvano Motta is a 58 year old male being admitted to the CICU on 2/4/2024 s/p peripheral VA-ECMO cannulation s/p cardiac arrest. 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 Admitted 1/25 to OSH for AHRF and transferred to [facility] 2/4 for PH therapy managed.  Patient not a lung transplant candidate at [facility] or [facility].       Assessment:      ECMO cannula location: Left groin ECMO cannula  Positioning tolerance: Fair  Date of ECMO cannulation: 02/23/2024  Presence of ischemia: No  Location of ischemia: n/a  Pressure Injury Present::No  Pressure Injury Prevention Interventions In Place:  Optifoam Dressing under ECMO Cannula, Z flow Positioner under head, TAPs Wedge Positioners in use, Pillows under calves for heel suspension, and Mepilex Sacral Dressing        Pressure Injury Prevention Interventions Added:  Heel off-loading boots ordered, previous boots became soiled        Treatment Plan:     ECMO pressure injury prevention plan:      Reposition patient every 1-2 hours using TAP Wedges  Position head on Z flow positioner, mold indentation at areas of pressure points.   Pad ECMO groin and chest cannula under rigid connectors with Optifoam or Soft cloth  Heel off-loading Boots at all times  Sacral Mepilex for Prevention, change every 5 days and prn  Low Air loss mattress     Orders: Reviewed    RECOMMEND PRIMARY TEAM ORDER: None, at this time  Education provided: plan of care  Discussed plan of care with: Nurse  WOC nurse follow-up plan: weekly  Notify WOC if wound(s) deteriorate.  Nursing to notify the Provider(s) and re-consult the WOC  Nurse if new skin concern.    DATA:     Current support surface: Standard  Low air loss (MICHAELLE pump, Isolibrium, Pulsate, skin guard, etc)  Containment of urine/stool: Indwelling catheter  BMI: Body mass index is 35.02 kg/m .   Active diet order: Orders Placed This Encounter      NPO per Anesthesia Guidelines for Procedure/Surgery Except for: Meds     Output: I/O last 3 completed shifts:  In: 5494.62 [I.V.:2569.62; NG/GT:1545]  Out: 7600 [Urine:6250; Stool:1350]     Labs:   Recent Labs   Lab 03/06/24  0959   ALBUMIN 3.1*   HGB 6.9*   INR 1.08   WBC 13.4*     Pressure injury risk assessment:   Sensory Perception: 3-->slightly limited  Moisture: 3-->occasionally moist  Activity: 1-->bedfast  Mobility: 2-->very limited  Nutrition: 3-->adequate  Friction and Shear: 2-->potential problem  Seth Score: 14    Pager no longer is use, please contact through [employer]: [facility] Nurse [employer]   Dept. Office Number: [phone]

## 2024-03-06 NOTE — PROGRESS NOTES
[facility]    ECLS Shift Summary:     ECMO Equipment:  Console Serial Number: [id number]  Circuit Lot Number: [id number]  Oxygenator Lot Number: [id number]    Circuit Assessment: Fibrin  Fibrin Location: Connectors    Arterial ECMO Cannula: 17 Fr in the Right Femoral Artery  Venous ECMO Cannula: 29 Fr in the Right Femoral Vein  DPC: 7 Fr in the RFSA    ECMO Cannula Venous Left femoral vein-Site Assessment: WDL, Sutured, Secure  ECMO Cannula Arterial Left femoral artery-Site Assessment: WDL, Sutured, Secure  Distal Perfusion Catheter Left femoral artery-Site Assessment: WDL, Sutured, Secure  ECMO Cannula Venous Left femoral vein-Site Intervention: No intervention needed  ECMO Cannula Arterial Left femoral artery-Site Intervention: No intervention needed  Distal Perfusion Catheter Left femoral artery-Site Intervention: No intervention needed    Patient remains on V-A ECMO, all equipment is functioning and alarms are appropriately set. RPM's: 3200 with Blood Flow (Circuit) LPM  Avg: 3.4 LPM  Min: 3.32 LPM  Max: 3.51 LPM L/min. Sweep is at 2 LPM and (S) 80 %. Extremities are warm.     Significant Shift Events: None    Vent settings:  Vent Mode: PCV PLUS  (Pressure Control Ventilation Plus (added secondary Mode)  FiO2 (%): 60 %  Resp Rate (Set): 15 breaths/min  PEEP (cm H2O): 14 cmH2O  Inspiratory Pressure (cm H2O) (Drager Nena): 30  Resp: 16      Anticoagulation:  Dose (units/hr) HEParin: 1200 Units/hr  Rate (mL/hr) HEParin: 12 mL/hr  Concentration HEParin: 100 Units/mL       Most recent: ACT  (seconds): 162 seconds    Urine output is Light / Pale.  Blood loss was small. Product given included 1 unit of PRBCs.     Intake/Output Summary (Last 24 hours) at 3/6/2024 1715  Last data filed at 3/6/2024 1700  Gross per 24 hour   Intake 5343.29 ml   Output 7150 ml   Net -1806.71 ml       Labs:  Recent Labs   Lab 03/06/24  1528 03/06/24  1409 03/06/24  1144 03/06/24  0959   PH 7.48*  7.46* 7.48* 7.45   PCO2 44 47* 43 48*   PO2 92 60* 60* 95   HCO3 33* 33* 32* 33*   O2PER 60  90  60 60 60 80       Lab Results   Component Value Date    HGB 7.4 (L) 03/06/2024    PHGB 80 (H) 03/06/2024     (L) 03/06/2024    FIBR 369 03/04/2024    INR 1.07 03/06/2024    PTT 59 (H) 03/06/2024    DD 2.45 (H) 03/04/2024    ANTCH 89 03/06/2024       Plan is to remain on VA ECMO support.    Bhumika Mejía, RT  ECMO Specialist  3/6/2024 5:15 PM

## 2024-03-06 NOTE — PROGRESS NOTES
Cardiology ICU Progress Note    Brief HPI:  Silvano Motta is a 58 year old male being admitted to the CICU on 2/4/2024 s/p peripheral VA-ECMO cannulation s/p cardiac arrest. 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 Admitted 1/25 to OSH for AHRF and transferred to [facility] 2/4 for PH therapy managed.  Patient not a lung transplant candidate at [facility] or [facility].      Subjective and Interval:  - Slightly improved oxygenation, decreased sedation needs  - Remains critically ill in the ICU  - Family meeting with decision to continue supportive care and re evaluate clinical progress Friday    Assessment and plan by system:   Today's changes:  - Wean FiO2 for Spo2 > 88%  - Repeat diuresis for FBG (-) 1-2 L  - Increase FWF  - Discuss utility of steroids with pulmonary team  - Increase Lantus dosing     Neurology   # Concern for anoxic brain injury    # Hx TBI 2020  # Hx Sz disorder-Todds paralysis  CT head 2/23: no intracranial pathology  - Intubated, sedated. Avoiding hyperthermia    Current sedation:  RASS (Davidson Agitation-Sedation Scale): -2-->light sedation  Dose (mg/hr) Midazolam: 3 mg/hr, Dose (mcg/hr) Fentanyl: 100 mcg/hr, and Dose (mcg/kg/min) Propofol: 30 mcg/kg/min    Plan:  - Continue PTA Keppra, VIMPAT, and gabapentin  - Continue home ASA  - Continue sedation with a RASS goal -1 to -2  - Spontaneous awakening trial as tolerated  - permissive hypercapnea and hypernatremia for neuroprotection     Cardiovascular  # PEA/VT Cardiac Arrest s/p peripheral VA ECMO Cannulation 2/23/24   # Cardiogenic shock requiring VA ECMO  Peripheral V-A ECMO inserted via LCFA and LCFV 17 Fr arterial cannula, 29 Fr venous annula, DPC 7 Fr LSFA  Angiogram: RHC, Normal biventricular filling pressures.   Mild pre-capillary pulmonary  hypertension  Low-normal cardiac output and index.  TTE:  VA ECMO at 4.1 LPM. LVEF 60-65%. The right ventricle is normal size.  Global right ventricular function is mildly reduced.  No pericardial effusion is present.    ECG Rhythm: Sinus tachycardia    ECMO:  Mode: V-A   Pump Type: Cardiohelp  RPM's: 3200  Blood Flow (Circuit) LPM: 3.41 LPM  Sweep LPM: 2 LPM  Sweep FiO2   %: 100 %  Venous Pressure  mmHg: -12 mmHg  Arterial Pressure  mmH mmHg  Internal Pressure mmH mmHg  Pressure Change mmH mmHg    Current Pressors/inotropes/antiarrythmic:    None      Hemodynamics:  Art Line  Arterial Line BP: 103/48  Arterial Line MAP (mmHg): 64 mmHg  Arterial Line Location: Right radial  Art Line Wave Form: Appropriate wave forms    Plan:  - Continue ASA 81mg   - Hold lipitor for now given shock liver  - Telemetry monitoring  - Continue trending troponin  - Continue ECMO support   - Lasix 80 TID, 5 metolazone  - FBG neg 1-2L     Pulmonary  # Acute on chronic hypoxemic respiratory failure requiring intubation  # Shunt physiology, positive shunt study 24  # Mild pulmonary hypertension  # Severe ROSALIO on PAP at night  # Probable aspiration pneumonia  Per pulmonology documentation: Hx of chronic hypoxemia thought due to hypoventilation and untreated sleep apnea since Oct 2023 with extensive workup including multiple CT Angio studies, NM Perfusion studies, RHC in Oct 2023 and again this hospitalization showing mild pulmonary hypertension (mean PA 25, PCWP 10). Imaging with stable pleural based HARMONY nodule and subpleural reticulation and thickening of interlobular septi present since . Repeated CT scans without embolus or AVM. Does have genetic sequencing in process. Thus far auto-immune workup negative (SSA, SSB, Scleroderma, Clara 1, RF, CCP, CRP, Aldolase, HP panels, ANCA, IgG) except for positive ROSAMARIA (speckled 1:160). In Oct also had negative workup (C3 and C4, anti-centromere, anti-Smith, anti-RNP, anti-SSA,  anti-SSB, Scl-70, anti-cardiolipin, anti-CCP). Concern for shunt physiology with highly elevated A-a gradient, multiple TTEs and EDWIGE with positive late bubble, hypoxemia and ongoing dependence on high flow oxygen that seems out of proportion to degree of pulmonary hypertension and fibrotic changes. NM MAA shunt study 2/9 confirms shunt physiology. Based on bubble studies, presumably extracardiac shunt, potential diagnosis includes DMVPAVM which is very rare but would explain lack of macrovascular abnormalities. In discussions with radiology, and abnormal NM shunt study perfusion to upper lung fields, will pursue CTPE dual energy to better delineate vasculature and perfusion. Additionally, will obtain CT abdomen/pelvis for any extrathoracic abnormality that could be contributing to shunt. Not a transplant candidate per [facility] or [place].   CXR:   Per my interpretation stable, support devices in appropriate position     Vent Settings:   Vent Mode: PCV PLUS  (Pressure Control Ventilation Plus (added secondary Mode)  FiO2 (%): 80 %  Resp Rate (Set): 15 breaths/min  PEEP (cm H2O): 14 cmH2O  Inspiratory Pressure (cm H2O) (Drager Nena): 30  Resp: 22    ABG:   Recent Labs   Lab 03/06/24  0800 03/06/24  0548 03/06/24  0408   PH 7.47* 7.49* 7.48*   PCO2 45 45 45   PO2 80 132* 129*   HCO3 33* 34* 33*   O2PER 80 80 80  100  80     Plan:  - Wean FiO2 for SpO2 > 88%  - Discus utility of steroids with pulm   - Wean vent as able  - Daily CXR  - Serial ABGs   - Consider scheduled duonebs if signs of lung dz, currently PRN    - Peridex  - FBG and diuresis as above        Gastrointestinal, Nutrition  # Shock liver 2/2 cardiac arrest  # Hypoalbuminemia   # At risk for protein malnutrition   No known medical hx.   CAP CT  Anterior mediastinal hematoma post arrest   Diet: Per Nutriiton  BM: 3/5 liquid stool per rectal tube    Recent Labs   Lab 03/06/24  0408 03/05/24  2200 03/05/24  1531   * 160* 166*   * 164* 154*    BILITOTAL 0.5 0.5 0.5   ALBUMIN 3.2* 3.2* 3.3*   PROTTOTAL 5.2* 5.3* 5.3*   ALKPHOS 139 137 133     Plan:  - Monitor LFTs  - Bowel regimen in place-PRN  - GI Prophylaxis: PPI; Protonix 40 mg daily  - Nutrition consulted, appreciate recommendations      Renal, Electrolytes  # Acute Renal Injury   # Hypernatremia  # Lactic acidosis  Baseline CR 0.90  I/O last 3 completed shifts:  In: 5020.77 [I.V.:2545.77; NG/GT:1425]  Out: 6925 [Urine:5625; Stool:1300]    NG/OG/NJ Tube Orogastric-Flush/Free Water (mL): 100 mL    Recent Labs   Lab 24  0408 24  21024  1531 24  1010 24  0411 24  1019 24  0325   * 149*  --  150*   < > 150*   < > 146*   POTASSIUM 3.6 3.7 3.4 3.3*   < > 3.5   < > 3.7   CHLORIDE 109* 107  --  109*   < > 110*   < > 109*   CO2 31* 32*  --  32*   < > 30*   < > 29   BUN 62.0* 61.7*  --  53.8*   < > 48.1*   < > 35.1*   CR 1.22* 1.27*  --  1.13   < > 1.03   < > 0.87   PHOS 4.6*  --   --   --   --  3.0  --  2.7   MAG 2.3 2.1  --  2.1   < > 2.1   < > 2.2    < > = values in this interval not displayed.     Plan:  - Avoid nephrotoxins, renally dose meds  - Monitor urine output  - FBG and diuresis as above  - Free water flush to maintain eunatremia    Infectious Disease  # Aspiration Pneumonia  # Leukocytosis  Recent Labs   Lab 24  04024  1531   WBC 13.7* 13.7* 11.7*   Temp (24hrs), Av.8  F (37.1  C), Min:98.6  F (37  C), Max:99.1  F (37.3  C)    Cultures thus far:  Blood NGTD   Sputum MRSA   Urine NGTD   MRSA Positive   COVID negative  Antibiotics     Anti-infectives (From now, onward)      Start     Dose/Rate Route Frequency Ordered Stop    24 1100  vancomycin (VANCOCIN) 1,500 mg in D5W 250 mL intermittent infusion         1,500 mg  over 90 Minutes Intravenous EVERY 12 HOURS 24 0935      24 1030  piperacillin-tazobactam (ZOSYN) 4.5 g in D5W 100 mL intermittent infusion        Note to Pharmacy: For  "[facility], [facility] and [facility]: For Zosyn-naive patients, use the \"Zosyn initial dose + extended infusion\" order panel.    4.5 g  100 mL/hr over 1 Hours Intravenous EVERY 6 HOURS 03/05/24 0935            Plan:  - Continue abx  - Monitor for signs of infection  - Avoid fevers     Hematology  #Acute Blood Loss Anemia on Anemia of Critical Illness   # Thrombocytopenia  Hgb decreaseing. Oral bleeding, CT CAP anterior mediastinal hematoma  US LE w/ Arterial Duplex with no evidence of acute thrombus    Receiving heparin for ECMO with ACT goal 160-180  Dose (units/hr) HEParin: 1200 Units/hr  ACT  (seconds): 170 seconds    Recent Labs   Lab 03/06/24  0408 03/05/24  2200 03/05/24  1531   HGB 7.5* 6.9* 7.6*   HCT 21.7* 20.5* 21.3*   PLT 92* 97* 79*     Recent Labs   Lab 03/06/24  0408 03/05/24  2200 03/05/24  1531   INR 1.07 1.09 1.06   PTT 61* 64* 74*       Plan:  - DVT PPX: Heparin with ACT goal as above  - PPI BID  - Transfusion parameters:   - 1u PRBC for hbg < 7.0   - 1u platelet for plt < 50   - 1u FFP for INR > 3   - 5u cryoprecipitate for fibrinogen <150     Endocrinology  # Hyperglycemia   # Type 1 DM  # A1C 10.0  # Hypothyroidism    Recent Labs   Lab 03/06/24  0815 03/06/24  0552 03/06/24  0413 03/06/24  0408 03/06/24  0209   * 146* 143* 150* 136*     Dose (units/hr) Insulin: 4 Units/hr    Plan  - insulin gtt as needed  - Lantus to 40 units BID  - Endocrine consulted   - Continue levothyroxine 150mcg    Integumentary:  - No skin issues    Lines/Tubes/Drains:  PIV left lower fore arm 2/10  CVC TL RFV 2/23  RR arterial line 2/23  ECMO L femoral vein 29 FR 2/23   ECMO L femoral artery 17 FR 2/23  Distal perfusion catheter LSFA 2/23  Nicole 2/23  OG 2/24  Rectal tube with balloon 3/5     ICU  Feeding: TF working towards goal  Analgesia: Fentanyl  Sedation: propofol and midazolam  Thrombopx: Heparin infusion   Head of bed: reverse trend   Ulcers: PPI  Glucose: Insulin infusion  SBT: not ready  B: liquid stool with rectal " "tube  I: still needs urinary catheter for accurate I/Os  De-escalate: continue abx    Family will be updated by team    Patient seen, discussed, and plan developed with staff physician, VASQUEZ Ho CNP  [facility]  ICU Cardiology-CICU Service  Send message or 10 digit call back number Securely via AM[employer] with the [employer] Web Console (learn more here)    Objective:  Most recent vital signs:  /64   Pulse (!) 123   Temp 98.8  F (37.1  C)   Resp 22   Ht 1.778 m (5' 10\")   Wt 110.7 kg (244 lb 0.8 oz)   SpO2 100%   BMI 35.02 kg/m    Temp:  [98.6  F (37  C)-99.1  F (37.3  C)] 98.8  F (37.1  C)  Pulse:  [0-137] 123  Resp:  [16-22] 22  MAP:  [60 mmHg-92 mmHg] 64 mmHg  Arterial Line BP: ()/(44-69) 103/48  FiO2 (%):  [80 %-100 %] 80 %  SpO2:  [87 %-100 %] 100 %    Physical exam:  General: In bed, in NAD  HEENT: PERRL, no scleral icterus or injection  CARDIAC: RRR, no m/r/g appreciated. Peripheral pulses dopplered  RESP: Mechanical ventilation; L lung diminished with exp wheezing  GI: soft, rounded, BS hypoactive  : Nicole, good UO  EXTREMITIES: 3+ LE edema, pulses 2+. Femoral access site oozing decreased. Dressing changed last night   SKIN: No acute lesions appreciated  NEURO: Intubated and sedated, sedation holiday as tolerates. Opens eyes, nods to questions when lightened    All imaging/results reviewed by me.         "

## 2024-03-06 NOTE — PROGRESS NOTES
[facility]    ECLS Shift Summary:     ECMO Equipment:  Console Serial Number: [id number]  Circuit Lot Number: [id number]  Oxygenator Lot Number: [id number]    Circuit Assessment: Fibrin  Fibrin Location: connectors    Arterial ECMO Cannula: 17 Fr in the Left Femoral Artery  Venous ECMO Cannula: 29 Fr in the Left Femoral Vein  ECMO Cannula Venous Left femoral vein-Site Assessment: WDL, Sutured, Secure  ECMO Cannula Arterial Left femoral artery-Site Assessment: WDL, Sutured, Secure  Distal Perfusion Catheter Left femoral artery-Site Assessment: WDL, Sutured, Secure  ECMO Cannula Venous Left femoral vein-Site Intervention: No intervention needed  ECMO Cannula Arterial Left femoral artery-Site Intervention: No intervention needed  Distal Perfusion Catheter Left femoral artery-Site Intervention: No intervention needed    Patient remains on V-A ECMO, all equipment is functioning and alarms are appropriately set. RPM's: 3200 with Blood Flow (Circuit) LPM  Avg: 3.5 LPM  Min: 3.41 LPM  Max: 3.51 LPM L/min. Sweep is at 2 LPM and 100 %. Extremities are warm to touch and perfused.     Significant Shift Events: one unit of RBCs given     Vent settings:  Vent Mode: PCV PLUS  (Pressure Control Ventilation Plus (added secondary Mode)  FiO2 (%): 80 %  Resp Rate (Set): 15 breaths/min  PEEP (cm H2O): 14 cmH2O  Inspiratory Pressure (cm H2O) (Drager Nena): 30  Resp: 22      Anticoagulation:  Dose (units/hr) HEParin: 1200 Units/hr  Rate (mL/hr) HEParin: 12 mL/hr  Concentration HEParin: 100 Units/mL     Dose (mg/kg/hr) Bivalirudin: 0 mg/kg/hr  Rate (mL/hr) Bivalirudin: 0 mL/hr  Concentration Bivalirudin: 1 mg/mL  Most recent: ACT  (seconds): 162 seconds    Urine output is Light / Pale.  No outward blood loss was observed. Product given included one unit of RBCs.     Intake/Output Summary (Last 24 hours) at 3/6/2024 0615  Last data filed at 3/6/2024 0600  Gross per 24 hour   Intake 5020.77 ml    Output 6925 ml   Net -1904.23 ml       Labs:  Recent Labs   Lab 03/06/24  0548 03/06/24  0408 03/06/24  0207 03/05/24  2354   PH 7.49* 7.48* 7.48* 7.48*   PCO2 45 45 44 44   PO2 132* 129* 112* 85   HCO3 34* 33* 32* 33*   O2PER 80 80  100  80 80 80       Lab Results   Component Value Date    HGB 7.5 (L) 03/06/2024    PHGB 80 (H) 03/06/2024    PLT 92 (L) 03/06/2024    FIBR 369 03/04/2024    INR 1.07 03/06/2024    PTT 61 (H) 03/06/2024    DD 2.45 (H) 03/04/2024    ANTCH 89 03/05/2024       Plan is to continue VA ECMO support and adjust settings as needed.    Chance Glover, RT  ECMO Specialist  3/6/2024 6:15 AM

## 2024-03-06 NOTE — PROGRESS NOTES
Neuro: Sedated/drowsy. KNOWLES, Afebrile. Pupils equal and reactive.    CV: VA ECMO: Flow 3.5LPM, sweep 2 FIO2 100, NSR 120s-130s. MAP>65. ACT decreased to goal 160-180    Pulm: PCV+ 15/30/14/100%    GI: OGT TF at  goal. watery, maroon stool- rectal tube placed (1L stool output)    : Nicole, adequate UOP. Metolazone and Lasix 80mg  given    Skin: scattered bruises to abdomen and upper arms, blanchable redness to sacrum/coccyx    Drips: Propofol 30, Fent 100, Versed 3, Insulin 4-8    Labs:     Lines: L femoral ECMO, R femoral and R internal jugular CVC TL, L PIV x2, R rad art line, left fem venuos sheath    Family care conference was today.  Goal to wean oxygen as able. Another care conference on Friday 3/8

## 2024-03-06 NOTE — PROGRESS NOTES
ECMO Attending Progress Note  3/6/2024       Silvano Motta is a 58 year old male who was cannulated for ECMO 2024 due to refractory PEA arrest     Cannulation Site:  17 Fr in the L femoral artery  29 Fr in the L femoral vein    Interval events: remains on % FiO2 despite net negative 2L, sputum cx MRSA on vanc/z, received one unit prbc    Physical Exam:  Temp:  [98.6  F (37  C)-99.1  F (37.3  C)] 98.8  F (37.1  C)  Pulse:  [0-137] 123  Resp:  [16-22] 22  MAP:  [60 mmHg-92 mmHg] 64 mmHg  Arterial Line BP: ()/(44-69) 103/48  FiO2 (%):  [80 %-100 %] 80 %  SpO2:  [87 %-100 %] 100 %    Intake/Output Summary (Last 24 hours) at 3/6/2024 0900  Last data filed at 3/6/2024 0800  Gross per 24 hour   Intake 5011.06 ml   Output 7335 ml   Net -2323.94 ml    Vent Mode: PCV PLUS  (Pressure Control Ventilation Plus (added secondary Mode)  FiO2 (%): 80 %  Resp Rate (Set): 15 breaths/min  PEEP (cm H2O): 14 cmH2O  Inspiratory Pressure (cm H2O) (Drager Nena): 30  Resp: 22       Labs:  Recent Labs   Lab 24  0800 24  0548 24  0408 24  0207   PH 7.47* 7.49* 7.48* 7.48*   PCO2 45 45 45 44   PO2 80 132* 129* 112*   HCO3 33* 34* 33* 32*   O2PER 80 80 80  100  80 80      Recent Labs   Lab 24  0408 24  2200 24  1531 24  1010   WBC 13.7* 13.7* 11.7* 11.4*   HGB 7.5* 6.9* 7.6* 7.2*     Creatinine   Date Value Ref Range Status   2024 1.22 (H) 0.67 - 1.17 mg/dL Final   2024 1.27 (H) 0.67 - 1.17 mg/dL Final   2024 1.13 0.67 - 1.17 mg/dL Final   2024 1.01 0.67 - 1.17 mg/dL Final       Blood Flow (Circuit) LPM: 3.41 LPM  Sweep LPM: 2 LPM  Sweep FiO2   %: 100 %  ACT  (seconds): 170 seconds  Arterial Blood Temp  (degrees C): 37.1 C  Pulse Oximetry  (SpO2%): 100 %  Arterial Pressure  mmH mmHg      ECMO Issues including assessments and plan on DOS 3/6/2024:  Neuro: Sedated for mechanical ventilation and ECMO on propofol fent and versed.  No acute distress.   NIRS stable b/l  RASS goal: -1 to -2  CV: Cardiogenic shock.  Hemodynamically stable off pressors 30 mmHg pulsatilty no iabp-turn down with good LVEF limiting factor is oxygenation  Pulm: PCV- increased peep to 14 remains on high FiO2. Will re engage pulm and give ARDS steroids   FEN/Renal: Electrolytes stable w/ replacement protocols in place, Cr stable/normal, UOP- stable- diuresis   Heme: ACT goal: 160-180 Hemoglobin dropping  Minimal oozing around the ECMO cannulas.  ID: started zosyn & vanco given worsening wbc & sputum -MRSA again growing will treat for 10 d   Cannulae: Position is acceptable on exam and the available imaging.  Distal perfusion cannula is in place and patent.  Extremities are well-perfused.     I have personally reviewed the ECMO flows, oxygenation and CO2 clearance, anticoagulation, and cannula position.  I have also personally assessed the patient's systemic response with hemodynamics, oxygenation, ventilation, and bleeding.       The patient requires continued ECMO support and management in the ICU.  Liseth Jenkins MD  Cardiology critical care  Pager

## 2024-03-06 NOTE — PROGRESS NOTES
Neuro: Sedated/drowsy. KNOWLES, Afebrile. Pupils equal and reactive.    CV: VA ECMO: Flow 3.5LPM, sweep 2 FIO2 100, NSR 120s-130s. MAP>65. ACT decreased to goal 160-180    Pulm: PCV+ 15/30/14/80%   Flolan @ 20    GI: OGT TF at  goal of 30, 50q2 FWF. watery, maroon/black stool- rectal tube in place    : Nicole, output robust after lasix    Intake/Output Summary (Last 24 hours) at 3/6/2024 0612  Last data filed at 3/6/2024 0600  Gross per 24 hour   Intake 5020.77 ml   Output 6925 ml   Net -1904.23 ml      Skin: scattered bruises to abdomen and upper arms, blanchable redness to sacrum/coccyx    Drips: Propofol 30, Fent 100, Versed 3, Insulin 4-8, Heparin 1200,     Labs: K replaced x3, 1 unit RBC given    Lines: L femoral ECMO, R internal jugular CVC TL, L PIV x2, R rad art line, left fem venuos sheath        Documenting RN assumed care of this patient from 3/5 [id number]    For vital signs, drip titrations, and complete assessments, please see documentation flowsheets; assessments charted by exception.  All ICU standards of care were followed.
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
## 2024-03-06 NOTE — PHARMACY-VANCOMYCIN DOSING SERVICE
Pharmacy Vancomycin Note  Date of Service 2024  Patient's  1965   58 year old, male    Indication: MRSA Pneumonia  Day of Therapy: 3  Current vancomycin regimen:  1500 mg IV q12h  Current vancomycin monitoring method: AUC  Current vancomycin therapeutic monitoring goal: 400-600 mg*h/L    InsightRX Prediction of Current Vancomycin Regimen  Regimen: 1500 mg IV every 12 hours.  Start time: 22:53 on 2024  Exposure target: AUC24 (range)400-600 mg/L.hr   AUC24,ss: 681 mg/L.hr  Probability of AUC24 > 400: 100 %  Ctrough,ss: 22.5 mg/L  Probability of Ctrough,ss > 20: 71 %  Probability of nephrotoxicity (Lodise STEPHANI ): 22 %    Current estimated CrCl = Estimated Creatinine Clearance: 77.9 mL/min (A) (based on SCr of 1.32 mg/dL (H)).    Creatinine for last 3 days  3/3/2024:  4:22 PM Creatinine 0.92 mg/dL; 10:13 PM Creatinine 0.93 mg/dL  3/4/2024:  3:25 AM Creatinine 0.87 mg/dL; 10:19 AM Creatinine 0.93 mg/dL;  3:57 PM Creatinine 0.97 mg/dL; 10:06 PM Creatinine 1.03 mg/dL  3/5/2024:  4:11 AM Creatinine 1.03 mg/dL; 10:10 AM Creatinine 1.01 mg/dL;  3:31 PM Creatinine 1.13 mg/dL; 10:00 PM Creatinine 1.27 mg/dL  3/6/2024:  4:08 AM Creatinine 1.22 mg/dL;  9:59 AM Creatinine 1.32 mg/dL    Recent Vancomycin Levels (past 3 days)  No results found for requested labs within last 3 days.    Vancomycin IV Administrations (past 72 hours)                     vancomycin (VANCOCIN) 1,500 mg in D5W 250 mL intermittent infusion (mg) 1,500 mg New Bag 24 1053     1,500 mg New Bag 24 2259     1,500 mg New Bag  1148    vancomycin (VANCOCIN) 1,500 mg in 0.9% NaCl 250 mL intermittent infusion (mg) 1,500 mg New Bag 24 2234    vancomycin (VANCOCIN) 2,000 mg in sodium chloride 0.9 % 250 mL intermittent infusion (mg) 2,000 mg New Bag 24 1116                    Nephrotoxins and other renal medications (From now, onward)      Start     Dose/Rate Route Frequency Ordered Stop    24 1200  furosemide  "(LASIX) injection 80 mg         80 mg  over 1-3 Minutes Intravenous 3 TIMES DAILY (Diuretics and Nitrates) 03/05/24 0934      03/05/24 1100  vancomycin (VANCOCIN) 1,500 mg in D5W 250 mL intermittent infusion         1,500 mg  over 90 Minutes Intravenous EVERY 12 HOURS 03/05/24 0935      03/05/24 1030  piperacillin-tazobactam (ZOSYN) 4.5 g in D5W 100 mL intermittent infusion        Note to Pharmacy: For [facility], [facility] and [facility]: For Zosyn-naive patients, use the \"Zosyn initial dose + extended infusion\" order panel.    4.5 g  100 mL/hr over 1 Hours Intravenous EVERY 6 HOURS 03/05/24 0935                 Contrast Orders - past 72 hours (72h ago, onward)      None            Interpretation of levels and current regimen:  Vancomycin level is 575 but is projected to be > 600 if remaining on this regimen    Has serum creatinine changed greater than 50% in last 72 hours: No  Urine output:  good urine output  Renal Function: Stable    InsightRX Prediction of Planned New Vancomycin Regimen  Regimen: 1250 mg IV every 12 hours.  Start time: 22:53 on 03/06/2024  Exposure target: AUC24 (range)400-600 mg/L.hr   AUC24,ss: 572 mg/L.hr  Probability of AUC24 > 400: 99 %  Ctrough,ss: 18.9 mg/L  Probability of Ctrough,ss > 20: 40 %  Probability of nephrotoxicity (Lodise STEPHANI 2009): 15 %      Plan:  Decrease Dose to 1250 mg IV q12h  Vancomycin monitoring method: AUC  Vancomycin therapeutic monitoring goal: 400-600 mg*h/L  Pharmacy will check vancomycin levels as appropriate in 1-3 Days.  Serum creatinine levels will be ordered daily for the first week of therapy and at least twice weekly for subsequent weeks.      Bela Pagan, PharmD, BCPS      "

## 2024-03-06 NOTE — PROGRESS NOTES
Palliative Care Consultation Note  [facility]      Patient: Silvano Motta  Date of Admission:  2/4/2024    Requesting Clinician / Team: ICU  Reason for consult: Goals of care       Recommendations & Counseling     GOALS OF CARE:     - Continue current cares - Restorative with limits  - DNAR  - plan for 3/4 day time limited trial of continuing current cares. If Silvano is not showing improvement by Friday or Saturday, the family will likely transition to a comfort focused plan. If Silvano decompensates before Friday, family would like to meet to readdress plan of care and likely transition to comfort care at that time.   - next care conference Friday, 3/8, @ 1:30pm   -- Family requesting that Pulmonary update family either before this and/or present at care conference        ADVANCE CARE PLANNING:  DECISION MAKING:  No health care directive on file. Per  informed consent policy, next of kin should be involved if patient becomes unable.   In depth discussion 2/16 with patient and his sister Odalys by phone. Silvano is clear that he wants Odalys to be his healthcare agent and Odalys confirms she is willing to play this role and agrees to be named healthcare agent on his healthcare directive. Silvano also says he would want to have his sister Antonieta be his alternate healthcare agent. He specifically says he does not want his daughter or mother involved in making healthcare decisions and does not want their contact information in the chart as he does not want them burdened by calls from the hospital.   There is no POLST form on file, defer to patient and/or next of kin for decisions   Code status: DNAR      MEDICAL MANAGEMENT:   We are not actively managing symptoms at this time.     PSYCHOSOCIAL/SPIRITUAL SUPPORT:  Large, close knit family         Shahana Ahmadi MS4  Securely message with Chartio (more info)  Text page via [employer] Paging/Directory     This  note was initiated by Shahana Ahmadi , medical student. I personally overwrote and edited each section of this note to reflect my sole personal and direct involvement in the patient's care today.    I, Shirley Kemp, was present with the medical student who participated in the service and in the documentation of the note.  I have verified the history and personally performed the physical exam and medical decision making.  I agree with the assessment and plan of care as documented in the note and have edited the note to reflect my medical decision making.  I personally reviewed vital signs, medications, labs and imaging.     Physician Attestation:   Shirley Kemp MD / Palliative Medicine   Thank you for the opportunity to participate in the care of this patient and family.   During regular M-F work hours -- please contact team via Securely message with the Vocera Web Console (learn more here)  After regular work hours and on weekends/holidays, you can call our answering service at [phone]. Also, who's on call for us is available in [employer].   Medical Decision Making       Please see A&P for additional details of medical decision making.  MANAGEMENT DISCUSSED with the following over the past 24 hours: CICU team   NOTE(S)/MEDICAL RECORDS REVIEWED over the past 24 hours: CICU, nursing,   Tests REVIEWED in the past 24 hours:  - BMP  - CBC  SUPPLEMENTAL HISTORY, in addition to the patient's history, over the past 24 hours obtained from:   - patient's mother, 3 sisters and niece         Assessment      Silvano Motta is a 58 year old male with a past medical history of DM1 who presented to the hospital for evaluation and work up of acute hypoxic respiratory failure.      Etiology of his respiratory failure has not been found. He has undergone a battery of tests and work ups, all of which have been inconclusive.     Regardless, he has been deemed not a transplant candidate at [facility] or at  [facility]. After this was shared with Silvano, he continued to attempt to wean his own oxygen needs with no success.     On 2/23/2024, Silvano suffered a cardiac arrest while in CT and on BiPAP support. ROSC was achieved quickly but he subsequently suffered refractory PEA requiring VA ECMO cannulation.     He now remains intubated, sedated, and on VA ECMO in the ICU.     Continues to require a high level of support. Care conference held on 3/5. Plan for time limited trial and okay to switch to DNAR per family     Interval History:     Improved BP - no longer on pressors. Significant UOP.  Reduced indications of distress today per bedside LESLIE Enciso  Family at bedside in afternoon - sisters, mom and niece. In good spirits due to his improvements since yesterday (namely comfort). No significant concerns at this time. Requesting update from Pulmonary team; per primary team they are planning to start a course of steroids but may provide additional recommendations as indicated.    Coping, Meaning, & Spirituality:   Intubated and sedated - Historically, Silvano has noted that he is not very Jewish    Social:   Living situation:lives alone  Important relationships/caregivers:His sister, Odalys and daughter Viviana   Occupation: On disability since 2020 [employer]  Areas of fulfillment/catie: art, music, playing bass, [place]    Medications:  I have reviewed this patient's medication profile and medications from this hospitalization. Notable medications     ROS:  Comprehensive ROS is reviewed and is negative except as here & per HPI:     Physical Exam   Vital Signs with Ranges  Temp:  [98.4  F (36.9  C)-99.1  F (37.3  C)] 98.4  F (36.9  C)  Pulse:  [0-137] 121  Resp:  [15-22] 15  MAP:  [60 mmHg-92 mmHg] 78 mmHg  Arterial Line BP: ()/(44-69) 120/63  FiO2 (%):  [80 %-100 %] 80 %  SpO2:  [87 %-100 %] 99 %  244 lbs .79 oz    PHYSICAL EXAM:  Middle aged man, intubated, sedated and on ecmo.    Data reviewed:  Recent  Labs   Lab 03/06/24  0815 03/06/24  0552 03/06/24  0413 03/06/24  0408 03/05/24 2206 03/05/24 2200 03/05/24 2105 03/05/24 2102 03/05/24  1539 03/05/24  1531   WBC  --   --   --  13.7*  --  13.7*  --   --   --  11.7*   HGB  --   --   --  7.5*  --  6.9*  --   --   --  7.6*   MCV  --   --   --  90  --  92  --   --   --  91   PLT  --   --   --  92*  --  97*  --   --   --  79*   INR  --   --   --  1.07  --  1.09  --   --   --  1.06   NA  --   --   --  150*  --  149*  --   --   --  150*   POTASSIUM  --   --   --  3.6  --  3.7  --  3.4  --  3.3*   CHLORIDE  --   --   --  109*  --  107  --   --   --  109*   CO2  --   --   --  31*  --  32*  --   --   --  32*   BUN  --   --   --  62.0*  --  61.7*  --   --   --  53.8*   CR  --   --   --  1.22*  --  1.27*  --   --   --  1.13   ANIONGAP  --   --   --  10  --  10  --   --   --  9   DORINA  --   --   --  8.9  --  9.0  --   --   --  9.0   * 146* 143* 150*   < > 178*   < >  --    < > 129*   ALBUMIN  --   --   --  3.2*  --  3.2*  --   --   --  3.3*   PROTTOTAL  --   --   --  5.2*  --  5.3*  --   --   --  5.3*   BILITOTAL  --   --   --  0.5  --  0.5  --   --   --  0.5   ALKPHOS  --   --   --  139  --  137  --   --   --  133   ALT  --   --   --  157*  --  164*  --   --   --  154*   AST  --   --   --  145*  --  160*  --   --   --  166*    < > = values in this interval not displayed.

## 2024-03-07 PROBLEM — N17.9 ACUTE KIDNEY FAILURE, UNSPECIFIED (H): Status: ACTIVE | Noted: 2024-01-01

## 2024-03-07 NOTE — PROGRESS NOTES
Neuro: Sedated/drowsy. KNOWLES, Afebrile. Pupils equal and reactive. Follows commands    CV: VA ECMO: Flow 3.3-3.5LPM, sweep 2 FIO2 80, -130s. MAP>65. ACT decreased to goal 160-180    Pulm: PCV+ 15/30/14/40%  Flolan @ 20. Wean for sat goal >88%. Started steroids    GI: OGT TF at  goal of 30, 100 q2h FWF. Rectal tube    : Nicole, output robust     Intake/Output Summary (Last 24 hours) at 3/7/2024 0631  Last data filed at 3/7/2024 0600  Gross per 24 hour   Intake 5315.89 ml   Output 7475 ml   Net -2159.11 ml        Skin: scattered bruises to abdomen and upper arms, blanchable redness to sacrum/coccyx.  New bruise on L flank/ribs, outline marked    Drips: Propofol 20, Fent 100, Versed 3, Insulin 4-10, Heparin 1200    Labs:     Lines: L femoral ECMO, R internal jugular CVC TL, L PIV x2, R rad art line, left fem venuos sheath        Documenting RN assumed care of this patient from 3/6 [id number]  For vital signs, drip titrations, and complete assessments, please see documentation flowsheets; assessments charted by exception.  All ICU standards of care were followed.

## 2024-03-07 NOTE — PROGRESS NOTES
Palliative Care Consultation Note  [facility]      Patient: Silvano Motta  Date of Admission:  2/4/2024    Requesting Clinician / Team: ICU  Reason for consult: Goals of care       Recommendations & Counseling     GOALS OF CARE:     - Continue current cares - Restorative with limits  - DNAR  - plan for 3/4 day time limited trial of continuing current cares. If Silvano is not showing improvement by Friday or Saturday, the family will likely transition to a comfort focused plan. If Silvano decompensates before Friday, family would like to meet to readdress plan of care and likely transition to comfort care at that time.   - next care conference Friday, 3/8, @ 1:30pm   -- Family updated by Pulm: they agree that a trial of steroids and IgG is reasonable        ADVANCE CARE PLANNING:  DECISION MAKING:  No health care directive on file. Per  informed consent policy, next of kin should be involved if patient becomes unable.   In depth discussion 2/16 with patient and his sister Odalys by phone. Silvano is clear that he wants Odalys to be his healthcare agent and Odalys confirms she is willing to play this role and agrees to be named healthcare agent on his healthcare directive. Silvano also says he would want to have his sister Antonieta be his alternate healthcare agent. He specifically says he does not want his daughter or mother involved in making healthcare decisions and does not want their contact information in the chart as he does not want them burdened by calls from the hospital.   There is no POLST form on file, defer to patient and/or next of kin for decisions   Code status: DNAR      MEDICAL MANAGEMENT:   We are not actively managing symptoms at this time.     PSYCHOSOCIAL/SPIRITUAL SUPPORT:  Large, close knit family       Jamie Mcdaniels MD  Palliative care fellow    Thank you for the opportunity to continue to participate in the care of this patient and family.   Please feel free to contact on-call palliative provider with any emergent needs.  We can be reached via Securely message with the [employer] Web Console (learn more here) or Text page via [facility] Paging/Directory   Physician Attestation:   I, Shirley Kemp MD, saw this patient and agree with Dr. Leyva's findings and plan of care as documented in the note.   Shirley Kemp MD / Palliative Medicine   Medical Decision Making       MANAGEMENT DISCUSSED with the following over the past 24 hours: CICU team, bedside RN   NOTE(S)/MEDICAL RECORDS REVIEWED over the past 24 hours: CICU, nursing, pulmonary  Tests REVIEWED in the past 24 hours:  - BMP  - CBC  SUPPLEMENTAL HISTORY, in addition to the patient's history, over the past 24 hours obtained from:   - Parents  - Sibling        Assessment      Silvano Motta is a 58 year old male with a past medical history of DM1 who presented to the hospital for evaluation and work up of acute hypoxic respiratory failure. Etiology of his respiratory failure has not been found. He has undergone a battery of tests and work ups, all of which have been inconclusive.     Regardless, he has been deemed not a transplant candidate at [facility] or at [facility]. After this was shared with Silvano, he continued to attempt to wean his own oxygen needs with no success.     On 2/23/2024, Silvano suffered a cardiac arrest while in CT and on BiPAP support. ROSC was achieved quickly but he subsequently suffered refractory PEA requiring VA ECMO cannulation.     He now remains intubated, sedated, and on VA ECMO in the ICU.     Continues to require a high level of support. Care conference held on 3/5. Plan for time limited trial and okay to switch to DNAR per family     Interval History:     Seen by pulm. Trial of steroids and IgG   Plan for care conference tomorrow   Pt will need trach if he shows enough improvement to where family would want to continue cares. Family are discussing this    Coping, Meaning,  & Spirituality:   Intubated and sedated - Historically, Silvano has noted that he is not very Spiritism    Social:   Living situation:lives alone  Important relationships/caregivers:His sister, Odalys and daughter Viviana   Occupation: On disability since 2020 [employer]  Areas of fulfillment/catie: art, music, playing bass, [place]    Medications:  I have reviewed this patient's medication profile and medications from this hospitalization. Notable medications     ROS:  Comprehensive ROS is reviewed and is negative except as here & per HPI:     Physical Exam   Vital Signs with Ranges  Temp:  [98.1  F (36.7  C)-99.1  F (37.3  C)] 98.2  F (36.8  C)  Pulse:  [108-134] 112  Resp:  [15-17] 16  BP: (122)/(60) 122/60  MAP:  [63 mmHg-243 mmHg] 77 mmHg  Arterial Line BP: ()/() 107/65  FiO2 (%):  [40 %-60 %] 50 %  SpO2:  [90 %-100 %] 93 %  238 lbs 8.6 oz    PHYSICAL EXAM:  Middle aged man, intubated, sedated and on ecmo.    Data reviewed:  Recent Labs   Lab 03/07/24  1513 03/07/24  1345 03/07/24  1315 03/07/24  0945 03/07/24  0938 03/07/24  0401 03/07/24  0354 03/06/24  2158 03/06/24  2155   WBC  --   --   --   --  11.2*  --  16.0*  --  15.9*   HGB  --   --   --   --  6.8*  --  7.3*  --  7.7*   MCV  --   --   --   --  91  --  92  --  92   PLT  --   --   --   --  103*  --  125*  --  111*   INR  --   --   --   --  1.10  --  1.07  --  1.06   NA  --   --   --   --  146*  --  150*  --  149*   POTASSIUM  --   --   --   --  3.8  --  4.1  --  4.6   CHLORIDE  --   --   --   --  104  --  106  --  105   CO2  --   --   --   --  29  --  30*  --  30*   BUN  --   --   --   --  92.1*  --  87.0*  --  81.4*   CR  --   --   --   --  1.62*  --  1.52*  --  1.60*   ANIONGAP  --   --   --   --  13  --  14  --  14   DORINA  --   --   --   --  8.9  --  9.2  --  9.3   * 177* 184*   < > 208*   < > 133*   < > 185*   ALBUMIN  --   --   --   --  3.1*  --  3.4*  --  3.3*   PROTTOTAL  --   --   --   --  5.4*  --  5.7*  --  5.7*    BILITOTAL  --   --   --   --  0.4  --  0.4  --  0.5   ALKPHOS  --   --   --   --  144  --  166*  --  159*   ALT  --   --   --   --  141*  --  171*  --  160*   AST  --   --   --   --  110*  --  145*  --  142*    < > = values in this interval not displayed.

## 2024-03-07 NOTE — CONSULTS
[facility] Surgical ICU Consultation    Silvano Motta MRN# [id number]   Age: 58 year old YOB: 1965     Date of Admission:  2/4/2024    Date of Consult:   2/04/24    Reason for consult: Tracheostomy placement assessment        Requesting service: Cardiology ICU; requesting provider: Milli Barnes APRN CNP                   Assessment and Plan:   Assessment:    Mr. Silvano Motta is a 57 yo male with PMHx of hypothyroidism, DM1, seizure disorder, ROSALIO, and TBI, who was admitted on 02/04 for evaluation of pulmonary HTN in the setting of chronic hypoxemic respiratory failure of unclear etiology. On 2/23, patient coded due to PEA arrest and underwent CPR  with ROSC after 5 mins and was intubated and transferred to the ICU and  placed on VA ECMO.  Patient currently DNAR, off pressors, on VA ECMO with sweep of 2, FiO2 80% with flow of 3.3-3.5 lpm with heparin drip at 1200 and on mechanical ventilation on PCV with PEEP 14; sedated, last ABG pH 7.47, HCO2 30, pO2 low at 62, pCO2 41, Hb 7.3, thrombocytopenia (plt 125). Patient to undergo care conference on Friday 3/8\.    Plan:    - We will see patient again tomorrow after the care conference between family members and primary team to check the up-to-date plan of care   - Please call with further question of concerns       Patient discussed with chief fellow, Dr. Vera, and staff, Dr. Simpson.    Panchito Constantino MD  PGY2  Dept. of Surgery          Chief Complaint:              History of Present Illness:   Mr. 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 On 2/23, patient coded due to PEA arrest while in the CT and underwent CPR  with ROSC after 5 mins and was intubated and transferred to  the ICU and peripherally cannulated on VA ECMO on the same day. Shunt study was positive with high A-a gradient, cultures positive for MRSA currently on vancomycin and Zosyn.  Per chart, patient is not a candidate for transplant at [facility] nor [place].     Patient currently DNAR, off pressors, on VA ECMO with sweep of 2, FiO2 80% with flow of 3.3-3.5 lpm with heparin drip at 1200 and on mechanical ventilation on PCV with PEEP 14; insulin gtt, Veletri @ 20, sedated with propofol 30, fentanyl 100, versed 1 and today was started on steroids for ARDS per Pulmonology recommendations.  VS patient tachycardic, BP WNL, MAPs above 65. Labs relevant for Na 146, Cr 1.62, LFT with , , normal bilirubin, LA 2.0, troponin 52, last ABG pH 7.47, HCO2 30, pO2 low at 62, pCO2 41, oxyhemoglobin low at 91; leukocytosis with WBC 16, anemia (Hb 7.3 from 7.7), thrombocytopenia (plt 125), ACT of 170.    Per chart, patient to undergo care conference on Friday 3/8 with possibility of comfort cares. We talked with family members, including daughter and sister, on bedside and they mentioned this discussions still ongoing.             Past Medical History:   History reviewed. No pertinent past medical history.         Past Surgical History:     Past Surgical History:   Procedure Laterality Date    CV EXTRACORPERAL MEMBRANE OXYGENATION N/A 2/23/2024    Procedure: Extracorporeal Membrance Oxygenation;  Surgeon: Robson Mohr MD;  [facility]    CV PULMONARY ANGIOGRAM N/A 3/2/2024    Procedure: Pulmonary Angiogram;  Surgeon: Kody Hall MD;  Location:  [facility] CARDIAC CATH LAB    CV RIGHT HEART CATH MEASUREMENTS RECORDED N/A 2/7/2024    Procedure: Right Heart Catheterization;  Surgeon: Venkatesh Kaufman MD;  Location:  HEART CARDIAC CATH LAB    CV RIGHT HEART CATH MEASUREMENTS RECORDED N/A 2/27/2024    Procedure: Right Heart Catheterization-Shunt run;  Surgeon: Kavin Saleh MD;   Location:  HEART CARDIAC CATH LAB    CV RIGHT HEART CATH PULMONARY VASODILATOR STUDY N/A 2/7/2024    Procedure: Right Heart Cath Pulmonary Vasodilator Study;  Surgeon: Venkatesh Kaufman MD;  Location:  HEART CARDIAC CATH LAB               Social History:     Social History     Tobacco Use    Smoking status: Former     Types: Cigarettes    Smokeless tobacco: Never   Substance Use Topics    Alcohol use: Yes     Alcohol/week: 2.0 - 4.0 standard drinks of alcohol     Types: 2 - 4 Standard drinks or equivalent per week             Family History:   No family history on file.          Allergies:     Allergies   Allergen Reactions    Fluoxetine Other (See Comments)     PN: Made him feel more depressed.  Worsening of depression  PN: Made him feel more depressed.      Metformin      Other reaction(s): Seizures    Carbamazepine Other (See Comments)     PN: diffuse rash  PN: diffuse rash      Other Environmental Allergy Unknown     dut             Medications:     Current Facility-Administered Medications   Medication    acetaminophen (TYLENOL) Suppository 650 mg    acetaminophen (TYLENOL) tablet 650 mg    acetaZOLAMIDE (DIAMOX) tablet 500 mg    albuterol (PROVENTIL) neb solution 2.5 mg    albuterol (PROVENTIL) neb solution 2.5 mg    alum & mag hydroxide-simethicone (MAALOX) suspension 30 mL    artificial tears ophthalmic ointment    aspirin (ASA) chewable tablet 81 mg    calcium chloride 1 g in sodium chloride 0.9 % 100 mL intermittent infusion    chlorhexidine (PERIDEX) 0.12 % solution 15 mL    cholecalciferol (VITAMIN D3) capsule 125 mcg    dexAMETHasone PF (DECADRON) injection 20 mg    Followed by    [START ON 3/11/2024] dexAMETHasone PF (DECADRON) injection 10 mg    dextrose 10% infusion    dextrose 10% infusion    glucose gel 15-30 g    Or    dextrose 50 % injection 25-50 mL    Or    glucagon injection 1 mg    epoprostenol (VELETRI) inhalation solution    fentaNYL (SUBLIMAZE) 50 mcg/mL bolus from pump    fentaNYL  (SUBLIMAZE) infusion    fluocinonide (LIDEX) 0.05 % ointment    fluticasone (FLONASE) 50 MCG/ACT spray 1 spray    furosemide (LASIX) injection 80 mg    gabapentin (NEURONTIN) solution 900 mg    heparin 2 unit/mL in 0.9% NaCl (for REPERFUSION CATHETER)    heparin infusion 25,000 units in D5W 250 mL ANTICOAGULANT    hydrOXYzine HCl (ATARAX) tablet 25 mg    [Held by provider] insulin aspart (NovoLOG) injection (RAPID ACTING)    [Held by provider] insulin aspart (NovoLOG) injection (RAPID ACTING)    insulin glargine (LANTUS PEN) injection 50 Units    insulin regular (MYXREDLIN) 1 unit/mL infusion    lacosamide (VIMPAT) solution 100 mg    levETIRAcetam (KEPPRA) oral solution 1,500 mg    levothyroxine (SYNTHROID/LEVOTHROID) tablet 150 mcg    propofol (DIPRIVAN) infusion    And    propofol (DIPRIVAN) bolus from bag or syringe pump    And    Medication Instruction    medication instruction    methocarbamol (ROBAXIN) tablet 1,000 mg    midazolam (VERSED) 1 mg/mL bolus from syringe/bag pump ADULT    midazolam (VERSED) 100mg/100mL NS infusion - ADULT    multivitamin, therapeutic (THERA-VIT) tablet 1 tablet    naloxone (NARCAN) injection 0.2 mg    Or    naloxone (NARCAN) injection 0.4 mg    Or    naloxone (NARCAN) injection 0.2 mg    Or    naloxone (NARCAN) injection 0.4 mg    oxyCODONE (ROXICODONE) tablet 5 mg    oxyCODONE IR (ROXICODONE) tablet 10 mg    pantoprazole (PROTONIX) IV push injection 40 mg    piperacillin-tazobactam (ZOSYN) 4.5 g in D5W 100 mL intermittent infusion    polyethylene glycol (MIRALAX) Packet 17 g    polyethylene glycol (MIRALAX) Packet 17 g    potassium chloride (KLOR-CON) Packet 40 mEq    protein modular (PROSOURCE TF20) packet 1 packet    QUEtiapine (SEROquel) tablet 100 mg    senna-docusate (SENOKOT-S/PERICOLACE) 8.6-50 MG per tablet 1 tablet    senna-docusate (SENOKOT-S/PERICOLACE) 8.6-50 MG per tablet 2 tablet    sodium chloride (NEBUSAL) 3 % neb solution 3 mL    sodium chloride (PF) 0.9% PF flush  3 mL    sodium chloride (PF) 0.9% PF flush 3 mL    sodium chloride (PF) 0.9% PF flush 3 mL    vancomycin (VANCOCIN) 1,250 mg in D5W 250 mL intermittent infusion               Review of Systems:     Complete review of systems negative except as documented in HPI          Physical Exam:   All vitals have been reviewed  Temp:  [98.1  F (36.7  C)-99.1  F (37.3  C)] 98.2  F (36.8  C)  Pulse:  [110-134] 121  Resp:  [15-17] 15  BP: (122)/(60) 122/60  MAP:  [61 mmHg-243 mmHg] 87 mmHg  Arterial Line BP: ()/() 125/70  FiO2 (%):  [40 %-60 %] 40 %  SpO2:  [91 %-100 %] 95 %    Intake/Output Summary (Last 24 hours) at 3/7/2024 1020  Last data filed at 3/7/2024 1000  Gross per 24 hour   Intake 5562.63 ml   Output 7200 ml   Net -1637.37 ml       Physical Exam:   General:  Sedated with RASS -1 to -2, obese, in no acute distress.  Neuro: Sedated   HEENT: Normocephalic, atraumatic, with EEG electrodes in place. ETT in place. Right sided internal jugular in place  Neck: No cervical lymphadenopathy. No thyromegaly. Moderate short but trachea and thyroid cartilage easely palpated  Respiratory: mechanically ventilated, diminished breath sounds BL  Cardiovascular: Regular rate and rhythm. No tachycardia. Strong radial pulse  Gastrointestinal: Abdomen soft, non-distended, non-tender to palpation. No organomegaly or masses appreciated. Rectal tube in place with dark brown stool. OG tube in place  Genitourinary: No CVA tenderness.   patterson catheter in place, urine yellow and clear  MSK/Extremities: Warm, well perfused. Tips of bilateral ringer fingers with purple color in the corner of digits. No peripheral edema. Peripheral pulses intact. Bilateral PIV in place. ECMO cannula on left femoral vein and leftfemoral artery                Data:   All laboratory data reviewed    Results:  BMP  Recent Labs   Lab 03/07/24  0945 03/07/24  0904 03/07/24  0757 03/07/24  0608 03/07/24  0401 03/07/24  0354 03/06/24  2158 03/06/24  2157  03/06/24  1531 03/06/24  1528 03/06/24  1108 03/06/24  0959   NA  --   --   --   --   --  150*  --  149*  --  149*  --  151*   POTASSIUM  --   --   --   --   --  4.1  --  4.6  --  3.6  --  4.1   CHLORIDE  --   --   --   --   --  106  --  105  --  107  --  108*   CO2  --   --   --   --   --  30*  --  30*  --  31*  --  30*   BUN  --   --   --   --   --  87.0*  --  81.4*  --  73.8*  --  67.0*   CR  --   --   --   --   --  1.52*  --  1.60*  --  1.41*  --  1.32*   * 195* 161* 131*   < > 133*   < > 185*   < > 153*   < > 230*    < > = values in this interval not displayed.     CBC  Recent Labs   Lab 03/07/24  0354 03/06/24 2155 03/06/24  1528 03/06/24  0959   WBC 16.0* 15.9* 13.3* 13.4*   HGB 7.3* 7.7* 7.4* 6.9*   * 111* 101* 95*     LFT  Recent Labs   Lab 03/07/24  0354 03/06/24 2155 03/06/24  1528 03/06/24  0959   * 142* 129* 134*   * 160* 149* 149*   ALKPHOS 166* 159* 147 146   BILITOTAL 0.4 0.5 0.5 0.5   ALBUMIN 3.4* 3.3* 3.2* 3.1*   INR 1.07 1.06 1.07 1.08     Recent Labs   Lab 03/07/24  0945 03/07/24  0904 03/07/24  0757 03/07/24  0608 03/07/24  0401 03/07/24  0354   * 195* 161* 131* 130* 133*

## 2024-03-07 NOTE — PROGRESS NOTES
Neuro: Sedated/drowsy. KNOWLES, Afebrile. Pupils equal and reactive. Follows commands    CV: VA ECMO: Flow 3.5LPM, sweep 2 FIO2 80, -120s. MAP>65. ACT decreased to goal 160-180    Pulm: PCV+ 15/30/14/60%  Flolan @ 20. Wean for sat goal >88%. Started steroids    GI: OGT TF at  goal of 30, 100 q2h FWF. Rectal tube    : Nicole, output robust after lasix 80mg x3, metolazone    Skin: scattered bruises to abdomen and upper arms, blanchable redness to sacrum/coccyx    Drips: Propofol 20, Fent 100, Versed 3, Insulin 4-10, Heparin 1200    Labs: K replaced, 1 unit RBC given    Lines: L femoral ECMO, R internal jugular CVC TL, L PIV x2, R rad art line, left fem venuos sheath        Family at bedside majority of the day. Updated by nurse and CICU NPAc.

## 2024-03-07 NOTE — PROGRESS NOTES
ECMO Attending Progress Note  3/7/2024    Silvano Motta is a 58 year old male who was cannulated for ECMO 2024 due to refractory PEA arrest     Cannulation Site:  17 Fr in the L femoral artery  29 Fr in the L femoral vein    Interval events: started on steroids and diuresed ystd on 40% PEEP 14 n the vent and 80% on circuit    Physical Exam:  Temp:  [98.1  F (36.7  C)-99.1  F (37.3  C)] 98.2  F (36.8  C)  Pulse:  [109-134] 109  Resp:  [15-17] 15  BP: (122)/(60) 122/60  MAP:  [63 mmHg-243 mmHg] 74 mmHg  Arterial Line BP: ()/() 98/66  FiO2 (%):  [40 %-60 %] 40 %  SpO2:  [90 %-100 %] 90 %    Intake/Output Summary (Last 24 hours) at 3/7/2024 1337  Last data filed at 3/7/2024 1300  Gross per 24 hour   Intake 5341.51 ml   Output 7315 ml   Net -1973.49 ml    Vent Mode: PCV Plus assist  (Pressure Control Ventilation/ Assist Control)  FiO2 (%): 40 %  Resp Rate (Set): 15 breaths/min  Tidal Volume (Set, mL): 30 mL  PEEP (cm H2O): 14 cmH2O  Pressure Support (cm H2O): 18 cmH2O  Inspiratory Pressure (cm H2O) (Drager Nena): 30  Resp: 15       Labs:  Recent Labs   Lab 24  1139 24  0938 24  0745 24  0605   PH 7.46* 7.47* 7.47* 7.50*   PCO2 43 41 43 39   PO2 60* 61* 58* 71*   HCO3 31* 30* 31* 30*   O2PER 40 40 40 40      Recent Labs   Lab 24  0938 24  0354 24  2155 24  1528   WBC 11.2* 16.0* 15.9* 13.3*   HGB 6.8* 7.3* 7.7* 7.4*     Creatinine   Date Value Ref Range Status   2024 1.62 (H) 0.67 - 1.17 mg/dL Final   2024 1.52 (H) 0.67 - 1.17 mg/dL Final   2024 1.60 (H) 0.67 - 1.17 mg/dL Final   2024 1.41 (H) 0.67 - 1.17 mg/dL Final       Blood Flow (Circuit) LPM: 3.44 LPM  Sweep LPM: 2 LPM  Sweep FiO2   %: 70 %  ACT  (seconds): 170 seconds  Arterial Blood Temp  (degrees C): 36.9 C  Pulse Oximetry  (SpO2%): 98 %  Arterial Pressure  mmH mmHg      ECMO Issues including assessments and plan on DOS 3/7/2024:  Neuro: Sedated for mechanical  ventilation and ECMO on propofol fent and versed.  No acute distress.  NIRS stable b/l  RASS goal: -1 to -2  CV: Cardiogenic shock.  Hemodynamically stable off pressors 50 mmHg pulsatilty no iabp-turn down with good LVEF limiting factor is oxygenation, weaning down ecmo to uptitrate vent   Pulm: PCV- peep 14 FiO2 40% continue ards protocol steroids and net negative goal  FEN/Renal: Electrolytes stable w/ replacement protocols in place, Cr stable/normal, UOP- stable- diuresis   Heme: ACT goal: 160-180 Hemoglobin dropping  Minimal oozing around the ECMO cannulas.  ID: started zosyn & vanco given worsening wbc & sputum -MRSA again growing will treat for 14 d and finish 5d zosyn  Cannulae: Position is acceptable on exam and the available imaging.  Distal perfusion cannula is in place and patent.  Extremities are well-perfused.     I have personally reviewed the ECMO flows, oxygenation and CO2 clearance, anticoagulation, and cannula position.  I have also personally assessed the patient's systemic response with hemodynamics, oxygenation, ventilation, and bleeding.       The patient requires continued ECMO support and management in the ICU.  I have discussed patient care and treatment plan with the primary team.      Liseth Jenkins MD  Cardiology critical care  Pager

## 2024-03-07 NOTE — PROGRESS NOTES
Date: 03/07/2024    Silvano Motta is a 58-year-old male whose medical history includes severe sleep apnea, type I diabetes, seizure disorder, MDD/anxiety, pulmonary hypertension, and acute on chronic hypoxic respiratory failure.  He has been admitted since 02/04/2024, and he remains critically ill following PEA/VT cardiac arrest s/p peripheral VA ECMO cannulation on 02/23/24.    Genetic testing was initiated earlier during this admission and involved analysis of a panel of genes associated with inherited forms of interstitial lung disease / pulmonary hypertension.     Genetic test Results: Non-diagnostic    1) No definitive mutations were identified in any of the analyzed genes.  Therefore, testing did not identify a clear molecular diagnosis that would explain Silvano's symptoms.    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      Single mutations in the RTEL1 gene are associated with familial pulmonary fibrosis.  Bi-allelic mutations in RTEL1 have been associated with a severe form of dyskeratosis congenita characterized by early onset bone marrow failure, immunodeficiency, and developmental defect associated with abnormally short telomeres.    The specific RTEL1 variant identified for Silvano is not overly suspicious (leaning towards benign per [employer] database).  Additionally, Silvano has no features of bone marrow failure and no reported pulmonary fibrosis.  Therefore, this RTEL1 variant is unlikely to be a good explanation for Silvano's lung disease.    Given Silvano's current intubated status, I called and spoke with his sister Odalys (current health care agent per Epic)  to discuss these results.  She expressed understanding and had no specific questions.  Will plan to update Care Team.      Dona Wooten MS, [facility]  Licensed Genetic Counselor  [facility], [place]  Phone: [phone]

## 2024-03-07 NOTE — PROGRESS NOTES
Cardiology ICU Progress Note    Brief HPI:  Silvano Motta is a 58 year old male being admitted to the CICU on 2/4/2024 s/p peripheral VA-ECMO cannulation s/p cardiac arrest. 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 Admitted 1/25 to OSH for AHRF and transferred to [facility] 2/4 for PH therapy managed.  Patient not a lung transplant candidate at [facility] or [facility].      Subjective and Interval:  - No acute events overnight, remains critically ill in the ICU  - Able to wean down on FiO2  - Started on Dexa ARDS steroid dosing  - Immuoglobulin level sent per pulm team  - LFTs continue to up trend    Assessment and plan by system:   Today's changes:  - Wean FiO2 for Spo2 > 88%  - Repeat diuresis for FBG (-) 1-2 L  - Increase FWF  - Continue steroid dosing  - Follow up IG levels  - Increase Lantus dosing  - RUQ US  - Consult surgery to assess for tracheostomy     Neurology   # Concern for anoxic brain injury    # Hx TBI 2020  # Hx Sz disorder-Todds paralysis  CT head 2/23: no intracranial pathology  - Intubated, sedated. Avoiding hyperthermia    Current sedation:  RASS (Davidson Agitation-Sedation Scale): -1-->drowsy  Dose (mg/hr) Midazolam: 3 mg/hr, Dose (mcg/hr) Fentanyl: 100 mcg/hr, and Dose (mcg/kg/min) Propofol: 30 mcg/kg/min    Plan:  - Continue PTA Keppra, VIMPAT, and gabapentin  - Continue home ASA  - Continue sedation with a RASS goal -1 to -2  - Spontaneous awakening trial as tolerated  - permissive hypercapnea and hypernatremia for neuroprotection     Cardiovascular  # PEA/VT Cardiac Arrest s/p peripheral VA ECMO Cannulation 2/23/24   # Cardiogenic shock requiring VA ECMO  Peripheral V-A ECMO inserted via LCFA and LCFV 17 Fr arterial cannula, 29 Fr venous annula, DPC 7 Fr LSFA  Angiogram: RHC, Normal biventricular filling  pressures.   Mild pre-capillary pulmonary hypertension  Low-normal cardiac output and index.  TTE:  VA ECMO at 4.1 LPM. LVEF 60-65%. The right ventricle is normal size.  Global right ventricular function is mildly reduced.  No pericardial effusion is present.    ECG Rhythm: Sinus tachycardia    ECMO:  Mode: V-A   Pump Type: Cardiohelp  RPM's: 3200  Blood Flow (Circuit) LPM: 3.45 LPM  Sweep LPM: 2 LPM  Sweep FiO2   %: 80 %  Venous Pressure  mmHg: -17 mmHg  Arterial Pressure  mmH mmHg  Internal Pressure mmH mmHg  Pressure Change mmH mmHg    Current Pressors/inotropes/antiarrythmic:    None    Hemodynamics:  Art Line  Arterial Line BP: 102/55  Arterial Line MAP (mmHg): 69 mmHg  Arterial Line Location: Right radial  Art Line Wave Form: Appropriate wave forms    Plan:  - Continue ASA 81mg   - Hold lipitor for now given shock liver  - Telemetry monitoring  - Continue trending troponin  - Continue ECMO support   - Lasix 80 BID, 5 metolazone, 500 diamox  - FBG neg 1-2L     Pulmonary  # Acute on chronic hypoxemic respiratory failure requiring intubation  # Shunt physiology, positive shunt study 24  # Mild pulmonary hypertension  # Severe ROSALIO on PAP at night  # Probable aspiration pneumonia  Per pulmonology documentation: Hx of chronic hypoxemia thought due to hypoventilation and untreated sleep apnea since Oct 2023 with extensive workup including multiple CT Angio studies, NM Perfusion studies, RHC in Oct 2023 and again this hospitalization showing mild pulmonary hypertension (mean PA 25, PCWP 10). Imaging with stable pleural based HARMONY nodule and subpleural reticulation and thickening of interlobular septi present since . Repeated CT scans without embolus or AVM. Does have genetic sequencing in process. Thus far auto-immune workup negative (SSA, SSB, Scleroderma, Clara 1, RF, CCP, CRP, Aldolase, HP panels, ANCA, IgG) except for positive ROSAMARIA (speckled 1:160). In Oct also had negative workup (C3 and C4,  anti-centromere, anti-Smith, anti-RNP, anti-SSA, anti-SSB, Scl-70, anti-cardiolipin, anti-CCP). Concern for shunt physiology with highly elevated A-a gradient, multiple TTEs and EDWIGE with positive late bubble, hypoxemia and ongoing dependence on high flow oxygen that seems out of proportion to degree of pulmonary hypertension and fibrotic changes. NM MAA shunt study 2/9 confirms shunt physiology. Based on bubble studies, presumably extracardiac shunt, potential diagnosis includes DMVPAVM which is very rare but would explain lack of macrovascular abnormalities. In discussions with radiology, and abnormal NM shunt study perfusion to upper lung fields, will pursue CTPE dual energy to better delineate vasculature and perfusion. Additionally, will obtain CT abdomen/pelvis for any extrathoracic abnormality that could be contributing to shunt. Not a transplant candidate per [facility] or [facility].   CXR:   Per my interpretation stable, support devices in appropriate position     Vent Settings:   Vent Mode: PCV Plus assist  (Pressure Control Ventilation/ Assist Control)  FiO2 (%): 40 %  Resp Rate (Set): 15 breaths/min  PEEP (cm H2O): 14 cmH2O  Inspiratory Pressure (cm H2O) (Drager Nena): 30  Resp: 17    ABG:   Recent Labs   Lab 03/07/24  0745 03/07/24  0605 03/07/24  0354   PH 7.47* 7.50* 7.48*   PCO2 43 39 42   PO2 58* 71* 65*   HCO3 31* 30* 31*   O2PER 40 40 40  80  40     Plan:  - Wean FiO2 for SpO2 > 88%  - Dexamethasone  - Follow up IG levels  - Wean vent as able  - Daily CXR  - Serial ABGs   - Consider scheduled duonebs if signs of lung dz, currently PRN    - Peridex  - FBG and diuresis as above        Gastrointestinal, Nutrition  # Shock liver 2/2 cardiac arrest  # Hypoalbuminemia   # At risk for protein malnutrition   No known medical hx.   CAP CT  Anterior mediastinal hematoma post arrest   Diet: Per Nutriiton  BM: 3/6 liquid stool per rectal tube    Recent Labs   Lab 03/07/24  0354 03/06/24  2155 03/06/24  1528   *  142* 129*   * 160* 149*   BILITOTAL 0.4 0.5 0.5   ALBUMIN 3.4* 3.3* 3.2*   PROTTOTAL 5.7* 5.7* 5.3*   ALKPHOS 166* 159* 147     Plan:  - Monitor LFTs  - RUQ US  - Bowel regimen in place-PRN  - GI Prophylaxis: PPI; Protonix 40 mg daily  - Nutrition consulted, appreciate recommendations      Renal, Electrolytes  # Acute Renal Injury   # Hypernatremia  # Lactic acidosis  Baseline CR 0.90  I/O last 3 completed shifts:  In: 5315.89 [I.V.:2175.89; NG/GT:2120]  Out: 7475 [Urine:6025; Stool:1450]    NG/OG/NJ Tube Orogastric-Flush/Free Water (mL): 100 mL    Recent Labs   Lab 24  0354 24  2155 24  1528 24  0959 24  0408 24  1010 24  0411   * 149* 149*   < > 150*   < > 150*   POTASSIUM 4.1 4.6 3.6   < > 3.6   < > 3.5   CHLORIDE 106 105 107   < > 109*   < > 110*   CO2 30* 30* 31*   < > 31*   < > 30*   BUN 87.0* 81.4* 73.8*   < > 62.0*   < > 48.1*   CR 1.52* 1.60* 1.41*   < > 1.22*   < > 1.03   PHOS 6.2*  --   --   --  4.6*  --  3.0   MAG 2.5* 2.3 2.2   < > 2.3   < > 2.1    < > = values in this interval not displayed.     Plan:  - Avoid nephrotoxins, renally dose meds  - Monitor urine output  - FBG and diuresis as above  - Free water flush to maintain eunatremia    Infectious Disease  # Aspiration Pneumonia  # Leukocytosis  Recent Labs   Lab 24  0354 24  2155 24  1528   WBC 16.0* 15.9* 13.3*   Temp (24hrs), Av.7  F (37.1  C), Min:98.1  F (36.7  C), Max:99.1  F (37.3  C)    Cultures thus far:  Blood NGTD   Sputum MRSA   Urine NGTD   MRSA Positive   COVID negative  Antibiotics     Anti-infectives (From now, onward)      Start     Dose/Rate Route Frequency Ordered Stop    24 2300  vancomycin (VANCOCIN) 1,250 mg in D5W 250 mL intermittent infusion         1,250 mg  over 90 Minutes Intravenous EVERY 12 HOURS 24 1212      24 1030  piperacillin-tazobactam (ZOSYN) 4.5 g in D5W 100 mL intermittent infusion        Note to Pharmacy: For [facility]  "and [facility]: For Zosyn-naive patients, use the \"Zosyn initial dose + extended infusion\" order panel.    4.5 g  100 mL/hr over 1 Hours Intravenous EVERY 6 HOURS 03/05/24 0935            Plan:  - Continue abx, zosyn 5 day course, vanco 14D from negative culture daate  - Monitor for signs of infection  - Avoid fevers     Hematology  #Acute Blood Loss Anemia on Anemia of Critical Illness   # Thrombocytopenia  Hgb decreaseing. Oral bleeding, CT CAP anterior mediastinal hematoma  US LE w/ Arterial Duplex with no evidence of acute thrombus    Receiving heparin for ECMO with ACT goal 160-180  Dose (units/hr) HEParin: 1200 Units/hr  ACT  (seconds): 170 seconds    Recent Labs   Lab 03/07/24  0354 03/06/24  2155 03/06/24  1528   HGB 7.3* 7.7* 7.4*   HCT 21.1* 23.3* 22.0*   * 111* 101*     Recent Labs   Lab 03/07/24  0354 03/06/24  2155 03/06/24  1528   INR 1.07 1.06 1.07   PTT 71* 64* 59*       Plan:  - DVT PPX: Heparin with ACT goal as above  - PPI BID  - Transfusion parameters:   - 1u PRBC for hbg < 7.0   - 1u platelet for plt < 50   - 1u FFP for INR > 3   - 5u cryoprecipitate for fibrinogen <150     Endocrinology  # Hyperglycemia   # Type 1 DM  # A1C 10.0  # Hypothyroidism    Recent Labs   Lab 03/07/24  0757 03/07/24  0608 03/07/24  0401 03/07/24  0354 03/07/24  0204   * 131* 130* 133* 183*     Dose (units/hr) Insulin: 4 Units/hr    Plan  - insulin gtt as needed  - Lantus to 50 units BID  - Endocrine consulted   - Continue levothyroxine 150mcg    Integumentary:  - No skin issues    Lines/Tubes/Drains:  PIV left lower fore arm 2/10  CVC TL RFV 2/23  RR arterial line 2/23  ECMO L femoral vein 29 FR 2/23   ECMO L femoral artery 17 FR 2/23  Distal perfusion catheter LSFA 2/23  Nicole 2/23  OG 2/24  Rectal tube with balloon 3/5     ICU  Feeding: TF working towards goal  Analgesia: Fentanyl  Sedation: propofol and midazolam  Thrombopx: Heparin infusion   Head of bed: reverse trend   Ulcers: PPI  Glucose: Insulin " "infusion  SBT: not ready  B: liquid stool with rectal tube  I: still needs urinary catheter for accurate I/Os  De-escalate: continue abx    Family will be updated by team    Patient seen, discussed, and plan developed with staff physician, VASQUEZ Ho CNP  [facility]  ICU Cardiology-CICU Service  Send message or 10 digit call back number Securely via [place] with the [place] Web Console (learn more here)    Objective:  Most recent vital signs:  /60   Pulse 119   Temp 98.4  F (36.9  C)   Resp 17   Ht 1.778 m (5' 10\")   Wt 108.2 kg (238 lb 8.6 oz)   SpO2 98%   BMI 34.23 kg/m    Temp:  [98.1  F (36.7  C)-99.1  F (37.3  C)] 98.4  F (36.9  C)  Pulse:  [118-134] 119  Resp:  [15-17] 17  BP: (122)/(60) 122/60  MAP:  [61 mmHg-243 mmHg] 69 mmHg  Arterial Line BP: ()/() 102/55  FiO2 (%):  [40 %-60 %] 40 %  SpO2:  [91 %-100 %] 98 %    Physical exam:  General: In bed, in NAD  HEENT: PERRL, no scleral icterus or injection  CARDIAC: RRR, no m/r/g appreciated. Peripheral pulses dopplered  RESP: Mechanical ventilation; L lung diminished with exp wheezing  GI: soft, rounded, BS hypoactive  : Nicole, good UO  EXTREMITIES: 3+ LE edema, pulses 2+. Femoral access site oozing decreased. Dressing changed last night   SKIN: No acute lesions appreciated  NEURO: Intubated and sedated, sedation holiday as tolerates. Opens eyes, nods to questions when lightened    All imaging/results reviewed by me.         "

## 2024-03-07 NOTE — PROGRESS NOTES
[facility]    ECLS Shift Summary:     ECMO Equipment:  Console Serial Number: [id number]  Circuit Lot Number: [id number]  Oxygenator Lot Number: [id number]    Circuit Assessment: Fibrin  Fibrin Location: connectors    Arterial ECMO Cannula: 17 Fr in the Left Femoral Artery  Venous ECMO Cannula: 29 Fr in the Left Femoral Vein  ECMO Cannula Venous Left femoral vein-Site Assessment: WDL, Sutured, Secure  ECMO Cannula Arterial Left femoral artery-Site Assessment: WDL, Sutured, Secure  Distal Perfusion Catheter Left femoral artery-Site Assessment: WDL, Sutured, Secure  ECMO Cannula Venous Left femoral vein-Site Intervention: No intervention needed  ECMO Cannula Arterial Left femoral artery-Site Intervention: No intervention needed  Distal Perfusion Catheter Left femoral artery-Site Intervention: No intervention needed    Patient remains on V-A ECMO, all equipment is functioning and alarms are appropriately set. RPM's: 3200 with Blood Flow (Circuit) LPM  Avg: 3.4 LPM  Min: 3.22 LPM  Max: 3.53 LPM L/min. Sweep is at 2 LPM and 80 %. Extremities are warm to touch and perfused.     Significant Shift Events:   Ventilator FiO2 decreased.    Vent settings:  Vent Mode: PCV Plus assist  (Pressure Control Ventilation/ Assist Control)  FiO2 (%): 40 %  Resp Rate (Set): 15 breaths/min  PEEP (cm H2O): 14 cmH2O  Inspiratory Pressure (cm H2O) (Drager Nena): 30  Resp: 17      Anticoagulation:  Dose (units/hr) HEParin: 1200 Units/hr  Rate (mL/hr) HEParin: 12 mL/hr  Concentration HEParin: 100 Units/mL     Dose (mg/kg/hr) Bivalirudin: 0 mg/kg/hr  Rate (mL/hr) Bivalirudin: 0 mL/hr  Concentration Bivalirudin: 1 mg/mL  Most recent: ACT  (seconds): 178 seconds    Urine output is Light / Pale.  No outward blood loss was observed. No blood product or fluid given overnight.     Intake/Output Summary (Last 24 hours) at 3/7/2024 0621  Last data filed at 3/7/2024 0600  Gross per 24 hour   Intake 5315.89 ml    Output 7475 ml   Net -2159.11 ml       Labs:  Recent Labs   Lab 03/07/24  0605 03/07/24  0354 03/07/24  0205 03/07/24  0011   PH 7.50* 7.48* 7.45 7.46*   PCO2 39 42 44 44   PO2 71* 65* 78* 69*   HCO3 30* 31* 30* 31*   O2PER 40 40  80  40 50 50       Lab Results   Component Value Date    HGB 7.3 (L) 03/07/2024    PHGB 40 (H) 03/07/2024     (L) 03/07/2024    FIBR 369 03/04/2024    INR 1.07 03/07/2024    PTT 71 (H) 03/07/2024    DD 2.45 (H) 03/04/2024    ANTCH 89 03/06/2024       Plan is to continue VA ECMO support and adjust settings as needed.    Chance Glover, RT  ECMO Specialist  3/7/2024 6:21 AM

## 2024-03-07 NOTE — PROGRESS NOTES
[facility]    ECLS Shift Summary:     ECMO Equipment:  Console Serial Number: [id number]  Circuit Lot Number: [id number]  Oxygenator Lot Number: [id number]    Circuit Assessment: Fibrin  Fibrin Location: connector    Arterial ECMO Cannula: 17 Fr in the Left Femoral Artery  Venous ECMO Cannula: 29 Fr in the Left Femoral Vein          Patient remains on V-A ECMO, all equipment is functioning and alarms are appropriately set. RPM's: 3290 with Blood Flow (Circuit) LPM  Avg: 3.5 LPM  Min: 3.44 LPM  Max: 3.56 LPM L/min. Sweep is at 2 LPM and 70 %. Extremities are warm.     Significant Shift Events: None    Vent settings:  Vent Mode: PCV Plus assist  (Pressure Control Ventilation/ Assist Control)  FiO2 (%): 50 %  Resp Rate (Set): 15 breaths/min  Tidal Volume (Set, mL): 30 mL  PEEP (cm H2O): 14 cmH2O  Inspiratory Pressure (cm H2O) (Drager Nena): 30  Resp: 15      Anticoagulation:  Dose (units/hr) HEParin: 1200 Units/hr  Rate (mL/hr) HEParin: 12 mL/hr  Concentration HEParin: 100 Units/mL     Dose (mg/kg/hr) Bivalirudin: 0 mg/kg/hr  Rate (mL/hr) Bivalirudin: 0 mL/hr  Concentration Bivalirudin: 1 mg/mL  Most recent: ACT  (seconds): 170 seconds    Urine output is  .  Blood loss was minimal. Product given included 2 units of PRBC's.     Intake/Output Summary (Last 24 hours) at 3/7/2024 1744  Last data filed at 3/7/2024 1700  Gross per 24 hour   Intake 5497.31 ml   Output 6640 ml   Net -1142.69 ml       Labs:  Recent Labs   Lab 03/07/24  1548 03/07/24  1343 03/07/24  1139 03/07/24  0938   PH 7.46* 7.44 7.46* 7.47*   PCO2 43 46* 43 41   PO2 68* 57* 60* 61*   HCO3 31* 31* 31* 30*   O2PER 50  70  50 40 40 40       Lab Results   Component Value Date    HGB 7.6 (L) 03/07/2024    PHGB 40 (H) 03/07/2024     (L) 03/07/2024    FIBR 369 03/04/2024    INR 1.06 03/07/2024    PTT 66 (H) 03/07/2024    DD 2.45 (H) 03/04/2024    ANTCH 106 03/07/2024       Plan is for care conference  tomorrow.    Neav Florentino RT  ECMO Specialist  3/7/2024 5:44 PM

## 2024-03-08 NOTE — PROGRESS NOTES
ECMO Attending Progress Note  3/8/2024    Silvano Motta is a 58 year old male who was cannulated for ECMO 2024 due to refractory PEA arrest     Cannulation Site:  17 Fr in the L femoral artery  29 Fr in the L femoral vein    Interval events: SCr rising net even    Physical Exam:  Temp:  [98.1  F (36.7  C)-98.8  F (37.1  C)] 98.1  F (36.7  C)  Pulse:  [108-129] 114  Resp:  [15-16] 15  MAP:  [62 mmHg-92 mmHg] 62 mmHg  Arterial Line BP: ()/(48-75) 87/49  FiO2 (%):  [50 %] 50 %  SpO2:  [90 %-100 %] 100 %    Intake/Output Summary (Last 24 hours) at 3/8/2024 0822  Last data filed at 3/8/2024 0800  Gross per 24 hour   Intake 6231.34 ml   Output 5380 ml   Net 851.34 ml    Vent Mode: PCV Plus assist  (Pressure Control Ventilation/ Assist Control)  FiO2 (%): 50 %  Resp Rate (Set): 15 breaths/min  Tidal Volume (Set, mL): 30 mL  PEEP (cm H2O): 14 cmH2O  Inspiratory Pressure (cm H2O) (Drager Nena): 30  Resp: 15       Labs:  Recent Labs   Lab 24  0756 24  0543 24  0300 24  0158   PH 7.44 7.42 7.43 7.42   PCO2 43 44 42 44   PO2 93 100 90 82   HCO3 29* 28 28 29*   O2PER 50 50 60  50  50 50      Recent Labs   Lab 24  0300 24  0231 24  2152 24  1548   WBC 18.8* 20.0* 14.6* 12.7*   HGB 7.7* 8.1* 7.1* 7.6*     Creatinine   Date Value Ref Range Status   2024 1.82 (H) 0.67 - 1.17 mg/dL Final   2024 1.83 (H) 0.67 - 1.17 mg/dL Final   2024 1.73 (H) 0.67 - 1.17 mg/dL Final   2024 1.63 (H) 0.67 - 1.17 mg/dL Final       Blood Flow (Circuit) LPM: 3.54 LPM  Sweep LPM: 1.5 LPM  Sweep FiO2   %: 60 %  ACT  (seconds): 151 seconds  Arterial Blood Temp  (degrees C): 36.8 C  Pulse Oximetry  (SpO2%): 100 %  Arterial Pressure  mmH mmHg      ECMO Issues including assessments and plan on DOS 3/8/2024:  Neuro: Sedated for mechanical ventilation and ECMO on propofol fent and versed.  No acute distress.  NIRS stable b/l  RASS goal: -1 to -2  CV: Cardiogenic shock.   Hemodynamically stable on Ne 50 mmHg pulsatilty no iabp-turn down   Pulm: PCV- peep 14 FiO2 50% continue ards protocol steroids and net negative goal  FEN/Renal: Electrolytes stable w/ replacement protocols in place, Cr rising, UOP- stable- diuresis   Heme: ACT goal: 160-180 Hemoglobin dropping  Minimal oozing around the ECMO cannulas.  ID: started zosyn & vanco given worsening wbc & sputum -MRSA again growing will treat for 14 d and finish 5d zosyn  Cannulae: Position is acceptable on exam and the available imaging.  Distal perfusion cannula is in place and patent.  Extremities are well-perfused.     I have personally reviewed the ECMO flows, oxygenation and CO2 clearance, anticoagulation, and cannula position.  I have also personally assessed the patient's systemic response with hemodynamics, oxygenation, ventilation, and bleeding.       The patient requires continued ECMO support and management in the ICU.     Liseth Jenkins MD  Cardiology critical care  Pager

## 2024-03-08 NOTE — PROGRESS NOTES
Palliative Care Consultation Note  [facility]      Patient: Silvano Motta  Date of Admission:  2/4/2024    Requesting Clinician / Team: ICU  Reason for consult: Goals of care       Recommendations & Counseling     GOALS OF CARE:     Continue current cares - Restorative with limits  DNAR  Per care conference 3/8, family is assessing next steps (full restorative cares including tracheostomy vs transition to comfort care) and will make their final decision before that next care conference on 3/10.  Continue all current cares for now  If he is declining Odalys ([facility]) should immediately be contacted to readdress plan of care. Per discussion on 3/8, Odalys would not pursue cardioversion for arrhythmia.   Plan for decision by family by next care conference 3/10 @ 1:30pm      ADVANCE CARE PLANNING:  DECISION MAKING:  No health care directive on file. Per  informed consent policy, next of kin should be involved if patient becomes unable.   In depth discussion 2/16 with patient and his sister Odalys by phone. Silvano is clear that he wants Odalys to be his healthcare agent and Odalys confirms she is willing to play this role and agrees to be named healthcare agent on his healthcare directive. Silvano also says he would want to have his sister Antonieta be his alternate healthcare agent. He specifically says he does not want his daughter or mother involved in making healthcare decisions and does not want their contact information in the chart as he does not want them burdened by calls from the hospital.   There is no POLST form on file, defer to patient and/or next of kin for decisions   Code status: DNAR      MEDICAL MANAGEMENT:   We are not actively managing symptoms at this time.     PSYCHOSOCIAL/SPIRITUAL SUPPORT:  Large, close knit family     Shahana Ahmadi   Medical Student  Palliative Care Service    This note was initiated by Shahana Ahmadi,  medical student. I personally overwrote and edited each section of this note to reflect my sole personal and direct involvement in the patient's care today.    I, Shirley Kemp, was present with the medical student who participated in the service and in the documentation of the note.  I have verified the history and personally performed the physical exam and medical decision making.  I agree with the assessment and plan of care as documented in the note and have edited the note to reflect my medical decision making.  I personally reviewed vital signs, medications, labs and imaging.     Physician Attestation:   Shirley Kemp MD / Palliative Medicine   Thank you for the opportunity to participate in the care of this patient and family.   During regular M-F work hours -- please contact team via Securely message with the Vocera Web Console (learn more here)  After regular work hours and on weekends/holidays, you can call our answering service at [phone]. Also, who's on call for us is available in [employer] Smart Web.     Shirley Kemp MD / Palliative Medicine   Medical Decision Making       Please see A&P for additional details of medical decision making.  MANAGEMENT DISCUSSED with the following over the past 24 hours: CICU team, bedside RN   NOTE(S)/MEDICAL RECORDS REVIEWED over the past 24 hours: Cardiology, CICU, respiratory therapy, nursing  Tests REVIEWED in the past 24 hours:  - BMP  - CBC  SUPPLEMENTAL HISTORY, in addition to the patient's history, over the past 24 hours obtained from:   - Sibling  - daughter  pt's mother, 3 sisters, ex wife           Assessment      Silvano Motta is a 58 year old male with a past medical history of DM1 who presented to the hospital for evaluation and work up of acute hypoxic respiratory failure. Etiology of his respiratory failure unclear though positive shunt study with high A-a gradient (2/9). 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    Silvano has been deemed not a transplant candidate at [facility] or at [place]. After this was shared with Silvano, he continued to attempt to wean his own oxygen needs with no success.     On 2/23/2024, Silvano suffered a cardiac arrest while in CT and on BiPAP support. ROSC was achieved quickly but he subsequently suffered refractory PEA requiring VA ECMO cannulation.     He now remains intubated, sedated, and on VA ECMO in the ICU.     Continues to require a high level of support. Switched to DNAR per family at care conference on 3/5. Per care conference 3/8, will continue full cares while family decides whether to continue full cares vs transitioning to comfort care.    Interval History  Period of hypotension overnight  Successful ECMO turndown study   Care conference today (see additional details below)  Plan for care conference Sunday at 1:30 pm     Care conference 3/8  In attendance: Silvano's mom, three sisters including Odalys, daughter Viviana and ex wife. CICU fellow Jose Carlos. Bedside RN. Floor SW. Palliative care team (Dr. Kemp and this writer). Chaplain Zamudio.    CICU fellow Jose Carlos shared with family that Silvano's condition has improved marginally from last care conference on 3/5. The successful turndown study indicates that he could come off of ECMO support. However, he continues to require high levels of ventilatory support and has been tracheally intubated for 14 days at this point. As such family could either pursue continued full cares with assessment for tracheostomy (via which patient would continue to require mechanical ventilation for weeks-months), or transition to comfort cares at this point. They voiced wanting a set deadline for themselves to make this decision which was settled as Obed 3/10 at 1:30pm.     In the meantime, should Silvano decline, particularly if he were to have an arrhythmia necessitating cardioversion Odalys ([facility]) should immediately be contacted, but would  "likely not pursue something like cardioversion. She stated that if he were to have an arrhythmia like this \"it's his body telling us it's time\". Otherwise, Odalys feels positively about his improvement but is not sure at this point pending further reflection what Silvano would want in regards to prolonged ventilation via trach.    Viviana (daughter) again reiterated that she wants to know all of the numbers and data that she can in making this decision. Additional understanding of how the Surgery team would be involved (specifically to describe risks vs benefits of tracheostomy rather than helping them to make the decision to pursue tracheostomy) was helpful. She also expressed that Silvano has been acknowledging her when she is in the room talking to him, and that yesterday he lifted his arm and waved at her when she entered the room. For this reason she feels that he is still trying to get through this illness.    Silvano's mother expresses feeling incredibly torn about what to do. She does note that prior to this hospitalization he had been placed in a care facility and strongly disliked this facility, favoring going to live independently in an apartment. For some time he has been having considerable shortness of breath that has severely limited his activity, even to lean over and pick something up for example.     Coping, Meaning, & Spirituality:   Intubated and sedated - Historically, Silvano has noted that he is not very Restorationism    Social:   Living situation:lives alone  Important relationships/caregivers:His sister, Odalys and daughter Viviana   Occupation: On disability since TBI in 2020   Areas of fulfillment/catie: art, music, playing bass, [place]    Medications:  I have reviewed this patient's medication profile and medications from this hospitalization. Notable medications     ROS:  Comprehensive ROS is reviewed and is negative except as here & per HPI:     Physical Exam   Vital " Signs with Ranges  Temp:  [97.9  F (36.6  C)-98.8  F (37.1  C)] 98.6  F (37  C)  Pulse:  [104-129] 116  Resp:  [14-32] 17  MAP:  [59 mmHg-92 mmHg] 77 mmHg  Arterial Line BP: ()/(48-75) 112/61  FiO2 (%):  [50 %] 50 %  SpO2:  [93 %-100 %] 99 %  238 lbs 1.55 oz    PHYSICAL EXAM:  Middle aged man, intubated, sedated and on ecmo.    Data reviewed:  Recent Labs   Lab 03/08/24  1534 03/08/24  1431 03/08/24  1203 03/08/24  1013 03/08/24  1005 03/08/24  0305 03/08/24  0300 03/08/24  0231 03/07/24  2154 03/07/24 2152   WBC  --   --   --   --  20.5*  --  18.8* 20.0*  --  14.6*   HGB  --   --   --   --  8.1*  --  7.7* 8.1*  --  7.1*   MCV  --   --   --   --  90  --  89 88  --  90   PLT  --   --   --   --  155  --  150 143*  --  123*   INR  --   --   --   --  1.13  --  1.05  --   --  1.04   NA  --   --   --   --  142  --  144 145  --  145   POTASSIUM  --   --   --   --  4.0  --  3.9 4.4  --  4.4   CHLORIDE  --   --   --   --  103  --  103 103  --  103   CO2  --   --   --   --  27  --  26 27  --  30*   BUN  --   --   --   --  115.0*  --  104.0* 106.0*  --  98.3*   CR  --   --   --   --  1.85*  --  1.82* 1.83*  --  1.73*   ANIONGAP  --   --   --   --  12  --  15 15  --  12   DORINA  --   --   --   --  8.7  --  8.5* 8.6  --  8.5*   * 218* 221*   < > 204*   < > 117* 109*   < > 162*   ALBUMIN  --   --   --   --  3.3*  --  3.2* 3.2*  --  3.2*   PROTTOTAL  --   --   --   --  5.6*  --  5.4* 5.4*  --  5.2*   BILITOTAL  --   --   --   --  0.5  --  0.4 0.4  --  0.4   ALKPHOS  --   --   --   --  137  --  139 142  --  133   ALT  --   --   --   --  112*  --  124* 125*  --  124*   AST  --   --   --   --  79*  --  86* 91*  --  89*    < > = values in this interval not displayed.

## 2024-03-08 NOTE — PLAN OF CARE
Goal Outcome Evaluation:      Plan of Care Reviewed With: patient, family    Overall Patient Progress: no changeOverall Patient Progress: no change    Outcome Evaluation: See RD note 3/8    Shanika Anaya, MPH, RDN, LD  4A (CVICU) ROSH[facility] Bowman [4A Clinical Dietitian]

## 2024-03-08 NOTE — CONSULTS
SPIRITUAL HEALTH SERVICES Consult Note  [facility] 4A - CVICU     I attended family conference, then visited with pt at bedside per family request, sharing a prayer with pt and family. Will continue to follow, checking in on pt/family again on Monday.     Ba Og M.Div. (Bill), [facility]  Staff   Pager [phone]      * [facility] remains available 24/7 for emergent requests/referrals, either by having the switchboard page the on-call  or by entering an ASAP/STAT consult in Epic (this will also page the on-call ). Routine Epic consults receive an initial response within 24 hours.*

## 2024-03-08 NOTE — PROGRESS NOTES
Care Management Follow Up    Length of Stay (days): 33    Expected Discharge Date:       Concerns to be Addressed: care coordination/care conferences     Patient plan of care discussed at interdisciplinary rounds: Yes    Anticipated Discharge Disposition:  (TBD pending care conference 2/17)     Anticipated Discharge Services:  (TBD pending care conference 2/17)  Anticipated Discharge DME:  (TBD pending care conference 2/17)    Patient/family educated on Medicare website which has current facility and service quality ratings:    Education Provided on the Discharge Plan:    Patient/Family in Agreement with the Plan: other (see comments)    Referrals Placed by CM/SW:    Private pay costs discussed: Not applicable    Additional Information:  Care conference held today at 1:30. In attendance were Dr. Shirley Kemp from the palliative team, CICU fellow, Unit SW, and patient's bedside nurse. Patient's daughter, mother, sister, and family attended the care conference in person.     Medical team provided medical update and answered family member's questions. Another care conference was scheduled for Sunday, 3/10 at 1:30 to discuss goals of care. SW reserved the 4A conference room and confirmed with Dr. Watson that Sunday at 1:30 would work for her. [facility] also [facility] that Sunday at 1:30 would work for them.     CATY Call, LGSW  4A & 4E ICU   Pager: [phone]  Phone: [phone]       CATY Caballero

## 2024-03-08 NOTE — PROCEDURES
ECMO [place] STUDY    Inotropes/Pressors:  Norepi 0.04 mcg/kg/mn    Vent Mode: PCV Plus assist  (Pressure Control Ventilation/ Assist Control)  FiO2 (%): 50 %  Resp Rate (Set): 15 breaths/min  Tidal Volume (Set, mL): 30 mL  PEEP (cm H2O): 14 cmH2O  Inspiratory Pressure (cm H2O) (Drager Nena): 30  Resp: 15       Flow  BP  HR O2 Sat   MVO2    3.4 107/51 (74) 110 100%  53.7%   2.5 105/56 (71) 113 100%  58.1%  1.5 105/55 (69) 113 100%  58%  1 93/51 (63) 114 100%  54%      ABG at lowest Flow:     Latest Reference Range & Units 03/08/24 09:33   pH Arterial 7.35 - 7.45  7.42   pCO2 Arterial 35 - 45 mm Hg 44   PO2 Arterial 80 - 105 mm Hg 84   Bicarbonate Arterial 21 - 28 mmol/L 29 (H)   Base Excess Art -3.0 - 3.0 mmol/L 3.5 (H)   FIO2  50   Oxyhemoglobin Arterial 92 - 100 % 95            Summary:  -- Echo pending  -- Hemodynamically stable with turndown  -- Oxygenation and ventilation pending ABG but sats remains 100%    Plan:  --The patient is ready for decannulation.    Katy Mayorga NP  Critical Care cards  March 8, 2024

## 2024-03-08 NOTE — PHARMACY-VANCOMYCIN DOSING SERVICE
Pharmacy Vancomycin Note  Date of Service 2024  Patient's  1965   58 year old, male    Indication:  MRSA Pneumonia  Day of Therapy: 4  Current vancomycin regimen:  1250 mg IV q12h  Current vancomycin monitoring method: AUC  Current vancomycin therapeutic monitoring goal: 400-600 mg*h/L    InsightRX Prediction of Current Vancomycin Regimen  Loading dose: N/A  Regimen: 1250 mg IV every 12 hours.  Start time: 11:22 on 2024  Exposure target: AUC24 (range)400-600 mg/L.hr   AUC24,ss: 911 mg/L.hr  Probability of AUC24 > 400: 100 %  Ctrough,ss: 32.8 mg/L  Probability of Ctrough,ss > 20: 100 %  Probability of nephrotoxicity (Lodise STEPHANI ): 50 %      Current estimated CrCl = Estimated Creatinine Clearance: 58.7 mL/min (A) (based on SCr of 1.73 mg/dL (H)).    Creatinine for last 3 days  3/5/2024:  4:11 AM Creatinine 1.03 mg/dL; 10:10 AM Creatinine 1.01 mg/dL;  3:31 PM Creatinine 1.13 mg/dL; 10:00 PM Creatinine 1.27 mg/dL  3/6/2024:  4:08 AM Creatinine 1.22 mg/dL;  9:59 AM Creatinine 1.32 mg/dL;  3:28 PM Creatinine 1.41 mg/dL;  9:55 PM Creatinine 1.60 mg/dL  3/7/2024:  3:54 AM Creatinine 1.52 mg/dL;  9:38 AM Creatinine 1.62 mg/dL;  3:48 PM Creatinine 1.63 mg/dL;  9:52 PM Creatinine 1.73 mg/dL    Recent Vancomycin Levels (past 3 days)  3/6/2024:  9:59 AM Vancomycin 21.2 ug/mL  3/7/2024:  9:08 PM Vancomycin 32.5 ug/mL    Vancomycin IV Administrations (past 72 hours)                     vancomycin (VANCOCIN) 1,250 mg in D5W 250 mL intermittent infusion (mg) 1,250 mg New Bag 24 2322     1,250 mg New Bag  1142     1,250 mg New Bag 24 2304    vancomycin (VANCOCIN) 1,500 mg in D5W 250 mL intermittent infusion (mg) 1,500 mg New Bag 24 1053     1,500 mg New Bag 24 2259     1,500 mg New Bag  1148                    Nephrotoxins and other renal medications (From now, onward)      Start     Dose/Rate Route Frequency Ordered Stop    24 1677  vancomycin place omer - receiving  "intermittent dosing         1 each Intravenous SEE ADMIN INSTRUCTIONS 03/07/24 2337      03/07/24 1600  furosemide (LASIX) injection 80 mg         80 mg  over 1-3 Minutes Intravenous 2 TIMES DAILY (Diuretics and Nitrates) 03/07/24 1002      03/05/24 1030  piperacillin-tazobactam (ZOSYN) 4.5 g in D5W 100 mL intermittent infusion        Note to Pharmacy: For [facility], [facility] and [facility]: For Zosyn-naive patients, use the \"Zosyn initial dose + extended infusion\" order panel.    4.5 g  100 mL/hr over 1 Hours Intravenous EVERY 6 HOURS 03/05/24 0935 03/09/24 2359               Contrast Orders - past 72 hours (72h ago, onward)      None            Interpretation of levels and current regimen:  Vancomycin level is reflective of AUC greater than 600    Has serum creatinine changed greater than 50% in last 72 hours: Yes    Urine output:  good urine output    Renal Function: Worsening    Plan:  Switch to intermittent dosing given DAREK and supratherapeutic level  Vancomycin monitoring method: Trough (Method 2 = manual dose calculation)  Vancomycin therapeutic monitoring goal: 15-20 mg/L  Pharmacy will check vancomycin levels as appropriate in 1-3 Days.  Serum creatinine levels will be ordered daily for the first week of therapy and at least twice weekly for subsequent weeks.    Blanca Sampson, [facility]    "

## 2024-03-08 NOTE — PROGRESS NOTES
Neuro: Sedated/drowsy. KNOWLES, Afebrile. Pupils equal and reactive. Follows commands    CV: VA ECMO: Flow 3.5LPM, sweep 1.5 FIO2 60, -120s. MAP>65. -180    Pulm: PCV+ 15/30/14/50%  Flolan @ 20. Wean for sat goal >88%. Continue steroids    GI: OGT TF at  goal of 30, 100 q1h FWF. Rectal tube, increasingly bloody stool    : Nicole, output robust with Lasix, Metolazone, Diamox    Intake/Output Summary (Last 24 hours) at 3/8/2024 0623  Last data filed at 3/8/2024 0600  Gross per 24 hour   Intake 6245.09 ml   Output 5500 ml   Net 745.09 ml      Skin: scattered bruises to abdomen and upper arms, blanchable redness to sacrum/coccyx.  New bruise on L flank/ribs, outline marked    Drips: Propofol 20, Fent 200, Versed 2, Insulin 4-10, Heparin 1200    Labs: RBC x1 overnight    Lines: L femoral ECMO, R internal jugular CVC TL, L PIV x2, R rad art line, left fem venuos sheath    EVENTS:  0200 pt became hypotensive w/ map <60 after turn w/ increased work of breathing, aprox 15 minutes until returned to baseline after placing in supine. 5 minutes later pt became increasingly more hypotensive than previously map <50, work of breathing increased again, ECMO specialist increased circuit Fi02 to 100% and increased flow to 4 L with no improvement, chest Xray acquired and MD did bedside echo, New pleural effusion. PT spontaneously returned to baseline.      Documenting RN assumed care of this patient from 3/7 [id number]    For vital signs, drip titrations, and complete assessments, please see documentation flowsheets; assessments charted by exception.  All ICU standards of care were followed.

## 2024-03-08 NOTE — PROGRESS NOTES
CLINICAL NUTRITION SERVICES - REASSESSMENT NOTE     Nutrition Prescription    RECOMMENDATIONS FOR MDs/PROVIDERS TO ORDER:  Total daily fluids/adjustments per MD     Malnutrition Status:    Patient does not meet two of the established criteria necessary for diagnosing malnutrition    Recommendations already ordered by Registered Dietitian (RD):  With increase in stool output, propofol rate trending down and upward BUN trends, transition back to semi-elemental formula at higher rate and slightly reduce protein provisions:   Vital High Protein at goal of  70ml/hr  (1680ml/day) to provide: 1680 kcals (20 Kcal/kg), 146 g PRO (1.7 gm/kg), 1404 ml free H20, 186 g CHO, and 0 g fiber daily.     Future/Additional Recommendations:  If pt ECMO decannulation doesn't happen and pending BUN trends, add back 1 pack ProSource TF20   Adjust TF provisions if pt again requires high-dose propofol   TwoCal HN @ 30 mL/hr (720 mL/day) +5 packs PSTF20 to provide 1840 kcals (22 kcal/kg/day), 160 g PRO (1.9 g/kg/day), 504 mL H2O, 158 g CHO and 4 g Fiber daily.   Continue to monitor nutrition-related findings and follow pt per protocol     EVALUATION OF THE PROGRESS TOWARD GOALS   Diet: NPO    Nutrition Support:   -Dosing weight: 83.7 kg  -EN access via OGT  -Regimen: TwoCal HN @ 30 mL/hr (720 mL/day) +5 packs PSTF20 to provide 1840 kcals (22 kcal/kg/day), 160 g PRO (1.9 g/kg/day), 504 mL H2O, 158 g CHO and 4 g Fiber daily.   - ml q2h (2400 mL/day) + 504 mL from TF for total free water provision of 2904 mL/day    EN Intake: Average 7-day TF intakes: 587 mL formula, 4 packets Prosource =  1494 Kcals (18 Kcal/kg), and 149 g pro (1.8 g/kg). This is meeting 89% of low-end est Kcal needs, and 87% of low-end est protein needs.   Earlier this week pt receiving high-dose propofol, providing an avg of additional ~850 kcal/d     NEW FINDINGS   Pt remained hemodynamically stable with ECMO turndown study today, will be ready for decannulation      Weights:  Most Recent Weight: 108 kg (238 lb 1.6 oz)  on 3/8/24 via Bed scale  Stable from initial bed scale wt taken on 2/11   Body mass index is 34.16 kg/m .    GI:  0-1400 ml per day of measured stool output over past week, per I/O.   Output has been >1L/d over the past 3 days   Last BM: 03/08/24, 550 mL output from rectal tube today thus far     Medications:  Propofol 13 ml/h (down from ~30 ml/h earlier in the week) [13 ml/h provides 343 kcal/day via lipid]  Veletri  Insulin drip   Levophed   Vit D3 125 mcg  Decadron   Lantus  MVI  Oxycodone  Protonix  Miralax   PSTF20 x5   Senna     Labs:  BUN consistently trending up over the past week, today is >100   Phos elevated x3days   TG elevated 288   Electrolytes  Potassium (mmol/L)   Date Value   03/08/2024 3.9   03/08/2024 4.4   03/07/2024 4.4     Potassium POCT (mmol/L)   Date Value   03/02/2024 3.8   03/02/2024 3.8   03/02/2024 3.7     Phosphorus (mg/dL)   Date Value   03/08/2024 6.6 (H)   03/07/2024 6.2 (H)   03/06/2024 4.6 (H)   03/05/2024 3.0   03/04/2024 2.7    Blood Glucose  Glucose (mg/dL)   Date Value   03/08/2024 117 (H)   03/08/2024 109 (H)   03/07/2024 162 (H)   02/25/2024 152 (H)   02/24/2024 178 (H)   02/24/2024 180 (H)   02/24/2024 171 (H)   02/24/2024 245 (H)     GLUCOSE BY METER POCT (mg/dL)   Date Value   03/08/2024 191 (H)   03/08/2024 142 (H)   03/08/2024 129 (H)   03/08/2024 162 (H)   03/08/2024 123 (H)     Hemoglobin A1C (%)   Date Value   02/23/2024 10.0 (H)   02/04/2024 10.0 (H)    Inflammatory Markers  WBC Count (10e3/uL)   Date Value   03/08/2024 20.5 (H)   03/08/2024 18.8 (H)   03/08/2024 20.0 (H)     Albumin (g/dL)   Date Value   03/08/2024 3.2 (L)   03/08/2024 3.2 (L)   03/07/2024 3.2 (L)      Magnesium (mg/dL)   Date Value   03/08/2024 2.7 (H)   03/07/2024 2.5 (H)   03/07/2024 3.2 (H)     Sodium (mmol/L)   Date Value   03/08/2024 144   03/08/2024 145   03/07/2024 145    Renal  Urea Nitrogen (mg/dL)   Date Value   03/08/2024 104.0  (H)   03/08/2024 106.0 (H)   03/07/2024 98.3 (H)     Creatinine (mg/dL)   Date Value   03/08/2024 1.82 (H)   03/08/2024 1.83 (H)   03/07/2024 1.73 (H)     Additional  Triglycerides (mg/dL)   Date Value   03/08/2024 288 (H)   03/04/2024 233 (H)   03/02/2024 236 (H)     Ketones Urine (mg/dL)   Date Value   02/23/2024 Negative     Platelet Count (10e3/uL)   Date Value   03/08/2024 155     aPTT (Seconds)   Date Value   03/08/2024 50 (H)     INR (no units)   Date Value   03/08/2024 1.05        RENAL  DAREK    RESPIRATORY  Vent      SKIN  No pressure injuries documented at this time     MALNUTRITION  % Intake: No decreased intake noted - met w/ TF   % Weight Loss: Weight loss does not meet criteria for malnutrition  -- cannot rule out confounded by fluid status  Subcutaneous Fat Loss: None observed   Muscle Loss: None observed  Fluid Accumulation/Edema: Mild  Malnutrition Diagnosis: Patient does not meet two of the established criteria necessary for diagnosing malnutrition    Previous Goals   Total avg nutritional intake to meet a minimum of 20 kcal/kg and 2 g PRO/kg daily (per dosing wt 84 kg)    Evaluation: Not met but improving since last assessment [kcal goal met, protein goal not met]     Previous Nutrition Diagnosis  Inadequate oral intake related to intubation as evidenced by NPO status and need for EN to meet nutrition needs at this time.     Evaluation: No change    CURRENT NUTRITION DIAGNOSIS  Inadequate oral intake related to intubation as evidenced by NPO status and need for EN to meet nutrition needs at this time.       INTERVENTIONS  Implementation  Enteral Nutrition - Modify composition and rate     Goals  Total avg nutritional intake to meet a minimum of 20 kcal/kg and 2 g PRO/kg daily (per dosing wt 83.7 kg).    Monitoring/Evaluation  Progress toward goals will be monitored and evaluated per protocol.    Shanika Anaya, MPH, RDN, LD  4A (CVICU) ROSHAN Bowman [4A Clinical Dietitian]   Weekend/Holiday: [place] -  Weekend Clinical Dietitian

## 2024-03-08 NOTE — PROGRESS NOTES
[facility]    ECLS Shift Summary:     ECMO Equipment:  Console Serial Number: [id number]  Circuit Lot Number: [id number]  Oxygenator Lot Number: [id number]    Circuit Assessment: Fibrin  Fibrin Location: connectors    Arterial ECMO Cannula: 17 Fr in the Left Femoral Artery  Venous ECMO Cannula: 29 Fr in the Left Femoral Vein          Patient remains on V-A ECMO, all equipment is functioning and alarms are appropriately set. RPM's: 3300 with Blood Flow (Circuit) LPM  Avg: 3.5 LPM  Min: 3.45 LPM  Max: 3.57 LPM L/min. Sweep is at 1.5 LPM and 60 %. Extremities are warm.     Significant Shift Events: Turndown done at the bedside this morning with ECHO. Care conference also done with family this afternoon.     Vent settings:  Vent Mode: PCV Plus assist  (Pressure Control Ventilation/ Assist Control)  FiO2 (%): 50 %  Resp Rate (Set): 15 breaths/min  Tidal Volume (Set, mL): 30 mL  PEEP (cm H2O): 12 cmH2O  Inspiratory Pressure (cm H2O) (Drager Nena): 28  Resp: 16      Anticoagulation:  Dose (units/hr) HEParin: 1400 Units/hr  Rate (mL/hr) HEParin: 14 mL/hr  Concentration HEParin: 100 Units/mL     Dose (mg/kg/hr) Bivalirudin: 0 mg/kg/hr  Rate (mL/hr) Bivalirudin: 0 mL/hr  Concentration Bivalirudin: 1 mg/mL  Most recent: ACT  (seconds): 166 seconds    Urine output is Yellow / Straw Colored.  Blood loss was minimal. Product given included none.     Intake/Output Summary (Last 24 hours) at 3/8/2024 1758  Last data filed at 3/8/2024 1700  Gross per 24 hour   Intake 5610.53 ml   Output 5495 ml   Net 115.53 ml       Labs:  Recent Labs   Lab 03/08/24  1632 03/08/24  1529 03/08/24  1428 03/08/24  1158 03/08/24  1005   PH  --  7.42 7.42 7.41 7.44   PCO2  --  43 42 45 41   PO2  --  97 98 91 77*   HCO3  --  28 27 28 28   O2PER 60 60  50  50 50 50 50       Lab Results   Component Value Date    HGB 7.4 (L) 03/08/2024    PHGB 90 (H) 03/08/2024     03/08/2024    FIBR 369 03/04/2024    INR  1.12 03/08/2024    PTT 59 (H) 03/08/2024    DD 2.45 (H) 03/04/2024    ANTCH 114 03/08/2024       Plan is for another care conference Sunday.    Neva Florentino RT  ECMO Specialist  3/8/2024 5:58 PM

## 2024-03-08 NOTE — PROGRESS NOTES
Neuro: Sedated/drowsy. KNOWLES, Afebrile. Pupils equal and reactive. Follows commands    CV: VA ECMO: Flow 3.5LPM, sweep 2 FIO2 50, -120s. MAP>65. -180    Pulm: PCV+ 15/30/14/50%  Flolan @ 20. Wean for sat goal >88%. Continue steroids    GI: OGT TF at  goal of 30, 200 q2h FWF. Rectal tube    : Nicole, output robust with Lasix, Metolazone, Diamox    Skin: scattered bruises to abdomen and upper arms, blanchable redness to sacrum/coccyx.  New bruise on L flank/ribs, outline marked    Drips: Propofol 20, Fent 200, Versed 2, Insulin 4-10, Heparin 1200    Labs: gave 2 units of PRBC    Lines: L femoral ECMO, R internal jugular CVC TL, L PIV x2, R rad art line, left fem venuos sheath

## 2024-03-08 NOTE — PROGRESS NOTES
Cardiology ICU Progress Note    Brief HPI:  Silvano Motta is a 58 year old male being admitted to the CICU on 2/4/2024 s/p peripheral VA-ECMO cannulation s/p cardiac arrest. 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 Admitted 1/25 to OSH for AHRF and transferred to [facility] 2/4 for PH therapy managed.  Patient not a lung transplant candidate at [facility] or [facility].      Subjective and Interval:  Remains critically ill in the ICU. Overnight, pt became hypotensive, increased work of breathing, took ~15 minutes to recover, ECMO circuit Fio2 increased to 100% and increased flows to 4LPM, new pl effusion on bedside echo, low dose pressor started. Yesterday, started on Dexa ARDS steroid dosing, Immuoglobulin level sent per pulm team, increased Lantus, consulted surgery for trach, RUQ ultrasound for transaminitis revealing no e/o cholecystitis, LFT's now stable/downtrending. LDH uptrending, lactate remains normal. WBC up from 13-->20, normal Tmax. Did not diurese well, net even yest, net +623 ml since midnight    Assessment and plan by system:   Today's changes:  - Follow up IG levels  - Formal echo to eval pericardial effusion  - Advance ETT 3 cm  - Wean Prop  - ECMO turndown  - PEEP to 14 mm hg  - Increase ACT tomorrow  - Decrease Huseyin     Neurology   # Concern for anoxic brain injury    # Hx TBI 2020  # Hx Sz disorder-Todds paralysis  CT head 2/23: no intracranial pathology  - Intubated, sedated. Avoiding hyperthermia    Current sedation:  RASS (Davidson Agitation-Sedation Scale): -2-->light sedation  Dose (mg/hr) Midazolam: 3 mg/hr, Dose (mcg/hr) Fentanyl: 200 mcg/hr, and Dose (mcg/kg/min) Propofol: 20 mcg/kg/min    Plan:  - Continue PTA Keppra, VIMPAT, and gabapentin  - Continue home ASA  - Continue sedation with a RASS goal -1 to  -2  - Spontaneous awakening trial as tolerated  - permissive hypercapnea and hypernatremia for neuroprotection     Cardiovascular  # PEA/VT Cardiac Arrest s/p peripheral VA ECMO Cannulation 24   # Cardiogenic shock requiring VA ECMO  # Possible pericardial effusion  Peripheral V-A ECMO inserted via LCFA and LCFV 17 Fr arterial cannula, 29 Fr venous annula, DPC 7 Fr LSFA  Angiogram: RHC, Normal biventricular filling pressures.   Mild pre-capillary pulmonary hypertension  Low-normal cardiac output and index.  TTE :  VA ECMO at 4.1 LPM. LVEF 60-65%. The right ventricle is normal size.  Global right ventricular function is mildly reduced.  No pericardial effusion is present.    ECG Rhythm: Sinus tachycardia    ECMO:  Mode: V-A   Pump Type: Cardiohelp  RPM's: 3290  Blood Flow (Circuit) LPM: 3.41 LPM  Sweep LPM: 1.5 LPM  Sweep FiO2   %: 60 %  Venous Pressure  mmHg: -16 mmHg  Arterial Pressure  mmH mmHg  Internal Pressure mmH mmHg  Pressure Change mmH mmHg    Current Pressors/inotropes/antiarrythmic:  Levo 0.02     Hemodynamics:  Art Line  Arterial Line BP: 102/57  Arterial Line MAP (mmHg): 72 mmHg  Arterial Line Location: Right radial  Art Line Wave Form: Appropriate wave forms    Plan:  - Continue ASA 81mg   - Hold lipitor for now given shock liver  - Telemetry monitoring  - Continue trending troponin  - Continue ECMO support   - Intermittently being diuresed. Net -30 ml yest, net + 623 ml since midnight       Pulmonary  # Acute on chronic hypoxemic respiratory failure requiring intubation  # Shunt physiology, positive shunt study 24  # Mild pulmonary hypertension  # Severe ROSALIO on PAP at night  # Probable aspiration pneumonia  Per pulmonology documentation: Hx of chronic hypoxemia thought due to hypoventilation and untreated sleep apnea since Oct 2023 with extensive workup including multiple CT Angio studies, NM Perfusion studies, RHC in Oct 2023 and again this hospitalization showing mild  pulmonary hypertension (mean PA 25, PCWP 10). Imaging with stable pleural based HARMONY nodule and subpleural reticulation and thickening of interlobular septi present since 2022. Repeated CT scans without embolus or AVM. Does have genetic sequencing in process. Thus far auto-immune workup negative (SSA, SSB, Scleroderma, Clara 1, RF, CCP, CRP, Aldolase, HP panels, ANCA, IgG) except for positive ROSAMARIA (speckled 1:160). In Oct also had negative workup (C3 and C4, anti-centromere, anti-Smith, anti-RNP, anti-SSA, anti-SSB, Scl-70, anti-cardiolipin, anti-CCP). Concern for shunt physiology with highly elevated A-a gradient, multiple TTEs and EDWIGE with positive late bubble, hypoxemia and ongoing dependence on high flow oxygen that seems out of proportion to degree of pulmonary hypertension and fibrotic changes. NM MAA shunt study 2/9 confirms shunt physiology. Based on bubble studies, presumably extracardiac shunt, potential diagnosis includes DMVPAVM which is very rare but would explain lack of macrovascular abnormalities. In discussions with radiology, and abnormal NM shunt study perfusion to upper lung fields, will pursue CTPE dual energy to better delineate vasculature and perfusion. Additionally, will obtain CT abdomen/pelvis for any extrathoracic abnormality that could be contributing to shunt. Not a transplant candidate per [facility] or [place].   - Remains on Epoprostenol @ 20    CXR:   Per my interpretation stable, support devices in appropriate position, ETT needs to be advanced     Vent Settings:   Vent Mode: PCV Plus assist  (Pressure Control Ventilation/ Assist Control)  FiO2 (%): 50 %  Resp Rate (Set): 15 breaths/min  Tidal Volume (Set, mL): 30 mL  PEEP (cm H2O): 14 cmH2O  Inspiratory Pressure (cm H2O) (Drager Nena): 30  Resp: 15    ABG:   Recent Labs   Lab 03/08/24  0543 03/08/24  0300 03/08/24  0158   PH 7.42 7.43 7.42   PCO2 44 42 44   PO2 100 90 82   HCO3 28 28 29*   O2PER 50 60  50  50 50     Plan:  - Wean FiO2 for  SpO2 > 88%  - Dexamethasone  - Follow up IG levels  - Wean vent as able  - Daily CXR  - Serial ABGs   - Consider scheduled duonebs if signs of lung dz, currently PRN    - Peridex  - FBG and diuresis as above        Gastrointestinal, Nutrition  # Shock liver 2/2 cardiac arrest  # Hypoalbuminemia   # At risk for protein malnutrition   No known medical hx.   CAP CT  Anterior mediastinal hematoma post arrest   Diet: Per Nutriiton  BM: 3/8 liquid stool per rectal tube    Recent Labs   Lab 03/08/24  0300 03/08/24  0231 03/07/24  2152   AST 86* 91* 89*   * 125* 124*   BILITOTAL 0.4 0.4 0.4   ALBUMIN 3.2* 3.2* 3.2*   PROTTOTAL 5.4* 5.4* 5.2*   ALKPHOS 139 142 133     Plan:  - Monitor LFTs  - RUQ US without abnormality  - Bowel regimen in place-PRN  - GI Prophylaxis: PPI; Protonix 40 mg daily  - Nutrition consulted, appreciate recommendations      Renal, Electrolytes  # Acute Renal Injury   # Hypernatremia  # Lactic acidosis  # hypermagnesemia  Baseline CR 0.90  I/O last 3 completed shifts:  In: 5836.46 [I.V.:2052.46; NG/GT:2660]  Out: 5865 [Urine:4365; Stool:1500]    NG/OG/NJ Tube Orogastric-Flush/Free Water (mL): 100 mL    Recent Labs   Lab 03/08/24  0300 03/08/24  0231 03/07/24  2152 03/07/24  1548 03/07/24  0938 03/07/24  0354 03/06/24  0959 03/06/24  0408    145 145 145   < > 150*   < > 150*   POTASSIUM 3.9 4.4 4.4 3.8   < > 4.1   < > 3.6   CHLORIDE 103 103 103 103   < > 106   < > 109*   CO2 26 27 30* 29   < > 30*   < > 31*   .0* 106.0* 98.3* 95.9*   < > 87.0*   < > 62.0*   CR 1.82* 1.83* 1.73* 1.63*   < > 1.52*   < > 1.22*   PHOS 6.6*  --   --   --   --  6.2*  --  4.6*   MAG 2.7*  --  2.5* 3.2*   < > 2.5*   < > 2.3    < > = values in this interval not displayed.     Plan:  - Avoid nephrotoxins, renally dose meds  - Monitor urine output  - FBG and diuresis as above  - Free water flush to maintain eunatremia    Infectious Disease  # Aspiration Pneumonia  # Leukocytosis  # MRSA pna  Recent Labs   Lab  "24  0231 24  1548   WBC 20.0* 14.6* 12.7*   Temp (24hrs), Av.4  F (36.9  C), Min:98.1  F (36.7  C), Max:98.8  F (37.1  C)   WBC up    Cultures thus far:  Blood NGTD   Sputum MRSA, GPC   Urine NGTD   MRSA Positive   COVID negative  Antibiotics     Anti-infectives (From now, onward)      Start     Dose/Rate Route Frequency Ordered Stop    24 233  vancomycin place omer - receiving intermittent dosing         1 each Intravenous SEE ADMIN INSTRUCTIONS 24 1030  piperacillin-tazobactam (ZOSYN) 4.5 g in D5W 100 mL intermittent infusion        Note to Pharmacy: For [facility], [facility] and [facility]: For Zosyn-naive patients, use the \"Zosyn initial dose + extended infusion\" order panel.    4.5 g  100 mL/hr over 1 Hours Intravenous EVERY 6 HOURS 24 1486          Plan:  - Continue abx, zosyn 5 day course, vanco 14D from negative culture daate  - Monitor for signs of infection  - Avoid fevers     Hematology  #Acute Blood Loss Anemia on Anemia of Critical Illness   # Thrombocytopenia  Hgb decreaseing. Oral bleeding, CT CAP anterior mediastinal hematoma  US LE w/ Arterial Duplex with no evidence of acute thrombus    Receiving heparin for ECMO with ACT goal 160-180  Dose (units/hr) HEParin: 1200 Units/hr  ACT  (seconds): 166 seconds    Recent Labs   Lab 24  0231 24  1548   HGB 8.1* 7.1* 7.6*   HCT 23.8* 21.5* 22.6*   * 123* 114*     Recent Labs   Lab 24  0300 24  1548   INR 1.05 1.04 1.06   PTT 50* 57* 66*       Plan:  - DVT PPX: Heparin with ACT goal as above  - PPI BID  - Transfusion parameters:   - 1u PRBC for hbg < 7.0   - 1u platelet for plt < 50   - 1u FFP for INR > 3   - 5u cryoprecipitate for fibrinogen <150     Endocrinology  # Hyperglycemia   # Type 1 DM  # A1C 10.0  # Hypothyroidism    Recent Labs   Lab 24  0637 24  0504 24  0305 24  0300 24  0231   * 162* " "123* 117* 109*     Dose (units/hr) Insulin: 4 Units/hr    Plan  - insulin gtt as needed  - Lantus to 50 units BID  - Endocrine consulted   - Continue levothyroxine 150mcg    Integumentary:  - No skin issues    Lines/Tubes/Drains:  PIV left lower fore arm 2/10  CVC TL RFV 2/23  RR arterial line 2/23  ECMO L femoral vein 29 FR 2/23   ECMO L femoral artery 17 FR 2/23  Distal perfusion catheter LSFA 2/23  Nicole 2/23  OG 2/24  Rectal tube with balloon 3/5     ICU  Feeding: TF   Analgesia: Fentanyl  Sedation: propofol and midazolam  Thrombopx: Heparin infusion   Head of bed: reverse trend   Ulcers: PPI  Glucose: Insulin infusion  SBT: not ready  B: liquid stool with rectal tube  I: still needs urinary catheter for accurate I/Os  De-escalate: continue abx    Family will be updated by team    Patient seen, discussed, and plan developed with staff physician, VASQUEZ Richey CNP  [facility]  ICU Cardiology-CICU Service  Send message or 10 digit call back number Securely via [employer] with the Vocera Web Console (learn more here)    Objective:  Most recent vital signs:  /60   Pulse 115   Temp 98.1  F (36.7  C)   Resp 15   Ht 1.778 m (5' 10\")   Wt 108 kg (238 lb 1.6 oz)   SpO2 100%   BMI 34.16 kg/m    Temp:  [98.1  F (36.7  C)-98.8  F (37.1  C)] 98.1  F (36.7  C)  Pulse:  [108-129] 115  Resp:  [15-16] 15  MAP:  [62 mmHg-92 mmHg] 72 mmHg  Arterial Line BP: ()/(48-75) 102/57  FiO2 (%):  [40 %-50 %] 50 %  SpO2:  [90 %-100 %] 100 %    Physical exam:  General: In bed, in NAD  HEENT: PERRL, no scleral icterus or injection  CARDIAC: RRR, no m/r/g appreciated. Peripheral pulses dopplered  RESP: Mechanical ventilation; L lung diminished with exp wheezing  GI: soft, rounded, BS hypoactive  : Nicole, good UO  EXTREMITIES: 3+ LE edema, pulses 2+. Femoral access site oozing decreased. Dressing changed last night   SKIN: No acute lesions appreciated  NEURO: Intubated and sedated, sedation " holiday as tolerates. Opens eyes, nods to questions when lightened

## 2024-03-09 NOTE — PLAN OF CARE
Goal Outcome Evaluation:  Plan of Care Reviewed With: family  Overall Patient Progress: no change      Major Shift Events: Decreased vent settings (PEEP from 14 to 12). VA ECMO settings unchanged. Intermittent low dose Levophed. Family at bedside and attentive to patient. Care conference held. Plan for another care conference on Sunday.     Plan: Wean vent as able. Wean pressors as able. Care conference Obed 3/10 at 13:30.    For vital signs and complete assessments, please see documentation flowsheets.   Hours cared for: [id number]  Hanh Bailey RN

## 2024-03-09 NOTE — PROGRESS NOTES
Cardiology ICU Progress Note    Brief HPI:  Silvano Motta is a 58 year old male being admitted to the CICU on 2/4/2024 s/p peripheral VA-ECMO cannulation s/p cardiac arrest. 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 Admitted 1/25 to OSH for AHRF and transferred to [facility] 2/4 for PH therapy managed.  Patient not a lung transplant candidate at [facility] or [facility].      Subjective and Interval:  Remains critically ill in the ICU. Yesterday, Turndown went well with stable hemodynamics, effusion stable, increased ACT, decreased Huseyin    Assessment and plan by system:   Today's changes:  Touch base with surgery regarding trach  Wean vent further, PEEP, then Veletri?  Turndown tomorrow? Potential CVTS consult for decann next few days?  DAREK improved, no diuresis today     Neurology   # Concern for anoxic brain injury    # Hx TBI 2020  # Hx Sz disorder-Todds paralysis  CT head 2/23: no intracranial pathology  - Intubated, sedated. Avoiding hyperthermia    Current sedation:  RASS (Davidson Agitation-Sedation Scale): -1-->drowsy  Dose (mg/hr) Midazolam: 3 mg/hr, Dose (mcg/hr) Fentanyl: 200 mcg/hr, and Dose (mcg/kg/min) Propofol: 20 mcg/kg/min    Plan:  - Continue PTA Keppra, VIMPAT, and gabapentin  - Continue home ASA  - Continue sedation with a RASS goal -1 to -2  - Spontaneous awakening trial as tolerated  - permissive hypercapnea and hypernatremia for neuroprotection     Cardiovascular  # PEA/VT Cardiac Arrest s/p peripheral VA ECMO Cannulation 2/23/24   # Cardiogenic shock requiring VA ECMO  # Possible pericardial effusion  Peripheral V-A ECMO inserted via LCFA and LCFV 17 Fr arterial cannula, 29 Fr venous annula, DPC 7 Fr LSFA  Angiogram: RHC, Normal biventricular filling pressures.   Mild pre-capillary pulmonary  hypertension  Low-normal cardiac output and index.  TTE :  VA ECMO at 4.1 LPM. LVEF 60-65%. The right ventricle is normal size.  Global right ventricular function is mildly reduced.  No pericardial effusion is present.    ECG Rhythm: Sinus tachycardia    ECMO:  Mode: V-A   Pump Type: Cardiohelp  RPM's: 3300  Blood Flow (Circuit) LPM: 3.55 LPM  Sweep LPM: 1.5 LPM  Sweep FiO2   %: 70 %  Venous Pressure  mmHg: -15 mmHg  Arterial Pressure  mmH mmHg  Internal Pressure mmH mmHg  Pressure Change mmH mmHg    Current Pressors/inotropes/antiarrythmic:  Levo 0.02     Hemodynamics:  Art Line  Arterial Line BP: 132/59  Arterial Line MAP (mmHg): 78 mmHg  Arterial Line Location: Right radial  Art Line Wave Form: Appropriate wave forms    Plan:  - Continue ASA 81mg   - Hold lipitor for now given shock liver  - Telemetry monitoring  - Continue trending troponin  - Continue ECMO support   - Intermittently being diuresed.        Pulmonary  # Acute on chronic hypoxemic respiratory failure requiring intubation  # Shunt physiology, positive shunt study 24  # Mild pulmonary hypertension  # Severe ROSALIO on PAP at night  # Probable aspiration pneumonia  Per pulmonology documentation: Hx of chronic hypoxemia thought due to hypoventilation and untreated sleep apnea since Oct 2023 with extensive workup including multiple CT Angio studies, NM Perfusion studies, RHC in Oct 2023 and again this hospitalization showing mild pulmonary hypertension (mean PA 25, PCWP 10). Imaging with stable pleural based HARMONY nodule and subpleural reticulation and thickening of interlobular septi present since . Repeated CT scans without embolus or AVM. Does have genetic sequencing in process. Thus far auto-immune workup negative (SSA, SSB, Scleroderma, Clara 1, RF, CCP, CRP, Aldolase, HP panels, ANCA, IgG) except for positive ROSAMARIA (speckled 1:160). In Oct also had negative workup (C3 and C4, anti-centromere, anti-Smith, anti-RNP, anti-SSA,  anti-SSB, Scl-70, anti-cardiolipin, anti-CCP). Concern for shunt physiology with highly elevated A-a gradient, multiple TTEs and EDWIGE with positive late bubble, hypoxemia and ongoing dependence on high flow oxygen that seems out of proportion to degree of pulmonary hypertension and fibrotic changes. NM MAA shunt study 2/9 confirms shunt physiology. Based on bubble studies, presumably extracardiac shunt, potential diagnosis includes DMVPAVM which is very rare but would explain lack of macrovascular abnormalities. In discussions with radiology, and abnormal NM shunt study perfusion to upper lung fields, will pursue CTPE dual energy to better delineate vasculature and perfusion. Additionally, will obtain CT abdomen/pelvis for any extrathoracic abnormality that could be contributing to shunt. Not a transplant candidate per [facility] or [facility].   - Remains on Epoprostenol @ 20    CXR:   Per my interpretation stable, support devices in appropriate position, ETT needs to be advanced     Vent Settings:   Vent Mode: PCV Plus assist  (Pressure Control Ventilation/ Assist Control)  FiO2 (%): 50 %  Resp Rate (Set): 15 breaths/min  Tidal Volume (Set, mL): 30 mL  PEEP (cm H2O): 10 cmH2O  Inspiratory Pressure (cm H2O) (Drager Nena): 28  Resp: 19    ABG:   Recent Labs   Lab 03/09/24  0601 03/09/24  0324 03/09/24  0157   PH 7.41 7.46* 7.45   PCO2 43 40 41   PO2 93 103 99   HCO3 28 29* 28   O2PER 50 50  50  100 50     Plan:  - Wean FiO2 for SpO2 > 88%  - Dexamethasone  - Follow up IG levels  - Wean vent as able  - Daily CXR  - Serial ABGs   - Consider scheduled duonebs if signs of lung dz, currently PRN    - Peridex  - FBG and diuresis as above        Gastrointestinal, Nutrition  # Shock liver 2/2 cardiac arrest  # Hypoalbuminemia   # At risk for protein malnutrition   No known medical hx.   CAP CT  Anterior mediastinal hematoma post arrest   Diet: Per Nutriiton  BM: 3/8 liquid stool per rectal tube    Recent Labs   Lab 03/09/24  0324  24  1529   AST 62* 69* 76*   ALT 91* 98* 108*   BILITOTAL 0.4 0.4 0.4   ALBUMIN 3.1* 3.3* 3.3*   PROTTOTAL 5.2* 5.5* 5.4*   ALKPHOS 118 127 137     Plan:  - Monitor LFTs  - RUQ US without abnormality  - Bowel regimen in place-PRN  - GI Prophylaxis: PPI; Protonix 40 mg daily  - Nutrition consulted, appreciate recommendations      Renal, Electrolytes  # Acute Renal Injury   # Hypernatremia  # Lactic acidosis  # hypermagnesemia  Baseline CR 0.90  I/O last 3 completed shifts:  In: 6207.64 [I.V.:1937.64; NG/GT:2920]  Out: 5990 [Urine:4990; Stool:1000]    NG/OG/NJ Tube Orogastric-Flush/Free Water (mL): 100 mL    Recent Labs   Lab 24  1529 24  1005 24  0300 24  0938 24  0354    144 142   < > 144   < > 150*   POTASSIUM 3.4 3.7 3.6   < > 3.9   < > 4.1   CHLORIDE 104 106 102   < > 103   < > 106   CO2 28 27 28   < > 26   < > 30*   BUN 97.6* 105.0* 113.0*   < > 104.0*   < > 87.0*   CR 1.46* 1.60* 1.77*   < > 1.82*   < > 1.52*   PHOS 4.0  --   --   --  6.6*  --  6.2*   MAG 2.9* 3.0* 2.8*   < > 2.7*   < > 2.5*    < > = values in this interval not displayed.     Plan:  - Avoid nephrotoxins, renally dose meds  - Monitor urine output  - FBG and diuresis as above  - Free water flush to maintain eunatremia    Infectious Disease  # Aspiration Pneumonia  # Leukocytosis  # MRSA pna  Recent Labs   Lab 24  03224  1529   WBC 22.1* 18.8* 19.4*   Temp (24hrs), Av.4  F (36.9  C), Min:98.1  F (36.7  C), Max:98.8  F (37.1  C)   WBC up    Cultures thus far:  Blood NGTD   Sputum MRSA, GPC   Urine NGTD   MRSA Positive   COVID negative  Antibiotics     Anti-infectives (From now, onward)      Start     Dose/Rate Route Frequency Ordered Stop    24 1830  vancomycin (VANCOCIN) 1,250 mg in 0.9% NaCl 250 mL intermittent infusion         1,250 mg  over 90 Minutes Intravenous EVERY 24 HOURS 24 1817      24 1630   "piperacillin-tazobactam (ZOSYN) 3.375 g in D5W 100 mL intermittent infusion        Note to Pharmacy: For [facility], [facility] and [facility]: For Zosyn-naive patients, use the \"Zosyn initial dose + extended infusion\" order panel.    3.375 g  100 mL/hr over 1 Hours Intravenous EVERY 6 HOURS 03/08/24 1410 03/09/24 2359          Plan:  - Continue abx, zosyn 5 day course, vanco 14D from negative culture daate  - Monitor for signs of infection  - Avoid fevers     Hematology  #Acute Blood Loss Anemia on Anemia of Critical Illness   # Thrombocytopenia  Hgb decreaseing. Oral bleeding, CT CAP anterior mediastinal hematoma  US LE w/ Arterial Duplex with no evidence of acute thrombus    Receiving heparin for ECMO with ACT goal 160-180  Dose (units/hr) HEParin: 1400 Units/hr  ACT  (seconds): 166 seconds    Recent Labs   Lab 03/09/24  0324 03/08/24  2205 03/08/24  1529   HGB 7.0* 7.6* 7.4*   HCT 21.1* 22.0* 21.9*    147* 152     Recent Labs   Lab 03/09/24  0324 03/08/24  2205 03/08/24  1529   INR 1.08 1.05 1.12   PTT 76* 68* 59*       Plan:  - DVT PPX: Heparin with ACT goal as above  - PPI BID  - Transfusion parameters:   - 1u PRBC for hbg < 7.0   - 1u platelet for plt < 50   - 1u FFP for INR > 3   - 5u cryoprecipitate for fibrinogen <150     Endocrinology  # Hyperglycemia   # Type 1 DM  # A1C 10.0  # Hypothyroidism    Recent Labs   Lab 03/09/24  0651 03/09/24  0602 03/09/24  0507 03/09/24  0403 03/09/24  0324   * 194* 210* 178* 188*     Dose (units/hr) Insulin: 7 Units/hr    Plan  - insulin gtt as needed  - Lantus to 50 units BID  - Endocrine consulted   - Continue levothyroxine 150mcg    Integumentary:  - No skin issues    Lines/Tubes/Drains:  PIV left lower fore arm 2/10  CVC TL RFV 2/23  RR arterial line 2/23  ECMO L femoral vein 29 FR 2/23   ECMO L femoral artery 17 FR 2/23  Distal perfusion catheter LSFA 2/23  Nicole 2/23  OG 2/24  Rectal tube with balloon 3/5     ICU  Feeding: TF   Analgesia: Fentanyl  Sedation: propofol and " "midazolam  Thrombopx: Heparin infusion   Head of bed: reverse trend   Ulcers: PPI  Glucose: Insulin infusion  SBT: not ready  B: liquid stool with rectal tube  I: still needs urinary catheter for accurate I/Os  De-escalate: continue abx    Family will be updated by team    Patient seen, discussed, and plan developed with staff physician, VASQUEZ Richey CNP  [facility]  ICU Cardiology-CICU Service  Send message or 10 digit call back number Securely via Spinzo with the Vocera Web Console (learn more here)    Objective:  Most recent vital signs:  /60   Pulse 119   Temp 98.2  F (36.8  C)   Resp 19   Ht 1.778 m (5' 10\")   Wt 110.8 kg (244 lb 4.3 oz)   SpO2 100%   BMI 35.05 kg/m    Temp:  [97.9  F (36.6  C)-98.6  F (37  C)] 98.2  F (36.8  C)  Pulse:  [103-121] 119  Resp:  [0-32] 19  MAP:  [30 mmHg-93 mmHg] 78 mmHg  Arterial Line BP: ()/(51-74) 132/59  FiO2 (%):  [50 %] 50 %  SpO2:  [91 %-100 %] 100 %    Physical exam:  General: In bed, in NAD  HEENT: PERRL, no scleral icterus or injection  CARDIAC: RRR, no m/r/g appreciated. Peripheral pulses dopplered  RESP: Mechanical ventilation; L lung diminished with exp wheezing  GI: soft, rounded, BS hypoactive  : Nicole, good UO  EXTREMITIES: 3+ LE edema, pulses 2+. Femoral access site oozing decreased. Dressing changed last night   SKIN: No acute lesions appreciated  NEURO: Intubated and sedated, sedation holiday as tolerates. Opens eyes, nods to questions when lightened           "

## 2024-03-09 NOTE — PHARMACY-VANCOMYCIN DOSING SERVICE
Pharmacy Vancomycin Note  Date of Service 2024  Patient's  1965   58 year old, male    Indication: MRSA Pneumonia   Day of Therapy: Day 5   Current vancomycin regimen: Intermittent vancomycin   Current vancomycin monitoring method: Trough (Method 2 = manual dose calculation)  Current vancomycin therapeutic monitoring goal: 15-20 mg/L    Current estimated CrCl = Estimated Creatinine Clearance: 57.3 mL/min (A) (based on SCr of 1.77 mg/dL (H)).    Creatinine for last 3 days  3/5/2024: 10:00 PM Creatinine 1.27 mg/dL  3/6/2024:  4:08 AM Creatinine 1.22 mg/dL;  9:59 AM Creatinine 1.32 mg/dL;  3:28 PM Creatinine 1.41 mg/dL;  9:55 PM Creatinine 1.60 mg/dL  3/7/2024:  3:54 AM Creatinine 1.52 mg/dL;  9:38 AM Creatinine 1.62 mg/dL;  3:48 PM Creatinine 1.63 mg/dL;  9:52 PM Creatinine 1.73 mg/dL  3/8/2024:  2:31 AM Creatinine 1.83 mg/dL;  3:00 AM Creatinine 1.82 mg/dL; 10:05 AM Creatinine 1.85 mg/dL;  3:29 PM Creatinine 1.77 mg/dL    Recent Vancomycin Levels (past 3 days)  3/6/2024:  9:59 AM Vancomycin 21.2 ug/mL  3/7/2024:  9:08 PM Vancomycin 32.5 ug/mL  3/8/2024:  3:29 PM Vancomycin 19.6 ug/mL    Vancomycin IV Administrations (past 72 hours)                     vancomycin (VANCOCIN) 1,250 mg in D5W 250 mL intermittent infusion (mg) 1,250 mg New Bag 24 2322     1,250 mg New Bag  1142     1,250 mg New Bag 24 2304    vancomycin (VANCOCIN) 1,500 mg in D5W 250 mL intermittent infusion (mg) 1,500 mg New Bag 24 1053     1,500 mg New Bag 24 2259                    Nephrotoxins and other renal medications (From now, onward)      Start     Dose/Rate Route Frequency Ordered Stop    24 1830  vancomycin (VANCOCIN) 1,250 mg in 0.9% NaCl 250 mL intermittent infusion         1,250 mg  over 90 Minutes Intravenous EVERY 24 HOURS 24 1817      24 1630  piperacillin-tazobactam (ZOSYN) 3.375 g in D5W 100 mL intermittent infusion        Note to Pharmacy: For [facility]: For Zosyn-naive  "patients, use the \"Zosyn initial dose + extended infusion\" order panel.    3.375 g  100 mL/hr over 1 Hours Intravenous EVERY 6 HOURS 03/08/24 1410 03/09/24 2359    03/08/24 0300  norepinephrine (LEVOPHED) 16 mg in  mL infusion MAX CONC CENTRAL LINE         0.01-0.6 mcg/kg/min × 108 kg (Dosing Weight)  1-60.8 mL/hr  Intravenous CONTINUOUS 03/08/24 0238      03/07/24 1600  [Held by provider]  furosemide (LASIX) injection 80 mg        (On hold since today at 0828 until manually unheld; held by Le Mayorga APRN CNPHold Reason: Other)    80 mg  over 1-3 Minutes Intravenous 2 TIMES DAILY (Diuretics and Nitrates) 03/07/24 1002                 Contrast Orders - past 72 hours (72h ago, onward)      None          Interpretation of levels and current regimen:  Vancomycin level is reflective of therapeutic level    Has serum creatinine changed greater than 50% in last 72 hours: No    Urine output:  good urine output    Renal Function: Stable    InsightRX Prediction of Planned New Vancomycin Regimen  Regimen: 1250 mg IV every 24 hours.  Start time: 23:22 on 03/08/2024  Exposure target: AUC24 (range)400-600 mg/L.hr   AUC24,ss: 457 mg/L.hr  Probability of AUC24 > 400: 90 %  Ctrough,ss: 13.1 mg/L  Probability of Ctrough,ss > 20: 2 %  Probability of nephrotoxicity (Lodise STEPHANI 2009): 8 %      Plan:  Increase Dose to 1250 mg IV every 24 hours  Vancomycin monitoring method: AUC  Vancomycin therapeutic monitoring goal: 400-600 mg*h/L  Pharmacy will check vancomycin levels as appropriate in 1-3 Days.  Serum creatinine levels will be ordered daily for the first week of therapy and at least twice weekly for subsequent weeks.    BRYANT AGUIRRE [facility]    "

## 2024-03-09 NOTE — PROGRESS NOTES
[facility]    ECLS Shift Summary:     ECMO Equipment:  Console Serial Number: [id number]  Circuit Lot Number: [id number]  Oxygenator Lot Number: [id number]    Circuit Assessment: Fibrin  Fibrin Location: connectors.    Arterial ECMO Cannula: 29 Fr in the Left Femoral Vein  Venous ECMO Cannula: 17 Fr in the Left Femoral Artery  ECMO Cannula Venous Left femoral vein-Site Assessment: WDL, Sutured, Secure  ECMO Cannula Arterial Left femoral artery-Site Assessment: WDL, Sutured, Secure  Distal Perfusion Catheter Left femoral artery-Site Assessment: WDL, Sutured, Secure  ECMO Cannula Venous Left femoral vein-Site Intervention: No intervention needed  ECMO Cannula Arterial Left femoral artery-Site Intervention: No intervention needed  Distal Perfusion Catheter Left femoral artery-Site Intervention: No intervention needed    Patient remains on V-A ECMO, all equipment is functioning and alarms are appropriately set. RPM's: 3300 with Blood Flow (Circuit) LPM  Avg: 3.6 LPM  Min: 3.5 LPM  Max: 3.6 LPM L/min. Sweep is at 1.5 LPM and 70 %. Extremities are warm.     Significant Shift Events: none.    Vent settings:  Vent Mode: PCV Plus assist  (Pressure Control Ventilation/ Assist Control)  FiO2 (%): 50 %  Resp Rate (Set): 15 breaths/min  Tidal Volume (Set, mL): 30 mL  PEEP (cm H2O): (S) 10 cmH2O (per MD order)  Inspiratory Pressure (cm H2O) (Drager Nena): 28  Resp: 15      Anticoagulation:  Dose (units/hr) HEParin: 1400 Units/hr  Rate (mL/hr) HEParin: 14 mL/hr  Concentration HEParin: 100 Units/mL     Dose (mg/kg/hr) Bivalirudin: 0 mg/kg/hr  Rate (mL/hr) Bivalirudin: 0 mL/hr  Concentration Bivalirudin: 1 mg/mL  Most recent: ACT  (seconds): 166 seconds    Urine output is Yellow / Straw Colored.  Blood loss was minimal. Product given included PRBC x1.     Intake/Output Summary (Last 24 hours) at 3/9/2024 0517  Last data filed at 3/9/2024 0400  Gross per 24 hour   Intake 5704.37 ml    Output 5790 ml   Net -85.63 ml       Labs:  Recent Labs   Lab 03/09/24  0324 03/09/24  0157 03/09/24  0019 03/08/24  2205   PH 7.46* 7.45 7.43 7.43   PCO2 40 41 43 43   PO2 103 99 90 103   HCO3 29* 28 29* 29*   O2PER 50  50  100 50 50 50       Lab Results   Component Value Date    HGB 7.0 (L) 03/09/2024    PHGB 40 (H) 03/09/2024     03/09/2024    FIBR 369 03/04/2024    INR 1.08 03/09/2024    PTT 76 (H) 03/09/2024    DD 2.45 (H) 03/04/2024    ANTCH 114 03/08/2024       Plan is continue VA ECMO at this time.    Faviola Livingston, RT  ECMO Specialist  3/9/2024 5:17 AM

## 2024-03-09 NOTE — PLAN OF CARE
Major Shift Events: No acute events overnight. Patient remains RASS -1 with propofol, versed and fentanyl gtts as well as oral medications. Opens eyes, follows simple commands, KNOWLES when sedation lightened. Pupils 3+, equal/reactive. Remains tachycardic 100-115, MAP maintained >65 with norepi gtt. PEEP changed to 10, vent settings unchanged otherwise. TF increased to goal 70ml/hr at midnight, [id number] FWF continues. Adequate UOP via patterson, continues to have maroon output via rectal tube. 1u PRBCs given. K replaced x2.   Plan: Continue to wean vent/ECMO as able. Planning for care conference on Sunday.  For vital signs and complete assessments, please see documentation flowsheets.

## 2024-03-09 NOTE — PROGRESS NOTES
ECMO Attending Progress Note  3/9/2024    Silvano Motta is a 58 year old male who was cannulated for ECMO 2024 due to refractory PEA arrest     Cannulation Site:  17 Fr in the L femoral artery  29 Fr in the L femoral vein    Interval events:improving Jen down to peep 10 FiO2 50% on vent   Physical Exam:  Temp:  [97.9  F (36.6  C)-98.6  F (37  C)] 98.4  F (36.9  C)  Pulse:  [103-121] 113  Resp:  [0-32] 17  MAP:  [30 mmHg-93 mmHg] 68 mmHg  Arterial Line BP: ()/(48-74) 108/52  FiO2 (%):  [50 %] 50 %  SpO2:  [91 %-100 %] 97 %    Intake/Output Summary (Last 24 hours) at 3/9/2024 0849  Last data filed at 3/9/2024 0800  Gross per 24 hour   Intake 6325.24 ml   Output 5960 ml   Net 365.24 ml    Vent Mode: PCV Plus assist  (Pressure Control Ventilation/ Assist Control)  FiO2 (%): 50 %  Resp Rate (Set): 15 breaths/min  Tidal Volume (Set, mL): 30 mL  PEEP (cm H2O): 10 cmH2O  Inspiratory Pressure (cm H2O) (Drager Nena): 28  Resp: 17       Labs:  Recent Labs   Lab 24  0800 24  0601 24  0324 24  0157   PH 7.42 7.41 7.46* 7.45   PCO2 42 43 40 41   PO2 77* 93 103 99   HCO3 27 28 29* 28   O2PER 50 50 50  50  100 50      Recent Labs   Lab 24  0324 24  2205 24  1529 24  1005   WBC 22.1* 18.8* 19.4* 20.5*   HGB 7.0* 7.6* 7.4* 8.1*     Creatinine   Date Value Ref Range Status   2024 1.46 (H) 0.67 - 1.17 mg/dL Final   2024 1.60 (H) 0.67 - 1.17 mg/dL Final   2024 1.77 (H) 0.67 - 1.17 mg/dL Final   2024 1.85 (H) 0.67 - 1.17 mg/dL Final       Blood Flow (Circuit) LPM: 3.61 LPM  Sweep LPM: 1.5 LPM  Sweep FiO2   %: 70 %  ACT  (seconds): 166 seconds  Arterial Blood Temp  (degrees C): 36.1 C  Pulse Oximetry  (SpO2%): 98 %  Arterial Pressure  mmH mmHg      ECMO Issues including assessments and plan on DOS 3/9/2024:  Neuro: Sedated for mechanical ventilation and ECMO on propofol fent and versed.  No acute distress.  NIRS stable b/l  RASS goal: -1 to -2  CV:  Cardiogenic shock. Hemodynamically stable on Ne 50 mmHg pulsatilty no iabp-turn down   Pulm: PCV- peep 14 down to 10->8 & FiO2 50% using circuit to wean vent preparing for trach settings- continue ards protocol steroids and net negative goal  FEN/Renal: Electrolytes stable w/ replacement protocols in place, Cr rising, UOP- stable- diuresis   Heme: ACT goal: 160-180 Hemoglobin dropping requiring daily transfusion. On ppi bid for GIB- melanie stools  Minimal oozing around the ECMO cannulas.  ID: started zosyn & vanco given worsening wbc & sputum -MRSA again growing will treat for 14 d and finish 5d zosyn - finish today  Cannulae: Position is acceptable on exam and the available imaging.  Distal perfusion cannula is in place and patent.  Extremities are well-perfused.     I have personally reviewed the ECMO flows, oxygenation and CO2 clearance, anticoagulation, and cannula position.  I have also personally assessed the patient's systemic response with hemodynamics, oxygenation, ventilation, and bleeding.       The patient requires continued ECMO support and management in the ICU.      Liseth Jenkins MD  Cardiology critical care  Pager

## 2024-03-09 NOTE — PROGRESS NOTES
BRIEF SOCIAL WORK NOTE:      SW met with patient's daughter, Elidia, outside of his room due to concerns about updating the family. Elidia reported that she was not aware that patient's sister was made his medical decision maker until today and she was told she was unable to be updated. Elidia reported that she had called to be updated about his care, but she was told she couldn't receive updates over the phone. Elidia was tearful and reported she is concerned that she will not be updated if something big happens with patient's care. She asked if she could be updated and if her contact information could be placed in the chart. SW explained that it was her dad's wishes for his sister to be his healthcare agent and that her contact information not be in the chart, as he did not want to burden her. Elidia expressed understanding and asked if she calls the hospital, if she could be updated on his care. SW agreed and offered to speak with the family regarding everyone getting updates. Elidia reported she would speak with the family as she worries this conversation would upset patient's sisters and she wouldn't get any updates. ROBERT will continue to follow for support.     Balbina Alcala, [facility]  ICU   Phone: [phone]  Pager: [phone]

## 2024-03-10 NOTE — PROGRESS NOTES
ECMO Attending Progress Note  3/10/2024    Silvano Motta is a 58 year old male who was cannulated for ECMO 2024 due to refractory PEA arrest     Cannulation Site:  17 Fr in the L femoral artery  29 Fr in the L femoral vein    Interval events:no acute events tolerating 8 peep another prbc last night    Physical Exam:  Temp:  [98.2  F (36.8  C)-98.6  F (37  C)] 98.4  F (36.9  C)  Pulse:  [] 102  Resp:  [11-27] 15  MAP:  [27 mmHg-88 mmHg] 69 mmHg  Arterial Line BP: ()/(52-77) 97/58  FiO2 (%):  [50 %] 50 %  SpO2:  [89 %-100 %] 92 %    Intake/Output Summary (Last 24 hours) at 3/10/2024 0819  Last data filed at 3/10/2024 0800  Gross per 24 hour   Intake 5693.99 ml   Output 4755 ml   Net 938.99 ml    Vent Mode: PCV Plus assist  (Pressure Control Ventilation/ Assist Control)  FiO2 (%): 50 %  Resp Rate (Set): 15 breaths/min  PEEP (cm H2O): 8 cmH2O  Inspiratory Pressure (cm H2O) (Drager Nena): 28  Resp: 15       Labs:  Recent Labs   Lab 03/10/24  0748 03/10/24  0559 03/10/24  0355 03/10/24  0152   PH 7.43 7.43 7.45 7.49*   PCO2 41 40 38 34*   PO2 64* 75* 70* 68*   HCO3 27 27 26 26   O2PER 50 50 50  50  70 50      Recent Labs   Lab 03/10/24  0355 24  2135 24  1622 24  0954   WBC 15.0* 18.4* 17.2* 18.2*   HGB 7.5* 7.3* 7.3* 7.5*     Creatinine   Date Value Ref Range Status   03/10/2024 1.21 (H) 0.67 - 1.17 mg/dL Final   2024 1.26 (H) 0.67 - 1.17 mg/dL Final   2024 1.29 (H) 0.67 - 1.17 mg/dL Final   2024 1.36 (H) 0.67 - 1.17 mg/dL Final       Blood Flow (Circuit) LPM: 3.62 LPM  Sweep LPM: 1 LPM  Sweep FiO2   %: 70 %  ACT  (seconds): 162 seconds  Arterial Blood Temp  (degrees C): 36.9 C  Pulse Oximetry  (SpO2%): 94 %  Arterial Pressure  mmH mmHg      ECMO Issues including assessments and plan on DOS 3/10/2024:  Neuro: Sedated for mechanical ventilation and ECMO on propofol fent and versed.  No acute distress.  NIRS stable b/l  RASS goal: -1 to -2  CV: Cardiogenic  shock. Hemodynamically stable on Ne 50 mmHg prioritizing trach over turn down  Pulm: PCV- peep 14 down to 8 & FiO2 50% using circuit to wean vent preparing for trach settings- continue ards protocol steroids and net negative goal  FEN/Renal: Electrolytes stable w/ replacement protocols in place, Cr rising, UOP- stable- diuresis for net negative   Heme: ACT goal: 180-200 due to relatively poor post oxy gas despite normal delta p. Hemoglobin dropping requiring daily transfusion. On ppi bid for GIB- melanie stools- improved  Minimal oozing around the ECMO cannulas.  ID:   vanco given worsening wbc & sputum -MRSA again growing will treat for 14 d and finished 5d zosyn   Cannulae: Position is acceptable on exam and the available imaging.  Distal perfusion cannula is in place and patent.  Extremities are well-perfused.     I have personally reviewed the ECMO flows, oxygenation and CO2 clearance, anticoagulation, and cannula position.  I have also personally assessed the patient's systemic response with hemodynamics, oxygenation, ventilation, and bleeding.       The patient requires continued ECMO support and management in the ICU.      Liseth Jenkins MD  Cardiology critical care  Pager

## 2024-03-10 NOTE — PHARMACY-VANCOMYCIN DOSING SERVICE
Pharmacy Vancomycin Note  Date of Service March 10, 2024  Patient's  1965   58 year old, male    Indication: MRSA PNA  Day of Therapy: 7  Current vancomycin regimen: 1250 mg IV Q24h  Current vancomycin monitoring method: AUC  Current vancomycin therapeutic monitoring goal: 400-600 mg*h/L    InsightRX Prediction of Current Vancomycin Regimen  Regimen: 1250 mg IV every 24 hours.  Start time: 17:31 on 2024  Exposure target: AUC24 (range)400-600 mg/L.hr   AUC24,ss: 299 mg/L.hr  Probability of AUC24 > 400: 0 %  Ctrough,ss: 8 mg/L  Probability of Ctrough,ss > 20: 0 %  Probability of nephrotoxicity (Lodise STEPHANI ): 5 %    Current estimated CrCl = Estimated Creatinine Clearance: 93.1 mL/min (based on SCr of 1.11 mg/dL).    Creatinine for last 3 days  3/7/2024:  9:52 PM Creatinine 1.73 mg/dL  3/8/2024:  2:31 AM Creatinine 1.83 mg/dL;  3:00 AM Creatinine 1.82 mg/dL; 10:05 AM Creatinine 1.85 mg/dL;  3:29 PM Creatinine 1.77 mg/dL; 10:05 PM Creatinine 1.60 mg/dL  3/9/2024:  3:24 AM Creatinine 1.46 mg/dL;  9:54 AM Creatinine 1.36 mg/dL;  4:22 PM Creatinine 1.29 mg/dL;  9:35 PM Creatinine 1.26 mg/dL  3/10/2024:  3:55 AM Creatinine 1.21 mg/dL; 10:01 AM Creatinine 1.13 mg/dL;  3:24 PM Creatinine 1.11 mg/dL    Recent Vancomycin Levels (past 3 days)  3/7/2024:  9:08 PM Vancomycin 32.5 ug/mL  3/8/2024:  3:29 PM Vancomycin 19.6 ug/mL  3/10/2024:  3:24 PM Vancomycin 15.7 ug/mL    Vancomycin IV Administrations (past 72 hours)                     vancomycin (VANCOCIN) 1,250 mg in 0.9% NaCl 250 mL intermittent infusion (mg) 1,250 mg New Bag 03/10/24 1731     1,250 mg New Bag 24 1804     1,250 mg New Bag 24 1856    vancomycin (VANCOCIN) 1,250 mg in D5W 250 mL intermittent infusion (mg) 1,250 mg New Bag 24 7606                    Nephrotoxins and other renal medications (From now, onward)      Start     Dose/Rate Route Frequency Ordered Stop    03/10/24 1800  piperacillin-tazobactam (ZOSYN) 4.5 g vial to  "attach to  mL bag        Note to Pharmacy: For [facility], [facility] and [facility]: For Zosyn-naive patients, use the \"Zosyn initial dose + extended infusion\" order panel.    4.5 g  over 30 Minutes Intravenous EVERY 6 HOURS 03/10/24 1729      03/08/24 1830  vancomycin (VANCOCIN) 1,250 mg in 0.9% NaCl 250 mL intermittent infusion         1,250 mg  over 90 Minutes Intravenous EVERY 24 HOURS 03/08/24 1817      03/08/24 0300  norepinephrine (LEVOPHED) 16 mg in  mL infusion MAX CONC CENTRAL LINE         0.01-0.6 mcg/kg/min × 108 kg (Dosing Weight)  1-60.8 mL/hr  Intravenous CONTINUOUS 03/08/24 0238                 Contrast Orders - past 72 hours (72h ago, onward)      None          Interpretation of levels and current regimen:  Vancomycin level is reflective of AUC less than 400    Has serum creatinine changed greater than 50% in last 72 hours: No    Urine output:  good urine output    Renal Function: Stable    InsightRX Prediction of Planned New Vancomycin Regimen  Regimen: 1500 mg IV every 18 hours.  Start time: 11:31 on 03/11/2024  Exposure target: AUC24 (range)400-600 mg/L.hr   AUC24,ss: 463 mg/L.hr  Probability of AUC24 > 400: 96 %  Ctrough,ss: 13.8 mg/L  Probability of Ctrough,ss > 20: 0 %  Probability of nephrotoxicity (Lodise STEPHANI 2009): 9 %    Plan:  Increase Dose to 1500 mg IV every 18 hours  Vancomycin monitoring method: AUC  Vancomycin therapeutic monitoring goal: 400-600 mg*h/L  Pharmacy will check vancomycin levels as appropriate in 1-3 Days.  Serum creatinine levels will be ordered daily for the first week of therapy and at least twice weekly for subsequent weeks.    Glenys Brennan, [facility]   "

## 2024-03-10 NOTE — PROGRESS NOTES
[facility] referral made. [facility] met with family, patients family has decided to move forward with DCD. Family support provided. Awaiting orders from CICU

## 2024-03-10 NOTE — PROGRESS NOTES
[facility]    ECLS Shift Summary:     ECMO Equipment:  Console Serial Number: [id number]  Circuit Lot Number: [id number]  Oxygenator Lot Number: [id number]    Circuit Assessment: Fibrin  Fibrin Location: connectors    Arterial ECMO Cannula: 17 Fr in the Right Femoral Artery  Venous ECMO Cannula: 29 Fr in the Right Femoral Vein  ECMO Cannula Venous Left femoral vein-Site Assessment: WDL  ECMO Cannula Arterial Left femoral artery-Site Assessment: WDL  Distal Perfusion Catheter Left femoral artery-Site Assessment: WDL  ECMO Cannula Venous Left femoral vein-Site Intervention: No intervention needed  ECMO Cannula Arterial Left femoral artery-Site Intervention: No intervention needed  Distal Perfusion Catheter Left femoral artery-Site Intervention: No intervention needed    Patient remains on V-A ECMO, all equipment is functioning and alarms are appropriately set. RPM's: 3300 with Blood Flow (Circuit) LPM  Avg: 3.5 LPM  Min: 3.46 LPM  Max: 3.6 LPM L/min. Sweep is at 1 LPM and 90 %. Extremities are perfused.     Significant Shift Events: Care conference today.  ACT range increased to 180-200.  Patient made comfort cares and will be followed by [facility].  Patient will donate via DCD.  Date and time to follow.    Vent settings:  Vent Mode: PCV Plus assist  (Pressure Control Ventilation/ Assist Control)  FiO2 (%): (S) 60 %  Resp Rate (Set): 15 breaths/min  PEEP (cm H2O): 8 cmH2O  Inspiratory Pressure (cm H2O) (Drager Nena): 28  Resp: 16      Anticoagulation:  Dose (units/hr) HEParin: 1800 Units/hr  Rate (mL/hr) HEParin: 18 mL/hr  Concentration HEParin: 100 Units/mL     Dose (mg/kg/hr) Bivalirudin: 0 mg/kg/hr  Rate (mL/hr) Bivalirudin: 0 mL/hr  Concentration Bivalirudin: 1 mg/mL  Most recent: ACT  (seconds): 185 seconds    Urine output is Yellow / Straw Colored.  Blood loss was minimal. Product given included none.     Intake/Output Summary (Last 24 hours) at 3/10/2024 1800  Last  data filed at 3/10/2024 1600  Gross per 24 hour   Intake 5374.68 ml   Output 5205 ml   Net 169.68 ml       Labs:  Recent Labs   Lab 03/10/24  1524 03/10/24  1352 03/10/24  1207 03/10/24  1001   PH 7.44 7.45 7.44 7.45   PCO2 39 38 38 38   PO2 70* 70* 58* 66*   HCO3 27 26 26 26   O2PER 60  90  60 50 50 50       Lab Results   Component Value Date    HGB 7.5 (L) 03/10/2024    PHGB 50 (H) 03/10/2024     (L) 03/10/2024    FIBR 369 03/04/2024    INR 1.09 03/10/2024    PTT 99 (H) 03/10/2024    DD 2.45 (H) 03/04/2024    ANTCH 111 03/10/2024       Plan is to continue VA support.    Herber Berman, RT  ECMO Specialist  3/10/2024 6:00 PM

## 2024-03-10 NOTE — PROGRESS NOTES
[facility]    ECLS Shift Summary:     ECMO Equipment:  Console Serial Number: [id number]  Circuit Lot Number: [id number]  Oxygenator Lot Number: [id number]    Circuit Assessment: Fibrin  Fibrin Location: connectors.    Arterial ECMO Cannula: 17 Fr in the Left Femoral Artery  Venous ECMO Cannula: 29 Fr in the Left Femoral Vein  ECMO Cannula Venous Left femoral vein-Site Assessment: WDL  ECMO Cannula Arterial Left femoral artery-Site Assessment: WDL  Distal Perfusion Catheter Left femoral artery-Site Assessment: WDL  ECMO Cannula Venous Left femoral vein-Site Intervention: No intervention needed  ECMO Cannula Arterial Left femoral artery-Site Intervention: No intervention needed  Distal Perfusion Catheter Left femoral artery-Site Intervention: No intervention needed    Patient remains on V-A ECMO, all equipment is functioning and alarms are appropriately set. RPM's: 3300 with Blood Flow (Circuit) LPM  Avg: 3.6 LPM  Min: 3.51 LPM  Max: 3.62 LPM L/min. Sweep is at 1 LPM and 70 %. Extremities are warm and perfused.     Significant Shift Events: None.     Vent settings:  Vent Mode: PCV Plus assist  (Pressure Control Ventilation/ Assist Control)  FiO2 (%): 50 %  Resp Rate (Set): 15 breaths/min  PEEP (cm H2O): 8 cmH2O  Inspiratory Pressure (cm H2O) (Drager Nena): 28  Resp: 15      Anticoagulation:  Dose (units/hr) HEParin: 1400 Units/hr  Rate (mL/hr) HEParin: 14 mL/hr  Concentration HEParin: 100 Units/mL     Dose (mg/kg/hr) Bivalirudin: 0 mg/kg/hr  Rate (mL/hr) Bivalirudin: 0 mL/hr  Concentration Bivalirudin: 1 mg/mL  Most recent: ACT  (seconds): 162 seconds    Urine output is Yellow / Straw Colored.  Blood loss was minimal. Product given included PRBC x1.     Intake/Output Summary (Last 24 hours) at 3/10/2024 0509  Last data filed at 3/10/2024 0400  Gross per 24 hour   Intake 5909.64 ml   Output 4680 ml   Net 1229.64 ml       Labs:  Recent Labs   Lab 03/10/24  7367  03/10/24  0152 03/09/24  2348 03/09/24  2135   PH 7.45 7.49* 7.44 7.47*   PCO2 38 34* 40 38   PO2 70* 68* 59* 77*   HCO3 26 26 27 27   O2PER 50  50  70 50 50 50       Lab Results   Component Value Date    HGB 7.5 (L) 03/10/2024    PHGB 40 (H) 03/09/2024     (L) 03/10/2024    FIBR 369 03/04/2024    INR 1.13 03/10/2024    PTT 83 (H) 03/10/2024    DD 2.45 (H) 03/04/2024    ANTCH 108 03/09/2024       Plan is continue VA ECMO at this time.    Faviola Livingston, RT  ECMO Specialist  3/10/2024 5:09 AM

## 2024-03-10 NOTE — PLAN OF CARE
Major Shift Events: No acute events overnight. Patient restless and appeared to be in pain this evening so sedation increased and oral meds given. Neuro status remains unchanged otherwise. Sweep decreased to 1, 1u PRBCs given for hgb 6.8 on circuit. Norepi on intermittently, see MAR for titrations. No vent changes, full strength veletri continues. Adequate UOP via patterson, stool output slowing down, appears more brown this morning rather than maroon. K replaced.  Plan: Continue to wean ECMO/vent as able, care conference planned today at 1330.  For vital signs and complete assessments, please see documentation flowsheets.

## 2024-03-10 NOTE — PROGRESS NOTES
PALLIATIVE CARE PROGRESS NOTE  [facility]     Patient Name: Silvano Motta  Date of Admission: 2/4/2024        Recommendations & Counseling       GOALS OF CARE:   Continue all current cares for now with plan to transition to comfort care tomorrow, 3/11, once family has had a chance to be with Silvano and say goodbye.  When family ready to transition to comfort, we discussed that we would make sure Silvano is comfortable and does not feel short of breath and is not suffering and will then remove ECMO and when ready, extubate him.  Prognosis of minutes to hours and possibly days left to live once Silvano is taken off ECMO and extubated.    ADVANCE CARE PLANNING:  No health care directive on file. Per  informed consent policy, next of kin should be involved if patient becomes unable.   In depth discussion 2/16 with patient and his sister Odalys by phone. Silvano is clear that he wants Odalys to be his healthcare agent and Odalys confirms she is willing to play this role and agrees to be named healthcare agent on his healthcare directive. Silvano also says he would want to have his sister Antonieta be his alternate healthcare agent. He specifically says he does not want his daughter or mother involved in making healthcare decisions and does not want their contact information in the chart as he does not want them burdened by calls from the hospital.   There is no POLST form on file, defer to patient and/or next of kin for decisions   Code status: No CPR- Pre-arrest intubation OK    MEDICAL MANAGEMENT:   Not actively managing at this time    PSYCHOSOCIAL/SPIRITUAL:  Large extended family all supporting him    Palliative Care will continue to follow. Thank you for the consult and allowing us to aid in the care of Silvano Motta.    These recommendations have been discussed with Dr Vidal Jenkins .    Shirley Kemp MD  Securely message with [employer] (more info)  Text  page via [facility] Paging/Directory          Interval History:     Multidisciplinary collaboration:  Care conference today with Dr. Vidal Jenkins and myself as well as Denise extended family including his daughter, mother, 3 sisters, and ex-wife.  Silvano's family believes that Silvano is suffering and he would not want to continue with all these aggressive cares knowing that the chance of him ever being able to get off the ventilator is very minimal.  Family asked many questions so that they could feel fully informed and make the best decision possible in this situation.  They all stated that prior to this hospitalization, Silvano's quality of life had progressively decreased and he was struggling to feel good with all of his health problems and they agreed that if Silvano knew he would have to remain on a ventilator for weeks to months in the best case scenario, he would instead choose to transition to comfort care and be allowed to pass away.    Family wanted the day to be with Silvano and have other family members and friends see him before transitioning to comfort care.  We explained what a transition to comfort care would look like and gave a prognosis of minutes to hours possibly days to live once ECMO was turned off and the ventilator was removed.    Palliative Summary/HPI          Silvano Motta is a 58 year old male with a past medical history of DM1 who presented to the hospital for evaluation and work up of acute hypoxic respiratory failure. Etiology of his respiratory failure has not been found. He has undergone a battery of tests and work ups, all of which have been inconclusive.      Regardless, he has been deemed not a transplant candidate at [facility] or at [facility]. After this was shared with Silvano, he continued to attempt to wean his own oxygen needs with no success.      On 2/23/2024, Silvano suffered a cardiac arrest while in CT and on BiPAP support. ROSC was achieved quickly but he subsequently  suffered refractory PEA requiring VA ECMO cannulation.      He now remains intubated, sedated, and on VA ECMO in the ICU.     Today, Patient was seen for:   Acute respiratory failure  Cardiac failure  Support  Goals of care            Review of Systems:     Not appropriate          Medications:     I have reviewed this patient's medication profile and medications during this hospitalization.               Physical Exam:   Temp:  [98.2  F (36.8  C)-98.8  F (37.1  C)] 98.8  F (37.1  C)  Pulse:  [] 101  Resp:  [13-25] 18  MAP:  [27 mmHg-102 mmHg] 95 mmHg  Arterial Line BP: ()/(52-81) 143/79  FiO2 (%):  [50 %] 50 %  SpO2:  [89 %-100 %] 97 %  246 lbs 14.64 oz    Gen: Intubated, sedated, appears stated age, sensorium not intact                 Current Problem List:   Principal Problem:    Pulmonary hypertension (H)  Active Problems:    Acute kidney failure, unspecified (H)      Allergies   Allergen Reactions    Fluoxetine Other (See Comments)     PN: Made him feel more depressed.  Worsening of depression  PN: Made him feel more depressed.      Metformin      Other reaction(s): Seizures    Carbamazepine Other (See Comments)     PN: diffuse rash  PN: diffuse rash      Other Environmental Allergy Unknown     dut            Data Reviewed:     Reviewed recent labs and pertinent imaging  ROUTINE ICU LABS (Last four results)  CMP  Recent Labs   Lab 03/10/24  1524 03/10/24  1359 03/10/24  1157 03/10/24  1005 03/10/24  1001 03/10/24  0403 03/10/24  0355 03/09/24  2142 03/09/24  2135 03/09/24  0403 03/09/24  0324 03/08/24  0305 03/08/24  0300 03/07/24  0401 03/07/24  0354     --   --   --  141  --  141  --  141   < > 142   < > 144   < > 150*   POTASSIUM 4.5  --   --   --  4.6  4.6  --  3.4  --  4.4   < > 3.4   < > 3.9   < > 4.1   CHLORIDE 105  --   --   --  108*  --  107  --  108*   < > 104   < > 103   < > 106   CO2 25  --   --   --  25  --  26  --  25   < > 28   < > 26   < > 30*   ANIONGAP 10  --   --   --  8   --  8  --  8   < > 10   < > 15   < > 14   * 134* 143* 132* 147*   < > 181*   < > 103*   < > 188*   < > 117*   < > 133*   BUN 70.8*  --   --   --  70.3*  --  75.0*  --  79.4*   < > 97.6*   < > 104.0*   < > 87.0*   CR 1.11  --   --   --  1.13  --  1.21*  --  1.26*   < > 1.46*   < > 1.82*   < > 1.52*   GFRESTIMATED 78  --   --   --  76  --  70  --  67   < > 56*   < > 43*   < > 53*   DORINA 8.4*  --   --   --  8.3*  --  8.2*  --  8.2*   < > 8.4*   < > 8.5*   < > 9.2   MAG 2.6*  --   --   --  2.7*  --  2.8*  --  2.9*   < > 2.9*   < > 2.7*   < > 2.5*   PHOS  --   --   --   --   --   --  2.4*  --   --   --  4.0  --  6.6*  --  6.2*   PROTTOTAL 5.3*  --   --   --  5.1*  --  5.0*  --  5.0*   < > 5.2*   < > 5.4*   < > 5.7*   ALBUMIN 3.3*  --   --   --  3.1*  --  3.0*  --  3.0*   < > 3.1*   < > 3.2*   < > 3.4*   BILITOTAL 0.4  --   --   --  0.4  --  0.4  --  0.4   < > 0.4   < > 0.4   < > 0.4   ALKPHOS 116  --   --   --  108  --  110  --  108   < > 118   < > 139   < > 166*   AST 53*  --   --   --  46*  --  51*  --  53*   < > 62*   < > 86*   < > 145*   ALT 66  --   --   --  64  --  68  --  69   < > 91*   < > 124*   < > 171*    < > = values in this interval not displayed.     CBC  Recent Labs   Lab 03/10/24  1524 03/10/24  1001 03/10/24  0355 03/09/24  2135   WBC 13.6* 14.8* 15.0* 18.4*   RBC 2.56* 2.58* 2.54* 2.41*   HGB 7.5* 7.8* 7.5* 7.3*   HCT 22.8* 23.3* 22.4* 21.6*   MCV 89 90 88 90   MCH 29.3 30.2 29.5 30.3   MCHC 32.9 33.5 33.5 33.8   RDW 19.3* 19.4* 19.3* 19.9*   * 134* 131* 137*     INR  Recent Labs   Lab 03/10/24  1524 03/10/24  1001 03/10/24  0355 03/09/24  2135   INR 1.09 1.11 1.13 1.13     Arterial Blood Gas  Recent Labs   Lab 03/10/24  1524 03/10/24  1352 03/10/24  1207 03/10/24  1001   PH 7.44 7.45 7.44 7.45   PCO2 39 38 38 38   PO2 70* 70* 58* 66*   HCO3 27 26 26 26   O2PER 60  90  60 50 50 50               Medical Decision Making       Please see A&P for additional details of medical decision  making.  MANAGEMENT DISCUSSED with the following over the past 24 hours: CICU team   NOTE(S)/MEDICAL RECORDS REVIEWED over the past 24 hours: Cardiology, nursing, respiratory therapy, CICU  Tests REVIEWED in the past 24 hours:  - BMP  - CBC  SUPPLEMENTAL HISTORY, in addition to the patient's history, over the past 24 hours obtained from:   - Parents  - Sibling  - Daughter, 3 sisters, mother

## 2024-03-10 NOTE — PLAN OF CARE
Goal Outcome Evaluation:  Plan of Care Reviewed With: family  Overall Patient Progress: no change    Major Shift Events: Ventilator settings changed (PEEP from 10 to 8). Patient appears to be tolerating this well. Sedated increased to maintain a RASS -1 to -2. Family at bedside and interacting with patient. Levophed off for the majority of the shift.  Plan: Care conference tomorrow at 1:30pm  For vital signs and complete assessments, please see documentation flowsheets.   Hours cared for: [id number]  Hanh Bailey RN

## 2024-03-10 NOTE — PROGRESS NOTES
[facility]    ECLS Shift Summary:     ECMO Equipment:  Console Serial Number: [id number]  Circuit Lot Number: [id number]  Oxygenator Lot Number: [id number]    Circuit Assessment: Fibrin  Fibrin Location: connectors    Arterial ECMO Cannula: 17 Fr in the Left Femoral Artery  Venous ECMO Cannula: 29 Fr in the Left Femoral Vein  ECMO Cannula Venous Left femoral vein-Site Assessment: WDL  ECMO Cannula Arterial Left femoral artery-Site Assessment: WDL  Distal Perfusion Catheter Left femoral artery-Site Assessment: WDL  ECMO Cannula Venous Left femoral vein-Site Intervention: No intervention needed  ECMO Cannula Arterial Left femoral artery-Site Intervention: No intervention needed  Distal Perfusion Catheter Left femoral artery-Site Intervention: No intervention needed    Patient remains on V-A ECMO, all equipment is functioning and alarms are appropriately set. RPM's: 3300 with Blood Flow (Circuit) LPM  Avg: 3.6 LPM  Min: 3.55 LPM  Max: 3.61 LPM L/min. Sweep is at 1.5 LPM and 70 %. Extremities are warm and perfused.     Significant Shift Events: none    Vent settings:  Vent Mode: PCV Plus assist  (Pressure Control Ventilation/ Assist Control)  FiO2 (%): 50 %  Resp Rate (Set): 15 breaths/min  Tidal Volume (Set, mL): 30 mL  PEEP (cm H2O): 8 cmH2O  Inspiratory Pressure (cm H2O) (Drager Nena): 28  Resp: 16      Anticoagulation:  Dose (units/hr) HEParin: 1400 Units/hr  Rate (mL/hr) HEParin: 14 mL/hr  Concentration HEParin: 100 Units/mL     Dose (mg/kg/hr) Bivalirudin: 0 mg/kg/hr  Rate (mL/hr) Bivalirudin: 0 mL/hr  Concentration Bivalirudin: 1 mg/mL  Most recent: ACT  (seconds): 174 seconds    Urine output is Yellow / Straw Colored.  Blood loss was minimal. Product given included none.     Intake/Output Summary (Last 24 hours) at 3/9/2024 1818  Last data filed at 3/9/2024 1700  Gross per 24 hour   Intake 6266.78 ml   Output 5590 ml   Net 676.78 ml       Labs:  Recent Labs   Lab  03/09/24  1756 03/09/24  1622 03/09/24  1557 03/09/24  1332 03/09/24  1139   PH 7.46*  --  7.48* 7.43 7.43   PCO2 39  --  37 40 41   PO2 70*  --  63* 80 67*   HCO3 27  --  27 27 27   O2PER 50 50  70 50 50 50       Lab Results   Component Value Date    HGB 7.3 (L) 03/09/2024    PHGB 40 (H) 03/09/2024     (L) 03/09/2024    FIBR 369 03/04/2024    INR 1.10 03/09/2024    PTT 70 (H) 03/09/2024    DD 2.45 (H) 03/04/2024    ANTCH 108 03/09/2024       Plan is to continue support.  May trach patient soon.    Herber Berman, RT  ECMO Specialist  3/9/2024 6:18 PM

## 2024-03-10 NOTE — PROGRESS NOTES
Cardiology ICU Progress Note    Brief HPI:  Silvano Motta is a 58 year old male being admitted to the CICU on 2/4/2024 s/p peripheral VA-ECMO cannulation s/p cardiac arrest. 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 Admitted 1/25 to OSH for AHRF and transferred to [facility] 2/4 for PH therapy managed.  Patient not a lung transplant candidate at [facility] or [facility].      Subjective and Interval:  Remains critically ill in the ICU. Yesterday, decreased PEEP to 8 with adequate oxygenation. Most recent ABG 7.43/41/64/27 on 50%/8 PEEP. DAREK continues to improve, lactic remains normal. ECMO fio2 70%, sweep 1, 1 PRBC given overnight. -180. Off pressors    Assessment and plan by system:   Today's changes:  Touch base with surgery regarding trach  Wean fio2  Trach soon?  DAREK improved, no diuresis today  After trach placed, then turndown/wean ECMO  Diurese with lasix and metolazone     Neurology   # Concern for anoxic brain injury    # Hx TBI 2020  # Hx Sz disorder-Todds paralysis  CT head 2/23: no intracranial pathology  - Intubated, sedated. Avoiding hyperthermia    Current sedation:  RASS (Davidson Agitation-Sedation Scale): -1-->drowsy  Dose (mg/hr) Midazolam: 3 mg/hr, Dose (mcg/hr) Fentanyl: 200 mcg/hr, and Dose (mcg/kg/min) Propofol: 30 mcg/kg/min    Plan:  - Continue PTA Keppra, VIMPAT, and gabapentin  - Continue home ASA  - Continue sedation with a RASS goal -1 to -2  - Spontaneous awakening trial as tolerated  - permissive hypercapnea and hypernatremia for neuroprotection     Cardiovascular  # PEA/VT Cardiac Arrest s/p peripheral VA ECMO Cannulation 2/23/24   # Cardiogenic shock requiring VA ECMO  # Possible pericardial effusion  Peripheral V-A ECMO inserted via LCFA and LCFV 17 Fr arterial cannula, 29 Fr venous annula, DPC 7  Fr LSFA  Angiogram: RHC, Normal biventricular filling pressures.   Mild pre-capillary pulmonary hypertension  Low-normal cardiac output and index.  TTE :  VA ECMO at 4.1 LPM. LVEF 60-65%. The right ventricle is normal size.  Global right ventricular function is mildly reduced.  No pericardial effusion is present.    ECG Rhythm: Sinus tachycardia    ECMO:  Mode: V-A   Pump Type: Cardiohelp  RPM's: 3300  Blood Flow (Circuit) LPM: 3.62 LPM  Sweep LPM: 1 LPM  Sweep FiO2   %: 70 %  Venous Pressure  mmHg: -19 mmHg  Arterial Pressure  mmH mmHg  Internal Pressure mmH mmHg  Pressure Change mmH mmHg    Current Pressors/inotropes/antiarrythmic:  Off pressors    Hemodynamics:  Art Line  Arterial Line BP: 97/58  Arterial Line MAP (mmHg): 69 mmHg  Arterial Line Location: Right radial  Art Line Wave Form: Appropriate wave forms    Plan:  - Continue ASA 81mg   - Hold lipitor for now given shock liver  - Telemetry monitoring  - Continue trending troponin  - Continue ECMO support   - Intermittently being diuresed.      Pulmonary  # Acute on chronic hypoxemic respiratory failure requiring intubation  # Shunt physiology, positive shunt study 24  # Mild pulmonary hypertension  # Severe ROSALIO on PAP at night  # Probable aspiration pneumonia  Per pulmonology documentation: Hx of chronic hypoxemia thought due to hypoventilation and untreated sleep apnea since Oct 2023 with extensive workup including multiple CT Angio studies, NM Perfusion studies, RHC in Oct 2023 and again this hospitalization showing mild pulmonary hypertension (mean PA 25, PCWP 10). Imaging with stable pleural based HARMONY nodule and subpleural reticulation and thickening of interlobular septi present since . Repeated CT scans without embolus or AVM. Does have genetic sequencing in process. Thus far auto-immune workup negative (SSA, SSB, Scleroderma, Clara 1, RF, CCP, CRP, Aldolase, HP panels, ANCA, IgG) except for positive ROSAMARIA (speckled 1:160). In  Oct also had negative workup (C3 and C4, anti-centromere, anti-Smith, anti-RNP, anti-SSA, anti-SSB, Scl-70, anti-cardiolipin, anti-CCP). Concern for shunt physiology with highly elevated A-a gradient, multiple TTEs and EDWIGE with positive late bubble, hypoxemia and ongoing dependence on high flow oxygen that seems out of proportion to degree of pulmonary hypertension and fibrotic changes. NM MAA shunt study 2/9 confirms shunt physiology. Based on bubble studies, presumably extracardiac shunt, potential diagnosis includes DMVPAVM which is very rare but would explain lack of macrovascular abnormalities. In discussions with radiology, and abnormal NM shunt study perfusion to upper lung fields, will pursue CTPE dual energy to better delineate vasculature and perfusion. Additionally, will obtain CT abdomen/pelvis for any extrathoracic abnormality that could be contributing to shunt. Not a transplant candidate per [facility] or [place].   - Remains on Epoprostenol @ 20  - Dexamathasone from 20-10 today    CXR:   Per my interpretation stable, support devices in appropriate position, ETT needs to be advanced     Vent Settings:   Vent Mode: PCV Plus assist  (Pressure Control Ventilation/ Assist Control)  FiO2 (%): 50 %  Resp Rate (Set): 15 breaths/min  PEEP (cm H2O): 8 cmH2O  Inspiratory Pressure (cm H2O) (Drager Nena): 28  Resp: 15    ABG:   Recent Labs   Lab 03/10/24  0559 03/10/24  0355 03/10/24  0152   PH 7.43 7.45 7.49*   PCO2 40 38 34*   PO2 75* 70* 68*   HCO3 27 26 26   O2PER 50 50  50  70 50     Plan:  - Wean FiO2 for SpO2 > 88%  - Dexamethasone to 10 today  - Wean veletri?  - Discuss trach with surgery  - Follow up IG levels  - Wean vent as able  - Daily CXR  - Serial ABGs   - Consider scheduled duonebs if signs of lung dz, currently PRN    - Peridex  - FBG and diuresis as above        Gastrointestinal, Nutrition  # Shock liver 2/2 cardiac arrest  # Hypoalbuminemia   # At risk for protein malnutrition   No known medical hx.    CAP CT  Anterior mediastinal hematoma post arrest   Diet: Per Nutriiton    Recent Labs   Lab 03/10/24  0355 03/09/24  2135 03/09/24  1622   AST 51* 53* 58*   ALT 68 69 81*   BILITOTAL 0.4 0.4 0.4   ALBUMIN 3.0* 3.0* 3.1*   PROTTOTAL 5.0* 5.0* 5.2*   ALKPHOS 110 108 117     Plan:  - Monitor LFTs  - RUQ US without abnormality  - Bowel regimen in place-PRN  - GI Prophylaxis: PPI; Protonix 40 mg daily  - Nutrition consulted, appreciate recommendations      Renal, Electrolytes  # Acute Renal Injury   # Hypernatremia  # Lactic acidosis  # hypermagnesemia  Baseline CR 0.90  I/O last 3 completed shifts:  In: 5894.44 [I.V.:1339.44; NG/GT:2645]  Out: 4755 [Urine:4205; Stool:550]    NG/OG/NJ Tube Orogastric-Flush/Free Water (mL): 100 mL    Recent Labs   Lab 03/10/24  0355 03/09/24  2135 03/09/24  1622 03/09/24  0954 24  0324 24  1005 24  0300    141 143   < > 142   < > 144   POTASSIUM 3.4 4.4 3.8   < > 3.4   < > 3.9   CHLORIDE 107 108* 106   < > 104   < > 103   CO2 26 25 27   < > 28   < > 26   BUN 75.0* 79.4* 84.0*   < > 97.6*   < > 104.0*   CR 1.21* 1.26* 1.29*   < > 1.46*   < > 1.82*   PHOS 2.4*  --   --   --  4.0  --  6.6*   MAG 2.8* 2.9* 2.8*   < > 2.9*   < > 2.7*    < > = values in this interval not displayed.     Net + 916 ml yesterday    Plan:  - Avoid nephrotoxins, renally dose meds  - Monitor urine output  - FBG and diuresis as above  - Free water flush to maintain eunatremia    Infectious Disease  # Aspiration Pneumonia  # Leukocytosis  # MRSA pna  Recent Labs   Lab 03/10/24  0355 03/09/24  2135 03/09/24  1622   WBC 15.0* 18.4* 17.2*   Temp (24hrs), Av.4  F (36.9  C), Min:98.1  F (36.7  C), Max:98.8  F (37.1  C)   WBC up    Cultures thus far:  Blood NGTD   Sputum MRSA, GPC   Urine NGTD   MRSA Positive   COVID negative  Antibiotics     Anti-infectives (From now, onward)      Start     Dose/Rate Route Frequency Ordered Stop    24 0210  vancomycin (VANCOCIN) 1,250 mg in 0.9% NaCl 250  mL intermittent infusion         1,250 mg  over 90 Minutes Intravenous EVERY 24 HOURS 03/08/24 1817            Plan:  - vanco 14D from negative culture date  - Monitor for signs of infection  - Avoid fevers     Hematology  #Acute Blood Loss Anemia on Anemia of Critical Illness   # Thrombocytopenia  Hgb decreaseing. Oral bleeding, CT CAP anterior mediastinal hematoma  US LE w/ Arterial Duplex with no evidence of acute thrombus    Receiving heparin for ECMO with ACT goal 160-180  Dose (units/hr) HEParin: 1400 Units/hr  ACT  (seconds): 162 seconds    Recent Labs   Lab 03/10/24  0355 03/09/24  2135 03/09/24  1622   HGB 7.5* 7.3* 7.3*   HCT 22.4* 21.6* 21.8*   * 137* 129*     Recent Labs   Lab 03/10/24  0355 03/09/24 2135 03/09/24  1622   INR 1.13 1.13 1.10   PTT 83* 79* 70*       Plan:  - DVT PPX: Heparin with ACT goal as above  - PPI BID  - Transfusion parameters:   - 1u PRBC for hbg < 7.0   - 1u platelet for plt < 50   - 1u FFP for INR > 3   - 5u cryoprecipitate for fibrinogen <150     Endocrinology  # Hyperglycemia   # Type 1 DM  # A1C 10.0  # Hypothyroidism    Recent Labs   Lab 03/10/24  0651 03/10/24  0601 03/10/24  0517 03/10/24  0403 03/10/24  0355   * 150* 144* 169* 181*     Dose (units/hr) Insulin: 3 Units/hr    Plan  - insulin gtt as needed  - Lantus to 50 units BID  - Endocrine consulted   - Continue levothyroxine 150mcg    Integumentary:  - No skin issues    Lines/Tubes/Drains:  PIV left lower fore arm 2/10  CVC TL RFV 2/23  RR arterial line 2/23  ECMO L femoral vein 29 FR 2/23   ECMO L femoral artery 17 FR 2/23  Distal perfusion catheter LSFA 2/23  Nicole 2/23  OG 2/24  Rectal tube with balloon 3/5     ICU  Feeding: TF   Analgesia: Fentanyl  Sedation: propofol and midazolam  Thrombopx: Heparin infusion   Head of bed: reverse trend   Ulcers: PPI  Glucose: Insulin infusion  SBT: not ready  B: liquid stool with rectal tube  I: still needs urinary catheter for accurate I/Os  De-escalate: continue  "abx    Family will be updated by team    Patient seen, discussed, and plan developed with staff physician, VASQUEZ Richey CNP  [facility]  ICU Cardiology-CICU Service    Objective:  Most recent vital signs:  /60   Pulse 102   Temp 98.4  F (36.9  C)   Resp 15   Ht 1.778 m (5' 10\")   Wt 112 kg (246 lb 14.6 oz)   SpO2 92%   BMI 35.43 kg/m    Temp:  [98.2  F (36.8  C)-98.6  F (37  C)] 98.4  F (36.9  C)  Pulse:  [] 102  Resp:  [11-27] 15  MAP:  [27 mmHg-88 mmHg] 69 mmHg  Arterial Line BP: ()/(48-77) 97/58  FiO2 (%):  [50 %] 50 %  SpO2:  [89 %-100 %] 92 %    Physical exam:  General: In bed, in NAD  HEENT: PERRL, no scleral icterus or injection  CARDIAC: RRR, no m/r/g appreciated. Peripheral pulses dopplered  RESP: Mechanical ventilation; L lung diminished with exp wheezing  GI: soft, rounded, BS hypoactive  : Nicole, good UO  EXTREMITIES: 3+ LE edema, pulses 2+. Femoral access site oozing decreased. Dressing changed last night   SKIN: No acute lesions appreciated  NEURO: Intubated and sedated, sedation holiday as tolerates. Opens eyes, nods to questions when lightened           "

## 2024-03-11 NOTE — PROGRESS NOTES
[facility]    ECLS Shift Summary:     ECMO Equipment:  Console Serial Number: [id number]  Circuit Lot Number: [id number]  Oxygenator Lot Number: [id number]    Circuit Assessment: Fibrin  Fibrin Location: connectors.    Arterial ECMO Cannula: 17 Fr in the Left Femoral Artery  Venous ECMO Cannula: 29 Fr in the Left Femoral Vein  ECMO Cannula Venous Left femoral vein-Site Assessment: WDL  ECMO Cannula Arterial Left femoral artery-Site Assessment: WDL  Distal Perfusion Catheter Left femoral artery-Site Assessment: WDL  ECMO Cannula Venous Left femoral vein-Site Intervention: No intervention needed  ECMO Cannula Arterial Left femoral artery-Site Intervention: No intervention needed  Distal Perfusion Catheter Left femoral artery-Site Intervention: No intervention needed    Patient remains on V-A ECMO, all equipment is functioning and alarms are appropriately set. RPM's: 3300 with Blood Flow (Circuit) LPM  Avg: 3.4 LPM  Min: 3.36 LPM  Max: 3.5 LPM L/min. Sweep is at 1 LPM and 90 %. Extremities are warm.     Significant Shift Events: None.    Vent settings:  Vent Mode: PCV Plus assist  (Pressure Control Ventilation/ Assist Control)  FiO2 (%): 60 %  Resp Rate (Set): 15 breaths/min  PEEP (cm H2O): 8 cmH2O  Inspiratory Pressure (cm H2O) (Drager Nena): 28  Resp: 15      Anticoagulation:  Dose (units/hr) HEParin: 1700 Units/hr  Rate (mL/hr) HEParin: 17 mL/hr  Concentration HEParin: 100 Units/mL     Dose (mg/kg/hr) Bivalirudin: 0 mg/kg/hr  Rate (mL/hr) Bivalirudin: 0 mL/hr  Concentration Bivalirudin: 1 mg/mL  Most recent: ACT  (seconds): 189 seconds    Urine output is Yellow / Straw Colored.  Blood loss was minimal. Product given included PRBC x1.     Intake/Output Summary (Last 24 hours) at 3/11/2024 0524  Last data filed at 3/11/2024 0500  Gross per 24 hour   Intake 5757.8 ml   Output 5965 ml   Net -207.2 ml       Labs:  Recent Labs   Lab 03/11/24  0416 03/11/24  0150 03/11/24  0000  03/10/24  2156   PH 7.45 7.45 7.44 7.43   PCO2 40 39 39 39   PO2 73* 64* 74* 68*   HCO3 28 27 27 26   O2PER 90  60  90 90 60 90       Lab Results   Component Value Date    HGB 8.6 (L) 03/11/2024    PHGB 50 (H) 03/11/2024     (L) 03/11/2024    FIBR 369 03/04/2024    INR 1.06 03/11/2024     (HH) 03/11/2024    DD 2.45 (H) 03/04/2024    ANTCH 111 03/10/2024       Plan is continue VA ECMO at this time.    Faviola Livingston, RT  ECMO Specialist  3/11/2024 5:24 AM

## 2024-03-11 NOTE — PROGRESS NOTES
PALLIATIVE CARE PROGRESS NOTE  [facility]     Patient Name: Silvano Motta  Date of Admission: 2/4/2024        Recommendations & Counseling       GOALS OF CARE:   Plan to transition to comfort care and planning for DCD organ donation -family has already met with [employer]. Given plan for organ donation, withdrawal of life support will take place in OR.     Met with family - daughter, sisters. They are aware of the plan and do not have any further questions or concerns.     ADVANCE CARE PLANNING:  No health care directive on file. Per  informed consent policy, next of kin should be involved if patient becomes unable.   In depth discussion 2/16 with patient and his sister Odalys by phone. Silvano is clear that he wants Odalys to be his healthcare agent and Odalys confirms she is willing to play this role and agrees to be named healthcare agent on his healthcare directive. Silvano also says he would want to have his sister Antonieta be his alternate healthcare agent. He specifically says he does not want his daughter or mother involved in making healthcare decisions and does not want their contact information in the chart as he does not want them burdened by calls from the hospital.   There is no POLST form on file, defer to patient and/or next of kin for decisions   Code status: No CPR- Pre-arrest intubation OK    MEDICAL MANAGEMENT:   Not actively managing at this time  Reviewed plan with CICU team who plans to have comfort medications available for when patient is going to OR for DCD    PSYCHOSOCIAL/SPIRITUAL:  Large extended family all supporting him    Palliative Care will continue to follow. Thank you for the consult and allowing us to aid in the care of Silvano Motta.    These recommendations have been discussed with Dr Saleh and the CICU team .        Winter Craven MD / Palliative Medicine   Securely message with the Vocera Web Console (learn more here)   Text  page via [facility] Paging/Directory            Interval History:     Multidisciplinary collaboration:  Care conference yesterday - made plan for comfort care.   Today [facility] met with family and they decided to proceed with organ donation     Palliative Summary/HPI          Silvano Motta is a 58 year old male with a past medical history of DM1 who presented to the hospital for evaluation and work up of acute hypoxic respiratory failure. Etiology of his respiratory failure has not been found. He has undergone a battery of tests and work ups, all of which have been inconclusive.      Regardless, he has been deemed not a transplant candidate at [facility] or at [place]. After this was shared with Silvano, he continued to attempt to wean his own oxygen needs with no success.      On 2/23/2024, Silvano suffered a cardiac arrest while in CT and on BiPAP support. ROSC was achieved quickly but he subsequently suffered refractory PEA requiring VA ECMO cannulation.      He now remains intubated, sedated, and on VA ECMO in the ICU.     Today, Patient was seen for:   Acute respiratory failure  Cardiac failure  Support  Goals of care            Review of Systems:     Not appropriate          Medications:     I have reviewed this patient's medication profile and medications during this hospitalization.               Physical Exam:   Temp:  [98.2  F (36.8  C)-98.6  F (37  C)] 98.2  F (36.8  C)  Pulse:  [] 96  Resp:  [15-36] 20  MAP:  [67 mmHg-153 mmHg] 98 mmHg  Arterial Line BP: ()/(43-82) 146/82  FiO2 (%):  [60 %] 60 %  SpO2:  [88 %-100 %] 96 %  247 lbs 12.75 oz    Gen: Intubated, sedated, appears stated age, sensorium not intact. Appears comfortable/sedated                 Current Problem List:   Principal Problem:    Pulmonary hypertension (H)  Active Problems:    Acute kidney failure, unspecified (H)      Allergies   Allergen Reactions    Fluoxetine Other (See Comments)     PN: Made him feel more depressed.  Worsening of  depression  PN: Made him feel more depressed.      Metformin      Other reaction(s): Seizures    Carbamazepine Other (See Comments)     PN: diffuse rash  PN: diffuse rash      Other Environmental Allergy Unknown     dut            Data Reviewed:     Reviewed recent labs and pertinent imaging  ROUTINE ICU LABS (Last four results)  CMP  Recent Labs   Lab 03/11/24  1222 03/11/24  1010 03/11/24  1006 03/11/24  0835 03/11/24  0420 03/11/24  0416 03/10/24  2157 03/10/24  2156 03/10/24  1802 03/10/24  1524 03/10/24  0403 03/10/24  0355 03/09/24  0403 03/09/24  0324 03/08/24  0305 03/08/24  0300   NA  --   --  143  --   --  145  --  140  --  140   < > 141   < > 142   < > 144   POTASSIUM  --   --  3.9  --   --  3.8  --  4.5  --  4.5   < > 3.4   < > 3.4   < > 3.9   CHLORIDE  --   --  109*  --   --  108*  --  106  --  105   < > 107   < > 104   < > 103   CO2  --   --  25  --   --  28  --  24  --  25   < > 26   < > 28   < > 26   ANIONGAP  --   --  9  --   --  9  --  10  --  10   < > 8   < > 10   < > 15   * 136* 144* 123*   < > 120*   < > 227*   < > 180*   < > 181*   < > 188*   < > 117*   BUN  --   --  56.3*  --   --  59.3*  --  62.9*  --  70.8*   < > 75.0*   < > 97.6*   < > 104.0*   CR  --   --  1.00  --   --  1.05  --  1.07  --  1.11   < > 1.21*   < > 1.46*   < > 1.82*   GFRESTIMATED  --   --  88  --   --  83  --  81  --  78   < > 70   < > 56*   < > 43*   DORINA  --   --  8.3*  --   --  8.3*  --  8.1*  --  8.4*   < > 8.2*   < > 8.4*   < > 8.5*   MAG  --   --  2.4*  --   --  2.6*  --  2.6*  --  2.6*   < > 2.8*   < > 2.9*   < > 2.7*   PHOS  --   --   --   --   --  4.4  --   --   --   --   --  2.4*  --  4.0  --  6.6*   PROTTOTAL  --   --  5.3*  --   --  5.2*  --  4.9*  --  5.3*   < > 5.0*   < > 5.2*   < > 5.4*   ALBUMIN  --   --  3.3*  --   --  3.2*  --  3.1*  --  3.3*   < > 3.0*   < > 3.1*   < > 3.2*   BILITOTAL  --   --  0.4  --   --  0.4  --  0.3  --  0.4   < > 0.4   < > 0.4   < > 0.4   ALKPHOS  --   --  109  --   --  115   --  110  --  116   < > 110   < > 118   < > 139   AST  --   --  45  --   --  48*  --  46*  --  53*   < > 51*   < > 62*   < > 86*   ALT  --   --  56  --   --  60  --  61  --  66   < > 68   < > 91*   < > 124*    < > = values in this interval not displayed.     CBC  Recent Labs   Lab 03/11/24  1006 03/11/24  0416 03/10/24  2156 03/10/24  1524   WBC 13.1* 13.0* 12.4* 13.6*   RBC 2.62* 2.79* 2.56* 2.56*   HGB 7.9* 8.6* 7.6* 7.5*   HCT 23.9* 25.2* 23.0* 22.8*   MCV 91 90 90 89   MCH 30.2 30.8 29.7 29.3   MCHC 33.1 34.1 33.0 32.9   RDW 19.1* 18.6* 18.5* 19.3*   * 127* 123* 129*     INR  Recent Labs   Lab 03/11/24  1006 03/11/24  0416 03/10/24  2156 03/10/24  1524   INR 1.11 1.06 1.11 1.09     Arterial Blood Gas  Recent Labs   Lab 03/11/24  1219 03/11/24  1006 03/11/24  0839 03/11/24  0552   PH 7.44 7.44 7.47* 7.45   PCO2 39 40 36 39   PO2 66* 66* 64* 77*   HCO3 27 27 26 27   O2PER 6 60 60 90               Medical Decision Making       Please see A&P for additional details of medical decision making.  MANAGEMENT DISCUSSED with the following over the past 24 hours: CICU team   NOTE(S)/MEDICAL RECORDS REVIEWED over the past 24 hours: Cardiology, nursing, respiratory therapy, CICU  Tests REVIEWED in the past 24 hours:  - BMP  - CBC  SUPPLEMENTAL HISTORY, in addition to the patient's history, over the past 24 hours obtained from:   - Daughter, sisters

## 2024-03-11 NOTE — PROGRESS NOTES
Care Management Follow Up    Length of Stay (days): 36    Expected Discharge Date:  N/A     Concerns to be Addressed: autopsy  Patient plan of care discussed at interdisciplinary rounds: Yes    Anticipated Discharge Disposition: N/A     Anticipated Discharge Services: N/a  Anticipated Discharge DME:  N/A    Patient/family educated on Medicare website which has current facility and service quality ratings:  N/A  Education Provided on the Discharge Plan:  N/A  Patient/Family in Agreement with the Plan: N/A    Referrals Placed by CM/SW:    Private pay costs discussed: Not applicable    Additional Information:  SW and Unit RNCC approached by [employer] notifying CM team that pt family would like autopsy done and inquiring about if this would be covered.     Unit RNCC [facility] with [facility] confirmed that autopsy should be covered by hospital as long as pt dies at hospital and declined by medical examiner.     ROBERT met with pt family at bedside and relayed information from pathology and shanellee that autopsy should likely be covered. SW shared that family would need to fill out form. Bedside RN also present and familiar with process. Bedside RN reported that she would coordinate with team later today.     ADDENDUM 1608: ROBERT notified by Bedside RN that pt family had questions about pt insurance coverage.     ROBERT met with pt family at bedside to address questions. Pt family reported that they wanted to ensure that they had everything in order so that pt would not get charged for hospitalization stay. ROBERT shared that Medical Assistance should likely cover pt's entire hospitalization. ROBERT advised pt family to call [facility] (ph: [phone]) for more specific answers to their questions. ROBERT provided pt family with [facility] phone number. Pt family had no other questions for ROBERT at this time.     CHARISSA Ayala   [facility]   Covering 4A/E ROBERT  4A/E ROBERT  Ph: [phone]

## 2024-03-11 NOTE — PROGRESS NOTES
[facility] Note:   Donor Evaluation and Work-Up      Silvano has designated themselves as a donor and this decision is supported by his family/LNOK. Silvano's loved ones have made end of life decisions and is honoring Saud s decision to be a donor.      Organs being evaluated:  Liver, Kidneys     Research :  Authorized     Testing/imaging is needed based on each organ system.      Liver   CT abdomen/pelvis *completed*  Kidneys  Routine lab work (Q6H)      Shift Summary and Expectations:     Plan for this shift:   Donation clinical coordinator will be on-site    Notify the Donation Coordinator if below parameters are not being met:   Fluid status   Maintain total IV fluid ~125 mL/hr   Central venous pressure goal 4-10 mmHg     Cardiac   Systolic blood pressure goal less than 150 mmHg   Mean arterial pressure goal: greater than 60   Heart rate goal      Respiratory   Oxygen Sat goal >95%     Liver   Serum sodium level < 155 mEq/L     Kidney   Urine output goal at least 50 ml/hr and no more than 250 ml/hr. [employer] coordinator to direct titration of vasopressin if not meeting UOP goal.      Thank you for your care of Silvano and their family,      [facility]

## 2024-03-11 NOTE — PROGRESS NOTES
[facility]    ECLS Shift Summary: 4321-3454     ECMO Equipment:  Console Serial Number: [id number]  Circuit Lot Number: [id number]  Oxygenator Lot Number: [id number]    Circuit Assessment: Fibrin  Fibrin Location: Connectors    Arterial ECMO Cannula: 17 Fr in the Left Femoral Artery  Venous ECMO Cannula: 29 Fr in the Left Femoral Vein  ECMO Cannula Venous Left femoral vein-Site Assessment: WDL  ECMO Cannula Arterial Left femoral artery-Site Assessment: WDL  Distal Perfusion Catheter Left femoral artery-Site Assessment: WDL  ECMO Cannula Venous Left femoral vein-Site Intervention: No intervention needed  ECMO Cannula Arterial Left femoral artery-Site Intervention: No intervention needed  Distal Perfusion Catheter Left femoral artery-Site Intervention: No intervention needed    Patient remains on V-A ECMO, all equipment is functioning and alarms are appropriately set. RPM's: 3300 with Blood Flow (Circuit) LPM  Avg: 3.5 LPM  Min: 3.46 LPM  Max: 3.62 LPM L/min. Sweep is at 1 LPM and 90 %. Extremities are warm.     Significant Shift Events: none.    Vent settings:  Vent Mode: PCV Plus assist  (Pressure Control Ventilation/ Assist Control)  FiO2 (%): (S) 60 %  Resp Rate (Set): 15 breaths/min  PEEP (cm H2O): 8 cmH2O  Inspiratory Pressure (cm H2O) (Drager Nena): 28  Resp: 16      Anticoagulation:  Dose (units/hr) HEParin: 1700 Units/hr  Rate (mL/hr) HEParin: 17 mL/hr  Concentration HEParin: 100 Units/mL     Dose (mg/kg/hr) Bivalirudin: 0 mg/kg/hr  Rate (mL/hr) Bivalirudin: 0 mL/hr  Concentration Bivalirudin: 1 mg/mL  Most recent: ACT  (seconds): 197 seconds    Urine output is Yellow / Straw Colored.  Blood loss was scant. Product given included none.     Intake/Output Summary (Last 24 hours) at 3/11/2024 1801  Last data filed at 3/11/2024 1800  Gross per 24 hour   Intake 5457.63 ml   Output 5125 ml   Net 332.63 ml       Labs:  Recent Labs   Lab 03/11/24  1540 03/11/24  1340  03/11/24  1219 03/11/24  1006   PH 7.46* 7.44 7.44 7.44   PCO2 37 39 39 40   PO2 134* 64* 66* 66*   HCO3 26 26 27 27   O2PER 80  90  80 60 6 60       Lab Results   Component Value Date    HGB 7.9 (L) 03/11/2024    PHGB 50 (H) 03/11/2024     (L) 03/11/2024    FIBR 369 03/04/2024    INR 1.11 03/11/2024     (H) 03/11/2024    DD 2.45 (H) 03/04/2024    ANTCH 102 03/11/2024       Plan is to remain on VA ECMO until OR time. Patient will be going to OR to donate organs per family wishes.    Dona Westbrook, RT  ECMO Specialist  3/11/2024 6:01 PM

## 2024-03-11 NOTE — PROGRESS NOTES
ECMO Attending Progress Note  3/11/2024    Silvano Motta is a 58 year old male who was cannulated for ECMO 2024 due to refractory PEA arrest    Cannulation Site:  17 Fr in the L femoral artery  29 Fr in the L femoral vein    Interval events: Stable hemodynamically, still with high oxygen requirement    Pulsatilty (IABP paused if applicable):40 mmHG     Physical Exam:  Temp:  [98.2  F (36.8  C)-98.8  F (37.1  C)] 98.2  F (36.8  C)  Pulse:  [] 95  Resp:  [15-36] 15  MAP:  [67 mmHg-153 mmHg] 83 mmHg  Arterial Line BP: ()/(43-81) 120/71  FiO2 (%):  [50 %-60 %] 60 %  SpO2:  [88 %-100 %] 93 %    Intake/Output Summary (Last 24 hours) at 2024 1158  Last data filed at 2024 1100  Gross per 24 hour   Intake 1932.82 ml   Output 1425 ml   Net 507.82 ml        Vent Mode: PCV Plus assist  (Pressure Control Ventilation/ Assist Control)  FiO2 (%): 60 %  Resp Rate (Set): 15 breaths/min  PEEP (cm H2O): 8 cmH2O  Inspiratory Pressure (cm H2O) (Drager Nena): 28  Resp: 15       Labs:  Recent Labs   Lab 24  1006 24  0839 24  0552 24  0416   PH 7.44 7.47* 7.45 7.45   PCO2 40 36 39 40   PO2 66* 64* 77* 73*   HCO3 27 26 27 28   O2PER 60 60 90 90  60  90      Recent Labs   Lab 24  0416 03/10/24  2156 03/10/24  1524 03/10/24  1001   WBC 13.0* 12.4* 13.6* 14.8*   HGB 8.6* 7.6* 7.5* 7.8*     Creatinine   Date Value Ref Range Status   2024 1.05 0.67 - 1.17 mg/dL Final   03/10/2024 1.07 0.67 - 1.17 mg/dL Final   03/10/2024 1.11 0.67 - 1.17 mg/dL Final   03/10/2024 1.13 0.67 - 1.17 mg/dL Final   Blood Flow (Circuit) LPM: 3.53 LPM  Sweep LPM: 1 LPM  Sweep FiO2   %: 90 %  ACT  (seconds): 189 seconds  Arterial Blood Temp  (degrees C): 37 C  Pulse Oximetry  (SpO2%): 98 %  Arterial Pressure  mmH mmHg    ECMO Issues including assessments and plan on DOS 3/11/2024:  Neuro: Sedated for mechanical ventilation and ECMO.  No acute distress.  NIRS stable b/l  RASS goal: -3  CV: Cardiogenic  shock.  Hemodynamically stable on no pressors  Pulm: Keep vent settings at rest settings as above.  FEN/Renal: Electrolytes stable w/ replacement protocols in place, Cr worsening, UOP decreasing  Heme: ACT goal: 160-180, Hemoglobin stable.  Minimal oozing around the ECMO cannulas.  ID: Receiving empiric antibiotics  Cannulae: Position is acceptable on exam and the available imaging.  Distal perfusion cannula is in place and patent.  Extremities are well-perfused.     I have personally reviewed the ECMO flows, oxygenation and CO2 clearance, anticoagulation, and cannula position.  I have also personally assessed the patient's systemic response with hemodynamics, oxygenation, ventilation, and bleeding.       The patient requires continued ECMO support and management in the ICU.  I have discussed patient care and treatment plan with the primary team.      Kavin Saleh MD, PhD  Interventional/Critical Care Cardiology  [phone]    March 11, 2024

## 2024-03-11 NOTE — PROGRESS NOTES
Cardiology ICU Progress Note    Brief HPI:  Silvano Motta is a 58 year old male being admitted to the CICU on 2/4/2024 s/p peripheral VA-ECMO cannulation s/p cardiac arrest. 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 Admitted 1/25 to OSH for AHRF and transferred to [facility] 2/4 for PH therapy managed.  Patient not a lung transplant candidate at [facility] or [facility].      Subjective and Interval:  Remains critically ill in the ICU. Yesterday, care conference held and decision made to transition pt to comfort today. Lactate normal, remains off pressors.     Assessment and plan by system:     Today's changes:  Comfort cares when family ready  Increase sedation as needed for comfort     Neurology   # Concern for anoxic brain injury    # Hx TBI 2020  # Hx Sz disorder-Todds paralysis  CT head 2/23: no intracranial pathology  - Intubated, sedated. Avoiding hyperthermia    Current sedation:  RASS (Davidson Agitation-Sedation Scale): -2-->light sedation  Dose (mg/hr) Midazolam: 3 mg/hr, Dose (mcg/hr) Fentanyl: 200 mcg/hr, and Dose (mcg/kg/min) Propofol: 30 mcg/kg/min    Plan:  - Continue PTA Keppra, VIMPAT, and gabapentin  - Continue home ASA  - Continue sedation with a RASS goal -1 to -2  - Spontaneous awakening trial as tolerated  - permissive hypercapnea and hypernatremia for neuroprotection     Cardiovascular  # PEA/VT Cardiac Arrest s/p peripheral VA ECMO Cannulation 2/23/24   # Cardiogenic shock requiring VA ECMO  # Possible pericardial effusion  Peripheral V-A ECMO inserted via LCFA and LCFV 17 Fr arterial cannula, 29 Fr venous annula, DPC 7 Fr LSFA  Angiogram: RHC, Normal biventricular filling pressures.   Mild pre-capillary pulmonary hypertension  Low-normal cardiac output and index.  TTE 2/4:  VA ECMO at 4.1 LPM. LVEF 60-65%. The  right ventricle is normal size.  Global right ventricular function is mildly reduced.  No pericardial effusion is present.    ECG Rhythm: Normal sinus rhythm    ECMO:  Mode: V-A   Pump Type: Cardiohelp  RPM's: 3300  Blood Flow (Circuit) LPM: 3.53 LPM  Sweep LPM: 1 LPM  Sweep FiO2   %: 90 %  Venous Pressure  mmHg: -20 mmHg  Arterial Pressure  mmH mmHg  Internal Pressure mmH mmHg  Pressure Change mmH mmHg    Current Pressors/inotropes/antiarrythmic:  Off pressors    Hemodynamics:  Art Line  Arterial Line BP: 172/69  Arterial Line MAP (mmHg): 92 mmHg  Arterial Line Location: Right radial  Art Line Wave Form: Appropriate wave forms    Plan:  - Continue ASA 81mg   - Hold lipitor for now given shock liver  - Telemetry monitoring  - Continue trending troponin  - Continue ECMO support   - Intermittently being diuresed.      Pulmonary  # Acute on chronic hypoxemic respiratory failure requiring intubation  # Shunt physiology, positive shunt study 24  # Mild pulmonary hypertension  # Severe ROSALIO on PAP at night  # Probable aspiration pneumonia  Per pulmonology documentation: Hx of chronic hypoxemia thought due to hypoventilation and untreated sleep apnea since Oct 2023 with extensive workup including multiple CT Angio studies, NM Perfusion studies, RHC in Oct 2023 and again this hospitalization showing mild pulmonary hypertension (mean PA 25, PCWP 10). Imaging with stable pleural based HARMONY nodule and subpleural reticulation and thickening of interlobular septi present since . Repeated CT scans without embolus or AVM. Does have genetic sequencing in process. Thus far auto-immune workup negative (SSA, SSB, Scleroderma, Clara 1, RF, CCP, CRP, Aldolase, HP panels, ANCA, IgG) except for positive ROSAMARIA (speckled 1:160). In Oct also had negative workup (C3 and C4, anti-centromere, anti-Smith, anti-RNP, anti-SSA, anti-SSB, Scl-70, anti-cardiolipin, anti-CCP). Concern for shunt physiology with highly elevated A-a  gradient, multiple TTEs and EDWIGE with positive late bubble, hypoxemia and ongoing dependence on high flow oxygen that seems out of proportion to degree of pulmonary hypertension and fibrotic changes. NM MAA shunt study 2/9 confirms shunt physiology. Based on bubble studies, presumably extracardiac shunt, potential diagnosis includes DMVPAVM which is very rare but would explain lack of macrovascular abnormalities. In discussions with radiology, and abnormal NM shunt study perfusion to upper lung fields, will pursue CTPE dual energy to better delineate vasculature and perfusion. Additionally, will obtain CT abdomen/pelvis for any extrathoracic abnormality that could be contributing to shunt. Not a transplant candidate per [facility] or [facility].   - Remains on Epoprostenol @ 20  - Dexamathasone 10     CXR:   Per my interpretation stable, support devices in appropriate position     Vent Settings:   Vent Mode: PCV Plus assist  (Pressure Control Ventilation/ Assist Control)  FiO2 (%): 60 %  Resp Rate (Set): 15 breaths/min  PEEP (cm H2O): 8 cmH2O  Inspiratory Pressure (cm H2O) (Drager Nena): 28  Resp: (!) 36    ABG:   Recent Labs   Lab 03/11/24  0552 03/11/24  0416 03/11/24  0150   PH 7.45 7.45 7.45   PCO2 39 40 39   PO2 77* 73* 64*   HCO3 27 28 27   O2PER 90 90  60  90 90     Plan:  - Wean FiO2 for SpO2 > 88%  - Dexamethasone to 10 today  - Wean veletri?  - Discuss trach with surgery  - Follow up IG levels  - Wean vent as able  - Daily CXR  - Serial ABGs   - Consider scheduled duonebs if signs of lung dz, currently PRN    - Peridex  - FBG and diuresis as above        Gastrointestinal, Nutrition  # Shock liver 2/2 cardiac arrest  # Hypoalbuminemia   # At risk for protein malnutrition   No known medical hx.   CAP CT  Anterior mediastinal hematoma post arrest   Diet: Per Nutriiton    Recent Labs   Lab 03/11/24  0416 03/10/24  2156 03/10/24  1524   AST 48* 46* 53*   ALT 60 61 66   BILITOTAL 0.4 0.3 0.4   ALBUMIN 3.2* 3.1* 3.3*    PROTTOTAL 5.2* 4.9* 5.3*   ALKPHOS 115 110 116     Plan:  - Monitor LFTs  - RUQ US without abnormality  - Bowel regimen in place-PRN  - GI Prophylaxis: PPI; Protonix 40 mg daily  - Nutrition consulted, appreciate recommendations      Renal, Electrolytes  # Acute Renal Injury   # Hypernatremia  # Lactic acidosis  # hypermagnesemia  Baseline CR 0.90  I/O last 3 completed shifts:  In: 5669.8 [I.V.:2114.8; NG/GT:1525]  Out: 6015 [Urine:5765; Stool:250]    NG/OG/NJ Tube Orogastric-Flush/Free Water (mL): 150 mL    Recent Labs   Lab 24  0416 03/10/24  2156 03/10/24  1524 03/10/24  1001 03/10/24  0355 24  0954 24  0324    140 140   < > 141   < > 142   POTASSIUM 3.8 4.5 4.5   < > 3.4   < > 3.4   CHLORIDE 108* 106 105   < > 107   < > 104   CO2 28 24 25   < > 26   < > 28   BUN 59.3* 62.9* 70.8*   < > 75.0*   < > 97.6*   CR 1.05 1.07 1.11   < > 1.21*   < > 1.46*   PHOS 4.4  --   --   --  2.4*  --  4.0   MAG 2.6* 2.6* 2.6*   < > 2.8*   < > 2.9*    < > = values in this interval not displayed.     Net + 916 ml yesterday    Plan:  - Avoid nephrotoxins, renally dose meds  - Monitor urine output  - FBG and diuresis as above  - Free water flush to maintain eunatremia    Infectious Disease  # Aspiration Pneumonia  # Leukocytosis  # MRSA pna  Recent Labs   Lab 246 03/10/24  2156 03/10/24  1524   WBC 13.0* 12.4* 13.6*   Temp (24hrs), Av.4  F (36.9  C), Min:98.1  F (36.7  C), Max:98.8  F (37.1  C)   WBC up    Cultures thus far:  Blood NGTD   Sputum MRSA, GPC   Urine NGTD   MRSA Positive   COVID negative  Antibiotics     Anti-infectives (From now, onward)      Start     Dose/Rate Route Frequency Ordered Stop    24 0800  vancomycin (VANCOCIN) 1,500 mg in 0.9% NaCl 250 mL intermittent infusion         1,500 mg  over 90 Minutes Intravenous EVERY 18 HOURS 03/10/24 1838      03/10/24 1800  piperacillin-tazobactam (ZOSYN) 4.5 g vial to attach to  mL bag        Note to Pharmacy: For [facility]  "and [facility]: For Zosyn-naive patients, use the \"Zosyn initial dose + extended infusion\" order panel.    4.5 g  over 30 Minutes Intravenous EVERY 6 HOURS 03/10/24 1729            Plan:  - vanco 14D from negative culture date  - Monitor for signs of infection  - Avoid fevers     Hematology  #Acute Blood Loss Anemia on Anemia of Critical Illness   # Thrombocytopenia  Hgb decreaseing. Oral bleeding, CT CAP anterior mediastinal hematoma  US LE w/ Arterial Duplex with no evidence of acute thrombus    Receiving heparin for ECMO with ACT goal 160-180  Dose (units/hr) HEParin: 1700 Units/hr  ACT  (seconds): 189 seconds    Recent Labs   Lab 03/11/24  0416 03/10/24  2156 03/10/24  1524   HGB 8.6* 7.6* 7.5*   HCT 25.2* 23.0* 22.8*   * 123* 129*     Recent Labs   Lab 03/11/24  0416 03/10/24  2156 03/10/24  1524   INR 1.06 1.11 1.09   * 140* 99*       Plan:  - DVT PPX: Heparin with ACT goal as above  - PPI BID  - Transfusion parameters:   - 1u PRBC for hbg < 7.0   - 1u platelet for plt < 50   - 1u FFP for INR > 3   - 5u cryoprecipitate for fibrinogen <150     Endocrinology  # Hyperglycemia   # Type 1 DM  # A1C 10.0  # Hypothyroidism    Recent Labs   Lab 03/11/24  0554 03/11/24  0420 03/11/24  0416 03/11/24  0255 03/11/24  0150   * 113* 120* 120* 96     Dose (units/hr) Insulin: 3 Units/hr    Plan  - insulin gtt as needed  - Lantus to 50 units BID  - Endocrine consulted   - Continue levothyroxine 150mcg    Integumentary:  - No skin issues    Lines/Tubes/Drains:  PIV left lower fore arm 2/10  CVC TL RFV 2/23  RR arterial line 2/23  ECMO L femoral vein 29 FR 2/23   ECMO L femoral artery 17 FR 2/23  Distal perfusion catheter LSFA 2/23  Nicole 2/23  OG 2/24  Rectal tube with balloon 3/5     ICU  Feeding: TF   Analgesia: Fentanyl  Sedation: propofol and midazolam  Thrombopx: Heparin infusion   Head of bed: reverse trend   Ulcers: PPI  Glucose: Insulin infusion  SBT: not ready  B: liquid stool with rectal tube  I: still " "needs urinary catheter for accurate I/Os  De-escalate: continue abx    Family will be updated by team    Patient seen, discussed, and plan developed with staff physician    VASQUEZ Cruz CNP  [facility]  ICU Cardiology-CICU Service    Objective:  Most recent vital signs:  /60   Pulse 112   Temp 98.4  F (36.9  C)   Resp (!) 36   Ht 1.778 m (5' 10\")   Wt 112.4 kg (247 lb 12.8 oz)   SpO2 97%   BMI 35.56 kg/m    Temp:  [98.2  F (36.8  C)-98.8  F (37.1  C)] 98.4  F (36.9  C)  Pulse:  [] 112  Resp:  [14-36] 36  MAP:  [67 mmHg-153 mmHg] 92 mmHg  Arterial Line BP: ()/(43-81) 172/69  FiO2 (%):  [50 %-60 %] 60 %  SpO2:  [88 %-100 %] 97 %    Physical exam:  General: In bed, in NAD  HEENT: PERRL, no scleral icterus or injection  CARDIAC: RRR, no m/r/g appreciated. Peripheral pulses dopplered  RESP: Mechanical ventilation; L lung diminished with exp wheezing  GI: soft, rounded, BS hypoactive  : Nicole, good UO  EXTREMITIES: 3+ LE edema, pulses 2+. Femoral access site oozing decreased. Dressing changed last night   SKIN: No acute lesions appreciated  NEURO: Intubated and sedated, sedation holiday as tolerates. Opens eyes, nods to questions when lightened           "

## 2024-03-11 NOTE — CONSULTS
SPIRITUAL HEALTH SERVICES Consult Note  [facility] ([place] 4A - CVICU    Provided commendation ritual for pt at bedside; many family members present. I shared prayers, scripture readings, songs, and final blessing for pt. Family very loving towards pt and supportive of each other.     Ba Lewis) Brian Og M.Div., [facility]  Staff   Pager [phone]      * [facility] remains available 24/7 for emergent requests/referrals, either by having the switchboard page the on-call  or by entering an ASAP/STAT consult in Epic (this will also page the on-call ). Routine Epic consults receive an initial response within 24 hours.*
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
## 2024-03-11 NOTE — SIGNIFICANT EVENT
SPIRITUAL HEALTH SERVICES Significant Event  [employer]ING  [facility] 4c    Pt anointed by Father Yfn Ramsey   Pager [phone]

## 2024-03-11 NOTE — PROGRESS NOTES
Overnight patient remains labile. Sedation increased for comfort. Pupils still normal and still following commands when sedation lightened.   HR 90s-100s, BP elevated when restless, otherwise normal. ECMO flowing at 3.4-3.6, RPM 3300, O2@90%, Sweep @ 1.   Vent settings unchanged. ABG's labile depending on level of sedation.   -200mL/hr  Good output from rectal tube.     K+ replaced, 1 unit PRBC's given.

## 2024-03-11 NOTE — PLAN OF CARE
Goal Outcome Evaluation:    Pt is on OR schedule for DCD this evening. Family would like 6 people in OR for withdrawal, however, it may not be possible depending on OR size. [facility] is aware and has been communicating with OR during the day. All labs collected for donation process. RT, ECMO specialist, and team aware of OR time and need to coordinate during DCD process. Heartbeats in a bottle given to family.    No patient belongings at bedside.      Plan of Care Reviewed With: parent, child, family

## 2024-03-12 LAB
BACTERIA BLD CULT: NO GROWTH
BACTERIA BLD CULT: NO GROWTH
BACTERIA SPT CULT: ABNORMAL
GRAM STAIN RESULT: ABNORMAL
GRAM STAIN RESULT: ABNORMAL

## 2024-03-12 PROCEDURE — 200N000002 HC R&B ICU UMMC

## 2024-03-12 ASSESSMENT — ACTIVITIES OF DAILY LIVING (ADL)
ADLS_ACUITY_SCORE: 47

## 2024-03-12 NOTE — PROGRESS NOTES
[facility]    ECLS Discontinuation Note:     Pt transported to OR for organ procurement without incident.   ECLS was discontinued 3/12/2024 at 2223. TOD 2235.   During procurement, 5L of preservative (that included 20,000 units of Heparin) was delivered through the arterial ECMO cannula via the ECMO rinse-back line with the help of pressure bags.    Blanca Horton RT  ECMO Specialist  3/12/2024 12:18 AM

## 2024-03-12 NOTE — PROGRESS NOTES
Pt taken to OR with RT, ECMO, MD, family present. Extubated to comfort cares per RT, premeds given per orders. Family at bedside. Pronounced  by MD at 2235.

## 2024-03-12 NOTE — DEATH PRONOUNCEMENT
MD DEATH PRONOUNCEMENT    Called to pronounce Silvano Motta dead.    Physical Exam: Unresponsive to noxious stimuli, Spontaneous respirations absent, Breath sounds absent, Carotid pulse absent, Heart sounds absent, Pupillary light reflex absent, and Corneal blink reflex absent    Patient was pronounced dead at 10:35 PM, 2024.    Preliminary Cause of Death: Respiratory failure    Principal Problem:    Pulmonary hypertension (H)  Active Problems:    Acute kidney failure, unspecified (H)       Infectious disease present?: NO    Communicable disease present? (examples: HIV, chicken pox, TB, Ebola, CJD) :  NO    Multi-drug resistant organism present? (example: MRSA): NO    Please consider an autopsy if any of the following exist:  NO Unexpected or unexplained death during or following any dental, medical, or surgical diagnostic treatment procedures.   NO Death of mother at or up to seven days after delivery.     NO All  and pediatric deaths.     NO Death where the cause is sufficiently obscure to delay completion of the death certificate.   NO Deaths in which autopsy would confirm a suspected illness/condition that would affect surviving family members or recipients of transplanted organs.     The following deaths must be reported to the 's Office:  NO A death that may be due entirely or in part to any factors other than natural disease (recent surgery, recent trauma, suspected abuse/neglect).   NO A death that may be an accident, suicide, or homicide.     NO Any sudden, unexpected death in which there is no prior history of significant heart disease or any other condition associated with sudden death.   NO A death under suspicious, unusual, or unexpected circumstances.    NO Any death which is apparently due to natural causes but in which the  does not have a personal physician familiar with the patient s medical history, social, or environmental situation or the circumstances of the  terminal event.   NO Any death apparently due to Sudden Infant Death Syndrome.     NO Deaths that occur during, in association with, or as consequences of a diagnostic, therapeutic, or anesthetic procedure.   NO Any death in which a fracture of a major bone has occurred within the past (6) six months.   NO A death of persons note seen by their physician within 120 days of demise.     NO Any death in which the  was an inmate of a public institution or was in the custody of Law Enforcement personnel.   NO  All unexpected deaths of children   YES Solid organ donors   NO Unidentified bodies   NO Deaths of persons whose bodies are to be cremated or otherwise disposed of so that the bodies will later be unavailable for examination;   NO Deaths unattended by a physician outside of a licensed healthcare facility or licensed residential hospice program   NO Deaths occurring within 24 hours of arrival to a health care facility if death is unexpected.    NO Deaths associated with the decedent s employment.   NO Deaths attributed to acts of terrorism.   NO Any death in which there is uncertainty as to whether it is a medical examiner s care should be discussed with the medical investigator.        Body disposition: Autopsy was discussed with family member:  Mother, Daughter, and Sister in person.  Permission for autopsy was obtained.

## 2024-03-13 PROCEDURE — 200N000002 HC R&B ICU UMMC

## 2024-03-13 ASSESSMENT — ACTIVITIES OF DAILY LIVING (ADL)
ADLS_ACUITY_SCORE: 47

## 2024-03-13 NOTE — DISCHARGE SUMMARY
[facility]  Cardiology Heart Failure Service (Cards 2)  Discharge Summary       Patient Name: Silvano Motta    Medical Record Number: [id number]    YOB: 1965  Date of admission:  2/4/2024  Date of discharge: 3/13/2024    Admitting provider: Rangel Schroeder MD  Discharge provider: Liseth Jenkins  Primary provider: Perlita Garcia    DISCHARGE DIAGNOSES:   1. Hypoxic arrest    ITEMS FOR OUTPATIENT FOLLOW UP:       HPI: Please see the detailed H & P from 2/4/2024. Silvano Motta is a 58 year old male being admitted to the CICU on 2/4/2024 s/p peripheral VA-ECMO cannulation s/p cardiac arrest. 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 Admitted 1/25 to OSH for AHRF and transferred to [facility] 2/4 for PH therapy managed.  Patient not a lung transplant candidate at [facility] or [facility].   Code blue called this afternoon 2/23/24 1630 in CT in setting of PEA arrest. CPR started immediately and patient oxygenation with BVM. Received 2mg epi and shocked x1 for VT. ROSC achieved within 5 mins of arrest. Intubated, started on amiodarone gtt, and transferred to  and experienced subsequent episode of PEA arrest and achieved ROS. CCL then activated for peripheral VA-ECMO cannulation.  During his time in CICU we explored multiple different therapies and diagnostics. Unfortunately overarching possible diagnosis was diffuse pulmonary AVM where management of it would require lung transplant which he is not a candidate for. We attempted to manage with steroids, abx and diuretics to get him to his pre-hospital state however only minimal improvement was noted. Family wished to pursue comfort measure and patient passed away peacefully on March 13th with family at bedside. Family wish to pursue  "autopsy.     HOSPITAL COURSE BY PROBLEM:     PHYSICAL EXAM:  Blood pressure 122/60, pulse 95, temperature 98.4  F (36.9  C), resp. rate 15, height 1.778 m (5' 10\"), weight 112.4 kg (247 lb 12.8 oz), SpO2 97%.  Patient passed, refer to death note    LABS:   Last CBC:   Recent Labs   Lab Test 03/11/24  1540   WBC 12.4*   RBC 2.64*   HGB 7.9*   HCT 24.0*   MCV 91   MCH 29.9   MCHC 32.9   RDW 19.2*   *       Last CMP:  Recent Labs   Lab Test 03/11/24  1810 03/11/24  1544 03/11/24  1540   NA  --   --  143   POTASSIUM  --   --  4.4   CHLORIDE  --   --  109*   DORINA  --   --  8.3*   CO2  --   --  24   BUN  --   --  50.9*   CR  --   --  0.93   *   < > 166*   AST  --   --  44   ALT  --   --  55   BILITOTAL  --   --  0.4  0.4   ALBUMIN  --   --  3.2*   PROTTOTAL  --   --  5.4*   ALKPHOS  --   --  112    < > = values in this interval not displayed.       IMAGING:  Results for orders placed or performed during the hospital encounter of 02/04/24   XR Chest Port 1 View    Narrative    Exam: XR CHEST PORT 1 VIEW, 2/4/2024 9:17 PM    Indication: new direct admission with increasing o2 recs    Comparison: Chest x-ray 10/3/2013    Findings:   AP portable semiupright radiograph of the chest. Trachea is midline.  Cardiomediastinal silhouette is unremarkable. Streaky patchy basilar  opacities. No focal consolidations. No definitive pleural effusion, or  pneumothorax. Similar appearing mild bilateral pulmonary interstitial  opacities.       Impression    Impression: Similar appearing mild bilateral pulmonary interstitial  opacities, may represent underlying interstitial disease or pulmonary  edema/atelectasis. No new confluent consolidation.    I have personally reviewed the examination and initial interpretation  and I agree with the findings.    MARY BARRY MD         SYSTEM ID:  [id number]   CT Chest w/o Contrast    Narrative    EXAMINATION: Chest CT  2/8/2024 10:35 AM    CLINICAL HISTORY: Patient with SOB possible " PH    COMPARISON: Outside CT, 1/26/2024.    TECHNIQUE: CT imaging obtained through the chest without contrast.  Axial, coronal, and sagittal reconstructions and axial MIP reformatted  images are reviewed.     CONTRAST: No contrast    FINDINGS:  Lungs: The trachea and central airways are patent. No pneumothorax or  pleural effusion. Diffuse subpleural reticulation and thickening of  the interlobular septi. Dependent atelectasis. No suspicious pulmonary  nodules.    Mediastinum: The visualized thyroid gland is unremarkable. The heart  size is within normal limits. No pericardial effusion. The ascending  aorta and main pulmonary artery diameters are within normal limits.  Coronary artery calcifications. Mild thoracoabdominal aortic  calcifications with focal left subclavian artery origin calcifications  and distal right brachiocephalic calcifications. Normal appearance and  configuration of the great vessels off of the aortic arch. Enlarged  subcarinal lymph node measuring up to 1.4 cm in short axis on image  27, series 2, similar to prior CT.    Bones and soft tissues: No suspicious bone findings. Multilevel  degenerative spurring throughout the thoracic spine.    Upper Abdomen: Unremarkable appearance of the adrenal glands.  Visualized liver, spleen, kidneys are unremarkable. Mildly distended  gallbladder. Colonic diverticulosis without evidence of acute  diverticulitis.      Impression    IMPRESSION:   1. Mild subpleural fibrotic changes. Bilateral dependent atelectasis.  2. Normal caliber main pulmonary artery.  3. Similar appearance of enlarged subcarinal lymph node.    I have personally reviewed the examination and initial interpretation  and I agree with the findings.    SONYA VELEZ MD         SYSTEM ID:  [id number]   US Abdomen Limited w Abdomen Doppler Limited    Narrative    EXAMINATION: US ABDOMEN LIMITED W ABDOMEN DOPPLER LIMITED 2/10/2024  8:15 AM     COMPARISON: Outside ultrasound 8/31/2022, CT  chest 2/8/2024    HISTORY: Evaluate for liver disease causing potential hepatopulmonary  syndrome    TECHNIQUE: The abdomen was scanned in standard fashion with  specialized ultrasound transducer(s) using both gray-scale, color  Doppler, and spectral flow techniques.    Findings:  Liver: The liver demonstrates normal homogeneous echotexture. No  evidence of a focal hepatic mass.     Extrahepatic portal vein flow is antegrade at 31 cm/s.  Right portal vein flow is antegrade, measuring 23 cm/s.  Left portal vein flow is antegrade, measuring 7 cm/s.    Flow in the hepatic artery is towards the liver and:  29 cm/s peak systolic  0.64 resistive index.     The splenic vein is patent and flow is towards the liver.  The left,  middle, and right hepatic veins are patent with flow towards the IVC.  The IVC is patent with flow towards the heart.   The visualized aorta  is not dilated.    Gallbladder: Gallbladder is distended with a stone present at the  gallbladder neck. There is no wall thickening, or pericholecystic  fluid.    Bile Ducts: Both the intra- and extrahepatic biliary system are of  normal caliber.  The common bile duct measures 3 mm.    Pancreas: Not well visualized.     Kidneys: The right kidney is normal echotexture, without mass or  hydronephrosis.   Renal lengths: right- 10.1 cm.     Fluid: No evidence of ascites or pleural effusions.      Impression    Impression:   1.  Normal grayscale and Doppler evaluation of the liver.  2.  Gallbladder is distended with a stone present at the gallbladder  neck, unchanged compared to prior CTs.    I have personally reviewed the examination and initial interpretation  and I agree with the findings.    SONYA VELEZ MD         SYSTEM ID:  [id number]   NM Lung Scan Perfusion Particulate    Addendum: 2/23/2024    Addendum:   Positive shunt study. Shunt fraction recalculated leaving the  injection site out. The shunt fraction is 4.5% which would be above  the threshold for  an abnormal study (>3%).      FELISHA WALDROP MD         SYSTEM ID:  [id number]      Narrative    Examination: NM LUNG SCAN PERFUSION PARTICULATE       Date: 2/9/2024 3:17 PM     Indication: MAA for pulmonary shunting     Comparison: none    Additional Information: none    Technique:    The patient received 6.6 mCi of Tc-99m labeled MAA intravenously.  Anterior and posterior quantitative views were obtained of the lungs  using a dual headed camera camera. A standard eight view lung  perfusion scan was also obtained. Calculations were performed using  the geometric mean technique.    Findings:    The perfusion images demonstrate does not demonstrate a significant  shunt.   Note the the calculated shunt on the second image in PACs in  red reads 10% and this is incorrect as it included the injection site  in the calculation. The actual shunt is within the range of normal.   There are a few tiny specks of technetium 3 in the brain,  2 in the  abdomen, a few in the legs but this is highly likely less than the 3%  threshold for a normal study.      The perfusion pattern however the lungs is atypical with very little  perfusion to the upper portions of the lungs bilaterally.      Impression    Impression:  No significant shunt.  Unchanged from 12/20/2023.  There is minimal perfusion in the upper halves of the lungs relative  to the bases which is atypical.   The CTPE study from 1/26/24 is   compromised by respiratory motion. While an explanation for this  perfusion decrease is not evident on that CT study it may be worth  repeating it in the absence of another explanation.     FELISHA WALDROP MD         SYSTEM ID:  [id number]   CT Chest Pulmonary Embolism w Contrast    Narrative    CT chest pulmonary angiogram with contrast    INDICATION: Evaluate for pulmonary AVM. Concern on prior imaging of  left upper lobe. Worsening respiratory failure.    COMPARISON: CT chest 2/8/2024    FINDINGS: Intravenous contrast utilized.  Dose was 75 mL intravenous  Isovue-370.    FINDINGS: Left upper extremity injection performed. Streak artifact  from the densely contrast filled left subclavian vein. SVC is single  right sided. Aorta normal size. Main pulmonary artery normal caliber  at approximately 2.8 cm. No significant short interval change of multi  station mediastinal lymph nodes and aortopulmonary window comment  prevascular common the tracheal and subcarinal stations. Unchanged  upper normal-sized right hilar lymph nodes. These are unchanged dating  back to the oldest available chest CT outside study 8/30/2022. No  pleural or pericardial effusion.  Thoracic aortic and coronary artery atherosclerotic calcifications.  The included upper abdomen appears unremarkable.  Bone detail shows degenerative spurring throughout the thoracic spine  with intervertebral disc calcifications at T8-9. No suspicious  sclerotic or lytic/destructive bone lesion.    No hypodense filling defect in the pulmonary artery tree to indicate  embolus. Major pulmonary venous return the left atrium images  unremarkable. Left atrial appendage is not enlarged.  Detail of the lungs shows mild dependent atelectasis at both lung  bases. No dominant right-sided pulmonary nodule. Soft tissue density  nodule medially in the left upper lobe (series 7 image 111) measures 9  x 5 mm. This does not appear to have feeding artery or draining vein.  Not changed in the short interval. Unchanged from outside CT chest  angiogram 11/15/2023 an outside chest CT 8/30/2022. There is  subsegmental atelectasis in the lingula.      Impression    IMPRESSION: Pleural-based solid 9 x 5 mm left upper lobe medial nodule  unchanged from oldest available CT from August 2022. Follow-up within  one year recommended to ensure stability. No evidence of pulmonary AVM  or embolus.   Unchanged mediastinal and right hilar adenopathy from 8/30/2022.  Atherosclerosis.    SONYA VELEZ MD         SYSTEM ID:   [id number]   XR Chest Port 1 View    Narrative    Portable chest    INDICATION: Hypotension. Tachycardia.    COMPARISON: CT earlier today    FINDINGS: Heart size normal. Subtle patchy densities in the lungs may  indicate mild edema 4 atelectasis bilaterally grossly similar to  recent CT. Degenerative spurring in the thoracic spine. Bony  structures grossly intact.      Impression    IMPRESSION: Mild bibasilar edema/atelectasis.    SONYA VELEZ MD         SYSTEM ID:  [id number]   XR Chest Port 1 View    Narrative    EXAM:  XR CHEST PORT 1 VIEW    INDICATION: respiratory failure    COMPARISON:  Same-day chest x-ray    FINDINGS:  Single AP view of the chest.    Cardiomediastinal silhouette within normal limits. Relatively  decreased lung volumes. Basilar predominant interstitial opacities. No  pneumothorax.  No pleural effusion.       Impression    IMPRESSION:  Increased basilar predominant interstitial opacities likely  accentuated from decreased lung volumes. Opacities likely represent  pulmonary edema.    I have personally reviewed the examination and initial interpretation  and I agree with the findings.    INDIGO CALABRESE MD         SYSTEM ID:  [id number]   XR Chest/Heart Fluoro    Narrative    EXAM:  XR CHEST/HEART FLUORO 2/12/2024 4:08 PM    INDICATION: sniff test for diaphragm dysfunction, hypoxia concern for  hypoventilation    COMPARISON:  Chest x-ray 2/10/2024.    Fluoro time: 1.5 minutes     FINDINGS: Slight limitation due to recumbent positioning of the  patient. Fluoroscopy was used to evaluate diaphragm movement during  respiration and sniff maneuver. Symmetric movement of bilateral  hemidiaphragm with quiet, exaggerated respiration and sniff maneuver.  No paradoxical movement with the sniff maneuver. External metallic  opacity projecting over the chest.      Impression    IMPRESSION: Symmetric hemidiaphragm movement with respiration and no  paradoxical motion with sniff maneuver in the recumbent  position.    I, MARY BARRY MD, attest that I was present for all critical  portions of the procedure and was immediately available to provide  guidance and assistance during the remainder of the procedure.      I have personally reviewed the examination and initial interpretation  and I agree with the findings.    MARY BARRY MD         SYSTEM ID:  [id number]   XR Chest Port 1 View    Narrative    EXAM: Chest radiograph 2/13/2024 4:30 AM    HISTORY: 58 years Male climbing O2 needs on BiPAP - eval for edema vs  opacities vs other.     COMPARISON: Chest x-ray 2/10/2024.    TECHNIQUE: Portable AP view the chest.    FINDINGS:   Trachea is midline. The cardiac silhouette size is stable. Prominent  pulmonary vasculature. Blunting of the bilateral costophrenic angles.  Perihilar and bibasilar interstitial and airspace opacities. No focal  pulmonary consolidation. No pneumothorax. The visualized upper abdomen  is unremarkable. No acute or suspicious osseous or soft tissue  abnormality.      Impression    IMPRESSION:     1. Increased perihilar and basilar predominant streaky pulmonary  opacities, nonspecific and may represent pulmonary edema, infection  and/or atelectasis in the appropriate clinical settings.  2. Possible trace pleural effusions.    I have personally reviewed the examination and initial interpretation  and I agree with the findings.    MARY BARRY MD         SYSTEM ID:  [id number]   CT Chest Pulmonary Embolism w Contrast    Narrative    CTA pulmonary angiogram    HISTORY: Evaluate pulmonary AVMs setting of acute respiratory failure    COMPARISON STUDY: 2/13/2024, 2/10/2024    FINDINGS: Stable subpleural nodule along the mediastinal pleura in the  left upper lobe measuring 10 x 8 mm. Subpleural nodule fibrotic  interstitial lung abnormality. Dependent atelectasis. No evidence of  pulmonary embolism. Borderline mediastinal and hilar nodes.    Evaluation of the upper abdomen is limited.    Bones: No  suspicious bony lesions      Impression    IMPRESSION: No evidence of pulmonary embolism. No evidence of  pulmonary AVM.    SAKINA ARIAS MD         SYSTEM ID:  [id number]   CT Abdomen Pelvis w Contrast    Narrative    EXAMINATION: CT ABDOMEN PELVIS W CONTRAST  2/23/2024 4:49 PM      CLINICAL HISTORY: evaluating for acute hypoxic respiratory failure, no  known source. Per Pulmonary, would like complete CT A/P to evaluate  for other causes.    COMPARISON: Same day CT chest    PROCEDURE COMMENTS: CT of the abdomen was performed with iopamidol  (ISOVUE-370) solution 135 mL intravenous contrast. Coronal and  sagittal reformatted images were obtained.    FINDINGS:  Lower thorax:   Diffuse intralobular septal thickening and groundglass with mild  bibasilar atelectasis. Please see same-day CT chest.    Abdomen and pelvis:  Liver: No mass. No intrahepatic biliary ductal dilation.    Biliary System: Normal gallbladder. No extrahepatic biliary ductal  dilation.    Pancreas: No mass or pancreatic ductal dilation. Atrophic parenchyma.    Adrenal glands: No mass or nodules    Spleen: Normal.    Kidneys: No suspicious mass, obstructing calculus or hydronephrosis.    Gastrointestinal tract : Normal caliber small bowel.  Colonic  diverticulosis with no diverticulitis.    Mesentery/peritoneum/retroperitoneum: No mass. No free fluid or air.   Fat-containing umbilical hernia.    Lymph nodes: No significant lymphadenopathy.    Vasculature: Patent major abdominal vasculature. Atherosclerosis.    Pelvis: Urinary bladder is normal.    Osseous structures: No aggressive or acute osseous lesion.      Soft tissues: Nodular areas and stranding in the subcutaneous fat  anteriorly with a few foci of air consistent with medication injection  sites.      Impression    IMPRESSION:   1. No acute findings in the abdomen or pelvis.  2. Colonic diverticulosis without evidence of acute diverticulitis    I have personally reviewed the examination and  initial interpretation  and I agree with the findings.    INDIGO CALABRESE MD         SYSTEM ID:  [id number]   XR Chest Port 1 View    Narrative    EXAM: XR CHEST PORT 1 VIEW  2/23/2024 5:40 PM      HISTORY: ETT placement    COMPARISON: Chest x-ray 2/13/2023    FINDINGS: Single view of the chest. The endotracheal tube tip  terminates in the upper thoracic trachea. Trachea is midline. Cardiac  silhouette is within normal limits. Stable perihilar and bibasilar  opacities. Small right pleural effusion. No pneumothorax. The upper  abdomen is unremarkable.      Impression    IMPRESSION:   1. Endotracheal tube tip terminates in the upper thoracic trachea.  2. Stable perihilar and bibasilar opacities, likely atelectasis.    I have personally reviewed the examination and initial interpretation  and I agree with the findings.    INDIGO CALABRESE MD         SYSTEM ID:  [id number]   CT Head w/o Contrast    Narrative    EXAM: CT HEAD W/O CONTRAST  2/23/2024 7:42 PM     HISTORY:  Dual energy - ECMO       COMPARISON:  MRI 3/28/2023    TECHNIQUE: Using multidetector thin collimation helical dual energy  acquisition technique, axial, coronal and sagittal CT images from the  skull base to the vertex were obtained without intravenous contrast.   (topogram) image(s) also obtained and reviewed.    FINDINGS:  No acute intracranial hemorrhage, mass effect, or midline shift. No  acute loss of gray-white matter differentiation in the cerebral  hemispheres. Ventricles are proportionate to the cerebral sulci. Clear  basal cisterns.    The bony calvaria and the bones of the skull base are normal. Mastoid  air cells are clear. Mild mucosal thickening in the dependent right  maxillary and sphenoid sinus. Grossly normal orbits.       Impression    IMPRESSION: No acute intracranial pathology.     I have personally reviewed the examination and initial interpretation  and I agree with the findings.    YASMANI MARR MD         SYSTEM ID:   [id number]   CT Chest Abdomen Pelvis w/o Contrast    Narrative    EXAM: CT chest, abdomen, and pelvis with intravenous contrast.  2/23/2024 7:50 PM    HISTORY: ECMO    TECHNIQUE: Helical acquisition of image data was performed for the  chest, abdomen, and pelvis without intravenous contrast.    COMPARISON: Same day CT    FINDINGS:    Lines and tubes: Left lower extremity ECMO cannula terminates in the  SVC. Additional left lower extremity arterial catheter terminates in  the common iliac artery. Endotracheal tube terminates in the upper  thoracic trachea. Right inguinal venous and arterial catheters with  minor adjacent hematoma. Small amount of air within the right external  iliac vein.    CHEST:    Lungs/pleura/airways: Debris layering within the distal trachea and  proximal left mainstem bronchus. Bilateral bronchial wall thickening.  Bilateral interlobular septal thickening. Peribronchovascular  groundglass opacities predominantly in the lower lobes and posterior  left upper lobe. Bibasilar consolidations. Small bilateral pleural  effusions. Stable 10 x 8 mm subpleural nodule along the mediastinal  pleura in the left upper lobe (series 7 image 5). No suspicious  pulmonary nodules or masses. No pneumothorax.    Lower neck/axillae/mediastinum: Thyroid appears unremarkable. No  enlarged axillary lymph nodes. Enlarged right paratracheal lymph node  measuring 1.5 cm in short axis diameter (series 4 image 17) several  additional prominent mediastinal and hilar lymph nodes. The heart is  not enlarged. Trace pericardial effusion. Thoracic aorta and main  pulmonary artery are normal in caliber. Scattered atherosclerotic  calcifications of the coronary arteries and aortic arch. The esophagus  appears unremarkable. There are is some high density material  posterior to the sternum and medial ribs in the lower mediastinum,  series 11 images 163 231 consistent with post resuscitation anterior  mediastinal  hematoma.    ABDOMEN/PELVIS:    Liver: No intrahepatic biliary dilatation. No focal hepatic mass.    Gallbladder/biliary tree: The common bile duct is not dilated.  Gallbladder appears unremarkable.    Pancreas: Mild fatty atrophy of the pancreas.    Spleen: The spleen is not enlarged.    Adrenal glands: No adrenal nodules.    Kidneys/ureters: No hydronephrosis. No renal calculi. Nephrograms from  CT with contrast earlier same date. Excreted contrast in the  collecting systems.    Bladder/pelvic organs: Nicole balloon in place. Small amount of  intraluminal contrast.    Bowel/mesentery: No dilated loops of small bowel or colon. Colonic  diverticulosis without evidence of acute diverticulitis.    Peritoneum/retroperitoneum: No extraluminal bowel gas. No free fluid  in the abdomen or pelvis.    Lymph nodes: No enlarged abdominal or pelvic lymph nodes by short axis  criteria.    Major vessels: Atherosclerotic calcifications.    BONES/SOFT TISSUE: Degenerative changes of the spine. Minimally  displaced bilateral third through sixth anterolateral rib fractures.  No definite displaced sternal fracture. Nodular foci within the  anterior abdominal soft tissues, likely injection granulomas. Small  fat-containing umbilical hernia.      Impression    IMPRESSION:    1. Multifocal peribronchial vascular groundglass opacities and  bibasilar consolidations, likely aspiration. Debris layering in the  trachea and left mainstem bronchus. Bilateral bronchial wall  thickening, likely reactive.  2. Enlarged right paratracheal lymph node with additional prominent  mediastinal lymph nodes, likely reactive.  3. Unchanged pleural-based nodule in the medial left upper lobe.  4. Minimally displaced bilateral third through sixth anterolateral rib  fractures secondary to resuscitation. There is also a small anterior  mediastinal hematoma. No definite displaced sternal fracture.    I have personally reviewed the examination and initial  interpretation  and I agree with the findings.    INDIGO CALABRESE MD         SYSTEM ID:  [id number]   XR Chest Port 1 View    Narrative    EXAM: XR CHEST PORT 1 VIEW  2/23/2024 8:17 PM      HISTORY: Check endotracheal tube placement and ECLS cannula placement.  DO NOT log-roll patient.  Place film under patient using patient  safety handling process.    COMPARISON: Same-day CT    FINDINGS: Single view of the chest. The endotracheal tube tip  terminates in the midthoracic trachea. Enteric tube courses inferiorly  below the diaphragm with tip possibly visualized in the left upper  quadrant versus coiled tubing. ECMO cannula terminates in the low SVC.  Trachea is midline. Cardiac silhouette is within normal limits.  Diffuse bilateral interstitial opacities and mixed opacities in the  lung bases. Small bilateral pleural effusions. No pneumothorax.  Minimally displaced bilateral rib fractures are better visualized on  same-day CT.      Impression    IMPRESSION:   1. Endotracheal tube terminates in the midthoracic trachea. ECMO  cannula terminates in the low SVC.  2. Diffuse bilateral mixed interstitial and airspace opacities,  predominantly in the lung bases, likely aspiration versus infection.  3. Small bilateral pleural effusions.  4. Minimally displaced bilateral rib fractures, better visualized on  same-day CT. No pneumothorax.    I have personally reviewed the examination and initial interpretation  and I agree with the findings.    INDIGO CALABRESE MD         SYSTEM ID:  [id number]   US Lower Extremity Arterial Duplex Bilateral    Narrative    Exam: Duplex ultrasound of bilateral lower extremity arteries dated  2/23/2024 10:08 PM     Clinical information: Baseline to assess blood flow to Lower  Extremities ([facility] ECMO)     Comparison: None    Technique: Grayscale (B-mode), color Doppler, and duplex spectral  Doppler ultrasound of the lower extremity arteries. Velocity  measurements obtained with angle correction of  60 degrees or less.    Ordering provider: MACK EMERY    Findings:     Right lower extremity:   Common femoral artery: Velocity: Obscured by overlying bandage.  Profunda femoral artery: Velocity: Obscured.   Proximal SFA: Velocity: Obscured.  Mid SFA:Velocity:  19 cm/sec. Waveforms: Monophasic  Distal SFA: Velocity: 25 cm/sec. Waveforms: Monophasic    Popliteal artery, proximal: Velocity: 11 cm/sec. Waveforms: Monophasic    PTA ankle: Velocity: 4 cm/sec. Waveforms: Monophasic  LARRY ankle: Velocity: 0 cm/sec.   DPA: 7 cm/s, monophasic    Left lower extremity:    Common femoral artery: ECMO cannula.  Proximal SFA: Velocity: ECMO cannula.  Mid SFA: Velocity: 57 cm/sec. Waveforms: Monophasic  Distal SFA: Velocity: 47 cm/sec. Waveforms: Monophasic    Popliteal artery, proximal: Velocity: 5 cm/sec. Waveforms: Monophasic    PTA ankle: Velocity: 3 cm/sec. Waveforms: Monophasic  LARRY ankle: Velocity: 3 cm/sec. Waveforms: Monophasic  DPA: 3 cm/s, monophasic.      Impression    Impression:   Patent Doppler evaluation of bilateral lower extremity arteries status  post ECMO cannulation, although flow is rather diminutive within  bilateral tibial vessels. No flow is detected in the right LARRY at the  ankle, though there is flow distally in the DPA, possibly via  collaterals.     Guidelines:    [facility] duplex criteria for lower limb arterial  occlusive disease    Percent stenosis:     Normal (1-19%): Peak systolic velocity (cm/s): <150, End-diastolic  velocity (cm/s): <40, Velocity ratio (Vr): <1.5, Distal arterial  waveform: Triphasic    20-49%: Peak systolic velocity (cm/s): 150-200, End-diastolic velocity  (cm/s): <40, Velocity ratio (Vr): 1.5-2.0, Distal arterial waveform:  Triphasic    50-75%: Peak systolic velocity (cm/s): 200-300, End-diastolic velocity  (cm/s): <90, Velocity ratio (Vr): 2.0-3.9, Distal arterial waveform:  Poststenotic turbulence distal to stenosis, monophasic distal  waveform    >75%: Peak systolic velocity (cm/s): >300, End-diastolic velocity  (cm/s): <90, Velocity ratio (Vr): >4.0, Distal arterial waveform:  Dampened distal waveform and low PSV/EDV* in the stenosis    Occlusion: Absent flow by color Doppler/pulsed Doppler spectral  analysis; length of occlusion estimated from distance between exit and  reentry collateral arteries    *PSV = peak systolic velocity, EDV = end-diastolic velocity  http://link.veronica.com/chapter/10.1007/930-4-5844-4005-4_23/fulltext  html    I have personally reviewed the examination and initial interpretation  and I agree with the findings.    KHUSHI RAMACHANDRAN MD         SYSTEM ID:  [id number]   US Carotid Bilateral    Narrative    ULTRASOUND CAROTID BILATERAL 2/24/2024 10:09 AM    CLINICAL HISTORY: Cardiac Arrest on ECMO    COMPARISON: None available.    REFERRING PROVIDER: ELIZABET ROCHA    TECHNIQUE: Grayscale (B-mode) and duplex and spectral Doppler  ultrasound of the common carotid, extracranial internal carotid,  external carotid, and vertebral artery origins. Velocity measurements  obtained with angle correction at or less than 60 degrees.    FINDINGS:    RIGHT SIDE:     Plaque Morphology: Minimal echogenic plaque at the bifurcation and  proximal internal carotid artery       Mid CCA: 65/21 cm/s     External CA: 55/18 cm/s       Proximal ICA: 49/16 cm/s     Mid ICA: 40/17 cm/s     Distal ICA: 41/15 cm/s       Vertebral artery: Antethgthrthathdtheth:th th2th8th cm/s        ICA/CCA ratio: 0.75     LEFT SIDE:     Plaque Morphology: Minimal echogenic plaque at the bifurcation        Mid CCA: 67/20 cm/s     External CA: 44/14 cm/s       Proximal ICA: 37/11 cm/s     Mid ICA: 37/14 cm/s     Distal ICA: Not evaluated       Vertebral artery: Antethgthrthathdtheth:th th2th5th cm/s        ICA/CCA ratio: 0.55       Impression    IMPRESSION:    1. RIGHT ICA: Less than 50% diameter narrowing by grayscale imaging  and sonographic velocity criteria.    2. LEFT ICA:  Less than 50% diameter narrowing  by grayscale imaging  and sonographic velocity criteria.    [employer] Updated Recommendation for  Carotid Stenosis Interpretation Criteria - November 2023.  https://intersocietal.org/wp-content/uploads/2023/11/GGN-Nijbxgk-Kvrei  endations-for-Carotid-[email or web address]-Fhoqwwzs_[email or web address].pdf         Normal            ICA PSV: < 180 cm/s            Plaque Estimate: None            ICA/CCA PSV Ratio: < 2.0            ICA EDV: < 40 cm/s         < 50%            ICA PSV: < 180 cm/s            Plaque Estimate: < 50%            ICA/CCA PSV Ratio: < 2.0            ICA EDV: < 40 cm/s         50 - 69%            ICA PSV: 180 - 230 cm/s            Plaque Estimate: > 50%            ICA/CCA PSV Ratio: 2.0 - 4.0            ICA EDV: 40 - 100 cm/s      50 - 69%            ICA PSV: 125-180 cm/s            AND            ICA/CCA PSV Ratio: > or = 2.0         > 70% but less than near occlusion            ICA PSV: > 230 cm/s            Plaque Estimate: > 50%            AND either            ICA EDV: > 100 cm/s            or            ICA/CCA PSV Ratio: > 4.0         Near occlusion            ICA PSV: High, low, or undetectable            Plaque Estimate: Visible            ICA/CCA PSV Ratio: Variable            ICA EDV: Variable         Total occlusion            ICA PSV: Undetectable            Plaque Estimate: Visible, no detectable lumen            ICA/CCA PSV Ratio: N/A            ICA EDV: N/A                                          Additional criteria from vascular surgery     > 80%       EDV > 120 cm/s    I have personally reviewed the examination and initial interpretation  and I agree with the findings.    KHUSHI RAMACHANDRAN MD         SYSTEM ID:  [id number]   XR Abdomen 1 View    Narrative    Exam: XR ABDOMEN 1 VIEW, 2/23/2024 11:32 PM    Indication: ogt placement    Comparison: CT chest abdomen and pelvis 2/23/2024, chest radiograph  2/23/2024    Findings:   Single portable supine frontal view of  the abdomen was obtained.  Stable alignment of inferior approach ECMO cannula. Enteric tube tip  projects over the distal stomach at the pylorus. Nonobstructive bowel  gas pattern. No pneumatosis or portal venous gas. Similar appearance  of bilateral mixed opacities in the lung bases and small pleural  effusions.      Impression    Impression: Enteric tube tip projects over the distal stomach near the  pylorus.    I have personally reviewed the examination and initial interpretation  and I agree with the findings.    PAULO LANDAVERDE,          SYSTEM ID:  [id number]   XR Chest Port 1 View    Narrative    Exam: XR CHEST PORT 1 VIEW, 2/25/2024 2:41 AM    Indication: Check endotracheal tube placement and ECLS cannula  placement. DO NOT log-roll patient.  Place film under patient using  patient safety handling process.    Comparison: Chest radiograph 2/24/2024    Findings:   Single portable supine AP view of the chest was obtained. Endotracheal  tube tip terminates in the midthoracic trachea. Stable alignment of  inferior approach ECMO cannula. Enteric tube tip terminates below the  field of view.    Stable cardiomediastinal silhouette. Blunting of the costophrenic  angles. Ongoing but decreased perihilar/basilar predominant pulmonary  opacities. Stable small pleural effusions. No appreciable  pneumothorax. Visualized upper abdomen is unremarkable.      Impression    Impression:   1. Stable support devices.  2. Ongoing perihilar/basilar opacities, decreased within the lung  bases compared to prior.  3. Stable small pleural effusions.    I have personally reviewed the examination and initial interpretation  and I agree with the findings.    PAULO LANDAVERDE,          SYSTEM ID:  [id number]   XR Chest Port 1 View    Narrative    Exam: X-ray chest portable one view, 2/24/2024 8:59 AM    Comparison: 2/23/2024, CT chest abdomen and pelvis 2/23/2024.    History: Interval changes, lines and tubes verification.    Findings:  2 AP  supine views of the chest. Endotracheal tube with tip in the mid  thoracic trachea. Lower approach ECMO cannula with tip in the low SVC.  Enteric tube in the stomach.    Trachea is midline. Stable cardiac silhouette. Prominent pulmonary  vasculature. Diffuse interstitial opacities. Stable streaky perihilar  and left greater than right basilar opacities. No pneumothorax. No  significant right effusion. Small left effusion. No pneumothorax. The  upper abdomen is unremarkable.      Impression    Impression:   1. Stable diffuse interstitial opacities which likely represent  pulmonary edema and streaky perihilar left greater than right basilar  opacities which likely represent atelectasis and/or infection.  2. Slightly increased small left effusion.  3. Minimally displaced rib fractures better seen on recent CT.    I have personally reviewed the examination and initial interpretation  and I agree with the findings.    PAULO LANDAVERDE DO         SYSTEM ID:  [id number]   XR Chest Port 1 View    Narrative    Exam: XR CHEST PORT 1 VIEW, 2/26/2024 12:55 AM    Indication: Check endotracheal tube placement and ECLS cannula  placement. DO NOT log-roll patient.  Place film under patient using  patient safety handling process.    Comparison: Chest radiograph and CT 2/25/2024    Findings:   Single portable supine AP view of the chest was obtained. Endotracheal  tube tip terminates in the midthoracic trachea. Stable alignment of  inferior approach ECMO cannula. Enteric tube tip terminates below the  field of view.    Stable cardiomediastinal silhouette. Mild blunting of the left  costophrenic angle. Ongoing but decreased perihilar/basilar  predominant pulmonary opacities. Small bilateral pleural effusions. No  appreciable pneumothorax. Visualized upper abdomen is unremarkable.      Impression    Impression:   1. Stable support devices.  2. Ongoing perihilar/basilar opacities, decreased within the left base  compared to prior.  3.  Stable small bilateral pleural effusions.    I have personally reviewed the examination and initial interpretation  and I agree with the findings.    JOHN KNAPP MD         SYSTEM ID:  [id number]   CT Chest Hi-Resolution wo Contrast    Narrative    EXAMINATION: Chest CT  2/25/2024 2:32 PM    CLINICAL HISTORY: Undifferentiated hypoxia, on ECMO, evaluate for  pulmonary shunt    COMPARISON:  Radiograph same-day, CT 2/23/2024    TECHNIQUE: CT imaging obtained through the chest without contrast.  Axial, coronal, and sagittal reconstructions and axial MIP reformatted  images are reviewed.     FINDINGS:    Devices: Endotracheal tube tip terminates at the low thoracic trachea.  Inferior approach ECMO cannula tip terminates in the low SVC.  Enteric  tube tip terminates in the stomach.    Lower neck and axillae: No enlarged supraclavicular nodes are present.  No actionable nodule is present in the imaged portion of the thyroid  lobes. No axillary lymphadenopathy.    Heart: Normal heart size. Patient appears slightly anemic. Stable  small pericardial effusion. Mild coronary artery calcifications.    Mediastinum and abdon: No mediastinal mass is present. Stable prominent  and enlarged mediastinal lymph nodes including a 2.2 x 1.7 cm  subcarinal lymph node.     Vessels: Normal caliber of the aorta and main pulmonary artery. Common  origin of the brachiocephalic and right common carotid artery, normal  variant. No significant atherosclerotic disease.    Airways: Debris/mucus present in the trachea and right mainstem  bronchus.     Lungs: Decreased patchy multifocal groundglass opacities and  interstitial thickening. Persistent small bilateral pleural effusions.  Increased consolidation/compressive atelectasis in the lower lobes. No  pneumothorax    Upper abdomen: Stable distended gallbladder. No pathologic process is  present in the imaged portion of the upper abdomen.    Bones: Stable acute multilevel minimally displaced  bilateral anterior  rib fractures. Degenerative changes throughout the visualized spine.    Soft tissues: A small retrosternal hyperdense fluid collection,  compatible with a resuscitation hematoma is redemonstrated with  slightly increased tracking of hematoma anterior to the heart, series  3 image 37 compared to prior CT.      Impression    IMPRESSION:   1. Decreased patchy multifocal groundglass opacities and interstitial  thickening throughout the lungs predominantly remaining in the lower  lobes.   2. Persistent small bilateral pleural effusions. Increased  consolidation/compressive atelectasis in the lower lobes.  3. Stable mediastinal lymphadenopathy, likely reactive.  4. Cannot assess for shunt pathology on this noncontrast exam.  5. Slightly increased post resuscitation retrosternal hematoma.    I have personally reviewed the examination and initial interpretation  and I agree with the findings.    INDIGO CALABRESE MD         SYSTEM ID:  [id number]   XR Chest Port 1 View    Narrative    Exam: XR CHEST PORT 1 VIEW, 2/27/2024 1:05 AM    Indication: Check endotracheal tube placement and ECLS cannula  placement. DO NOT log-roll patient.  Place film under patient using  patient safety handling process.    Comparison: Chest radiograph 2/26/2024 and CT 2/25/2024    Findings:   Single portable supine AP view of the chest was obtained. Endotracheal  tube tip terminates in the midthoracic trachea. Stable position of the  inferior approach ECMO cannula. Enteric tube tip objects over the mid  stomach    Stable appearance of the cardiomediastinal silhouette. Mild blunting  of the left costophrenic angle. Pulmonary vasculature remains  indistinct. Ongoing perihilar/basilar predominant pulmonary opacities.  Small bilateral pleural effusions. No appreciable pneumothorax.  Visualized upper abdomen is unremarkable. Osseous structures are  unchanged.      Impression    Impression:   1. Stable support devices.  2. Ongoing  perihilar/basilar opacities, not significantly changed  compared to prior.  3. Stable small bilateral pleural effusions.    I have personally reviewed the examination and initial interpretation  and I agree with the findings.    YASMANI RUSSELL MD         SYSTEM ID:  [id number]   XR Chest Port 1 View    Narrative    Exam: XR CHEST PORT 1 VIEW, 2/27/2024 9:59 PM    Indication: Central line placement    Comparison: Chest radiograph from earlier the same day at 1:01 AM    Findings:   Single portable AP supine view of the chest was obtained. Endotracheal  tube tip terminates in the mid thoracic trachea. Enteric tube tip  terminates below the field-of-view. Interval placement of right  internal jugular central venous catheter with tip projecting over the  right atrium. Stable alignment of the inferior ECMO cannula.    Stable cardiomediastinal silhouette. Blunting of the left costophrenic  angle. No appreciable pneumothorax. Similar perihilar/basilar  opacities. Small bilateral pleural effusions, similar compared to  prior. Visualized upper abdomen is unremarkable.      Impression    Impression:   1. Right internal jugular central venous catheter tip projects over  the right atrium. Remainder of support devices are stable.  2. Similar perihilar/basilar mixed opacities.  3. Stable small pleural effusions.    I have personally reviewed the examination and initial interpretation  and I agree with the findings.    SRINI MACIAS MD (Joe)         SYSTEM ID:  [id number]   XR Chest Port 1 View    Narrative    Exam: XR CHEST PORT 1 VIEW, 2/29/2024 1:55 AM    Indication: Check endotracheal tube placement and ECLS cannula  placement. DO NOT log-roll patient.  Place film under patient using  patient safety handling process.    Comparison: Chest x-ray 2/27/2024    Findings:   Single portable AP supine view of the chest was obtained. Endotracheal  tube tip terminates in the mid thoracic trachea. Enteric tube tip  terminates below  the field-of-view. Right internal jugular central  venous catheter with tip projecting over the right atrium. Stable  alignment of the inferior ECMO cannula.    Stable cardiomediastinal silhouette. Blunting of the left costophrenic  angle. No appreciable pneumothorax. Similar perihilar/basilar  opacities. Small bilateral pleural effusions, slightly decreased  compared to prior. Visualized upper abdomen is unremarkable.      Impression    Impression:   1. Stable support devices.  2. Similar perihilar/basilar mixed opacities.  3. Slightly decreased small pleural effusions.    I have personally reviewed the examination and initial interpretation  and I agree with the findings.    JOHN KNAPP MD         SYSTEM ID:  [id number]   XR Chest Port 1 View    Narrative    Exam: XR CHEST PORT 1 VIEW, 3/1/2024 2:07 AM    Indication: Check endotracheal tube placement and ECLS cannula  placement. DO NOT log-roll patient.  Place film under patient using  patient safety handling process.    Comparison: Chest x-ray 2/29/2024    Findings:   Single portable AP supine view of the chest was obtained. Endotracheal  tube tip terminates in the mid thoracic trachea. Enteric tube tip  terminates below the field-of-view. Right internal jugular central  venous catheter with tip projecting over the right atrium. Stable  alignment of the inferior ECMO cannula.    Stable cardiomediastinal silhouette. Decreased blunting of the left  costophrenic angle. No appreciable pneumothorax. Similar  perihilar/basilar opacities. Small bilateral pleural effusions,  slightly decreased compared to prior. Visualized upper abdomen is  unremarkable.      Impression    Impression:   1. Stable support devices.  2. Similar perihilar/basilar mixed opacities.  3. Slightly decreased small pleural effusions.    I have personally reviewed the examination and initial interpretation  and I agree with the findings.    FABIOLA ROLDAN MD         SYSTEM ID:  [id number]   XR  Chest Port 1 View    Narrative    Exam: XR CHEST PORT 1 VIEW, 3/2/2024 1:17 AM    Indication: ecmo    Comparison: Chest x-ray 3/1/2024    Findings:   Single portable AP supine view of the chest was obtained. Endotracheal  tube tip terminates in the mid thoracic trachea. Enteric tube tip  terminates below the field-of-view. Right internal jugular central  venous catheter with tip projecting over the right atrium. Stable  alignment of the inferior ECMO cannula.    Stable cardiomediastinal silhouette. Increased blunting of the left  costophrenic angle. No appreciable pneumothorax. Similar  perihilar/basilar opacities. Small bilateral pleural effusions.  Visualized upper abdomen is unremarkable.      Impression    Impression:   1. Relatively unchanged support devices of endotracheal tube tip  projects about 6 cm above the kianna, may consider advancement by 1 to  2 cm followed by chest x-ray.  2. Similar perihilar/basilar mixed opacities.  3. Slightly increased small pleural effusions.    I have personally reviewed the examination and initial interpretation  and I agree with the findings.    MARY BARRY MD         SYSTEM ID:  [id number]   XR Chest Port 1 View    Narrative    Exam: XR CHEST PORT 1 VIEW, 3/3/2024 1:13 AM    Indication: ecmo    Comparison: Chest x-ray 3/2/2024    Findings:   Single portable AP supine view of the chest was obtained. Endotracheal  tube tip terminates in the proximal thoracic trachea. Enteric tube tip  terminates below the field-of-view. Right internal jugular central  venous catheter with tip projecting over the right atrium. Stable  alignment of the inferior ECMO cannula.    Stable cardiomediastinal silhouette. Continued mild blunting of the  costophrenic angles. No appreciable pneumothorax. Similar  perihilar/basilar opacities. Visualized upper abdomen is unremarkable.      Impression    Impression:   1. Stable support devices with endotracheal tube tip in the proximal  thoracic trachea  at the level of T2.  2. Similar perihilar/basilar mixed opacities.  3. Continued small pleural effusions.    I have personally reviewed the examination and initial interpretation  and I agree with the findings.    EARL MEIDNA MD         SYSTEM ID:  [id number]   XR Chest Port 1 View    Narrative    Exam: XR CHEST PORT 1 VIEW, 3/4/2024 1:11 AM    Indication: ECMO    Comparison: 3/3/2024    Findings:   AP portable supine view of the chest. Endotracheal tube tip terminates  in the mid thoracic trachea. Right IJ central venous catheter tip  terminates in the right atrium. Inferior approach ECMO cannula  terminates in the low SVC. Trachea is midline. Cardiac silhouette is  stable. Continued patchy perihilar and bibasilar opacities, slightly  increased on the left. No pleural effusion. No pneumothorax.      Impression    Impression:   1. Stable position of support devices.  2. Continued patchy perihilar and bibasilar opacities, slightly  increased on the left.    I have personally reviewed the examination and initial interpretation  and I agree with the findings.    PAULO LANDAVERDE DO         SYSTEM ID:  [id number]   XR Chest Port 1 View    Narrative    Exam: TEMPORARY, 3/5/2024 12:56 AM    Indication: ECMO    Comparison: Same day at 0045 hours    Findings:   Portable AP supine view of the chest. Endotracheal tube tip projects  over the mid thoracic trachea. Inferior approach ECMO cannula projects  over the distal SVC. Right IJ [place]-Franny catheter tip projects over the  proximal right pulmonary artery. Nasogastric tube side hole and distal  tip projecting below the diaphragm and extends below the field of  view.    Trachea is midline. Cardiac silhouette is stable. Slightly decreased  patchy perihilar and bibasilar opacities. No pleural effusion or  pneumothorax. The structures are unchanged.      Impression    Impression:   1. Stable position of support devices.  2. Slightly decreased patchy perihilar and bibasilar  opacities.     I have personally reviewed the examination and initial interpretation  and I agree with the findings.    YASMANI RUSSELL MD         SYSTEM ID:  [id number]   XR Chest Port 1 View    Narrative    EXAM: XR CHEST PORT 1 VIEW  3/6/2024 1:14 AM      HISTORY: ecmo    COMPARISON: 3/5/2024    FINDINGS: Single view of the chest. Endotracheal tube tip terminates  in the midthoracic trachea. Inferior protocol cannula terminates in  the low SVC. Right IJ central venous catheter tip terminates at the  superior cavoatrial junction.    Trachea is midline. Cardiac silhouette is stable. Slightly increased  mixed perihilar and bibasilar opacities. No pleural effusion or  pneumothorax. No acute osseous abnormality.      Impression    IMPRESSION:   1. Interval right IJ Yeaddiss-Franny catheter removal.  2. Right IJ central venous catheter tip terminates at the superior  cavoatrial junction. Additional support devices are in stable  position.  3. Slightly increased mixed perihilar and bibasilar pulmonary  opacities.    I have personally reviewed the examination and initial interpretation  and I agree with the findings.    JOHN KNAPP MD         SYSTEM ID:  [id number]   XR Chest Port 1 View    Narrative    EXAM: XR CHEST PORT 1 VIEW  3/7/2024 1:15 AM      HISTORY: ecmo    COMPARISON: 3/6/2024    FINDINGS: Single view of the chest. The endotracheal tube tip  terminates in the midthoracic trachea. Inferior approach ECMO cannula  terminates in the low SVC. Right IJ central venous catheter tip  terminates at the superior cavoatrial junction. Enteric tube courses  inferiorly below the diaphragm with tip out of the field of view.  Trachea is midline. Cardiac silhouette is within normal limits.  Continued mixed perihilar and bibasilar opacities. No pleural effusion  or pneumothorax.       Impression    IMPRESSION:   1. Stable position of support devices including endotracheal tube in  the midthoracic trachea and the Yeaddiss-Franny  catheter tip in the low SVC.  2. Slightly improved mixed perihilar and bibasilar opacities.    I have personally reviewed the examination and initial interpretation  and I agree with the findings.    JOHN KNAPP MD         SYSTEM ID:  [id number]   US Abdomen Limited    Narrative    EXAMINATION: Limited Abdominal Ultrasound, 3/7/2024 1:04 PM     COMPARISON: 2/23/2024 CT chest abdomen and pelvis    HISTORY: Concern for acalculous cholecystitis    TECHNIQUE: The abdomen was scanned in standard fashion with  specialized ultrasound transducer(s) using both gray-scale and limited  color Doppler techniques.    FINDINGS:     Liver: The liver demonstrates normal echotexture, measuring 17.9 cm in  craniocaudal dimension. There is no focal mass.     Gallbladder: There is no wall thickening, pericholecystic fluid,  positive sonographic Rielly's sign or evidence for cholelithiasis.    Bile Ducts: Both the intra- and extrahepatic biliary system are of  normal caliber.  The common bile duct measures 4. mm in diameter.    Pancreas: Visualized portions of the head and body of the pancreas are  unremarkable.     Kidney: The right kidney measures 11.1 cm long. There is no  hydronephrosis or hydroureter, no shadowing renal calculi, cystic  lesion or mass.     Fluid: No evidence of ascites or pleural effusions.    Loops of bowel between the liver and the right kidney.      Impression    IMPRESSION:   Liver size at the upper limit of normal. No evidence of cholecystitis.    I have personally reviewed the examination and initial interpretation  and I agree with the findings.    JOHN KNAPP MD         SYSTEM ID:  [id number]   XR Chest Port 1 View    Narrative    Exam: Chest X-ray, 3/8/2024 1:15 AM    Indication: ECMO    Comparison: 3/7/2024    Findings:   Single view of the chest. ECMO cannula tip terminates in the low SVC.  Right IJ central venous catheter tip terminates at the superior  cavoatrial junction. Endotracheal tube  tip terminates in the  midthoracic trachea. Enteric tube courses inferiorly below the  diaphragm and out of the field of view. Trachea is midline. Cardiac  silhouette is stable. Slightly decreased mixed perihilar and bibasilar  opacities. No pleural effusion or pneumothorax. The upper abdomen is  unremarkable. No acute osseous abnormality.      Impression    Impression:   1. Stable position of support devices including endotracheal tube in  the midthoracic trachea and ECMO cannula tip in the low SVC.  2. Decreased perihilar and basilar predominant mixed opacities.    I have personally reviewed the examination and initial interpretation  and I agree with the findings.    FABIOLA ROLDAN MD         SYSTEM ID:  [id number]   XR Chest Port 1 View    Narrative    Exam: XR CHEST PORT 1 VIEW, 3/8/2024 3:10 AM    Indication: ECMO    Comparison: Same day at 0106 hours    Findings:   Single view of the chest. Endotracheal tube tip terminates in the  midthoracic trachea. ECMO cannula terminates in the low SVC. Right IJ  central venous catheter tip terminates at the superior cavoatrial  junction. Trachea is midline. Cardiac silhouette is stable. Stable  mild streaky perihilar and bibasilar opacities. No pleural effusion or  pneumothorax.      Impression    Impression:   1. Support devices are in stable position.  2. Continued mild perihilar and bibasilar opacities.    I have personally reviewed the examination and initial interpretation  and I agree with the findings.    FABIOLA ROLDAN MD         SYSTEM ID:  [id number]   XR Chest Port 1 View    Narrative    Examination:  XR CHEST PORT 1 VIEW    Date:  3/9/2024 10:21 AM     Clinical Information: ECMO     Comparison: 3/8/2024.    Findings:   Single view of the chest. Endotracheal tube tip terminates in the  midthoracic trachea. ECMO cannula terminates in the low SVC. Right IJ  central venous catheter tip terminates at the superior cavoatrial  junction. Enteric tube courses  infradiaphragmatically with tip out of  the field-of-view. Trachea is midline. Cardiac silhouette is stable.  Similar mixed perihilar and bibasilar opacities. No pleural effusion  or pneumothorax.      Impression    Impression:  1. Stable support devices.  2. Similar mixed perihilar and bibasilar opacities, favored  edema/atelectasis.    EARL MEDINA MD         SYSTEM ID:  [id number]   XR Chest Port 1 View    Narrative    EXAM: XR CHEST PORT 1 VIEW  3/10/2024 1:26 AM      HISTORY: ECMO    COMPARISON: 3/9/2024    FINDINGS: Single view of the chest. Endotracheal tube terminates in  the mid thoracic trachea. Right IJ central venous catheter terminates  at the superior cavoatrial junction. ECMO cannula terminates in the  low SVC. Enteric tube courses inferiorly below the diaphragm and tip  out of the field of view. Trachea is midline. Cardiac silhouette is  stable. Stable mixed perihilar and bibasilar opacities. No pleural  effusion. No appreciable pneumothorax.      Impression    IMPRESSION:   1. Stable mixed perihilar and bibasilar opacities, likely  edema/atelectasis.  2. Stable position of support devices.    I have personally reviewed the examination and initial interpretation  and I agree with the findings.    MARY BARRY MD         SYSTEM ID:  [id number]   XR Chest Port 1 View    Narrative    EXAM: XR CHEST PORT 1 VIEW  3/11/2024 1:13 AM     HISTORY: ECMO       COMPARISON: Radiograph 3/10/2024, 3/9/2024.    TECHNIQUE: AP view the chest    FINDINGS:   Devices, lines, tubes: Endotracheal tube tip projects 5.6 cm to the  kianna. Right internal jugular central venous catheter tip projects  over the superior cavoatrial junction. Gastric tube courses inferior  to the left hemidiaphragm extending outside of the field of view.  Lower approach ECMO cannula tip projects over the low SVC.    The trachea is midline. The cardiomediastinal silhouette is within  normal limits. Stable bibasilar predominant mixed interstitial  and  airspace opacities.  No appreciable pneumothorax. No acute osseous  abnormality.        Impression    IMPRESSION:   1. Supporting devices are in stable positioning.  2. No significant change in bibasilar predominant mixed pulmonary  opacities.    I have personally reviewed the examination and initial interpretation  and I agree with the findings.    INDIGO CALABRESE MD         SYSTEM ID:  [id number]   Echo Complete     Value    LVEF  55-60%    Narrative    [id number]  [id number]  [id number]  [id number]^NORA^JIGNESH     [facility],[place]  Echocardiography Laboratory  [address] [place] [address]     Name: NEGIN NEVILLE  MRN: [id number]  : 1965  Study Date: 2024 10:00 AM  Age: 58 yrs  Gender: Male  Patient Location: [facility]  Reason For Study: Pulmonary HTN  Ordering Physician: JIGNESH MELENDEZ  Referring Physician: MACK EMERY  Performed By: Christian Fan     BSA: 2.3 m2  Height: 69 in  Weight: 246 lb  HR: 70  BP: 110/83 mmHg  ______________________________________________________________________________  Procedure  Complete Portable Echo Adult. Contrast Optison. Echocardiogram with two-  dimensional, color and spectral Doppler performed. Poor acoustic windows.  Optison (NDC #8635-3476-81) given intravenously. Patient was given 7 ml  mixture of 3 ml Optison and 6 ml saline. 2 ml wasted.  ______________________________________________________________________________  Interpretation Summary  Left ventricular size, wall motion and function are normal. The ejection  fraction is 55-60%.  The RV is not well visualized, the RV function appears to be low normal.  Pulmonary artery systolic pressure cannot be assessed.  No pericardial effusion is present.  ______________________________________________________________________________  Left Ventricle  Left ventricular size, wall motion and function are normal. The ejection  fraction is 55-60%. Left  ventricular diastolic function is normal.     Right Ventricle  The RV is not well visualized, the RV function appears to be low normal.     Atria  Both atria appear normal.     Mitral Valve  The mitral valve is normal. Trace mitral insufficiency is present.     Aortic Valve  The valve leaflets are not well visualized. On Doppler interrogation, there is  no significant stenosis or regurgitation.     Tricuspid Valve  The tricuspid valve is normal. Trace tricuspid insufficiency is present.  Pulmonary artery systolic pressure cannot be assessed.     Pulmonic Valve  The valve leaflets are not well visualized. On Doppler interrogation, there is  no significant stenosis or regurgitation.     Vessels  The aorta root is normal. The thoracic aorta is normal. The pulmonary artery  cannot be assessed. The inferior vena cava cannot be assessed.     Pericardium  No pericardial effusion is present.     Compared to Previous Study  Previous study not available for comparison.  ______________________________________________________________________________  MMode/2D Measurements & Calculations  IVSd: 1.00 cm     LVIDd: 4.4 cm  LVIDs: 2.2 cm  LVPWd: 0.88 cm  FS: 49.9 %  LV mass(C)d: 135.6 grams  LV mass(C)dI: 60.1 grams/m2  Ao root diam: 3.7 cm  asc Aorta Diam: 3.1 cm  LVOT diam: 2.2 cm  LVOT area: 3.9 cm2  Ao root diam index Ht(cm/m): 2.1  Ao root diam index BSA (cm/m2): 1.7  Asc Ao diam index BSA (cm/m2): 1.4  Asc Ao diam index Ht(cm/m): 1.8  LA Volume (BP): 38.6 ml     LA Volume Index (BP): 17.1 ml/m2  RWT: 0.40  TAPSE: 2.0 cm     Doppler Measurements & Calculations  MV E max tanmay: 77.4 cm/sec  MV A max tanmay: 73.9 cm/sec  MV E/A: 1.0  MV dec slope: 303.8 cm/sec2  MV dec time: 0.26 sec  PA acc time: 0.08 sec  E/E' av.0  Lateral E/e': 11.3  Medial E/e': 10.8     ______________________________________________________________________________  Report approved by: MD Jamie Zimmer 2024 11:06 AM          Echocardiogram Limited     Value    LVEF  60-65%    [facility]    [id number]  [id number]  [id number]  [id number]^BORIS HU^NEERAJ^FADI     [facility],[place]  Echocardiography Laboratory  [address] [place] [id number]     Name: NEGIN NEVILLE  MRN: [id number]  : 1965  Study Date: 2024 10:13 AM  Age: 58 yrs  Gender: Male  Patient Location: [facility]  Reason For Study: Dyspnea  Ordering Physician: NEERAJ COLVIN  Referring Physician: MACK EMERY  Performed By: Blanca Torres RDCS     BSA: 2.3 m2  Height: 70 in  Weight: 246 lb  ______________________________________________________________________________  Procedure  Bubble Limited Echocardiogram with portions of two-dimensional, color and  spectral Doppler performed.  ______________________________________________________________________________  Interpretation Summary  Normal biventricular function.  The atrial septum is intact as assessed by agitated saline bubble study .  No pericaridal effusion present.  ______________________________________________________________________________  Left Ventricle  Global and regional left ventricular function is normal with an EF of 60-65%.     Right Ventricle  Global right ventricular function is normal.     Atria  The atrial septum is intact as assessed by agitated saline bubble study .     Pericardium  No pericardial effusion is present.     Compared to Previous Study  This study was compared with the study from  . No significant changes  noted.  ______________________________________________________________________________  Report approved by: Jona Dimas 2024 11:49 AM     ______________________________________________________________________________      Echo Limited     Value    LVEF  60-65%    Narrative    [id number]  [id number]  [id number]  [id number]^DOMITILA     [facility],[place]  Echocardiography  Laboratory  [address] [place] [address]     Name: NEGIN NEVILLE  MRN: [id number]  : 1965  Study Date: 2024 03:28 PM  Age: 58 yrs  Gender: Male  Patient Location: [facility]  Reason For Study: Dyspnea  Ordering Physician: SHANTI CLEMENTE  Referring Physician: MACK EMERY  Performed By: Fanny Valdez     BSA: 2.2 m2  Height: 69 in  Weight: 239 lb  ______________________________________________________________________________  Procedure  Bubble Limited Echocardiogram with portions of two-dimensional, color and  spectral Doppler performed. Contrast Optison. Patient was given 5 ml mixture  of 3 ml Optison and 6 ml saline. 4 ml wasted.  ______________________________________________________________________________  Interpretation Summary  Global and regional left ventricular function is normal with an EF of 60-65%.  Global right ventricular function is normal.  Bubble study performed however inconclusive due to poor acuostic windows.  ______________________________________________________________________________  Left Ventricle  Global and regional left ventricular function is normal with an EF of 60-65%.     Right Ventricle  The right ventricle is normal size. Global right ventricular function is  normal.     ______________________________________________________________________________  Report approved by: Lynen TABARES 2024 04:12 PM     ______________________________________________________________________________      Echo Limited with Bubble Study    Narrative    [id number]  [id number]  [id number]  [id number]^ELENA^KRISTEN     [facility],[place]  Echocardiography Laboratory  [address] [place] [address]     Name: NEGIN NEVILLE  MRN: [id number]  : 1965  Study Date: 2024 02:50 PM  Age: 58 yrs  Gender: Male  [place] [facility]  Reason For Study: Assess Interatrial Septum  Ordering Physician: KRISTEN PARKER  Referring  Physician: MACK EMERY  Performed By: Divya Sanchez RDCS     BSA: 2.2 m2  Height: 70 in  Weight: 238 lb  HR: 84  ______________________________________________________________________________  Procedure  Bubble Limited Echocardiogram with portions of two-dimensional, color and  spectral Doppler performed.  ______________________________________________________________________________  Interpretation Summary  Right-to-left shunt at the interatrial septal is present demonstrated by a  positive saline bubble study, consider [id number] to further evaluate if clinically  indicated.  ______________________________________________________________________________  Pericardium  No pericardial effusion is present.  ______________________________________________________________________________  Report approved by: MD Jamie Zimmer 2024 03:37 PM     ______________________________________________________________________________      Echocardiogram Complete    Narrative    [id number]  [id number]  [id number]  [id number]^AJ^ELIZABET     [facility],[place]  Echocardiography Laboratory  [address] [place] [address]     Name: NEGIN NEVILLE  MRN: [id number]  : 1965  Study Date: 2024 08:25 AM  Age: 58 yrs  Gender: Male  Patient Location: [facility]  Reason For Study: Cardiac Arrest  Ordering Physician: ELIZABET ROCHA  Referring Physician: MACK EMERY  Performed By: Shannon Frederick     BSA: 2.2 m2  Height: 70 in  Weight: 238 lb  BP: 89/57 mmHg  ______________________________________________________________________________  Procedure  Complete Portable Echo Adult. Technically difficult study.Extremely poor  acoustic windows.  ______________________________________________________________________________  Interpretation Summary  Technically difficult study.Extremely poor acoustic windows.  On VA ECMO at 4.1 LPM. Difficult to assess LV  function but appears to be  ~30%.  The right ventricle is normal size.  Global right ventricular function is mildly reduced.  No pericardial effusion is present.  ______________________________________________________________________________  Left Ventricle  On VA ECMO at 4.1 LPM. Difficult to assess LV function but appears to be  ~30%.     Right Ventricle  The right ventricle is normal size. Global right ventricular function is  mildly reduced.     Aortic Valve  AV open with each systole.     Pericardium  No pericardial effusion is present.     ______________________________________________________________________________  Doppler Measurements & Calculations  LV V1 max P.1 mmHg  LV V1 max: 72.7 cm/sec  LV V1 VTI: 10.9 cm     ______________________________________________________________________________  Report approved by: Jona Greenberg 2024 12:07 PM         Echo Limited     Value    LVEF  25-30% (severely reduced)    Narrative    [id number]  [id number]  [id number]  [id number]^GOPAL^DEVANG     [facility],[place]  Echocardiography Laboratory  [address] [place] [address]     Name: NEGIN NEVILLE  MRN: [id number]  : 1965  Study Date: 2024 08:19 AM  Age: 58 yrs  Gender: Male  Patient Location: [facility]  Reason For Study: Cardiomyopathy  Ordering Physician: DEVANG HERRON  Referring Physician: MACK EMERYHISYLVIE  Performed By: Yadi Cano RDCS     BSA: 2.3 m2  Height: 70 in  Weight: 259 lb  ______________________________________________________________________________  Procedure  Limited Portable Echo Adult.  ______________________________________________________________________________  Interpretation Summary  The patient is on VA ECMO at 3.5LPM at baseline.  Left ventricular function is decreased. The ejection fraction is 25-30%  (severely reduced).The septum is midline. Aortic valve was not imaged.  The VA ECMO was turned down to  1.5LPM  The left ventricular ejection fraction improved to 45-50%. The right ventricle  remained equal in size with mild dysfunction.     ______________________________________________________________________________  Left Ventricle  Left ventricular function is decreased. The ejection fraction is 25-30%  (severely reduced).     Right Ventricle  Global right ventricular function is mildly reduced.     Vessels  ECMO cannula is present in the IVC.     ______________________________________________________________________________  Report approved by: Lynne Hernandez 2024 10:57 AM     ______________________________________________________________________________      Echocardiogram Limited     Value    LVEF  > 65%    Narrative    [id number]  [id number]  [id number]  [id number]^ABBY^LOIS     [facility],[place]  Echocardiography Laboratory  [address] [place] [address]     Name: NEGIN NEVILLE  MRN: [id number]  : 1965  Study Date: 2024 01:19 PM  Age: 58 yrs  Gender: Male  Patient Location: [facility]  Reason For Study: Shock  Ordering Physician: SPENCER MORA  Referring Physician: MACK EMERY  Performed By: Felicia Espinoza RDCS     BSA: 2.3 m2  Height: 70 in  Weight: 252 lb  HR: 122  BP: 141/63 mmHg  ______________________________________________________________________________  Procedure  Limited Portable Echo Adult. Technically difficult study.Extremely poor  acoustic windows.  ______________________________________________________________________________  Interpretation Summary  VA ECMO turndown from 4.2 L to 1.5 L  Left ventricular function is normal.The ejection fraction is > 65%.  The right ventricle is normal size. Global right ventricular function is  normal.  No pericardial effusion is present.  Compared to prior, cardiac fxn is improved.  ______________________________________________________________________________  Left  Ventricle  Left ventricular function is normal.The ejection fraction is > 65%.     Right Ventricle  The right ventricle is normal size. Global right ventricular function is  normal.     Mitral Valve  The mitral valve is normal.     Aortic Valve  On Doppler interrogation, there is no significant stenosis or regurgitation.     Tricuspid Valve  The tricuspid valve is normal.     Pulmonic Valve  On Doppler interrogation, there is no significant stenosis or regurgitation.     Pericardium  No pericardial effusion is present.  ______________________________________________________________________________  Report approved by: Jona Johnson 2024 02:21 PM     ______________________________________________________________________________      Echocardiogram Limited     Value    LVEF  55-60%    Narrative    [id number]  [id number]  [id number]  [id number]^ABBY^LOIS     [facility],[place]  Echocardiography Laboratory  [address] [place] [address]     Name: NEGIN NEVILLE  MRN: [id number]  : 1965  Study Date: 2024 07:52 AM  Age: 58 yrs  Gender: Male  Patient Location: [facility]  Reason For Study: Pericardial Effusion  Ordering Physician: SPENCER MORA  Referring Physician: MACK EMERY  Performed By: Fanny Valdez     BSA: 2.2 m2  Height: 70 in  Weight: 238 lb  ______________________________________________________________________________  Procedure  Limited Portable Echo Adult. Technically difficult study.  ______________________________________________________________________________  Interpretation Summary  VA ECMO 3.5 LPM.     Baseline:     The visual ejection fraction is probably ~50%. [place] is not well visualized.  The right ventricle is normal size.  Global right ventricular function is normal.  Technically difficult study.     With turndown to 1 lpm, no significnat change.  No pericardial effusion is  "present.  ______________________________________________________________________________  Left Ventricle  Left ventricular size is normal. The visual ejection fraction is 55-60%.     Right Ventricle  The right ventricle is normal size. Global right ventricular function is  normal.     Pericardium  No pericardial effusion is present.     ______________________________________________________________________________  Report approved by: Lynne TABARES 03/08/2024 10:11 AM     ______________________________________________________________________________      Cardiac Catheterization    Narrative      Right sided filling pressures are normal.    Left sided filling pressures are normal.    Mild elevated pulmonary hypertension.    Normal cardiac output level.    Hemodynamic data has been modified in Epic per physician review.    Normal biventricular filling pressures.   Mild pre-capillary pulmonary hypertension  Low-normal cardiac output and index.     Cardiac Catheterization    Narrative    Successful ECMO cannulation  Right radial art line placement     Cardiac Catheterization    Narrative    1. Normal to mildly elevated right-sided filling pressures (RA 7mmHg,   RVEDP 11mmHg)  2. Normal left-sided filling pressures (PCWP 13mmHg)  3. Pulmonary hypertension present (mean PA 36mmHg)  4. Shunt run performed at 3.5 L/min and 1.0 L/min VA ECMO flow.  Findings   not consistent with intracardiac shunting.  Main abnormalities observed   were significantly elevated venous saturations in the IVC and lower   extremities at both flow rates, which could potentially be attributed to   hyperoxygenated arterial VA ECMO return (ie \"North-South Syndrome\") and   his hypometabolic state.  Also notable is severe hypoxia on the LLL PCWP   (53.9%) which was confirmed on multiple attempts and was nearly identical   to the concurrent PA saturation (52.7%) and well out of proportion to the   RML (98.8%) and RLL (96.3%) raising concern for " further pathology.     Cardiac Catheterization    Narrative    No evidence of culprit only shunt in the left lower lobe.  DMPAVM         DISCHARGE MEDICATIONS:  Current Discharge Medication List        START taking these medications    Details   Continuous Blood Gluc Sensor (FREESTYLE JOSÉ LUIS 3 SENSOR) MISC 1 each every 14 days Wear josé luis 3 sensor for 14 days to monitor blood glucose in patient with type 1 DM  Qty: 6 each, Refills: 0    Associated Diagnoses: Type 1 diabetes mellitus with hyperglycemia (H)           CONTINUE these medications which have NOT CHANGED    Details   albuterol (PROAIR HFA/PROVENTIL HFA/VENTOLIN HFA) 108 (90 Base) MCG/ACT inhaler INHALE 1 TO 2 PUFFS BY MOUTH EVERY 4 HOURS AS NEEDED FOR WHEEZING OR SHORTNESS OF BREATH      aspirin (ASA) 81 MG chewable tablet Take 81 mg by mouth every evening      atorvastatin (LIPITOR) 40 MG tablet Take 40 mg by mouth      calcium carbonate 500 mg, elemental, 1250 (500 Ca) MG tablet chewable Take 500 mg by mouth Chew and swallow 1 Tablet (500 mg) by mouth every 4 hours as needed      DULoxetine (CYMBALTA) 30 MG capsule Take 30 mg by mouth daily      FLOVENT  MCG/ACT inhaler Inhale 2 puffs into the lungs daily      fluocinonide (LIDEX) 0.05 % external gel Apply topically 2 times daily as needed (for left sided internal cheek irritation)      gabapentin (NEURONTIN) 300 MG capsule Take 900 mg by mouth 3 times daily      hydrOXYzine (VISTARIL) 25 MG capsule TAKE 1 TO 2 CAPSULES BY MOUTH THREE TIMES A DAY AS NEEDED FOR ANXIETY      insulin aspart (NOVOLOG PEN) 100 UNIT/ML pen 10-15 units per meal; up to 60 units per day      insulin glargine (LANTUS PEN) 100 UNIT/ML pen Inject 15 Units Subcutaneous at bedtime      Lacosamide (VIMPAT) 100 MG TABS tablet Take 100 mg by mouth 2 times daily      levETIRAcetam (KEPPRA) 750 MG tablet Take 1,500 mg by mouth 2 times daily      levothyroxine (SYNTHROID/LEVOTHROID) 150 MCG tablet Take 1 tablet by mouth daily     "  LORazepam (ATIVAN) 0.5 MG tablet Take 1 tablet by mouth 2 times daily as needed for anxiety      mirtazapine (REMERON) 30 MG tablet Take 30 mg by mouth At Bedtime      blood glucose (ACCU-CHEK SMARTVIEW) test strip USE 6 TO 8 STRIPS TO TEST DAILY      !! Insulin Pen Needle (PEN NEEDLES 5/16\") 31G X 8 MM MISC 3-5 needles per day for insulin injections      !! UNIFINE PENTIPS PLUS 31G X 8 MM miscellaneous        !! - Potential duplicate medications found. Please discuss with provider.          DISCHARGE DISPOSITION: Silvano Motta will discharge to home in stable condition.     DISCHARGE INSTRUCTIONS:  Discharge Procedure Orders   Anti Nuclear Bea IgG by IFA with Reflex   Standing Status: Future Standing Exp. Date: 02/07/25     Clara 1 Antibody IgG   Standing Status: Future Number of Occurrences: 1 Standing Exp. Date: 02/07/25     SSA Ro LAVELL Antibody IgG   Standing Status: Future Number of Occurrences: 1 Standing Exp. Date: 02/07/25     SSB La LAVELL Antibody IgG   Standing Status: Future Number of Occurrences: 1 Standing Exp. Date: 02/07/25     Scleroderma Antibody Scl70 LAVELL IgG   Standing Status: Future Number of Occurrences: 1 Standing Exp. Date: 02/07/25     Erythrocyte sedimentation rate auto   Standing Status: Future Standing Exp. Date: 02/07/25     CRP inflammation   Standing Status: Future Number of Occurrences: 1 Standing Exp. Date: 02/07/25     Rheumatoid factor   Standing Status: Future Number of Occurrences: 1 Standing Exp. Date: 02/07/25     Cyclic Citrullinated Peptide Antibody IgG   Standing Status: Future Number of Occurrences: 1 Standing Exp. Date: 02/07/25     CK total   Standing Status: Future Number of Occurrences: 1 Standing Exp. Date: 02/07/25     Aldolase   Standing Status: Future Standing Exp. Date: 02/07/25     Hypersensitivity Pneumonitis 2 - Farmer's Lung   Standing Status: Future Standing Exp. Date: 02/07/25     ANCA IgG by IFA with Reflex to Titer   Standing Status: Future Standing Exp. Date: " 02/07/25       It was our pleasure to care for Silvano Motta during this hospitalization. Please do not hesitate to contact me should there be questions regarding the hospital course or discharge plan.    Thank you for allowing me to care for this patient, please don't hesitate to contact me with any questions regarding this plan.   Patient was discussed and evaluated with Dann Bridges*, attending physician, who agrees with the assessment and plan above.    Jose Carlos Kohli MD  Cardiology Fellow

## 2024-03-14 PROCEDURE — 200N000002 HC R&B ICU UMMC

## 2024-03-14 ASSESSMENT — ACTIVITIES OF DAILY LIVING (ADL)
ADLS_ACUITY_SCORE: 47

## 2024-03-15 PROCEDURE — 200N000002 HC R&B ICU UMMC

## 2024-03-15 ASSESSMENT — ACTIVITIES OF DAILY LIVING (ADL)
ADLS_ACUITY_SCORE: 47

## 2024-03-16 LAB
BACTERIA BLD CULT: NO GROWTH
BACTERIA BLD CULT: NO GROWTH

## 2024-03-16 PROCEDURE — 200N000002 HC R&B ICU UMMC

## 2024-03-16 ASSESSMENT — ACTIVITIES OF DAILY LIVING (ADL)
ADLS_ACUITY_SCORE: 47

## 2024-03-17 PROCEDURE — 200N000002 HC R&B ICU UMMC

## 2024-03-17 ASSESSMENT — ACTIVITIES OF DAILY LIVING (ADL)
ADLS_ACUITY_SCORE: 47

## 2024-03-18 PROCEDURE — 200N000002 HC R&B ICU UMMC

## 2024-03-18 ASSESSMENT — ACTIVITIES OF DAILY LIVING (ADL)
ADLS_ACUITY_SCORE: 47

## 2024-03-19 PROCEDURE — 200N000002 HC R&B ICU UMMC

## 2024-03-19 ASSESSMENT — ACTIVITIES OF DAILY LIVING (ADL)
ADLS_ACUITY_SCORE: 47

## 2024-03-20 PROCEDURE — 200N000002 HC R&B ICU UMMC

## 2024-03-20 ASSESSMENT — ACTIVITIES OF DAILY LIVING (ADL)
ADLS_ACUITY_SCORE: 47

## 2024-03-21 PROCEDURE — 200N000002 HC R&B ICU UMMC

## 2024-03-21 ASSESSMENT — ACTIVITIES OF DAILY LIVING (ADL)
ADLS_ACUITY_SCORE: 47

## 2024-03-22 PROCEDURE — 200N000002 HC R&B ICU UMMC

## 2024-03-22 ASSESSMENT — ACTIVITIES OF DAILY LIVING (ADL)
ADLS_ACUITY_SCORE: 47

## 2024-03-23 PROCEDURE — 200N000002 HC R&B ICU UMMC

## 2024-03-23 ASSESSMENT — ACTIVITIES OF DAILY LIVING (ADL)
ADLS_ACUITY_SCORE: 47

## 2024-03-24 PROCEDURE — 200N000002 HC R&B ICU UMMC

## 2024-03-24 ASSESSMENT — ACTIVITIES OF DAILY LIVING (ADL)
ADLS_ACUITY_SCORE: 47

## 2024-03-25 PROCEDURE — 200N000002 HC R&B ICU UMMC

## 2024-03-25 ASSESSMENT — ACTIVITIES OF DAILY LIVING (ADL)
ADLS_ACUITY_SCORE: 47

## 2024-03-26 PROCEDURE — 200N000002 HC R&B ICU UMMC

## 2024-03-26 ASSESSMENT — ACTIVITIES OF DAILY LIVING (ADL)
ADLS_ACUITY_SCORE: 47

## 2024-03-27 PROCEDURE — 200N000002 HC R&B ICU UMMC

## 2024-03-27 ASSESSMENT — ACTIVITIES OF DAILY LIVING (ADL)
ADLS_ACUITY_SCORE: 47

## 2024-03-28 PROCEDURE — 200N000002 HC R&B ICU UMMC

## 2024-03-28 ASSESSMENT — ACTIVITIES OF DAILY LIVING (ADL)
ADLS_ACUITY_SCORE: 47

## 2024-03-29 PROCEDURE — 200N000002 HC R&B ICU UMMC

## 2024-03-29 ASSESSMENT — ACTIVITIES OF DAILY LIVING (ADL)
ADLS_ACUITY_SCORE: 47

## 2024-03-30 PROCEDURE — 200N000002 HC R&B ICU UMMC

## 2024-03-30 ASSESSMENT — ACTIVITIES OF DAILY LIVING (ADL)
ADLS_ACUITY_SCORE: 47

## 2024-03-31 PROCEDURE — 200N000002 HC R&B ICU UMMC

## 2024-03-31 ASSESSMENT — ACTIVITIES OF DAILY LIVING (ADL)
ADLS_ACUITY_SCORE: 47

## 2024-04-01 PROCEDURE — 200N000002 HC R&B ICU UMMC

## 2024-04-01 ASSESSMENT — ACTIVITIES OF DAILY LIVING (ADL)
ADLS_ACUITY_SCORE: 47

## 2024-04-02 PROCEDURE — 200N000002 HC R&B ICU UMMC

## 2024-04-02 ASSESSMENT — ACTIVITIES OF DAILY LIVING (ADL)
ADLS_ACUITY_SCORE: 47

## 2024-04-03 PROCEDURE — 200N000002 HC R&B ICU UMMC

## 2024-04-03 ASSESSMENT — ACTIVITIES OF DAILY LIVING (ADL)
ADLS_ACUITY_SCORE: 47

## 2024-04-04 PROCEDURE — 200N000002 HC R&B ICU UMMC

## 2024-04-04 ASSESSMENT — ACTIVITIES OF DAILY LIVING (ADL)
ADLS_ACUITY_SCORE: 47

## 2024-04-05 PROCEDURE — 200N000002 HC R&B ICU UMMC

## 2024-04-05 ASSESSMENT — ACTIVITIES OF DAILY LIVING (ADL)
ADLS_ACUITY_SCORE: 47

## 2024-04-06 PROCEDURE — 200N000002 HC R&B ICU UMMC

## 2024-04-06 ASSESSMENT — ACTIVITIES OF DAILY LIVING (ADL)
ADLS_ACUITY_SCORE: 47

## 2024-04-07 PROCEDURE — 200N000002 HC R&B ICU UMMC

## 2024-04-07 ASSESSMENT — ACTIVITIES OF DAILY LIVING (ADL)
ADLS_ACUITY_SCORE: 47

## 2024-04-08 PROCEDURE — 200N000002 HC R&B ICU UMMC

## 2024-04-08 ASSESSMENT — ACTIVITIES OF DAILY LIVING (ADL)
ADLS_ACUITY_SCORE: 47

## 2024-04-09 ASSESSMENT — ACTIVITIES OF DAILY LIVING (ADL)
ADLS_ACUITY_SCORE: 47

## 2024-04-10 ASSESSMENT — ACTIVITIES OF DAILY LIVING (ADL)
ADLS_ACUITY_SCORE: 47

## (undated) DEVICE — DRAPE BACK TABLE  44X90" 8377

## (undated) DEVICE — SUCTION TIP POOLE K770

## (undated) DEVICE — SPONGE LAP 18X18" X8435

## (undated) DEVICE — DRAPE SHEET REV FOLD 3/4 9349

## (undated) DEVICE — ESU ELEC BLADE 6" COATED E1450-6

## (undated) DEVICE — SU SILK 4-0 TIE 12X30" A303H

## (undated) DEVICE — PREP CHLORAPREP 26ML TINTED HI-LITE ORANGE 930815

## (undated) DEVICE — INTRO SHEATH MICRO PLATINUM TIP 4FRX40CM 7274

## (undated) DEVICE — LINEN TOWEL PACK X30 5481

## (undated) DEVICE — WIPE PREMOIST CLEANSING WASHCLOTHS 7988

## (undated) DEVICE — CLIP APPLIER 11" MED LIGACLIP MCM30

## (undated) DEVICE — VALVE HEMOSTASIS GUARDIAN II OD8 FR GUIDEWIRE 8215

## (undated) DEVICE — SUCTION MANIFOLD NEPTUNE 2 SYS 4 PORT 0702-020-000

## (undated) DEVICE — SU SILK 2-0 TIE 12X30" A305H

## (undated) DEVICE — CLIP APPLIER 09 3/8" SM LIGACLIP MCS20

## (undated) DEVICE — TUBING SUCTION 10'X3/16" N510

## (undated) DEVICE — SU SILK 3-0 TIE 12X30" A304H

## (undated) DEVICE — KIT ARTERIAL LINE 2GA 12CM INTRO NDL ASK-04510-HF

## (undated) DEVICE — CATH GUIDING BLUE YELLOW PTFE JR4 6FRX100CM 67008200

## (undated) DEVICE — DRSG TELFA 3X8" 1238

## (undated) DEVICE — CATH ANGIO INFINITI PIGTAIL 145 6 SH 6FRX110CM  534-652S

## (undated) DEVICE — DRAPE FLUID WARMING 52 X 60" ORS-321

## (undated) DEVICE — SU ETHIBOND 4-0 TIE 12X30" X303H

## (undated) DEVICE — SU SILK 0 TIE 6X30" A306H

## (undated) DEVICE — SU PROLENE 6-0 RB-2DA 30" 8711H

## (undated) DEVICE — KIT DVC ANGIO IBASIXCOMPAK 13INX20ML 3WAY IN4430

## (undated) DEVICE — POST MORTEM BAG ADULT 3 ID TAG 90X36IN NON70540WM

## (undated) DEVICE — DRAPE IOBAN INCISE 23X17" 6650EZ

## (undated) DEVICE — INTRO SHEATH 6FRX25CM PINNACLE RSS606

## (undated) DEVICE — NDL BX TRU CUT 14GA 4.5" 2N2702X

## (undated) DEVICE — BONE WAX 2.5GM W31G

## (undated) DEVICE — BLADE SAW STRK STERNAL 6207-97-101

## (undated) DEVICE — CATH ANGIO INFINITI JR4 4FRX100CM 538421

## (undated) DEVICE — BLADE CLIPPER SGL USE 9680

## (undated) DEVICE — SU ETHIBOND 2-0TIE 30" X305H

## (undated) DEVICE — ESU PENCIL SMOKE EVAC W/ROCKER SWITCH 0703-047-000

## (undated) DEVICE — Device

## (undated) DEVICE — VESSEL LOOPS YELLOW MAXI 31145694

## (undated) DEVICE — INTRO SHEATH SET ORNG 9 3/8IN 7 & 8FR 3W STPCK TISS CL-07724

## (undated) DEVICE — LINEN TOWEL PACK X6 WHITE 5487

## (undated) DEVICE — WIRE GUIDE 0.025"X150CM EMERALD J TIP 502524

## (undated) DEVICE — PACK HEART RIGHT CUSTOM SAN32RHF18

## (undated) DEVICE — CLIP APPLIER 13" LG LIGACLIP MCL20

## (undated) DEVICE — CATH ANGIO WEDGE PRESSURE 6FRX110CM DL AI-07126

## (undated) DEVICE — WIRE GUIDE 0.035"X145CM AMPLATZ XSTIFF J THSCF-35-145-3-AES

## (undated) DEVICE — CANNULA ART 17FR 3/8IN 23CM 2 SH BIOLINE 70105.3082

## (undated) DEVICE — INTRO SHEATH 7FRX10CM PINNACLE RSS702

## (undated) DEVICE — SU PROLENE 7-0 BV-1DA 4X24" M8702

## (undated) DEVICE — SU SILK 3-0 SH 30" K832H

## (undated) DEVICE — SU SILK 1 TIE 6X30" A307H

## (undated) DEVICE — SU ETHIBOND 0 CT-1 CR 8X18" CX21D

## (undated) DEVICE — INTRO SHEATH 8FRX10CM PINNACLE RSS802

## (undated) DEVICE — SU ETHIBOND 5 LRDA 30" B499T

## (undated) DEVICE — NDL COUNTER 20CT 31142493

## (undated) DEVICE — SU PROLENE 3-0 SHDA 36" 8522H

## (undated) DEVICE — TUBE NASOGASTRIC FEEDING KANGAROO ENTRIFLEX 8FR 43" 8884-720

## (undated) DEVICE — GW VASC .035IN DIA 260CML 7CML 3 MM RADIUS J CURVE 502455

## (undated) DEVICE — SU SILK 3-0 SH CR 8X18" C013D

## (undated) DEVICE — CATH ANGIO INFINITI PIGTAIL 6FRX110CM 6 SH 534650S

## (undated) DEVICE — KIT MICROINTRODUCER VASCULAR  4FRX21GAX4CM

## (undated) DEVICE — DRAPE ISOLATION BAG 1003

## (undated) DEVICE — KIT RIGHT HEART CATH 60130719

## (undated) DEVICE — SHEATH GUIDING R2P DESTINATION 75CM 6FR STERIL GS-R6ST1C75W

## (undated) DEVICE — INTRODUCER SHEATH 8FRX24CM ARROW FLEX CL-07824

## (undated) DEVICE — KIT HAND CONTROL ACIST 016795

## (undated) DEVICE — PACK HEART LEFT CUSTOM

## (undated) DEVICE — MANIFOLD KIT ANGIO AUTOMATED 014613

## (undated) DEVICE — TOURNIQUET VASCULAR KIT 7 1/2" 79012

## (undated) DEVICE — SU ETHIBOND 0 TIE 6X30" X306H

## (undated) DEVICE — SU UMBILICAL TAPE .125X30" U11T

## (undated) DEVICE — CLIP HORIZON LG ORANGE 004200

## (undated) DEVICE — POST MORTEM BAG STD 36X90" BW901

## (undated) DEVICE — SYR ANGIOGRAPHY MULTIUSE KIT ACIST 014612

## (undated) DEVICE — BASIN SET SINGLE STERILE 13752-624

## (undated) DEVICE — SUCTION TIP YANKAUER W/O VENT K86

## (undated) DEVICE — TUBING PRESSURE 30"

## (undated) RX ORDER — FENTANYL CITRATE 50 UG/ML
INJECTION, SOLUTION INTRAMUSCULAR; INTRAVENOUS
Status: DISPENSED
Start: 2024-01-01

## (undated) RX ORDER — HEPARIN SODIUM 1000 [USP'U]/ML
INJECTION, SOLUTION INTRAVENOUS; SUBCUTANEOUS
Status: DISPENSED
Start: 2024-01-01

## (undated) RX ORDER — NOREPINEPHRINE BITARTRATE 0.06 MG/ML
INJECTION, SOLUTION INTRAVENOUS
Status: DISPENSED
Start: 2024-01-01

## (undated) RX ORDER — LIDOCAINE HYDROCHLORIDE 10 MG/ML
INJECTION, SOLUTION EPIDURAL; INFILTRATION; INTRACAUDAL; PERINEURAL
Status: DISPENSED
Start: 2024-01-01